# Patient Record
Sex: FEMALE | Race: WHITE | Employment: OTHER | URBAN - NONMETROPOLITAN AREA
[De-identification: names, ages, dates, MRNs, and addresses within clinical notes are randomized per-mention and may not be internally consistent; named-entity substitution may affect disease eponyms.]

---

## 2017-01-05 ENCOUNTER — OFFICE VISIT (OUTPATIENT)
Dept: OTHER | Age: 67
End: 2017-01-05

## 2017-01-05 ENCOUNTER — OFFICE VISIT (OUTPATIENT)
Dept: FAMILY MEDICINE CLINIC | Age: 67
End: 2017-01-05

## 2017-01-05 VITALS — BODY MASS INDEX: 35.37 KG/M2 | HEIGHT: 62 IN | WEIGHT: 192.2 LBS

## 2017-01-05 VITALS
HEART RATE: 68 BPM | DIASTOLIC BLOOD PRESSURE: 72 MMHG | WEIGHT: 193.6 LBS | SYSTOLIC BLOOD PRESSURE: 118 MMHG | HEIGHT: 62 IN | BODY MASS INDEX: 35.63 KG/M2 | RESPIRATION RATE: 20 BRPM | TEMPERATURE: 97.8 F

## 2017-01-05 DIAGNOSIS — F32.A DEPRESSION, UNSPECIFIED DEPRESSION TYPE: ICD-10-CM

## 2017-01-05 DIAGNOSIS — E11.65 TYPE 2 DIABETES MELLITUS WITH HYPERGLYCEMIA, UNSPECIFIED LONG TERM INSULIN USE STATUS: Primary | ICD-10-CM

## 2017-01-05 DIAGNOSIS — E66.09 NON MORBID OBESITY DUE TO EXCESS CALORIES: ICD-10-CM

## 2017-01-05 DIAGNOSIS — E78.5 HYPERLIPIDEMIA, UNSPECIFIED HYPERLIPIDEMIA TYPE: ICD-10-CM

## 2017-01-05 DIAGNOSIS — M54.40 ACUTE MIDLINE LOW BACK PAIN WITH SCIATICA, SCIATICA LATERALITY UNSPECIFIED: ICD-10-CM

## 2017-01-05 DIAGNOSIS — K21.9 GASTROESOPHAGEAL REFLUX DISEASE, ESOPHAGITIS PRESENCE NOT SPECIFIED: ICD-10-CM

## 2017-01-05 DIAGNOSIS — E03.9 HYPOTHYROIDISM, UNSPECIFIED TYPE: ICD-10-CM

## 2017-01-05 DIAGNOSIS — I10 ESSENTIAL HYPERTENSION: ICD-10-CM

## 2017-01-05 DIAGNOSIS — E11.8 TYPE 2 DIABETES MELLITUS WITH COMPLICATION, WITHOUT LONG-TERM CURRENT USE OF INSULIN (HCC): Primary | ICD-10-CM

## 2017-01-05 PROCEDURE — 99214 OFFICE O/P EST MOD 30 MIN: CPT | Performed by: NURSE PRACTITIONER

## 2017-01-05 PROCEDURE — 97803 MED NUTRITION INDIV SUBSEQ: CPT | Performed by: DIETITIAN, REGISTERED

## 2017-01-05 RX ORDER — GLIMEPIRIDE 2 MG/1
TABLET ORAL
Qty: 180 TABLET | Refills: 3 | Status: SHIPPED | OUTPATIENT
Start: 2017-01-05 | End: 2018-02-14 | Stop reason: SDUPTHER

## 2017-01-05 RX ORDER — ESCITALOPRAM OXALATE 20 MG/1
TABLET ORAL
Qty: 90 TABLET | Refills: 3 | Status: SHIPPED | OUTPATIENT
Start: 2017-01-05 | End: 2018-02-14 | Stop reason: SDUPTHER

## 2017-01-05 RX ORDER — ATENOLOL 50 MG/1
TABLET ORAL
Qty: 45 TABLET | Refills: 3 | Status: SHIPPED | OUTPATIENT
Start: 2017-01-05 | End: 2018-02-14 | Stop reason: SDUPTHER

## 2017-01-05 RX ORDER — PANTOPRAZOLE SODIUM 40 MG/1
TABLET, DELAYED RELEASE ORAL
Qty: 180 TABLET | Refills: 3 | Status: SHIPPED | OUTPATIENT
Start: 2017-01-05 | End: 2018-02-14 | Stop reason: SDUPTHER

## 2017-01-05 RX ORDER — ATORVASTATIN CALCIUM 40 MG/1
TABLET, FILM COATED ORAL
Qty: 90 TABLET | Refills: 3 | Status: SHIPPED | OUTPATIENT
Start: 2017-01-05 | End: 2018-02-14 | Stop reason: SDUPTHER

## 2017-01-05 RX ORDER — LEVOTHYROXINE SODIUM 0.05 MG/1
TABLET ORAL
Qty: 90 TABLET | Refills: 3 | Status: SHIPPED | OUTPATIENT
Start: 2017-01-05 | End: 2018-02-14 | Stop reason: SDUPTHER

## 2017-01-05 ASSESSMENT — ENCOUNTER SYMPTOMS
COUGH: 0
CONSTIPATION: 1
NAUSEA: 0
WHEEZING: 0
BACK PAIN: 1
DIARRHEA: 0
ABDOMINAL PAIN: 0
TROUBLE SWALLOWING: 0
BLOOD IN STOOL: 0
SHORTNESS OF BREATH: 0
ABDOMINAL DISTENTION: 0
ANAL BLEEDING: 0
PHOTOPHOBIA: 0
APNEA: 0
VOMITING: 0
VOICE CHANGE: 0

## 2017-02-09 ENCOUNTER — TELEPHONE (OUTPATIENT)
Dept: FAMILY MEDICINE CLINIC | Age: 67
End: 2017-02-09

## 2017-02-16 ENCOUNTER — TELEPHONE (OUTPATIENT)
Dept: OTHER | Age: 67
End: 2017-02-16

## 2017-02-23 ENCOUNTER — OFFICE VISIT (OUTPATIENT)
Dept: CARDIOLOGY | Age: 67
End: 2017-02-23

## 2017-02-23 VITALS
BODY MASS INDEX: 36.06 KG/M2 | DIASTOLIC BLOOD PRESSURE: 78 MMHG | HEART RATE: 61 BPM | WEIGHT: 191 LBS | SYSTOLIC BLOOD PRESSURE: 148 MMHG | HEIGHT: 61 IN

## 2017-02-23 DIAGNOSIS — I25.10 CORONARY ARTERY DISEASE INVOLVING NATIVE CORONARY ARTERY OF NATIVE HEART WITHOUT ANGINA PECTORIS: ICD-10-CM

## 2017-02-23 DIAGNOSIS — E78.01 FAMILIAL HYPERCHOLESTEROLEMIA: ICD-10-CM

## 2017-02-23 DIAGNOSIS — I10 ESSENTIAL HYPERTENSION: Primary | ICD-10-CM

## 2017-02-23 PROCEDURE — G8598 ASA/ANTIPLAT THER USED: HCPCS | Performed by: NUCLEAR MEDICINE

## 2017-02-23 PROCEDURE — 93000 ELECTROCARDIOGRAM COMPLETE: CPT | Performed by: NUCLEAR MEDICINE

## 2017-02-23 PROCEDURE — 1090F PRES/ABSN URINE INCON ASSESS: CPT | Performed by: NUCLEAR MEDICINE

## 2017-02-23 PROCEDURE — 99213 OFFICE O/P EST LOW 20 MIN: CPT | Performed by: NUCLEAR MEDICINE

## 2017-02-23 PROCEDURE — 3017F COLORECTAL CA SCREEN DOC REV: CPT | Performed by: NUCLEAR MEDICINE

## 2017-02-23 PROCEDURE — 4040F PNEUMOC VAC/ADMIN/RCVD: CPT | Performed by: NUCLEAR MEDICINE

## 2017-02-23 PROCEDURE — 1123F ACP DISCUSS/DSCN MKR DOCD: CPT | Performed by: NUCLEAR MEDICINE

## 2017-02-23 PROCEDURE — G8400 PT W/DXA NO RESULTS DOC: HCPCS | Performed by: NUCLEAR MEDICINE

## 2017-02-23 PROCEDURE — G8427 DOCREV CUR MEDS BY ELIG CLIN: HCPCS | Performed by: NUCLEAR MEDICINE

## 2017-02-23 PROCEDURE — 3014F SCREEN MAMMO DOC REV: CPT | Performed by: NUCLEAR MEDICINE

## 2017-02-23 PROCEDURE — G8417 CALC BMI ABV UP PARAM F/U: HCPCS | Performed by: NUCLEAR MEDICINE

## 2017-02-23 PROCEDURE — 1036F TOBACCO NON-USER: CPT | Performed by: NUCLEAR MEDICINE

## 2017-02-23 PROCEDURE — G8484 FLU IMMUNIZE NO ADMIN: HCPCS | Performed by: NUCLEAR MEDICINE

## 2017-03-23 ENCOUNTER — OFFICE VISIT (OUTPATIENT)
Dept: OTHER | Age: 67
End: 2017-03-23

## 2017-03-23 VITALS — HEIGHT: 61 IN | WEIGHT: 190.6 LBS | BODY MASS INDEX: 35.98 KG/M2

## 2017-03-23 DIAGNOSIS — E11.8 TYPE 2 DIABETES MELLITUS WITH COMPLICATION, WITHOUT LONG-TERM CURRENT USE OF INSULIN (HCC): Primary | ICD-10-CM

## 2017-04-05 ENCOUNTER — OFFICE VISIT (OUTPATIENT)
Dept: FAMILY MEDICINE CLINIC | Age: 67
End: 2017-04-05

## 2017-04-05 VITALS
TEMPERATURE: 97.9 F | RESPIRATION RATE: 16 BRPM | WEIGHT: 189.6 LBS | BODY MASS INDEX: 35.82 KG/M2 | HEART RATE: 64 BPM | SYSTOLIC BLOOD PRESSURE: 124 MMHG | DIASTOLIC BLOOD PRESSURE: 80 MMHG

## 2017-04-05 DIAGNOSIS — I10 ESSENTIAL HYPERTENSION: ICD-10-CM

## 2017-04-05 DIAGNOSIS — J20.9 ACUTE BRONCHITIS WITH BRONCHOSPASM: ICD-10-CM

## 2017-04-05 DIAGNOSIS — E66.09 NON MORBID OBESITY DUE TO EXCESS CALORIES: ICD-10-CM

## 2017-04-05 DIAGNOSIS — E78.5 HYPERLIPIDEMIA, UNSPECIFIED HYPERLIPIDEMIA TYPE: Primary | ICD-10-CM

## 2017-04-05 DIAGNOSIS — E11.8 TYPE 2 DIABETES MELLITUS WITH COMPLICATION, WITHOUT LONG-TERM CURRENT USE OF INSULIN (HCC): ICD-10-CM

## 2017-04-05 DIAGNOSIS — R05.9 COUGH: ICD-10-CM

## 2017-04-05 PROCEDURE — G8598 ASA/ANTIPLAT THER USED: HCPCS | Performed by: NURSE PRACTITIONER

## 2017-04-05 PROCEDURE — 3014F SCREEN MAMMO DOC REV: CPT | Performed by: NURSE PRACTITIONER

## 2017-04-05 PROCEDURE — 1090F PRES/ABSN URINE INCON ASSESS: CPT | Performed by: NURSE PRACTITIONER

## 2017-04-05 PROCEDURE — 3017F COLORECTAL CA SCREEN DOC REV: CPT | Performed by: NURSE PRACTITIONER

## 2017-04-05 PROCEDURE — G8417 CALC BMI ABV UP PARAM F/U: HCPCS | Performed by: NURSE PRACTITIONER

## 2017-04-05 PROCEDURE — G8427 DOCREV CUR MEDS BY ELIG CLIN: HCPCS | Performed by: NURSE PRACTITIONER

## 2017-04-05 PROCEDURE — 4040F PNEUMOC VAC/ADMIN/RCVD: CPT | Performed by: NURSE PRACTITIONER

## 2017-04-05 PROCEDURE — 1036F TOBACCO NON-USER: CPT | Performed by: NURSE PRACTITIONER

## 2017-04-05 PROCEDURE — 3045F PR MOST RECENT HEMOGLOBIN A1C LEVEL 7.0-9.0%: CPT | Performed by: NURSE PRACTITIONER

## 2017-04-05 PROCEDURE — G8400 PT W/DXA NO RESULTS DOC: HCPCS | Performed by: NURSE PRACTITIONER

## 2017-04-05 PROCEDURE — 1123F ACP DISCUSS/DSCN MKR DOCD: CPT | Performed by: NURSE PRACTITIONER

## 2017-04-05 PROCEDURE — 99214 OFFICE O/P EST MOD 30 MIN: CPT | Performed by: NURSE PRACTITIONER

## 2017-04-05 RX ORDER — CEFDINIR 300 MG/1
300 CAPSULE ORAL EVERY 12 HOURS
Qty: 20 CAPSULE | Refills: 0 | Status: SHIPPED | OUTPATIENT
Start: 2017-04-05 | End: 2017-04-15

## 2017-04-05 RX ORDER — BENZONATATE 100 MG/1
100 CAPSULE ORAL 3 TIMES DAILY PRN
Qty: 21 CAPSULE | Refills: 0 | Status: SHIPPED | OUTPATIENT
Start: 2017-04-05 | End: 2017-04-12

## 2017-04-05 ASSESSMENT — ENCOUNTER SYMPTOMS
DIARRHEA: 0
PHOTOPHOBIA: 0
VOMITING: 0
ABDOMINAL DISTENTION: 0
RHINORRHEA: 0
SINUS PRESSURE: 1
BLOOD IN STOOL: 0
WHEEZING: 1
VOICE CHANGE: 1
BACK PAIN: 1
COUGH: 1
TROUBLE SWALLOWING: 0
APNEA: 0
SHORTNESS OF BREATH: 0
ANAL BLEEDING: 0
ABDOMINAL PAIN: 0
NAUSEA: 0
CONSTIPATION: 0

## 2017-04-05 ASSESSMENT — PATIENT HEALTH QUESTIONNAIRE - PHQ9
1. LITTLE INTEREST OR PLEASURE IN DOING THINGS: 0
SUM OF ALL RESPONSES TO PHQ9 QUESTIONS 1 & 2: 0
SUM OF ALL RESPONSES TO PHQ QUESTIONS 1-9: 0
2. FEELING DOWN, DEPRESSED OR HOPELESS: 0

## 2017-04-18 ENCOUNTER — TELEPHONE (OUTPATIENT)
Dept: FAMILY MEDICINE CLINIC | Age: 67
End: 2017-04-18

## 2017-05-09 ENCOUNTER — TELEPHONE (OUTPATIENT)
Dept: FAMILY MEDICINE CLINIC | Age: 67
End: 2017-05-09

## 2017-05-12 ENCOUNTER — TELEPHONE (OUTPATIENT)
Dept: ENDOCRINOLOGY | Age: 67
End: 2017-05-12

## 2017-05-15 ENCOUNTER — OFFICE VISIT (OUTPATIENT)
Dept: ENDOCRINOLOGY | Age: 67
End: 2017-05-15

## 2017-05-15 VITALS
BODY MASS INDEX: 35.78 KG/M2 | WEIGHT: 189.5 LBS | DIASTOLIC BLOOD PRESSURE: 72 MMHG | HEIGHT: 61 IN | RESPIRATION RATE: 20 BRPM | SYSTOLIC BLOOD PRESSURE: 148 MMHG | HEART RATE: 65 BPM

## 2017-05-15 DIAGNOSIS — E11.8 TYPE 2 DIABETES MELLITUS WITH COMPLICATION, WITHOUT LONG-TERM CURRENT USE OF INSULIN (HCC): Primary | ICD-10-CM

## 2017-05-15 DIAGNOSIS — E03.9 ACQUIRED HYPOTHYROIDISM: ICD-10-CM

## 2017-05-15 PROCEDURE — 1036F TOBACCO NON-USER: CPT | Performed by: CLINICAL NURSE SPECIALIST

## 2017-05-15 PROCEDURE — G8400 PT W/DXA NO RESULTS DOC: HCPCS | Performed by: CLINICAL NURSE SPECIALIST

## 2017-05-15 PROCEDURE — G8427 DOCREV CUR MEDS BY ELIG CLIN: HCPCS | Performed by: CLINICAL NURSE SPECIALIST

## 2017-05-15 PROCEDURE — 1090F PRES/ABSN URINE INCON ASSESS: CPT | Performed by: CLINICAL NURSE SPECIALIST

## 2017-05-15 PROCEDURE — 1123F ACP DISCUSS/DSCN MKR DOCD: CPT | Performed by: CLINICAL NURSE SPECIALIST

## 2017-05-15 PROCEDURE — 3017F COLORECTAL CA SCREEN DOC REV: CPT | Performed by: CLINICAL NURSE SPECIALIST

## 2017-05-15 PROCEDURE — G8417 CALC BMI ABV UP PARAM F/U: HCPCS | Performed by: CLINICAL NURSE SPECIALIST

## 2017-05-15 PROCEDURE — 4040F PNEUMOC VAC/ADMIN/RCVD: CPT | Performed by: CLINICAL NURSE SPECIALIST

## 2017-05-15 PROCEDURE — G8598 ASA/ANTIPLAT THER USED: HCPCS | Performed by: CLINICAL NURSE SPECIALIST

## 2017-05-15 PROCEDURE — 3045F PR MOST RECENT HEMOGLOBIN A1C LEVEL 7.0-9.0%: CPT | Performed by: CLINICAL NURSE SPECIALIST

## 2017-05-15 PROCEDURE — 99214 OFFICE O/P EST MOD 30 MIN: CPT | Performed by: CLINICAL NURSE SPECIALIST

## 2017-05-15 PROCEDURE — 3014F SCREEN MAMMO DOC REV: CPT | Performed by: CLINICAL NURSE SPECIALIST

## 2017-05-15 RX ORDER — METFORMIN HYDROCHLORIDE 500 MG/1
500 TABLET, EXTENDED RELEASE ORAL
Qty: 30 TABLET | Refills: 3 | Status: SHIPPED | OUTPATIENT
Start: 2017-05-15 | End: 2017-06-05 | Stop reason: SDUPTHER

## 2017-05-15 ASSESSMENT — ENCOUNTER SYMPTOMS
CHEST TIGHTNESS: 0
COUGH: 0
ABDOMINAL PAIN: 0
EYE REDNESS: 0
NAUSEA: 0
WHEEZING: 0
CONSTIPATION: 0
DIARRHEA: 0
VOICE CHANGE: 0
SHORTNESS OF BREATH: 0

## 2017-06-05 ENCOUNTER — TELEPHONE (OUTPATIENT)
Dept: ENDOCRINOLOGY | Age: 67
End: 2017-06-05

## 2017-06-05 RX ORDER — METFORMIN HYDROCHLORIDE 500 MG/1
TABLET, EXTENDED RELEASE ORAL
Qty: 30 TABLET | Refills: 3
Start: 2017-06-05 | End: 2017-06-06 | Stop reason: SDUPTHER

## 2017-06-06 RX ORDER — METFORMIN HYDROCHLORIDE 500 MG/1
TABLET, EXTENDED RELEASE ORAL
Qty: 60 TABLET | Refills: 3 | Status: SHIPPED | OUTPATIENT
Start: 2017-06-06 | End: 2017-07-26 | Stop reason: SDUPTHER

## 2017-06-19 ENCOUNTER — TELEPHONE (OUTPATIENT)
Dept: ENDOCRINOLOGY | Age: 67
End: 2017-06-19

## 2017-06-20 ENCOUNTER — TELEPHONE (OUTPATIENT)
Dept: ENDOCRINOLOGY | Age: 67
End: 2017-06-20

## 2017-06-22 ENCOUNTER — OFFICE VISIT (OUTPATIENT)
Dept: OTHER | Age: 67
End: 2017-06-22

## 2017-06-22 VITALS — BODY MASS INDEX: 34.7 KG/M2 | HEIGHT: 61 IN | WEIGHT: 183.8 LBS

## 2017-06-22 DIAGNOSIS — E11.8 TYPE 2 DIABETES MELLITUS WITH COMPLICATION, WITHOUT LONG-TERM CURRENT USE OF INSULIN (HCC): Primary | ICD-10-CM

## 2017-06-26 ENCOUNTER — TELEPHONE (OUTPATIENT)
Dept: FAMILY MEDICINE CLINIC | Age: 67
End: 2017-06-26

## 2017-06-26 ENCOUNTER — TELEPHONE (OUTPATIENT)
Dept: CARDIOLOGY | Age: 67
End: 2017-06-26

## 2017-06-26 DIAGNOSIS — E11.8 TYPE 2 DIABETES MELLITUS WITH COMPLICATION, WITHOUT LONG-TERM CURRENT USE OF INSULIN (HCC): Primary | ICD-10-CM

## 2017-06-27 ENCOUNTER — TELEPHONE (OUTPATIENT)
Dept: ENDOCRINOLOGY | Age: 67
End: 2017-06-27

## 2017-06-27 DIAGNOSIS — E03.9 ACQUIRED HYPOTHYROIDISM: ICD-10-CM

## 2017-06-27 DIAGNOSIS — E11.8 TYPE 2 DIABETES MELLITUS WITH COMPLICATION, WITHOUT LONG-TERM CURRENT USE OF INSULIN (HCC): Primary | ICD-10-CM

## 2017-07-10 ENCOUNTER — OFFICE VISIT (OUTPATIENT)
Dept: FAMILY MEDICINE CLINIC | Age: 67
End: 2017-07-10

## 2017-07-10 VITALS
SYSTOLIC BLOOD PRESSURE: 132 MMHG | HEART RATE: 64 BPM | WEIGHT: 185.6 LBS | HEIGHT: 61 IN | DIASTOLIC BLOOD PRESSURE: 86 MMHG | TEMPERATURE: 98.5 F | RESPIRATION RATE: 14 BRPM | BODY MASS INDEX: 35.04 KG/M2

## 2017-07-10 DIAGNOSIS — M25.561 RIGHT KNEE PAIN, UNSPECIFIED CHRONICITY: ICD-10-CM

## 2017-07-10 DIAGNOSIS — Z87.898 HISTORY OF POSTOPERATIVE NAUSEA AND VOMITING: ICD-10-CM

## 2017-07-10 DIAGNOSIS — Z01.818 PREOPERATIVE CLEARANCE: Primary | ICD-10-CM

## 2017-07-10 PROCEDURE — 1123F ACP DISCUSS/DSCN MKR DOCD: CPT | Performed by: NURSE PRACTITIONER

## 2017-07-10 PROCEDURE — G8598 ASA/ANTIPLAT THER USED: HCPCS | Performed by: NURSE PRACTITIONER

## 2017-07-10 PROCEDURE — 99214 OFFICE O/P EST MOD 30 MIN: CPT | Performed by: NURSE PRACTITIONER

## 2017-07-10 PROCEDURE — 4040F PNEUMOC VAC/ADMIN/RCVD: CPT | Performed by: NURSE PRACTITIONER

## 2017-07-10 PROCEDURE — 1090F PRES/ABSN URINE INCON ASSESS: CPT | Performed by: NURSE PRACTITIONER

## 2017-07-10 PROCEDURE — 1036F TOBACCO NON-USER: CPT | Performed by: NURSE PRACTITIONER

## 2017-07-10 PROCEDURE — G8400 PT W/DXA NO RESULTS DOC: HCPCS | Performed by: NURSE PRACTITIONER

## 2017-07-10 PROCEDURE — 3014F SCREEN MAMMO DOC REV: CPT | Performed by: NURSE PRACTITIONER

## 2017-07-10 PROCEDURE — 3017F COLORECTAL CA SCREEN DOC REV: CPT | Performed by: NURSE PRACTITIONER

## 2017-07-10 PROCEDURE — G8427 DOCREV CUR MEDS BY ELIG CLIN: HCPCS | Performed by: NURSE PRACTITIONER

## 2017-07-10 PROCEDURE — G8417 CALC BMI ABV UP PARAM F/U: HCPCS | Performed by: NURSE PRACTITIONER

## 2017-07-10 ASSESSMENT — ENCOUNTER SYMPTOMS
DIARRHEA: 0
ABDOMINAL PAIN: 0
VOMITING: 0
SHORTNESS OF BREATH: 0
BLOOD IN STOOL: 0
ANAL BLEEDING: 0
PHOTOPHOBIA: 0
ABDOMINAL DISTENTION: 0
APNEA: 0
BACK PAIN: 0
CONSTIPATION: 0
VOICE CHANGE: 0
COUGH: 0
WHEEZING: 0
NAUSEA: 0
TROUBLE SWALLOWING: 0

## 2017-07-18 ENCOUNTER — TELEPHONE (OUTPATIENT)
Dept: FAMILY MEDICINE CLINIC | Age: 67
End: 2017-07-18

## 2017-07-26 ENCOUNTER — OFFICE VISIT (OUTPATIENT)
Dept: FAMILY MEDICINE CLINIC | Age: 67
End: 2017-07-26
Payer: MEDICARE

## 2017-07-26 VITALS
TEMPERATURE: 98.2 F | RESPIRATION RATE: 16 BRPM | DIASTOLIC BLOOD PRESSURE: 60 MMHG | HEART RATE: 64 BPM | SYSTOLIC BLOOD PRESSURE: 110 MMHG | HEIGHT: 62 IN | BODY MASS INDEX: 34.04 KG/M2 | WEIGHT: 185 LBS

## 2017-07-26 DIAGNOSIS — I10 ESSENTIAL HYPERTENSION: ICD-10-CM

## 2017-07-26 DIAGNOSIS — E78.5 HYPERLIPIDEMIA, UNSPECIFIED HYPERLIPIDEMIA TYPE: ICD-10-CM

## 2017-07-26 DIAGNOSIS — K21.9 GASTROESOPHAGEAL REFLUX DISEASE, ESOPHAGITIS PRESENCE NOT SPECIFIED: ICD-10-CM

## 2017-07-26 DIAGNOSIS — D64.9 ANEMIA, UNSPECIFIED TYPE: ICD-10-CM

## 2017-07-26 DIAGNOSIS — E11.8 TYPE 2 DIABETES MELLITUS WITH COMPLICATION, WITHOUT LONG-TERM CURRENT USE OF INSULIN (HCC): Primary | ICD-10-CM

## 2017-07-26 DIAGNOSIS — E66.09 NON MORBID OBESITY DUE TO EXCESS CALORIES: ICD-10-CM

## 2017-07-26 DIAGNOSIS — Z23 NEED FOR STREPTOCOCCUS PNEUMONIAE VACCINATION: ICD-10-CM

## 2017-07-26 PROCEDURE — 1123F ACP DISCUSS/DSCN MKR DOCD: CPT | Performed by: NURSE PRACTITIONER

## 2017-07-26 PROCEDURE — G0009 ADMIN PNEUMOCOCCAL VACCINE: HCPCS | Performed by: NURSE PRACTITIONER

## 2017-07-26 PROCEDURE — 1036F TOBACCO NON-USER: CPT | Performed by: NURSE PRACTITIONER

## 2017-07-26 PROCEDURE — 90732 PPSV23 VACC 2 YRS+ SUBQ/IM: CPT | Performed by: NURSE PRACTITIONER

## 2017-07-26 PROCEDURE — 3014F SCREEN MAMMO DOC REV: CPT | Performed by: NURSE PRACTITIONER

## 2017-07-26 PROCEDURE — G8417 CALC BMI ABV UP PARAM F/U: HCPCS | Performed by: NURSE PRACTITIONER

## 2017-07-26 PROCEDURE — 3046F HEMOGLOBIN A1C LEVEL >9.0%: CPT | Performed by: NURSE PRACTITIONER

## 2017-07-26 PROCEDURE — G8400 PT W/DXA NO RESULTS DOC: HCPCS | Performed by: NURSE PRACTITIONER

## 2017-07-26 PROCEDURE — 4040F PNEUMOC VAC/ADMIN/RCVD: CPT | Performed by: NURSE PRACTITIONER

## 2017-07-26 PROCEDURE — 3017F COLORECTAL CA SCREEN DOC REV: CPT | Performed by: NURSE PRACTITIONER

## 2017-07-26 PROCEDURE — G8427 DOCREV CUR MEDS BY ELIG CLIN: HCPCS | Performed by: NURSE PRACTITIONER

## 2017-07-26 PROCEDURE — 1090F PRES/ABSN URINE INCON ASSESS: CPT | Performed by: NURSE PRACTITIONER

## 2017-07-26 PROCEDURE — 99214 OFFICE O/P EST MOD 30 MIN: CPT | Performed by: NURSE PRACTITIONER

## 2017-07-26 PROCEDURE — G8598 ASA/ANTIPLAT THER USED: HCPCS | Performed by: NURSE PRACTITIONER

## 2017-07-26 RX ORDER — METFORMIN HYDROCHLORIDE 500 MG/1
1000 TABLET, EXTENDED RELEASE ORAL
Qty: 180 TABLET | Refills: 3 | Status: SHIPPED | OUTPATIENT
Start: 2017-07-26 | End: 2017-07-26 | Stop reason: SDUPTHER

## 2017-07-26 RX ORDER — METFORMIN HYDROCHLORIDE 500 MG/1
1000 TABLET, EXTENDED RELEASE ORAL
Qty: 180 TABLET | Refills: 3 | Status: SHIPPED | OUTPATIENT
Start: 2017-07-26 | End: 2017-07-27 | Stop reason: SDUPTHER

## 2017-07-26 ASSESSMENT — ENCOUNTER SYMPTOMS
WHEEZING: 0
CONSTIPATION: 0
DIARRHEA: 0
CHEST TIGHTNESS: 0
PHOTOPHOBIA: 0
BACK PAIN: 1
NAUSEA: 0
BLOOD IN STOOL: 0
ABDOMINAL PAIN: 0
VOICE CHANGE: 0
VOMITING: 0
ABDOMINAL DISTENTION: 0
APNEA: 0
TROUBLE SWALLOWING: 0
ANAL BLEEDING: 0
SHORTNESS OF BREATH: 0
COUGH: 0

## 2017-07-27 DIAGNOSIS — E11.8 TYPE 2 DIABETES MELLITUS WITH COMPLICATION, WITHOUT LONG-TERM CURRENT USE OF INSULIN (HCC): ICD-10-CM

## 2017-07-27 RX ORDER — METFORMIN HYDROCHLORIDE 500 MG/1
1000 TABLET, EXTENDED RELEASE ORAL
Qty: 180 TABLET | Refills: 3 | Status: SHIPPED | OUTPATIENT
Start: 2017-07-27 | End: 2018-04-18 | Stop reason: SDUPTHER

## 2017-09-05 ENCOUNTER — TELEPHONE (OUTPATIENT)
Dept: FAMILY MEDICINE CLINIC | Age: 67
End: 2017-09-05

## 2017-09-05 RX ORDER — CEFDINIR 300 MG/1
300 CAPSULE ORAL 2 TIMES DAILY
Qty: 20 CAPSULE | Refills: 0 | Status: SHIPPED | OUTPATIENT
Start: 2017-09-05 | End: 2017-09-15

## 2017-09-07 ENCOUNTER — TELEPHONE (OUTPATIENT)
Dept: FAMILY MEDICINE CLINIC | Age: 67
End: 2017-09-07

## 2017-09-20 ENCOUNTER — TELEPHONE (OUTPATIENT)
Dept: FAMILY MEDICINE CLINIC | Age: 67
End: 2017-09-20

## 2017-11-06 ENCOUNTER — OFFICE VISIT (OUTPATIENT)
Dept: FAMILY MEDICINE CLINIC | Age: 67
End: 2017-11-06
Payer: MEDICARE

## 2017-11-06 VITALS
BODY MASS INDEX: 33.75 KG/M2 | RESPIRATION RATE: 18 BRPM | SYSTOLIC BLOOD PRESSURE: 130 MMHG | HEART RATE: 64 BPM | DIASTOLIC BLOOD PRESSURE: 76 MMHG | TEMPERATURE: 97.8 F | WEIGHT: 183.4 LBS | HEIGHT: 62 IN

## 2017-11-06 DIAGNOSIS — K64.4 EXTERNAL HEMORRHOIDS: ICD-10-CM

## 2017-11-06 DIAGNOSIS — Z78.0 POSTMENOPAUSAL: ICD-10-CM

## 2017-11-06 DIAGNOSIS — E11.8 TYPE 2 DIABETES MELLITUS WITH COMPLICATION, WITHOUT LONG-TERM CURRENT USE OF INSULIN (HCC): ICD-10-CM

## 2017-11-06 DIAGNOSIS — Z01.419 ENCOUNTER FOR GYNECOLOGICAL EXAMINATION: Primary | ICD-10-CM

## 2017-11-06 DIAGNOSIS — D64.9 ANEMIA, UNSPECIFIED TYPE: ICD-10-CM

## 2017-11-06 DIAGNOSIS — Z23 NEED FOR INFLUENZA VACCINATION: ICD-10-CM

## 2017-11-06 PROCEDURE — G8598 ASA/ANTIPLAT THER USED: HCPCS | Performed by: NURSE PRACTITIONER

## 2017-11-06 PROCEDURE — 90662 IIV NO PRSV INCREASED AG IM: CPT | Performed by: NURSE PRACTITIONER

## 2017-11-06 PROCEDURE — G8484 FLU IMMUNIZE NO ADMIN: HCPCS | Performed by: NURSE PRACTITIONER

## 2017-11-06 PROCEDURE — 3045F PR MOST RECENT HEMOGLOBIN A1C LEVEL 7.0-9.0%: CPT | Performed by: NURSE PRACTITIONER

## 2017-11-06 PROCEDURE — G8400 PT W/DXA NO RESULTS DOC: HCPCS | Performed by: NURSE PRACTITIONER

## 2017-11-06 PROCEDURE — G8427 DOCREV CUR MEDS BY ELIG CLIN: HCPCS | Performed by: NURSE PRACTITIONER

## 2017-11-06 PROCEDURE — 4040F PNEUMOC VAC/ADMIN/RCVD: CPT | Performed by: NURSE PRACTITIONER

## 2017-11-06 PROCEDURE — 3017F COLORECTAL CA SCREEN DOC REV: CPT | Performed by: NURSE PRACTITIONER

## 2017-11-06 PROCEDURE — G8417 CALC BMI ABV UP PARAM F/U: HCPCS | Performed by: NURSE PRACTITIONER

## 2017-11-06 PROCEDURE — 1090F PRES/ABSN URINE INCON ASSESS: CPT | Performed by: NURSE PRACTITIONER

## 2017-11-06 PROCEDURE — 1123F ACP DISCUSS/DSCN MKR DOCD: CPT | Performed by: NURSE PRACTITIONER

## 2017-11-06 PROCEDURE — 1036F TOBACCO NON-USER: CPT | Performed by: NURSE PRACTITIONER

## 2017-11-06 PROCEDURE — 99214 OFFICE O/P EST MOD 30 MIN: CPT | Performed by: NURSE PRACTITIONER

## 2017-11-06 PROCEDURE — G0008 ADMIN INFLUENZA VIRUS VAC: HCPCS | Performed by: NURSE PRACTITIONER

## 2017-11-06 PROCEDURE — 3014F SCREEN MAMMO DOC REV: CPT | Performed by: NURSE PRACTITIONER

## 2017-11-06 ASSESSMENT — ENCOUNTER SYMPTOMS
NAUSEA: 0
WHEEZING: 0
SHORTNESS OF BREATH: 0
VOMITING: 0
BLOOD IN STOOL: 0
ABDOMINAL PAIN: 0
APNEA: 0
ANAL BLEEDING: 0
VOICE CHANGE: 0
DIARRHEA: 0
ABDOMINAL DISTENTION: 0
COUGH: 0
PHOTOPHOBIA: 0
BACK PAIN: 0
TROUBLE SWALLOWING: 0
CONSTIPATION: 1

## 2017-11-06 NOTE — PROGRESS NOTES
 Pneumococcal 13-valent Conjugate ([id number]) 02/24/2016    Pneumococcal Polysaccharide ([id number]) 03/01/2004, 07/26/2017    Tdap (Boostrix, Adacel) 09/19/2014    Zoster 04/26/2012        Health Maintenance   Topic Date Due    DEXA (modify frequency per FRAX score)  07/26/2057 (Originally 1/29/2016)    Breast cancer screen  07/26/2057 (Originally 6/26/2016)    Colon cancer screen colonoscopy  07/26/2057 (Originally 11/21/2015)    Diabetic foot exam  11/30/2017    Diabetic retinal exam  06/08/2018    Diabetic hemoglobin A1C test  06/23/2018    Diabetic microalbuminuria test  06/23/2018    Lipid screen  06/23/2018    DTaP/Tdap/Td vaccine (2 - Td) 09/19/2024    Zostavax vaccine  Completed    Flu vaccine  Completed    Pneumococcal low/med risk  Completed    Hepatitis C screen  Addressed        Lab Results   Component Value Date    WBC 5.9 07/03/2017    HGB 10.8 (L) 07/03/2017    HCT 34.3 (L) 07/03/2017     07/03/2017    CHOL 105 06/23/2017    TRIG 87 06/23/2017    HDL 36 06/23/2017    LDLDIRECT 79.69 05/14/2015    ALT 33 12/06/2016    AST 35 12/06/2016     07/03/2017    K 4.0 07/03/2017     07/03/2017    CREATININE 0.6 07/03/2017    BUN 10 07/03/2017    CO2 22 (L) 07/03/2017    TSH 3.710 06/23/2017    LABA1C 7.7 (H) 06/23/2017    LABMICR 3.32 06/23/2017       No results found for this visit on 11/06/17. Subjective:      Review of Systems   Constitutional: Negative for activity change, appetite change, chills, diaphoresis, fatigue, fever and unexpected weight change. HENT: Negative for dental problem, hearing loss, nosebleeds, tinnitus, trouble swallowing and voice change. Eyes: Negative for photophobia and visual disturbance. Respiratory: Negative for apnea, cough, shortness of breath and wheezing. Cardiovascular: Positive for leg swelling. Negative for chest pain and palpitations. Gastrointestinal: Positive for constipation.  Negative for abdominal distention, abdominal pain, anal bleeding, blood in stool, diarrhea, nausea and vomiting. Genitourinary: Negative for difficulty urinating, dysuria, flank pain, frequency, hematuria, pelvic pain, urgency, vaginal bleeding and vaginal discharge. Musculoskeletal: Positive for arthralgias. Negative for back pain, joint swelling, myalgias and neck stiffness. Skin: Positive for wound (Healing incisions right knee). Negative for rash. Allergic/Immunologic: Negative for immunocompromised state. Neurological: Negative for dizziness, tremors, seizures, syncope, weakness and headaches. Hematological: Negative for adenopathy. Does not bruise/bleed easily. Psychiatric/Behavioral: Negative for decreased concentration, dysphoric mood, sleep disturbance and suicidal ideas. The patient is not nervous/anxious. Objective:     /76 (Site: Left Arm, Position: Sitting)   Pulse 64   Temp 97.8 °F (36.6 °C) (Oral)   Resp 18   Ht 5' 2\" (1.575 m)   Wt 183 lb 6.4 oz (83.2 kg)   BMI 33.54 kg/m²     Body mass index is 33.54 kg/m². Physical Exam   Constitutional: She is oriented to person, place, and time. She appears well-developed and well-nourished. HENT:   Head: Normocephalic and atraumatic. Right Ear: External ear normal.   Left Ear: External ear normal.   Nose: Nose normal.   Mouth/Throat: Oropharynx is clear and moist. No oropharyngeal exudate. Eyes: Conjunctivae and EOM are normal. Pupils are equal, round, and reactive to light. No scleral icterus. Neck: Normal range of motion. Neck supple. No JVD present. No thyromegaly present. Cardiovascular: Normal rate, regular rhythm and normal heart sounds. Exam reveals no gallop and no friction rub. No murmur heard. Pulmonary/Chest: Effort normal and breath sounds normal. No respiratory distress. Abdominal: Soft. Bowel sounds are normal. She exhibits no distension and no mass. There is no tenderness.  Hernia confirmed negative in the right inguinal area and Provider Thom Lock   2/23/2018 12:30 PM Ren Subramanian MD [facility] [place]     Assessment:     1. Encounter for gynecological examination     2. Postmenopausal     3. Type 2 diabetes mellitus with complication, without long-term current use of insulin (HCC)  Hemoglobin A1C   4. Anemia, unspecified type     5. External hemorrhoids     6. Need for influenza vaccination  INFLUENZA, HIGH DOSE, 65 YRS +, IM, PF, PREFILL SYR, 0.5ML (FLUZONE HD)       Plan:         Orders Placed:  Orders Placed This Encounter   Procedures    INFLUENZA, HIGH DOSE, 65 YRS +, IM, PF, PREFILL SYR, 0.5ML (FLUZONE HD)    Hemoglobin A1C     Standing Status:   Future     Standing Expiration Date:   11/6/2018     Medications Prescribed:  No orders of the defined types were placed in this encounter. Mile Rodriguez received counseling on the following healthy behaviors: nutrition, exercise, safety issues and immunizations. She declines any further mammograms. The patient has been reminded to perform monthly breast self examinations. She declines any further DEXA scans. Take a calcium supplement with Vitamin D daily. The next pelvic exam is due in one year. She declines a colonoscopy at this time. Obtain lab tests as advised. Obtain at least 45 minutes of moderate exercise at least 3 times per week. Follow a low fat, low carbohydrate and low sodium diet. Take medications as prescribed. Wear eye protection (sunglasses) as needed to avoid unnecessary exposure to sunlight. Obtain regular dental care. Obtain annual eye exams as advised. Weight management is encouraged. Maintain safe habits, including wearing a seat belt, avoiding excessive speed and avoiding the use of a cell phone while driving. Avoid tobacco smoke, including second-hand smoke. Follow a well-balanced diet. \"Strive for [employer]" servings of fruits and vegetables daily.   Obtain at least 2 servings of

## 2017-11-06 NOTE — PATIENT INSTRUCTIONS
Orders Placed:  Orders Placed This Encounter   Procedures    INFLUENZA, HIGH DOSE, 65 YRS +, IM, PF, PREFILL SYR, 0.5ML (FLUZONE HD)    Hemoglobin A1C     Standing Status:   Future     Standing Expiration Date:   11/6/2018     Medications Prescribed:  No orders of the defined types were placed in this encounter. Leeann Garcia received counseling on the following healthy behaviors: nutrition, exercise, safety issues and immunizations. She declines any further mammograms. The patient has been reminded to perform monthly breast self examinations. She declines any further DEXA scans. Take a calcium supplement with Vitamin D daily. The next pelvic exam is due in one year. She declines a colonoscopy at this time. Obtain lab tests as advised. Obtain at least 45 minutes of moderate exercise at least 3 times per week. Follow a low fat, low carbohydrate and low sodium diet. Take medications as prescribed. Wear eye protection (sunglasses) as needed to avoid unnecessary exposure to sunlight. Obtain regular dental care. Obtain annual eye exams as advised. Weight management is encouraged. Maintain safe habits, including wearing a seat belt, avoiding excessive speed and avoiding the use of a cell phone while driving. Avoid tobacco smoke, including second-hand smoke. Follow a well-balanced diet. \"Strive for [employer]" servings of fruits and vegetables daily. Obtain at least 2 servings of calcium-rich food daily. Stay well hydrated with at least 64 ounces of water daily. Use sunscreen when outdoors. Vaccines provided in the office today include: High-dose influenza vaccine. Call with any concerns. Return in about 1 year (around 11/6/2018). Patient Education        Well Visit, Over 72: Care Instructions  Your Care Instructions  Physical exams can help you stay healthy.  Your doctor has checked your overall health and may have suggested ways to need a test if you ever smoked or if your parent, brother, sister, or child has had an aneurysm. When should you call for help? Watch closely for changes in your health, and be sure to contact your doctor if you have any problems or symptoms that concern you. Where can you learn more? Go to [email or web address] and sign in to your [employer] account. Enter T577 in the [employer] box to learn more about \"Well Visit, Over 65: Care Instructions. \"     If you do not have an account, please click on the \"Sign Up Now\" link. Current as of: July 19, 2016  Content Version: 11.3  © 4975-3581 [employer]. Care instructions adapted under license by [facility] ([facility]). If you have questions about a medical condition or this instruction, always ask your healthcare professional. [address] any warranty or liability for your use of this information.

## 2017-11-06 NOTE — PROGRESS NOTES
After obtaining consent, and per orders of Yemi Jaffe CNP, injection of Fluzone High Dose 0.5mL IM given in Right deltoid by Rosalia Hendrickson. Patient instructed to report any adverse reaction to me immediately. Patient tolerated well.

## 2018-01-09 ENCOUNTER — OFFICE VISIT (OUTPATIENT)
Dept: FAMILY MEDICINE CLINIC | Age: 68
End: 2018-01-09
Payer: MEDICARE

## 2018-01-09 ENCOUNTER — HOSPITAL ENCOUNTER (OUTPATIENT)
Dept: PHARMACY | Age: 68
Setting detail: THERAPIES SERIES
Discharge: HOME | End: 2018-01-09
Payer: MEDICARE

## 2018-01-09 ENCOUNTER — HOSPITAL ENCOUNTER (OUTPATIENT)
Age: 68
Discharge: HOME | End: 2018-01-09
Payer: MEDICARE

## 2018-01-09 VITALS
TEMPERATURE: 98.1 F | HEART RATE: 64 BPM | DIASTOLIC BLOOD PRESSURE: 68 MMHG | BODY MASS INDEX: 34.2 KG/M2 | RESPIRATION RATE: 16 BRPM | WEIGHT: 187 LBS | SYSTOLIC BLOOD PRESSURE: 130 MMHG

## 2018-01-09 DIAGNOSIS — I25.10 CORONARY ARTERY DISEASE INVOLVING NATIVE CORONARY ARTERY OF NATIVE HEART WITHOUT ANGINA PECTORIS: ICD-10-CM

## 2018-01-09 DIAGNOSIS — E11.8 TYPE 2 DIABETES MELLITUS WITH COMPLICATION, WITHOUT LONG-TERM CURRENT USE OF INSULIN (HCC): ICD-10-CM

## 2018-01-09 DIAGNOSIS — E78.5 HYPERLIPIDEMIA, UNSPECIFIED HYPERLIPIDEMIA TYPE: ICD-10-CM

## 2018-01-09 DIAGNOSIS — R05.9 COUGH: ICD-10-CM

## 2018-01-09 DIAGNOSIS — E66.09 CLASS 1 OBESITY DUE TO EXCESS CALORIES WITH SERIOUS COMORBIDITY AND BODY MASS INDEX (BMI) OF 34.0 TO 34.9 IN ADULT: ICD-10-CM

## 2018-01-09 DIAGNOSIS — R09.82 POST-NASAL DRIP: ICD-10-CM

## 2018-01-09 DIAGNOSIS — I10 ESSENTIAL HYPERTENSION: ICD-10-CM

## 2018-01-09 DIAGNOSIS — D64.9 ANEMIA, UNSPECIFIED TYPE: ICD-10-CM

## 2018-01-09 DIAGNOSIS — K76.0 NAFLD (NONALCOHOLIC FATTY LIVER DISEASE): ICD-10-CM

## 2018-01-09 DIAGNOSIS — E03.9 HYPOTHYROIDISM, UNSPECIFIED TYPE: ICD-10-CM

## 2018-01-09 DIAGNOSIS — J01.10 ACUTE NON-RECURRENT FRONTAL SINUSITIS: Primary | ICD-10-CM

## 2018-01-09 LAB
ALBUMIN SERPL-MCNC: 3.8 G/DL (ref 3.5–5.1)
ALP BLD-CCNC: 75 U/L (ref 38–126)
ALT SERPL-CCNC: 36 U/L (ref 11–66)
ANISOCYTOSIS: ABNORMAL
AST SERPL-CCNC: 70 U/L (ref 5–40)
AVERAGE GLUCOSE: 189 MG/DL (ref 70–126)
BASOPHILIA: SLIGHT
BASOPHILS # BLD: 0.5 %
BASOPHILS ABSOLUTE: 0 THOU/MM3 (ref 0–0.1)
BILIRUB SERPL-MCNC: 0.4 MG/DL (ref 0.3–1.2)
BILIRUBIN DIRECT: < 0.2 MG/DL (ref 0–0.3)
CRENATED RBC'S: ABNORMAL
ELLIPTOCYTES: ABNORMAL
EOSINOPHIL # BLD: 2.2 %
EOSINOPHILS ABSOLUTE: 0.1 THOU/MM3 (ref 0–0.4)
HBA1C MFR BLD: 8.3 % (ref 4.4–6.4)
HCT VFR BLD CALC: 33 % (ref 37–47)
HEMOGLOBIN: 10.2 GM/DL (ref 12–16)
HYPOCHROMIA: ABNORMAL
LYMPHOCYTES # BLD: 33.1 %
LYMPHOCYTES ABSOLUTE: 2 THOU/MM3 (ref 1–4.8)
MCH RBC QN AUTO: 20.8 PG (ref 27–31)
MCHC RBC AUTO-ENTMCNC: 30.9 GM/DL (ref 33–37)
MCV RBC AUTO: 67.4 FL (ref 81–99)
MICROCYTES: ABNORMAL
MONOCYTES # BLD: 9.2 %
MONOCYTES ABSOLUTE: 0.6 THOU/MM3 (ref 0.4–1.3)
NUCLEATED RED BLOOD CELLS: 0 /100 WBC
PDW BLD-RTO: 22.6 % (ref 11.5–14.5)
PLATELET # BLD: 136 THOU/MM3 (ref 130–400)
PLATELET ESTIMATE: ADEQUATE
PMV BLD AUTO: 8.4 MCM (ref 7.4–10.4)
POIKILOCYTES: ABNORMAL
RBC # BLD: 4.89 MILL/MM3 (ref 4.2–5.4)
SCAN OF BLOOD SMEAR: NORMAL
SEG NEUTROPHILS: 55 %
SEGMENTED NEUTROPHILS ABSOLUTE COUNT: 3.4 THOU/MM3 (ref 1.8–7.7)
TARGET CELLS: ABNORMAL
TOTAL PROTEIN: 6.7 G/DL (ref 6.1–8)
WBC # BLD: 6.1 THOU/MM3 (ref 4.8–10.8)

## 2018-01-09 PROCEDURE — 36415 COLL VENOUS BLD VENIPUNCTURE: CPT

## 2018-01-09 PROCEDURE — 83036 HEMOGLOBIN GLYCOSYLATED A1C: CPT

## 2018-01-09 PROCEDURE — 80076 HEPATIC FUNCTION PANEL: CPT

## 2018-01-09 PROCEDURE — G8427 DOCREV CUR MEDS BY ELIG CLIN: HCPCS | Performed by: FAMILY MEDICINE

## 2018-01-09 PROCEDURE — G8400 PT W/DXA NO RESULTS DOC: HCPCS | Performed by: FAMILY MEDICINE

## 2018-01-09 PROCEDURE — 4040F PNEUMOC VAC/ADMIN/RCVD: CPT | Performed by: FAMILY MEDICINE

## 2018-01-09 PROCEDURE — 3014F SCREEN MAMMO DOC REV: CPT | Performed by: FAMILY MEDICINE

## 2018-01-09 PROCEDURE — 3046F HEMOGLOBIN A1C LEVEL >9.0%: CPT | Performed by: FAMILY MEDICINE

## 2018-01-09 PROCEDURE — 1036F TOBACCO NON-USER: CPT | Performed by: FAMILY MEDICINE

## 2018-01-09 PROCEDURE — G8484 FLU IMMUNIZE NO ADMIN: HCPCS | Performed by: FAMILY MEDICINE

## 2018-01-09 PROCEDURE — 99213 OFFICE O/P EST LOW 20 MIN: CPT | Performed by: FAMILY MEDICINE

## 2018-01-09 PROCEDURE — 1090F PRES/ABSN URINE INCON ASSESS: CPT | Performed by: FAMILY MEDICINE

## 2018-01-09 PROCEDURE — 3017F COLORECTAL CA SCREEN DOC REV: CPT | Performed by: FAMILY MEDICINE

## 2018-01-09 PROCEDURE — 85025 COMPLETE CBC W/AUTO DIFF WBC: CPT

## 2018-01-09 PROCEDURE — 1123F ACP DISCUSS/DSCN MKR DOCD: CPT | Performed by: FAMILY MEDICINE

## 2018-01-09 PROCEDURE — G8598 ASA/ANTIPLAT THER USED: HCPCS | Performed by: FAMILY MEDICINE

## 2018-01-09 PROCEDURE — G8417 CALC BMI ABV UP PARAM F/U: HCPCS | Performed by: FAMILY MEDICINE

## 2018-01-09 RX ORDER — AMOXICILLIN AND CLAVULANATE POTASSIUM 875; 125 MG/1; MG/1
1 TABLET, FILM COATED ORAL 2 TIMES DAILY
Qty: 20 TABLET | Refills: 0 | Status: SHIPPED | OUTPATIENT
Start: 2018-01-09 | End: 2018-01-19

## 2018-01-09 ASSESSMENT — ENCOUNTER SYMPTOMS
SHORTNESS OF BREATH: 0
SINUS PRESSURE: 1
COUGH: 1
EYE PAIN: 0
ABDOMINAL PAIN: 0
ANAL BLEEDING: 0
CONSTIPATION: 0
EYE ITCHING: 0
RHINORRHEA: 1
BLOOD IN STOOL: 0
VOMITING: 0
SORE THROAT: 0
EYE DISCHARGE: 0
EYE REDNESS: 0
SINUS PAIN: 1
DIARRHEA: 0
CHEST TIGHTNESS: 0
NAUSEA: 0

## 2018-01-09 NOTE — PROGRESS NOTES
[facility]  [facility]  Dept: [phone]  Dept Fax: [phone]  JACOB Lindsey is a 78 y. o.female    Pt complains of cough, chest congestion since 12/26/22017- pt using Coricidin HBP with minimal benefit. Glucometer readings at home are running \"higher\" as pt does not watch diet on a regular basis. (Avg 163 for past 7 days). Checking 1-2x/day per pt report. Patient Active Problem List   Diagnosis    Hyperlipidemia    GERD (gastroesophageal reflux disease)    Essential hypertension    Insomnia    Type 2 diabetes mellitus ([facility] [address]    CAD (coronary artery disease)    Class 1 obesity due to excess calories with serious comorbidity and body mass index (BMI) of 34.0 to 34.9 in adult    Elevated liver enzymes    NAFLD (nonalcoholic fatty liver disease)--Dr. Sheba Ayala    Hypothyroidism    Microalbuminuria     Review of Systems   Constitutional: Negative for chills, diaphoresis, fatigue, fever and unexpected weight change. HENT: Positive for postnasal drip, rhinorrhea, sinus pain (frontal) and sinus pressure. Negative for ear discharge, ear pain and sore throat. Eyes: Negative for pain, discharge, redness, itching and visual disturbance. Respiratory: Positive for cough. Negative for chest tightness and shortness of breath. Cardiovascular: Positive for leg swelling (occasional, resolves at HS.  ). Negative for chest pain and palpitations. Gastrointestinal: Negative for abdominal pain, anal bleeding, blood in stool, constipation, diarrhea, nausea and vomiting. Genitourinary: Negative for dysuria and hematuria. Musculoskeletal: Negative for neck pain. Neurological: Positive for headaches (frontal). Negative for dizziness and light-headedness.      OBJECTIVE     /68 (Site: Left Arm)   Pulse 64   Temp 98.1 °F (36.7 °C) (Oral)   Resp 16   Wt 187 lb (84.8 kg)   BMI 34.20 kg/m²     Physical Exam   Constitutional: She is oriented Virus Vaccine 12/12/2012, 12/11/2013, 09/19/2014, 09/30/2015    Influenza, High Dose 11/06/2017    Influenza, Quadv, 3 Years and older, IM 11/21/2016    Pneumococcal 13-valent Conjugate ([id number]) 02/24/2016    Pneumococcal Polysaccharide ([id number]) 03/01/2004, 07/26/2017    Tdap (Boostrix, Adacel) 09/19/2014    Zoster 04/26/2012     Health Maintenance   Topic Date Due    Potassium monitoring  1950    Creatinine monitoring  1950    Diabetic foot exam  11/30/2017    DEXA (modify frequency per FRAX score)  07/26/2057 (Originally 1/29/2016)    Breast cancer screen  07/26/2057 (Originally 6/26/2016)    Colon cancer screen colonoscopy  07/26/2057 (Originally 11/21/2015)    Diabetic retinal exam  06/08/2018    A1C test (Diabetic or Prediabetic)  06/23/2018    Diabetic microalbuminuria test  06/23/2018    Lipid screen  06/23/2018    TSH testing  06/23/2018    DTaP/Tdap/Td vaccine (2 - Td) 09/19/2024    Zostavax vaccine  Completed    Flu vaccine  Completed    Pneumococcal low/med risk  Completed    Hepatitis C screen  Addressed         Future Appointments  Date Time Provider Thom Lock   1/9/2018 3:00 PM Brandon Harris RD, LD [facility]   2/23/2018 12:30 PM Joselin Frost MD [facility]      1. Acute non-recurrent frontal sinusitis  amoxicillin-clavulanate (AUGMENTIN) 875-125 MG per tablet   2. Cough     3. Post-nasal drip     4. Type 2 diabetes mellitus with complication, without long-term current use of insulin (HCC)  Hemoglobin A1C   5. Essential hypertension     6. Hyperlipidemia, unspecified hyperlipidemia type  Hepatic Function Panel   7. Coronary artery disease involving native coronary artery of native heart without angina pectoris     8. Class 1 obesity due to excess calories with serious comorbidity and body mass index (BMI) of 34.0 to 34.9 in adult     9. Hypothyroidism, unspecified type     10.  NAFLD (nonalcoholic

## 2018-01-09 NOTE — PROGRESS NOTES
Patient arrived for dietitian appt and was expecting to be with Tiesha Miller RD. Explained to pt that Faiza Garrido now works at [employer] in the [facility] program and is no longer with the [facility] Nutrition clinic. Pt refused to be seen by a different dietitian. Apology given, but pt turned around and walked out the department door.

## 2018-01-10 ENCOUNTER — TELEPHONE (OUTPATIENT)
Dept: FAMILY MEDICINE CLINIC | Age: 68
End: 2018-01-10

## 2018-01-10 DIAGNOSIS — R79.89 ELEVATED LFTS: ICD-10-CM

## 2018-01-10 DIAGNOSIS — E11.8 TYPE 2 DIABETES MELLITUS WITH COMPLICATION, WITHOUT LONG-TERM CURRENT USE OF INSULIN (HCC): ICD-10-CM

## 2018-01-10 DIAGNOSIS — I10 ESSENTIAL HYPERTENSION: ICD-10-CM

## 2018-01-10 DIAGNOSIS — D64.9 ANEMIA, UNSPECIFIED TYPE: Primary | ICD-10-CM

## 2018-01-10 NOTE — TELEPHONE ENCOUNTER
Would like to have the ordered tests, as we are trying to figure out the CAUSE of her anemia. If she will not let me, I don't have any choice- This is certainly not in her best interests medically. Increase Glucophage as instructed. Let me know.   ES

## 2018-02-08 ENCOUNTER — HOSPITAL ENCOUNTER (OUTPATIENT)
Age: 68
Discharge: HOME | End: 2018-02-08
Payer: MEDICARE

## 2018-02-08 DIAGNOSIS — E11.8 TYPE 2 DIABETES MELLITUS WITH COMPLICATION, WITHOUT LONG-TERM CURRENT USE OF INSULIN (HCC): ICD-10-CM

## 2018-02-08 DIAGNOSIS — I10 ESSENTIAL HYPERTENSION: ICD-10-CM

## 2018-02-08 DIAGNOSIS — R79.89 ELEVATED LFTS: ICD-10-CM

## 2018-02-08 LAB
ALT SERPL-CCNC: 20 U/L (ref 11–66)
AST SERPL-CCNC: 30 U/L (ref 5–40)
BUN BLDV-MCNC: 8 MG/DL (ref 7–22)
CREAT SERPL-MCNC: 0.5 MG/DL (ref 0.4–1.2)
GFR SERPL CREATININE-BSD FRML MDRD: > 90 ML/MIN/1.73M2

## 2018-02-08 PROCEDURE — 84450 TRANSFERASE (AST) (SGOT): CPT

## 2018-02-08 PROCEDURE — 36415 COLL VENOUS BLD VENIPUNCTURE: CPT

## 2018-02-08 PROCEDURE — 84520 ASSAY OF UREA NITROGEN: CPT

## 2018-02-08 PROCEDURE — 82565 ASSAY OF CREATININE: CPT

## 2018-02-08 PROCEDURE — 84460 ALANINE AMINO (ALT) (SGPT): CPT

## 2018-02-14 ENCOUNTER — OFFICE VISIT (OUTPATIENT)
Dept: FAMILY MEDICINE CLINIC | Age: 68
End: 2018-02-14
Payer: MEDICARE

## 2018-02-14 VITALS
HEART RATE: 80 BPM | SYSTOLIC BLOOD PRESSURE: 122 MMHG | WEIGHT: 184 LBS | DIASTOLIC BLOOD PRESSURE: 60 MMHG | TEMPERATURE: 98 F | BODY MASS INDEX: 33.65 KG/M2 | RESPIRATION RATE: 24 BRPM

## 2018-02-14 DIAGNOSIS — E03.9 HYPOTHYROIDISM, UNSPECIFIED TYPE: ICD-10-CM

## 2018-02-14 DIAGNOSIS — E78.5 HYPERLIPIDEMIA, UNSPECIFIED HYPERLIPIDEMIA TYPE: ICD-10-CM

## 2018-02-14 DIAGNOSIS — R82.998 LEUKOCYTES IN URINE: ICD-10-CM

## 2018-02-14 DIAGNOSIS — K21.9 GASTROESOPHAGEAL REFLUX DISEASE, ESOPHAGITIS PRESENCE NOT SPECIFIED: ICD-10-CM

## 2018-02-14 DIAGNOSIS — Z12.31 VISIT FOR SCREENING MAMMOGRAM: ICD-10-CM

## 2018-02-14 DIAGNOSIS — K64.9 HEMORRHOIDS, UNSPECIFIED HEMORRHOID TYPE: ICD-10-CM

## 2018-02-14 DIAGNOSIS — R30.0 DYSURIA: ICD-10-CM

## 2018-02-14 DIAGNOSIS — F32.A DEPRESSION, UNSPECIFIED DEPRESSION TYPE: ICD-10-CM

## 2018-02-14 DIAGNOSIS — R31.9 HEMATURIA, UNSPECIFIED TYPE: ICD-10-CM

## 2018-02-14 DIAGNOSIS — D64.9 ANEMIA, UNSPECIFIED TYPE: ICD-10-CM

## 2018-02-14 DIAGNOSIS — N39.0 URINARY TRACT INFECTION WITHOUT HEMATURIA, SITE UNSPECIFIED: ICD-10-CM

## 2018-02-14 DIAGNOSIS — I25.10 CORONARY ARTERY DISEASE INVOLVING NATIVE CORONARY ARTERY OF NATIVE HEART WITHOUT ANGINA PECTORIS: ICD-10-CM

## 2018-02-14 DIAGNOSIS — I10 ESSENTIAL HYPERTENSION: ICD-10-CM

## 2018-02-14 DIAGNOSIS — E11.65 TYPE 2 DIABETES MELLITUS WITH HYPERGLYCEMIA, UNSPECIFIED LONG TERM INSULIN USE STATUS: Primary | ICD-10-CM

## 2018-02-14 DIAGNOSIS — E66.09 CLASS 1 OBESITY DUE TO EXCESS CALORIES WITH SERIOUS COMORBIDITY AND BODY MASS INDEX (BMI) OF 34.0 TO 34.9 IN ADULT: ICD-10-CM

## 2018-02-14 LAB
BILIRUBIN URINE: NEGATIVE
BLOOD URINE, POC: ABNORMAL
CHARACTER, URINE: ABNORMAL
COLOR, URINE: ABNORMAL
GLUCOSE URINE: NEGATIVE MG/DL
KETONES, URINE: 15
LEUKOCYTE CLUMPS, URINE: ABNORMAL
NITRITE, URINE: POSITIVE
PH, URINE: 5.5
PROTEIN, URINE: >= 300 MG/DL
SPECIFIC GRAVITY, URINE: 1.02 (ref 1–1.03)
UROBILINOGEN, URINE: 1 EU/DL

## 2018-02-14 PROCEDURE — G8427 DOCREV CUR MEDS BY ELIG CLIN: HCPCS | Performed by: FAMILY MEDICINE

## 2018-02-14 PROCEDURE — 1036F TOBACCO NON-USER: CPT | Performed by: FAMILY MEDICINE

## 2018-02-14 PROCEDURE — G8417 CALC BMI ABV UP PARAM F/U: HCPCS | Performed by: FAMILY MEDICINE

## 2018-02-14 PROCEDURE — 1123F ACP DISCUSS/DSCN MKR DOCD: CPT | Performed by: FAMILY MEDICINE

## 2018-02-14 PROCEDURE — G8598 ASA/ANTIPLAT THER USED: HCPCS | Performed by: FAMILY MEDICINE

## 2018-02-14 PROCEDURE — 81003 URINALYSIS AUTO W/O SCOPE: CPT | Performed by: FAMILY MEDICINE

## 2018-02-14 PROCEDURE — G8484 FLU IMMUNIZE NO ADMIN: HCPCS | Performed by: FAMILY MEDICINE

## 2018-02-14 PROCEDURE — 99214 OFFICE O/P EST MOD 30 MIN: CPT | Performed by: FAMILY MEDICINE

## 2018-02-14 PROCEDURE — 3014F SCREEN MAMMO DOC REV: CPT | Performed by: FAMILY MEDICINE

## 2018-02-14 PROCEDURE — 3045F PR MOST RECENT HEMOGLOBIN A1C LEVEL 7.0-9.0%: CPT | Performed by: FAMILY MEDICINE

## 2018-02-14 PROCEDURE — 4040F PNEUMOC VAC/ADMIN/RCVD: CPT | Performed by: FAMILY MEDICINE

## 2018-02-14 PROCEDURE — G8400 PT W/DXA NO RESULTS DOC: HCPCS | Performed by: FAMILY MEDICINE

## 2018-02-14 PROCEDURE — 1090F PRES/ABSN URINE INCON ASSESS: CPT | Performed by: FAMILY MEDICINE

## 2018-02-14 PROCEDURE — 3017F COLORECTAL CA SCREEN DOC REV: CPT | Performed by: FAMILY MEDICINE

## 2018-02-14 RX ORDER — LEVOTHYROXINE SODIUM 0.05 MG/1
TABLET ORAL
Qty: 90 TABLET | Refills: 3 | Status: SHIPPED | OUTPATIENT
Start: 2018-02-14 | End: 2019-02-22 | Stop reason: SDUPTHER

## 2018-02-14 RX ORDER — GLIMEPIRIDE 2 MG/1
TABLET ORAL
Qty: 180 TABLET | Refills: 3 | Status: SHIPPED | OUTPATIENT
Start: 2018-02-14 | End: 2019-01-21 | Stop reason: SDUPTHER

## 2018-02-14 RX ORDER — CIPROFLOXACIN 500 MG/1
500 TABLET, FILM COATED ORAL 2 TIMES DAILY
Qty: 14 TABLET | Refills: 0 | Status: SHIPPED | OUTPATIENT
Start: 2018-02-14 | End: 2018-02-21

## 2018-02-14 RX ORDER — ATENOLOL 50 MG/1
TABLET ORAL
Qty: 45 TABLET | Refills: 3 | Status: SHIPPED | OUTPATIENT
Start: 2018-02-14 | End: 2019-02-22 | Stop reason: SDUPTHER

## 2018-02-14 RX ORDER — ESCITALOPRAM OXALATE 20 MG/1
TABLET ORAL
Qty: 90 TABLET | Refills: 3 | Status: SHIPPED | OUTPATIENT
Start: 2018-02-14 | End: 2019-05-04 | Stop reason: SDUPTHER

## 2018-02-14 RX ORDER — PANTOPRAZOLE SODIUM 40 MG/1
TABLET, DELAYED RELEASE ORAL
Qty: 180 TABLET | Refills: 3 | Status: SHIPPED | OUTPATIENT
Start: 2018-02-14 | End: 2019-05-04 | Stop reason: SDUPTHER

## 2018-02-14 RX ORDER — ATORVASTATIN CALCIUM 40 MG/1
TABLET, FILM COATED ORAL
Qty: 90 TABLET | Refills: 3 | Status: SHIPPED | OUTPATIENT
Start: 2018-02-14 | End: 2019-02-22 | Stop reason: SDUPTHER

## 2018-02-14 ASSESSMENT — ENCOUNTER SYMPTOMS
ANAL BLEEDING: 0
NAUSEA: 0
DIARRHEA: 0
VOMITING: 0
CONSTIPATION: 0
CHEST TIGHTNESS: 0
SHORTNESS OF BREATH: 0
ABDOMINAL PAIN: 0
BLOOD IN STOOL: 0

## 2018-02-14 NOTE — PROGRESS NOTES
Normal range of motion. Neurological: She is alert and oriented to person, place, and time. She has normal reflexes. Visual inspection:  Deformity/amputation: absent  Skin lesions/pre-ulcerative calluses: absent  Edema: right- negative, left- negative    Sensory exam:  Monofilament sensation: normal  (minimum of 5 random plantar locations tested, avoiding callused areas - > 1 area with absence of sensation is + for neuropathy)    Plus at least one of the following:  Pulses: normal     Skin: Skin is warm and dry. Psychiatric: She has a normal mood and affect. Her behavior is normal. Judgment and thought content normal.   Nursing note and vitals reviewed.     Results for POC orders placed in visit on 02/14/18   POCT Urinalysis No Micro (Auto)   Result Value Ref Range    Glucose, Ur Negative NEGATIVE mg/dl    Bilirubin Urine Negative     Ketones, Urine 15 (A) NEGATIVE    Specific Gravity, Urine 1.020 1.002 - 1.03    Blood, UA POC Moderate NEGATIVE    pH, Urine 5.50 5.0 - 9.0    Protein, Urine >= 300 (A) NEGATIVE mg/dl    Urobilinogen, Urine 1.00 0.0 - 1.0 eu/dl    Nitrite, Urine Positive NEGATIVE    Leukocyte Clumps, Urine Moderate NEGATIVE    Color, Urine Green YELLOW-STR    Character, Urine Cloudy [id number]     Component      Latest Ref Rng & Units 2/8/2018   AST      5 - 40 U/L 30   ALT      11 - 66 U/L 20   BUN      7 - 22 mg/dL 8   Creatinine      0.4 - 1.2 mg/dL 0.5   Est, Glom Filt Rate      ml/min/1.73m2 >90       Lab Results   Component Value Date    LABA1C 8.3 (H) 01/09/2018   +Metformin increased after this result+    Lab Results   Component Value Date    CHOL 105 06/23/2017    TRIG 87 06/23/2017    HDL 36 06/23/2017    LDLCALC 52 06/23/2017    LDLDIRECT 79.69 05/14/2015       Lab Results   Component Value Date     07/03/2017    K 4.0 07/03/2017     07/03/2017    CO2 22 (L) 07/03/2017    BUN 8 02/08/2018    CREATININE 0.5 02/08/2018    GLUCOSE 225 (H) 07/03/2017    CALCIUM 9.3 07/03/2017 Essential hypertension  atenolol (TENORMIN) 50 MG tablet   3. Hyperlipidemia, unspecified hyperlipidemia type  atorvastatin (LIPITOR) 40 MG tablet   4. Hypothyroidism, unspecified type  levothyroxine (SYNTHROID) 50 MCG tablet   5. Coronary artery disease involving native coronary artery of native heart without angina pectoris     6. Gastroesophageal reflux disease, esophagitis presence not specified  pantoprazole (PROTONIX) 40 MG tablet   7. Depression, unspecified depression type  escitalopram (LEXAPRO) 20 MG tablet   8. Hemorrhoids, unspecified hemorrhoid type  Gastro-Intestinal Ovi Pump, MD (CHAD)   9. Class 1 obesity due to excess calories with serious comorbidity and body mass index (BMI) of 34.0 to 34.9 in adult     10. Dysuria  POCT Urinalysis No Micro (Auto)   11. Visit for screening mammogram  ALEXANDREA DIGITAL SCREEN W CAD BILATERAL   12. Urinary tract infection without hematuria, site unspecified  ciprofloxacin (CIPRO) 500 MG tablet   13. Anemia, unspecified type  Hemoglobin and Hematocrit, Blood    Vitamin B12 & Folate    Ferritin    Iron and TIBC       PLAN      Check UA in office today- +- will treat with Cipro 500 mg- 1 pill 2x/day for 7 days. #14/ no refills. Encouraged NEW SHINGLES [facility]) - check with insurance re coverage and location of administration. PELVIC done 11/6/2017 per BK-  Plan PELVIC EXAM annually. No further PAP smears indicated as pt has had adequate negative prior screening  Plan PELVIC EXAM annually. MAMMO overdue- order given  COLONOSCOPY done 2005 per Dr. Starr Elena- was to have again in 2015- will refer back to Dr. Claus Damico office at this time. Pt declines DEXA at this time. DILATED DIABETIC EYE EXAM- > 12 months- to make appt with Dr. Mer Crenshaw in [place], [place]. Continue current medicines at current doses. Refills   Check HGA1C, H/H, FOLATE, B12, FERRITIN, IRON & TIBC in 2 months   Follow up in 2 months.          Electronically signed by Ledy Avitia

## 2018-02-16 LAB — URINE CULTURE, ROUTINE: ABNORMAL

## 2018-02-23 ENCOUNTER — OFFICE VISIT (OUTPATIENT)
Dept: CARDIOLOGY CLINIC | Age: 68
End: 2018-02-23
Payer: MEDICARE

## 2018-02-23 VITALS
WEIGHT: 184 LBS | HEART RATE: 64 BPM | DIASTOLIC BLOOD PRESSURE: 64 MMHG | SYSTOLIC BLOOD PRESSURE: 138 MMHG | BODY MASS INDEX: 33.86 KG/M2 | HEIGHT: 62 IN

## 2018-02-23 DIAGNOSIS — E78.01 FAMILIAL HYPERCHOLESTEROLEMIA: ICD-10-CM

## 2018-02-23 DIAGNOSIS — I10 ESSENTIAL HYPERTENSION: Primary | ICD-10-CM

## 2018-02-23 DIAGNOSIS — I25.10 CORONARY ARTERY DISEASE INVOLVING NATIVE CORONARY ARTERY OF NATIVE HEART WITHOUT ANGINA PECTORIS: ICD-10-CM

## 2018-02-23 PROCEDURE — 4040F PNEUMOC VAC/ADMIN/RCVD: CPT | Performed by: NUCLEAR MEDICINE

## 2018-02-23 PROCEDURE — 1036F TOBACCO NON-USER: CPT | Performed by: NUCLEAR MEDICINE

## 2018-02-23 PROCEDURE — 99213 OFFICE O/P EST LOW 20 MIN: CPT | Performed by: NUCLEAR MEDICINE

## 2018-02-23 PROCEDURE — 1090F PRES/ABSN URINE INCON ASSESS: CPT | Performed by: NUCLEAR MEDICINE

## 2018-02-23 PROCEDURE — 3017F COLORECTAL CA SCREEN DOC REV: CPT | Performed by: NUCLEAR MEDICINE

## 2018-02-23 PROCEDURE — G8428 CUR MEDS NOT DOCUMENT: HCPCS | Performed by: NUCLEAR MEDICINE

## 2018-02-23 PROCEDURE — 1123F ACP DISCUSS/DSCN MKR DOCD: CPT | Performed by: NUCLEAR MEDICINE

## 2018-02-23 PROCEDURE — G8417 CALC BMI ABV UP PARAM F/U: HCPCS | Performed by: NUCLEAR MEDICINE

## 2018-02-23 PROCEDURE — G8400 PT W/DXA NO RESULTS DOC: HCPCS | Performed by: NUCLEAR MEDICINE

## 2018-02-23 PROCEDURE — G8598 ASA/ANTIPLAT THER USED: HCPCS | Performed by: NUCLEAR MEDICINE

## 2018-02-23 PROCEDURE — 3014F SCREEN MAMMO DOC REV: CPT | Performed by: NUCLEAR MEDICINE

## 2018-02-23 PROCEDURE — G8484 FLU IMMUNIZE NO ADMIN: HCPCS | Performed by: NUCLEAR MEDICINE

## 2018-02-26 ENCOUNTER — HOSPITAL ENCOUNTER (INPATIENT)
Age: 68
LOS: 3 days | Discharge: HOME | DRG: 369 | End: 2018-03-02
Attending: FAMILY MEDICINE | Admitting: INTERNAL MEDICINE
Payer: MEDICARE

## 2018-02-26 ENCOUNTER — TELEPHONE (OUTPATIENT)
Dept: FAMILY MEDICINE CLINIC | Age: 68
End: 2018-02-26

## 2018-02-26 ENCOUNTER — APPOINTMENT (OUTPATIENT)
Dept: CT IMAGING | Age: 68
DRG: 369 | End: 2018-02-26
Payer: MEDICARE

## 2018-02-26 DIAGNOSIS — K64.9 HEMORRHOIDS, UNSPECIFIED HEMORRHOID TYPE: ICD-10-CM

## 2018-02-26 DIAGNOSIS — K74.69 OTHER CIRRHOSIS OF LIVER (HCC): ICD-10-CM

## 2018-02-26 DIAGNOSIS — K92.1 BLOOD IN STOOL: Primary | ICD-10-CM

## 2018-02-26 DIAGNOSIS — R42 DIZZINESS: ICD-10-CM

## 2018-02-26 DIAGNOSIS — K52.9 COLITIS: ICD-10-CM

## 2018-02-26 PROBLEM — K92.2 GI BLEEDING: Status: ACTIVE | Noted: 2018-02-26

## 2018-02-26 LAB
ABO: NORMAL
ALBUMIN SERPL-MCNC: 3.7 G/DL (ref 3.5–5.1)
ALP BLD-CCNC: 77 U/L (ref 38–126)
ALT SERPL-CCNC: 29 U/L (ref 11–66)
ANION GAP SERPL CALCULATED.3IONS-SCNC: 18 MEQ/L (ref 8–16)
ANTIBODY SCREEN: NORMAL
AST SERPL-CCNC: 43 U/L (ref 5–40)
BILIRUB SERPL-MCNC: 0.5 MG/DL (ref 0.3–1.2)
BILIRUBIN DIRECT: < 0.2 MG/DL (ref 0–0.3)
BUN BLDV-MCNC: 4 MG/DL (ref 7–22)
CALCIUM SERPL-MCNC: 8.9 MG/DL (ref 8.5–10.5)
CHLORIDE BLD-SCNC: 99 MEQ/L (ref 98–111)
CO2: 21 MEQ/L (ref 23–33)
CREAT SERPL-MCNC: 0.4 MG/DL (ref 0.4–1.2)
EKG ATRIAL RATE: 73 BPM
EKG P AXIS: 32 DEGREES
EKG P-R INTERVAL: 144 MS
EKG Q-T INTERVAL: 430 MS
EKG QRS DURATION: 74 MS
EKG QTC CALCULATION (BAZETT): 473 MS
EKG R AXIS: -9 DEGREES
EKG T AXIS: -10 DEGREES
EKG VENTRICULAR RATE: 73 BPM
GFR SERPL CREATININE-BSD FRML MDRD: > 90 ML/MIN/1.73M2
GLUCOSE BLD-MCNC: 188 MG/DL (ref 70–108)
GLUCOSE BLD-MCNC: 55 MG/DL (ref 70–108)
GLUCOSE BLD-MCNC: 87 MG/DL (ref 70–108)
HEMOCCULT STL QL: POSITIVE
INR BLD: 1.19 (ref 0.85–1.13)
OSMOLALITY CALCULATION: 277.6 MOSMOL/KG (ref 275–300)
POTASSIUM SERPL-SCNC: 3.4 MEQ/L (ref 3.5–5.2)
RH FACTOR: NORMAL
SCAN OF BLOOD SMEAR: NORMAL
SODIUM BLD-SCNC: 138 MEQ/L (ref 135–145)
TOTAL PROTEIN: 6.8 G/DL (ref 6.1–8)
TROPONIN T: < 0.01 NG/ML

## 2018-02-26 PROCEDURE — 70450 CT HEAD/BRAIN W/O DYE: CPT

## 2018-02-26 PROCEDURE — 6360000004 HC RX CONTRAST MEDICATION: Performed by: FAMILY MEDICINE

## 2018-02-26 PROCEDURE — 85610 PROTHROMBIN TIME: CPT

## 2018-02-26 PROCEDURE — 80053 COMPREHEN METABOLIC PANEL: CPT

## 2018-02-26 PROCEDURE — G0378 HOSPITAL OBSERVATION PER HR: HCPCS

## 2018-02-26 PROCEDURE — 2580000003 HC RX 258: Performed by: FAMILY MEDICINE

## 2018-02-26 PROCEDURE — 85025 COMPLETE CBC W/AUTO DIFF WBC: CPT

## 2018-02-26 PROCEDURE — 99223 1ST HOSP IP/OBS HIGH 75: CPT | Performed by: INTERNAL MEDICINE

## 2018-02-26 PROCEDURE — 96374 THER/PROPH/DIAG INJ IV PUSH: CPT

## 2018-02-26 PROCEDURE — 36415 COLL VENOUS BLD VENIPUNCTURE: CPT

## 2018-02-26 PROCEDURE — 82248 BILIRUBIN DIRECT: CPT

## 2018-02-26 PROCEDURE — 93005 ELECTROCARDIOGRAM TRACING: CPT | Performed by: FAMILY MEDICINE

## 2018-02-26 PROCEDURE — 82272 OCCULT BLD FECES 1-3 TESTS: CPT

## 2018-02-26 PROCEDURE — 86850 RBC ANTIBODY SCREEN: CPT

## 2018-02-26 PROCEDURE — 82948 REAGENT STRIP/BLOOD GLUCOSE: CPT

## 2018-02-26 PROCEDURE — 74177 CT ABD & PELVIS W/CONTRAST: CPT

## 2018-02-26 PROCEDURE — 99285 EMERGENCY DEPT VISIT HI MDM: CPT

## 2018-02-26 PROCEDURE — 6360000002 HC RX W HCPCS: Performed by: FAMILY MEDICINE

## 2018-02-26 PROCEDURE — 86900 BLOOD TYPING SEROLOGIC ABO: CPT

## 2018-02-26 PROCEDURE — 87507 IADNA-DNA/RNA PROBE TQ 12-25: CPT

## 2018-02-26 PROCEDURE — 86901 BLOOD TYPING SEROLOGIC RH(D): CPT

## 2018-02-26 PROCEDURE — 84484 ASSAY OF TROPONIN QUANT: CPT

## 2018-02-26 RX ORDER — ONDANSETRON 2 MG/ML
4 INJECTION INTRAMUSCULAR; INTRAVENOUS ONCE
Status: COMPLETED | OUTPATIENT
Start: 2018-02-26 | End: 2018-02-26

## 2018-02-26 RX ORDER — SODIUM CHLORIDE 9 MG/ML
INJECTION, SOLUTION INTRAVENOUS CONTINUOUS
Status: DISCONTINUED | OUTPATIENT
Start: 2018-02-26 | End: 2018-02-27 | Stop reason: SDUPTHER

## 2018-02-26 RX ADMIN — ONDANSETRON 4 MG: 2 INJECTION INTRAMUSCULAR; INTRAVENOUS at 20:14

## 2018-02-26 RX ADMIN — SODIUM CHLORIDE: 9 INJECTION, SOLUTION INTRAVENOUS at 18:04

## 2018-02-26 RX ADMIN — IOPAMIDOL 80 ML: 755 INJECTION, SOLUTION INTRAVENOUS at 20:55

## 2018-02-26 ASSESSMENT — ENCOUNTER SYMPTOMS
EYE DISCHARGE: 0
VOMITING: 0
ABDOMINAL PAIN: 1
SHORTNESS OF BREATH: 0
COUGH: 0
NAUSEA: 0
BLOOD IN STOOL: 1
WHEEZING: 0
SORE THROAT: 0
DIARRHEA: 0
ANAL BLEEDING: 1
EYE PAIN: 0
RECTAL PAIN: 1
BACK PAIN: 0
RHINORRHEA: 0

## 2018-02-26 ASSESSMENT — PAIN DESCRIPTION - LOCATION: LOCATION: RECTUM

## 2018-02-26 ASSESSMENT — PAIN DESCRIPTION - PAIN TYPE: TYPE: ACUTE PAIN

## 2018-02-26 ASSESSMENT — PAIN DESCRIPTION - DESCRIPTORS: DESCRIPTORS: DISCOMFORT

## 2018-02-26 NOTE — TELEPHONE ENCOUNTER
Spoke with patient. She denies SOB but is having some dizziness. States that she only notices blood with BMs and it is bright red. Happening since Friday evening. Extreme fatigue. CBC in January--10.2 hemoglobin. Please advise.

## 2018-02-27 ENCOUNTER — APPOINTMENT (OUTPATIENT)
Dept: NUCLEAR MEDICINE | Age: 68
DRG: 369 | End: 2018-02-27
Payer: MEDICARE

## 2018-02-27 PROBLEM — K92.1 BLOOD IN STOOL: Status: ACTIVE | Noted: 2018-02-26

## 2018-02-27 PROBLEM — K92.2 GI BLEEDING: Status: ACTIVE | Noted: 2018-02-27

## 2018-02-27 LAB
ADENOVIRUS F 40 41 PCR: NOT DETECTED
ANISOCYTOSIS: ABNORMAL
ANISOCYTOSIS: ABNORMAL
ASTROVIRUS PCR: NOT DETECTED
ATYPICAL LYMPHOCYTES: ABNORMAL %
BASOPHILIA: ABNORMAL
BASOPHILIA: ABNORMAL
BASOPHILS # BLD: 0.4 %
BASOPHILS # BLD: 0.5 %
BASOPHILS ABSOLUTE: 0 THOU/MM3 (ref 0–0.1)
BASOPHILS ABSOLUTE: 0 THOU/MM3 (ref 0–0.1)
CAMPYLOBACTER PCR: NOT DETECTED
CLOSTRIDIUM DIFFICILE, PCR: NOT DETECTED
CRENATED RBC'S: ABNORMAL
CRYPTOSPORIDIUM PCR: NOT DETECTED
CYCLOSPORA CAYETANENSIS PCR: NOT DETECTED
E COLI 0157 PCR: NORMAL
E COLI ENTEROAGGREGATIVE PCR: NOT DETECTED
E COLI ENTEROPATHOGENIC PCR: NOT DETECTED
E COLI ENTEROTOXIGENIC PCR: NOT DETECTED
E COLI SHIGA LIKE TOXIN PCR: NOT DETECTED
E COLI SHIGELLA/ENTEROINVASIVE PCR: NOT DETECTED
E HISTOLYTICA GI FILM ARRAY: NOT DETECTED
ELLIPTOCYTES: ABNORMAL
EOSINOPHIL # BLD: 2.2 %
EOSINOPHIL # BLD: 2.2 %
EOSINOPHILS ABSOLUTE: 0.2 THOU/MM3 (ref 0–0.4)
EOSINOPHILS ABSOLUTE: 0.2 THOU/MM3 (ref 0–0.4)
FERRITIN: 8 NG/ML (ref 10–291)
GIARDIA LAMBLIA PCR: NOT DETECTED
GLUCOSE BLD-MCNC: 118 MG/DL (ref 70–108)
GLUCOSE BLD-MCNC: 140 MG/DL (ref 70–108)
GLUCOSE BLD-MCNC: 147 MG/DL (ref 70–108)
GLUCOSE BLD-MCNC: 187 MG/DL (ref 70–108)
HCT VFR BLD CALC: 28.8 % (ref 37–47)
HCT VFR BLD CALC: 28.8 % (ref 37–47)
HCT VFR BLD CALC: 31.6 % (ref 37–47)
HEMOGLOBIN: 8.3 GM/DL (ref 12–16)
HEMOGLOBIN: 8.4 GM/DL (ref 12–16)
HEMOGLOBIN: 9 GM/DL (ref 12–16)
HEMOGLOBIN: 9.1 GM/DL (ref 12–16)
HEMOGLOBIN: 9.5 GM/DL (ref 12–16)
HEPATITIS C ANTIBODY: NEGATIVE
HYPOCHROMIA: ABNORMAL
HYPOCHROMIA: ABNORMAL
LYMPHOCYTES # BLD: 32.4 %
LYMPHOCYTES # BLD: 40.9 %
LYMPHOCYTES ABSOLUTE: 2.5 THOU/MM3 (ref 1–4.8)
LYMPHOCYTES ABSOLUTE: 3.3 THOU/MM3 (ref 1–4.8)
MCH RBC QN AUTO: 20 PG (ref 27–31)
MCH RBC QN AUTO: 20.3 PG (ref 27–31)
MCH RBC QN AUTO: 20.9 PG (ref 27–31)
MCHC RBC AUTO-ENTMCNC: 29.3 GM/DL (ref 33–37)
MCHC RBC AUTO-ENTMCNC: 30.2 GM/DL (ref 33–37)
MCHC RBC AUTO-ENTMCNC: 31.3 GM/DL (ref 33–37)
MCV RBC AUTO: 66.8 FL (ref 81–99)
MCV RBC AUTO: 67.3 FL (ref 81–99)
MCV RBC AUTO: 68.2 FL (ref 81–99)
MICROCYTES: ABNORMAL
MICROCYTES: ABNORMAL
MONOCYTES # BLD: 7.5 %
MONOCYTES # BLD: 9.3 %
MONOCYTES ABSOLUTE: 0.6 THOU/MM3 (ref 0.4–1.3)
MONOCYTES ABSOLUTE: 0.7 THOU/MM3 (ref 0.4–1.3)
NOROVIRUS GI GII PCR: NOT DETECTED
NUCLEATED RED BLOOD CELLS: 0 /100 WBC
NUCLEATED RED BLOOD CELLS: 0 /100 WBC
OVALOCYTES: ABNORMAL
OVALOCYTES: ABNORMAL
PATHOLOGIST REVIEW: ABNORMAL
PDW BLD-RTO: 21.1 % (ref 11.5–14.5)
PDW BLD-RTO: 21.8 % (ref 11.5–14.5)
PDW BLD-RTO: 22 % (ref 11.5–14.5)
PLATELET # BLD: 194 THOU/MM3 (ref 130–400)
PLATELET # BLD: 207 THOU/MM3 (ref 130–400)
PLATELET # BLD: 223 THOU/MM3 (ref 130–400)
PLATELET ESTIMATE: ADEQUATE
PLATELET ESTIMATE: ADEQUATE
PLESIOMONAS SHIGELLOIDES PCR: NOT DETECTED
PMV BLD AUTO: 7.7 FL (ref 7.4–10.4)
PMV BLD AUTO: 8.1 FL (ref 7.4–10.4)
PMV BLD AUTO: 8.7 FL (ref 7.4–10.4)
POIKILOCYTES: ABNORMAL
POIKILOCYTES: SLIGHT
RBC # BLD: 4.22 MILL/MM3 (ref 4.2–5.4)
RBC # BLD: 4.31 MILL/MM3 (ref 4.2–5.4)
RBC # BLD: 4.69 MILL/MM3 (ref 4.2–5.4)
ROTAVIRUS A PCR: NOT DETECTED
SALMONELLA PCR: NOT DETECTED
SAPOVIRUS PCR: NOT DETECTED
SCAN OF BLOOD SMEAR: NORMAL
SCHISTOCYTES: ABNORMAL
SEG NEUTROPHILS: 48.9 %
SEG NEUTROPHILS: 55.7 %
SEGMENTED NEUTROPHILS ABSOLUTE COUNT: 3.9 THOU/MM3 (ref 1.8–7.7)
SEGMENTED NEUTROPHILS ABSOLUTE COUNT: 4.3 THOU/MM3 (ref 1.8–7.7)
TARGET CELLS: ABNORMAL
TARGET CELLS: ABNORMAL
VIBRIO CHOLERAE PCR: NOT DETECTED
VIBRIO PCR: NOT DETECTED
WBC # BLD: 7.7 THOU/MM3 (ref 4.8–10.8)
WBC # BLD: 8 THOU/MM3 (ref 4.8–10.8)
WBC # BLD: 8.7 THOU/MM3 (ref 4.8–10.8)
YERSINIA ENTEROCOLITICA PCR: NOT DETECTED

## 2018-02-27 PROCEDURE — 99233 SBSQ HOSP IP/OBS HIGH 50: CPT | Performed by: INTERNAL MEDICINE

## 2018-02-27 PROCEDURE — 87340 HEPATITIS B SURFACE AG IA: CPT

## 2018-02-27 PROCEDURE — 96376 TX/PRO/DX INJ SAME DRUG ADON: CPT

## 2018-02-27 PROCEDURE — 6360000002 HC RX W HCPCS: Performed by: INTERNAL MEDICINE

## 2018-02-27 PROCEDURE — 36415 COLL VENOUS BLD VENIPUNCTURE: CPT

## 2018-02-27 PROCEDURE — A9560 TC99M LABELED RBC: HCPCS | Performed by: INTERNAL MEDICINE

## 2018-02-27 PROCEDURE — 2580000003 HC RX 258: Performed by: INTERNAL MEDICINE

## 2018-02-27 PROCEDURE — 86038 ANTINUCLEAR ANTIBODIES: CPT

## 2018-02-27 PROCEDURE — C9113 INJ PANTOPRAZOLE SODIUM, VIA: HCPCS | Performed by: INTERNAL MEDICINE

## 2018-02-27 PROCEDURE — 96375 TX/PRO/DX INJ NEW DRUG ADDON: CPT

## 2018-02-27 PROCEDURE — 99152 MOD SED SAME PHYS/QHP 5/>YRS: CPT | Performed by: INTERNAL MEDICINE

## 2018-02-27 PROCEDURE — 1200000000 HC SEMI PRIVATE

## 2018-02-27 PROCEDURE — 85025 COMPLETE CBC W/AUTO DIFF WBC: CPT

## 2018-02-27 PROCEDURE — 78278 ACUTE GI BLOOD LOSS IMAGING: CPT

## 2018-02-27 PROCEDURE — 3609012300 HC EGD BAND LIGATION ESOPHGEAL/GASTRIC VARICES: Performed by: INTERNAL MEDICINE

## 2018-02-27 PROCEDURE — 85018 HEMOGLOBIN: CPT

## 2018-02-27 PROCEDURE — 93010 ELECTROCARDIOGRAM REPORT: CPT | Performed by: NUCLEAR MEDICINE

## 2018-02-27 PROCEDURE — 6370000000 HC RX 637 (ALT 250 FOR IP): Performed by: INTERNAL MEDICINE

## 2018-02-27 PROCEDURE — 85027 COMPLETE CBC AUTOMATED: CPT

## 2018-02-27 PROCEDURE — 82728 ASSAY OF FERRITIN: CPT

## 2018-02-27 PROCEDURE — 2720000010 HC SURG SUPPLY STERILE: Performed by: INTERNAL MEDICINE

## 2018-02-27 PROCEDURE — 3430000000 HC RX DIAGNOSTIC RADIOPHARMACEUTICAL: Performed by: INTERNAL MEDICINE

## 2018-02-27 PROCEDURE — 82948 REAGENT STRIP/BLOOD GLUCOSE: CPT

## 2018-02-27 PROCEDURE — 82105 ALPHA-FETOPROTEIN SERUM: CPT

## 2018-02-27 PROCEDURE — 86803 HEPATITIS C AB TEST: CPT

## 2018-02-27 PROCEDURE — 86256 FLUORESCENT ANTIBODY TITER: CPT

## 2018-02-27 PROCEDURE — 83516 IMMUNOASSAY NONANTIBODY: CPT

## 2018-02-27 PROCEDURE — 06L38CZ OCCLUSION OF ESOPHAGEAL VEIN WITH EXTRALUMINAL DEVICE, VIA NATURAL OR ARTIFICIAL OPENING ENDOSCOPIC: ICD-10-PCS | Performed by: INTERNAL MEDICINE

## 2018-02-27 RX ORDER — ACETAMINOPHEN 325 MG/1
650 TABLET ORAL EVERY 4 HOURS PRN
Status: DISCONTINUED | OUTPATIENT
Start: 2018-02-27 | End: 2018-03-02 | Stop reason: HOSPADM

## 2018-02-27 RX ORDER — ONDANSETRON 2 MG/ML
4 INJECTION INTRAMUSCULAR; INTRAVENOUS EVERY 6 HOURS PRN
Status: DISCONTINUED | OUTPATIENT
Start: 2018-02-27 | End: 2018-03-02 | Stop reason: HOSPADM

## 2018-02-27 RX ORDER — SENNA PLUS 8.6 MG/1
15 TABLET ORAL ONCE
Status: DISCONTINUED | OUTPATIENT
Start: 2018-02-27 | End: 2018-02-27

## 2018-02-27 RX ORDER — FENTANYL CITRATE 50 UG/ML
INJECTION, SOLUTION INTRAMUSCULAR; INTRAVENOUS PRN
Status: DISCONTINUED | OUTPATIENT
Start: 2018-02-27 | End: 2018-02-27 | Stop reason: HOSPADM

## 2018-02-27 RX ORDER — HYDROCODONE BITARTRATE AND ACETAMINOPHEN 5; 325 MG/1; MG/1
1 TABLET ORAL ONCE
Status: DISCONTINUED | OUTPATIENT
Start: 2018-02-27 | End: 2018-03-02 | Stop reason: HOSPADM

## 2018-02-27 RX ORDER — SENNA PLUS 8.6 MG/1
15 TABLET ORAL ONCE
Status: COMPLETED | OUTPATIENT
Start: 2018-02-28 | End: 2018-02-28

## 2018-02-27 RX ORDER — HYDROCODONE BITARTRATE AND ACETAMINOPHEN 5; 325 MG/1; MG/1
1 TABLET ORAL EVERY 6 HOURS PRN
Status: DISCONTINUED | OUTPATIENT
Start: 2018-02-27 | End: 2018-03-02 | Stop reason: HOSPADM

## 2018-02-27 RX ORDER — LEVOTHYROXINE SODIUM 0.05 MG/1
50 TABLET ORAL DAILY
Status: DISCONTINUED | OUTPATIENT
Start: 2018-02-27 | End: 2018-03-02 | Stop reason: HOSPADM

## 2018-02-27 RX ORDER — MIDAZOLAM HYDROCHLORIDE 1 MG/ML
INJECTION INTRAMUSCULAR; INTRAVENOUS PRN
Status: DISCONTINUED | OUTPATIENT
Start: 2018-02-27 | End: 2018-02-27 | Stop reason: HOSPADM

## 2018-02-27 RX ORDER — ATENOLOL 25 MG/1
25 TABLET ORAL DAILY
Status: DISCONTINUED | OUTPATIENT
Start: 2018-02-27 | End: 2018-03-02 | Stop reason: HOSPADM

## 2018-02-27 RX ORDER — POLYETHYLENE GLYCOL 3350 17 G/17G
238 POWDER, FOR SOLUTION ORAL DAILY
Status: DISCONTINUED | OUTPATIENT
Start: 2018-02-28 | End: 2018-02-27

## 2018-02-27 RX ORDER — METFORMIN HYDROCHLORIDE 500 MG/1
1000 TABLET, EXTENDED RELEASE ORAL
Status: DISCONTINUED | OUTPATIENT
Start: 2018-02-27 | End: 2018-02-27

## 2018-02-27 RX ORDER — SODIUM CHLORIDE 0.9 % (FLUSH) 0.9 %
10 SYRINGE (ML) INJECTION PRN
Status: DISCONTINUED | OUTPATIENT
Start: 2018-02-27 | End: 2018-03-02 | Stop reason: HOSPADM

## 2018-02-27 RX ORDER — SODIUM CHLORIDE 9 MG/ML
INJECTION, SOLUTION INTRAVENOUS CONTINUOUS
Status: DISCONTINUED | OUTPATIENT
Start: 2018-02-27 | End: 2018-03-01

## 2018-02-27 RX ORDER — ATORVASTATIN CALCIUM 40 MG/1
40 TABLET, FILM COATED ORAL NIGHTLY
Status: DISCONTINUED | OUTPATIENT
Start: 2018-02-27 | End: 2018-03-02 | Stop reason: HOSPADM

## 2018-02-27 RX ORDER — PANTOPRAZOLE SODIUM 40 MG/10ML
40 INJECTION, POWDER, LYOPHILIZED, FOR SOLUTION INTRAVENOUS DAILY
Status: DISCONTINUED | OUTPATIENT
Start: 2018-02-27 | End: 2018-03-02 | Stop reason: HOSPADM

## 2018-02-27 RX ORDER — ESCITALOPRAM OXALATE 20 MG/1
20 TABLET ORAL DAILY
Status: DISCONTINUED | OUTPATIENT
Start: 2018-02-27 | End: 2018-03-02 | Stop reason: HOSPADM

## 2018-02-27 RX ORDER — POLYETHYLENE GLYCOL 3350 17 G/17G
238 POWDER, FOR SOLUTION ORAL ONCE
Status: COMPLETED | OUTPATIENT
Start: 2018-02-28 | End: 2018-02-28

## 2018-02-27 RX ORDER — DOCUSATE SODIUM 100 MG/1
100 CAPSULE, LIQUID FILLED ORAL 2 TIMES DAILY
Status: DISCONTINUED | OUTPATIENT
Start: 2018-02-27 | End: 2018-03-02 | Stop reason: HOSPADM

## 2018-02-27 RX ORDER — PANTOPRAZOLE SODIUM 40 MG/1
40 TABLET, DELAYED RELEASE ORAL
Status: DISCONTINUED | OUTPATIENT
Start: 2018-02-27 | End: 2018-02-27

## 2018-02-27 RX ORDER — DOCUSATE SODIUM 100 MG/1
100 CAPSULE, LIQUID FILLED ORAL NIGHTLY
Status: DISCONTINUED | OUTPATIENT
Start: 2018-02-27 | End: 2018-02-27 | Stop reason: SDUPTHER

## 2018-02-27 RX ORDER — POLYETHYLENE GLYCOL 3350 17 G/17G
238 POWDER, FOR SOLUTION ORAL ONCE
Status: DISCONTINUED | OUTPATIENT
Start: 2018-02-27 | End: 2018-02-27

## 2018-02-27 RX ORDER — ATENOLOL 50 MG/1
50 TABLET ORAL DAILY
Status: DISCONTINUED | OUTPATIENT
Start: 2018-02-27 | End: 2018-02-27

## 2018-02-27 RX ORDER — SODIUM CHLORIDE 0.9 % (FLUSH) 0.9 %
10 SYRINGE (ML) INJECTION EVERY 12 HOURS SCHEDULED
Status: DISCONTINUED | OUTPATIENT
Start: 2018-02-27 | End: 2018-03-02 | Stop reason: HOSPADM

## 2018-02-27 RX ADMIN — SODIUM CHLORIDE: 9 INJECTION, SOLUTION INTRAVENOUS at 19:15

## 2018-02-27 RX ADMIN — ONDANSETRON 4 MG: 2 INJECTION INTRAMUSCULAR; INTRAVENOUS at 19:10

## 2018-02-27 RX ADMIN — ATORVASTATIN CALCIUM 40 MG: 40 TABLET, FILM COATED ORAL at 22:18

## 2018-02-27 RX ADMIN — Medication 10 ML: at 11:54

## 2018-02-27 RX ADMIN — SODIUM CHLORIDE: 9 INJECTION, SOLUTION INTRAVENOUS at 00:57

## 2018-02-27 RX ADMIN — Medication 15 ML: at 20:56

## 2018-02-27 RX ADMIN — ONDANSETRON 4 MG: 2 INJECTION INTRAMUSCULAR; INTRAVENOUS at 11:54

## 2018-02-27 RX ADMIN — Medication 28.8 MILLICURIE: at 07:35

## 2018-02-27 RX ADMIN — SODIUM CHLORIDE: 9 INJECTION, SOLUTION INTRAVENOUS at 22:21

## 2018-02-27 RX ADMIN — ATENOLOL 25 MG: 25 TABLET ORAL at 22:17

## 2018-02-27 RX ADMIN — ATORVASTATIN CALCIUM 40 MG: 40 TABLET, FILM COATED ORAL at 02:10

## 2018-02-27 RX ADMIN — PANTOPRAZOLE SODIUM 40 MG: 40 INJECTION, POWDER, FOR SOLUTION INTRAVENOUS at 11:54

## 2018-02-27 ASSESSMENT — PAIN SCALES - GENERAL
PAINLEVEL_OUTOF10: 3
PAINLEVEL_OUTOF10: 0
PAINLEVEL_OUTOF10: 4
PAINLEVEL_OUTOF10: 0

## 2018-02-27 ASSESSMENT — PAIN DESCRIPTION - PAIN TYPE
TYPE: ACUTE PAIN
TYPE: ACUTE PAIN

## 2018-02-27 ASSESSMENT — PAIN DESCRIPTION - LOCATION
LOCATION: CHEST
LOCATION: CHEST

## 2018-02-27 ASSESSMENT — PAIN DESCRIPTION - ORIENTATION: ORIENTATION: MID

## 2018-02-28 ENCOUNTER — APPOINTMENT (OUTPATIENT)
Dept: ULTRASOUND IMAGING | Age: 68
DRG: 369 | End: 2018-02-28
Payer: MEDICARE

## 2018-02-28 LAB
AFP-TUMOR MARKER: 2.7 UG/L
ANION GAP SERPL CALCULATED.3IONS-SCNC: 15 MEQ/L (ref 8–16)
BUN BLDV-MCNC: 3 MG/DL (ref 7–22)
CALCIUM SERPL-MCNC: 8.5 MG/DL (ref 8.5–10.5)
CHLORIDE BLD-SCNC: 107 MEQ/L (ref 98–111)
CO2: 23 MEQ/L (ref 23–33)
CREAT SERPL-MCNC: 0.4 MG/DL (ref 0.4–1.2)
GFR SERPL CREATININE-BSD FRML MDRD: > 90 ML/MIN/1.73M2
GLUCOSE BLD-MCNC: 136 MG/DL (ref 70–108)
GLUCOSE BLD-MCNC: 174 MG/DL (ref 70–108)
HEMOGLOBIN: 8.6 GM/DL (ref 12–16)
HEMOGLOBIN: 9.5 GM/DL (ref 12–16)
HEPATITIS B SURFACE ANTIGEN: NEGATIVE
POTASSIUM SERPL-SCNC: 3.8 MEQ/L (ref 3.5–5.2)
SODIUM BLD-SCNC: 145 MEQ/L (ref 135–145)

## 2018-02-28 PROCEDURE — C9113 INJ PANTOPRAZOLE SODIUM, VIA: HCPCS | Performed by: INTERNAL MEDICINE

## 2018-02-28 PROCEDURE — 6370000000 HC RX 637 (ALT 250 FOR IP): Performed by: INTERNAL MEDICINE

## 2018-02-28 PROCEDURE — 36415 COLL VENOUS BLD VENIPUNCTURE: CPT

## 2018-02-28 PROCEDURE — 93975 VASCULAR STUDY: CPT

## 2018-02-28 PROCEDURE — 85018 HEMOGLOBIN: CPT

## 2018-02-28 PROCEDURE — 1200000000 HC SEMI PRIVATE

## 2018-02-28 PROCEDURE — 6360000002 HC RX W HCPCS: Performed by: INTERNAL MEDICINE

## 2018-02-28 PROCEDURE — 99232 SBSQ HOSP IP/OBS MODERATE 35: CPT | Performed by: INTERNAL MEDICINE

## 2018-02-28 PROCEDURE — 80048 BASIC METABOLIC PNL TOTAL CA: CPT

## 2018-02-28 PROCEDURE — 76705 ECHO EXAM OF ABDOMEN: CPT

## 2018-02-28 PROCEDURE — 82948 REAGENT STRIP/BLOOD GLUCOSE: CPT

## 2018-02-28 PROCEDURE — 2580000003 HC RX 258: Performed by: INTERNAL MEDICINE

## 2018-02-28 RX ADMIN — LEVOTHYROXINE SODIUM 50 MCG: 0.05 TABLET ORAL at 10:30

## 2018-02-28 RX ADMIN — ESCITALOPRAM OXALATE 20 MG: 20 TABLET ORAL at 21:44

## 2018-02-28 RX ADMIN — SODIUM CHLORIDE: 9 INJECTION, SOLUTION INTRAVENOUS at 18:41

## 2018-02-28 RX ADMIN — SODIUM CHLORIDE: 9 INJECTION, SOLUTION INTRAVENOUS at 08:30

## 2018-02-28 RX ADMIN — SENNOSIDES 129 MG: 8.6 TABLET, FILM COATED ORAL at 21:37

## 2018-02-28 RX ADMIN — DOCUSATE SODIUM 100 MG: 100 CAPSULE ORAL at 21:44

## 2018-02-28 RX ADMIN — ATORVASTATIN CALCIUM 40 MG: 40 TABLET, FILM COATED ORAL at 21:43

## 2018-02-28 RX ADMIN — ATENOLOL 25 MG: 25 TABLET ORAL at 21:40

## 2018-02-28 RX ADMIN — PANTOPRAZOLE SODIUM 40 MG: 40 INJECTION, POWDER, FOR SOLUTION INTRAVENOUS at 08:15

## 2018-02-28 RX ADMIN — POLYETHYLENE GLYCOL 3350 238 G: 17 POWDER, FOR SOLUTION ORAL at 21:44

## 2018-02-28 RX ADMIN — ACETAMINOPHEN 650 MG: 325 TABLET ORAL at 21:35

## 2018-02-28 ASSESSMENT — PAIN DESCRIPTION - PAIN TYPE
TYPE: ACUTE PAIN
TYPE: ACUTE PAIN

## 2018-02-28 ASSESSMENT — PAIN DESCRIPTION - PROGRESSION: CLINICAL_PROGRESSION: NOT CHANGED

## 2018-02-28 ASSESSMENT — PAIN DESCRIPTION - LOCATION
LOCATION: HEAD
LOCATION: CHEST

## 2018-02-28 ASSESSMENT — PAIN SCALES - GENERAL
PAINLEVEL_OUTOF10: 3
PAINLEVEL_OUTOF10: 0
PAINLEVEL_OUTOF10: 1
PAINLEVEL_OUTOF10: 0
PAINLEVEL_OUTOF10: 0
PAINLEVEL_OUTOF10: 3
PAINLEVEL_OUTOF10: 0

## 2018-02-28 ASSESSMENT — PAIN DESCRIPTION - FREQUENCY: FREQUENCY: CONTINUOUS

## 2018-02-28 ASSESSMENT — PAIN DESCRIPTION - ONSET: ONSET: SUDDEN

## 2018-02-28 ASSESSMENT — PAIN DESCRIPTION - DESCRIPTORS: DESCRIPTORS: HEADACHE

## 2018-02-28 ASSESSMENT — PAIN DESCRIPTION - ORIENTATION: ORIENTATION: MID

## 2018-03-01 ENCOUNTER — ANESTHESIA EVENT (OUTPATIENT)
Dept: ENDOSCOPY | Age: 68
DRG: 369 | End: 2018-03-01
Payer: MEDICARE

## 2018-03-01 ENCOUNTER — ANESTHESIA (OUTPATIENT)
Dept: ENDOSCOPY | Age: 68
DRG: 369 | End: 2018-03-01
Payer: MEDICARE

## 2018-03-01 LAB
ANION GAP SERPL CALCULATED.3IONS-SCNC: 11 MEQ/L (ref 8–16)
BUN BLDV-MCNC: < 2 MG/DL (ref 7–22)
CALCIUM SERPL-MCNC: 8.1 MG/DL (ref 8.5–10.5)
CHLORIDE BLD-SCNC: 107 MEQ/L (ref 98–111)
CO2: 22 MEQ/L (ref 23–33)
CREAT SERPL-MCNC: 0.3 MG/DL (ref 0.4–1.2)
GFR SERPL CREATININE-BSD FRML MDRD: > 90 ML/MIN/1.73M2
GLUCOSE BLD-MCNC: 136 MG/DL (ref 70–108)
HCT VFR BLD CALC: 27 % (ref 37–47)
HEMOGLOBIN: 8.1 GM/DL (ref 12–16)
MCH RBC QN AUTO: 20.4 PG (ref 27–31)
MCHC RBC AUTO-ENTMCNC: 30.2 GM/DL (ref 33–37)
MCV RBC AUTO: 67.7 FL (ref 81–99)
PDW BLD-RTO: 21.2 % (ref 11.5–14.5)
PLATELET # BLD: 157 THOU/MM3 (ref 130–400)
PMV BLD AUTO: 8.5 FL (ref 7.4–10.4)
POTASSIUM SERPL-SCNC: 3 MEQ/L (ref 3.5–5.2)
RBC # BLD: 3.98 MILL/MM3 (ref 4.2–5.4)
SODIUM BLD-SCNC: 140 MEQ/L (ref 135–145)
WBC # BLD: 6.7 THOU/MM3 (ref 4.8–10.8)

## 2018-03-01 PROCEDURE — C9113 INJ PANTOPRAZOLE SODIUM, VIA: HCPCS | Performed by: INTERNAL MEDICINE

## 2018-03-01 PROCEDURE — 6360000002 HC RX W HCPCS: Performed by: INTERNAL MEDICINE

## 2018-03-01 PROCEDURE — 80048 BASIC METABOLIC PNL TOTAL CA: CPT

## 2018-03-01 PROCEDURE — 6370000000 HC RX 637 (ALT 250 FOR IP): Performed by: INTERNAL MEDICINE

## 2018-03-01 PROCEDURE — 36415 COLL VENOUS BLD VENIPUNCTURE: CPT

## 2018-03-01 PROCEDURE — 7100000000 HC PACU RECOVERY - FIRST 15 MIN: Performed by: INTERNAL MEDICINE

## 2018-03-01 PROCEDURE — 1200000000 HC SEMI PRIVATE

## 2018-03-01 PROCEDURE — 0DJD8ZZ INSPECTION OF LOWER INTESTINAL TRACT, VIA NATURAL OR ARTIFICIAL OPENING ENDOSCOPIC: ICD-10-PCS | Performed by: INTERNAL MEDICINE

## 2018-03-01 PROCEDURE — 2580000003 HC RX 258: Performed by: INTERNAL MEDICINE

## 2018-03-01 PROCEDURE — 3609027000 HC COLONOSCOPY: Performed by: INTERNAL MEDICINE

## 2018-03-01 PROCEDURE — 99232 SBSQ HOSP IP/OBS MODERATE 35: CPT | Performed by: INTERNAL MEDICINE

## 2018-03-01 PROCEDURE — 85027 COMPLETE CBC AUTOMATED: CPT

## 2018-03-01 RX ORDER — METFORMIN HYDROCHLORIDE 500 MG/1
1000 TABLET, EXTENDED RELEASE ORAL
Status: DISCONTINUED | OUTPATIENT
Start: 2018-03-01 | End: 2018-03-02 | Stop reason: HOSPADM

## 2018-03-01 RX ORDER — POTASSIUM CHLORIDE 20 MEQ/1
20 TABLET, EXTENDED RELEASE ORAL 2 TIMES DAILY WITH MEALS
Status: COMPLETED | OUTPATIENT
Start: 2018-03-01 | End: 2018-03-02

## 2018-03-01 RX ORDER — FENTANYL CITRATE 50 UG/ML
INJECTION, SOLUTION INTRAMUSCULAR; INTRAVENOUS PRN
Status: DISCONTINUED | OUTPATIENT
Start: 2018-03-01 | End: 2018-03-01 | Stop reason: HOSPADM

## 2018-03-01 RX ORDER — MIDAZOLAM HYDROCHLORIDE 1 MG/ML
INJECTION INTRAMUSCULAR; INTRAVENOUS PRN
Status: DISCONTINUED | OUTPATIENT
Start: 2018-03-01 | End: 2018-03-01 | Stop reason: HOSPADM

## 2018-03-01 RX ADMIN — SODIUM CHLORIDE: 9 INJECTION, SOLUTION INTRAVENOUS at 14:36

## 2018-03-01 RX ADMIN — POTASSIUM CHLORIDE 20 MEQ: 1500 TABLET, EXTENDED RELEASE ORAL at 18:20

## 2018-03-01 RX ADMIN — ACETAMINOPHEN 650 MG: 325 TABLET ORAL at 10:56

## 2018-03-01 RX ADMIN — ESCITALOPRAM OXALATE 20 MG: 20 TABLET ORAL at 21:03

## 2018-03-01 RX ADMIN — DOCUSATE SODIUM 100 MG: 100 CAPSULE ORAL at 21:02

## 2018-03-01 RX ADMIN — ATORVASTATIN CALCIUM 40 MG: 40 TABLET, FILM COATED ORAL at 21:02

## 2018-03-01 RX ADMIN — PANTOPRAZOLE SODIUM 40 MG: 40 INJECTION, POWDER, FOR SOLUTION INTRAVENOUS at 10:16

## 2018-03-01 RX ADMIN — Medication 10 ML: at 21:06

## 2018-03-01 RX ADMIN — LEVOTHYROXINE SODIUM 50 MCG: 0.05 TABLET ORAL at 06:33

## 2018-03-01 RX ADMIN — ATENOLOL 25 MG: 25 TABLET ORAL at 21:01

## 2018-03-01 ASSESSMENT — PAIN SCALES - GENERAL
PAINLEVEL_OUTOF10: 0
PAINLEVEL_OUTOF10: 3
PAINLEVEL_OUTOF10: 0

## 2018-03-01 ASSESSMENT — PAIN - FUNCTIONAL ASSESSMENT: PAIN_FUNCTIONAL_ASSESSMENT: 0-10

## 2018-03-02 VITALS
WEIGHT: 182.4 LBS | HEART RATE: 60 BPM | RESPIRATION RATE: 18 BRPM | HEIGHT: 62 IN | DIASTOLIC BLOOD PRESSURE: 67 MMHG | BODY MASS INDEX: 33.57 KG/M2 | TEMPERATURE: 97.4 F | OXYGEN SATURATION: 97 % | SYSTOLIC BLOOD PRESSURE: 138 MMHG

## 2018-03-02 LAB
ANA SCREEN: NORMAL
ANION GAP SERPL CALCULATED.3IONS-SCNC: 10 MEQ/L (ref 8–16)
BUN BLDV-MCNC: < 2 MG/DL (ref 7–22)
CALCIUM SERPL-MCNC: 8.2 MG/DL (ref 8.5–10.5)
CHLORIDE BLD-SCNC: 105 MEQ/L (ref 98–111)
CO2: 27 MEQ/L (ref 23–33)
CREAT SERPL-MCNC: 0.3 MG/DL (ref 0.4–1.2)
GFR SERPL CREATININE-BSD FRML MDRD: > 90 ML/MIN/1.73M2
GLUCOSE BLD-MCNC: 142 MG/DL (ref 70–108)
HCT VFR BLD CALC: 26.4 % (ref 37–47)
HEMOGLOBIN: 8.1 GM/DL (ref 12–16)
MAGNESIUM: 1.8 MG/DL (ref 1.6–2.4)
MCH RBC QN AUTO: 20.8 PG (ref 27–31)
MCHC RBC AUTO-ENTMCNC: 30.8 GM/DL (ref 33–37)
MCV RBC AUTO: 67.6 FL (ref 81–99)
MITOCHONDRIAL ANTIBODY: 3.1 UNITS (ref 0–20)
PDW BLD-RTO: 22.7 % (ref 11.5–14.5)
PLATELET # BLD: 210 THOU/MM3 (ref 130–400)
PMV BLD AUTO: 8.2 FL (ref 7.4–10.4)
POTASSIUM SERPL-SCNC: 3.7 MEQ/L (ref 3.5–5.2)
RBC # BLD: 3.9 MILL/MM3 (ref 4.2–5.4)
SMOOTH MUSCLE AB IGG TITER: < 1
SODIUM BLD-SCNC: 142 MEQ/L (ref 135–145)
WBC # BLD: 6.8 THOU/MM3 (ref 4.8–10.8)

## 2018-03-02 PROCEDURE — 6370000000 HC RX 637 (ALT 250 FOR IP): Performed by: INTERNAL MEDICINE

## 2018-03-02 PROCEDURE — C9113 INJ PANTOPRAZOLE SODIUM, VIA: HCPCS | Performed by: INTERNAL MEDICINE

## 2018-03-02 PROCEDURE — 80048 BASIC METABOLIC PNL TOTAL CA: CPT

## 2018-03-02 PROCEDURE — 85027 COMPLETE CBC AUTOMATED: CPT

## 2018-03-02 PROCEDURE — 83735 ASSAY OF MAGNESIUM: CPT

## 2018-03-02 PROCEDURE — 36415 COLL VENOUS BLD VENIPUNCTURE: CPT

## 2018-03-02 PROCEDURE — 99238 HOSP IP/OBS DSCHRG MGMT 30/<: CPT | Performed by: INTERNAL MEDICINE

## 2018-03-02 PROCEDURE — 6360000002 HC RX W HCPCS: Performed by: INTERNAL MEDICINE

## 2018-03-02 RX ORDER — FERROUS SULFATE 325(65) MG
325 TABLET ORAL
Qty: 30 TABLET | Refills: 0 | Status: SHIPPED | OUTPATIENT
Start: 2018-03-03 | End: 2018-04-26 | Stop reason: SINTOL

## 2018-03-02 RX ORDER — POLYETHYLENE GLYCOL 3350 17 G/17G
17 POWDER, FOR SOLUTION ORAL DAILY PRN
Qty: 510 G | Refills: 0 | Status: SHIPPED | OUTPATIENT
Start: 2018-03-02 | End: 2018-04-01

## 2018-03-02 RX ORDER — FERROUS SULFATE 325(65) MG
325 TABLET ORAL
Status: DISCONTINUED | OUTPATIENT
Start: 2018-03-03 | End: 2018-03-02 | Stop reason: HOSPADM

## 2018-03-02 RX ADMIN — METFORMIN HYDROCHLORIDE 1000 MG: 500 TABLET, EXTENDED RELEASE ORAL at 08:41

## 2018-03-02 RX ADMIN — ACETAMINOPHEN 650 MG: 325 TABLET ORAL at 03:01

## 2018-03-02 RX ADMIN — POTASSIUM CHLORIDE 20 MEQ: 1500 TABLET, EXTENDED RELEASE ORAL at 08:39

## 2018-03-02 RX ADMIN — PANTOPRAZOLE SODIUM 40 MG: 40 INJECTION, POWDER, FOR SOLUTION INTRAVENOUS at 08:39

## 2018-03-02 RX ADMIN — LEVOTHYROXINE SODIUM 50 MCG: 0.05 TABLET ORAL at 06:09

## 2018-03-02 ASSESSMENT — PAIN DESCRIPTION - LOCATION: LOCATION: HEAD

## 2018-03-02 ASSESSMENT — PAIN SCALES - GENERAL
PAINLEVEL_OUTOF10: 0
PAINLEVEL_OUTOF10: 0
PAINLEVEL_OUTOF10: 3

## 2018-03-02 ASSESSMENT — PAIN DESCRIPTION - PAIN TYPE: TYPE: ACUTE PAIN

## 2018-03-02 ASSESSMENT — PAIN DESCRIPTION - ONSET: ONSET: SUDDEN

## 2018-03-02 ASSESSMENT — PAIN DESCRIPTION - FREQUENCY: FREQUENCY: CONTINUOUS

## 2018-03-02 ASSESSMENT — PAIN DESCRIPTION - DESCRIPTORS: DESCRIPTORS: HEADACHE

## 2018-04-02 PROBLEM — D62 ACUTE BLOOD LOSS ANEMIA: Status: ACTIVE | Noted: 2018-04-02

## 2018-04-12 ENCOUNTER — HOSPITAL ENCOUNTER (OUTPATIENT)
Age: 68
Discharge: HOME | End: 2018-04-12
Payer: MEDICARE

## 2018-04-12 DIAGNOSIS — E11.65 TYPE 2 DIABETES MELLITUS WITH HYPERGLYCEMIA, UNSPECIFIED LONG TERM INSULIN USE STATUS: ICD-10-CM

## 2018-04-12 DIAGNOSIS — D64.9 ANEMIA, UNSPECIFIED TYPE: ICD-10-CM

## 2018-04-12 LAB
AVERAGE GLUCOSE: 138 MG/DL (ref 70–126)
FERRITIN: 6 NG/ML (ref 10–291)
FOLATE: 9.6 NG/ML (ref 4.8–24.2)
HBA1C MFR BLD: 6.6 % (ref 4.4–6.4)
HCT VFR BLD CALC: 32.3 % (ref 37–47)
HEMOGLOBIN: 9.5 GM/DL (ref 12–16)
IRON: 26 UG/DL (ref 50–170)
TOTAL IRON BINDING CAPACITY: 451 UG/DL (ref 171–450)
VITAMIN B-12: 637 PG/ML (ref 211–911)

## 2018-04-12 PROCEDURE — 82746 ASSAY OF FOLIC ACID SERUM: CPT

## 2018-04-12 PROCEDURE — 85018 HEMOGLOBIN: CPT

## 2018-04-12 PROCEDURE — 36415 COLL VENOUS BLD VENIPUNCTURE: CPT

## 2018-04-12 PROCEDURE — 83540 ASSAY OF IRON: CPT

## 2018-04-12 PROCEDURE — 83550 IRON BINDING TEST: CPT

## 2018-04-12 PROCEDURE — 83036 HEMOGLOBIN GLYCOSYLATED A1C: CPT

## 2018-04-12 PROCEDURE — 85014 HEMATOCRIT: CPT

## 2018-04-12 PROCEDURE — 82607 VITAMIN B-12: CPT

## 2018-04-12 PROCEDURE — 82728 ASSAY OF FERRITIN: CPT

## 2018-04-13 ENCOUNTER — TELEPHONE (OUTPATIENT)
Dept: FAMILY MEDICINE CLINIC | Age: 68
End: 2018-04-13

## 2018-04-18 DIAGNOSIS — E11.8 TYPE 2 DIABETES MELLITUS WITH COMPLICATION, WITHOUT LONG-TERM CURRENT USE OF INSULIN (HCC): ICD-10-CM

## 2018-04-18 RX ORDER — METFORMIN HYDROCHLORIDE 500 MG/1
1000 TABLET, EXTENDED RELEASE ORAL 2 TIMES DAILY
Qty: 360 TABLET | Refills: 3 | Status: SHIPPED | OUTPATIENT
Start: 2018-04-18 | End: 2019-05-04 | Stop reason: SDUPTHER

## 2018-04-19 ENCOUNTER — ANESTHESIA (OUTPATIENT)
Dept: ENDOSCOPY | Age: 68
End: 2018-04-19
Payer: MEDICARE

## 2018-04-19 ENCOUNTER — ANESTHESIA EVENT (OUTPATIENT)
Dept: ENDOSCOPY | Age: 68
End: 2018-04-19
Payer: MEDICARE

## 2018-04-19 ENCOUNTER — HOSPITAL ENCOUNTER (OUTPATIENT)
Age: 68
Setting detail: OUTPATIENT SURGERY
Discharge: HOME | End: 2018-04-19
Attending: INTERNAL MEDICINE | Admitting: INTERNAL MEDICINE
Payer: MEDICARE

## 2018-04-19 VITALS
OXYGEN SATURATION: 96 % | BODY MASS INDEX: 32.42 KG/M2 | WEIGHT: 176.2 LBS | TEMPERATURE: 97.1 F | RESPIRATION RATE: 20 BRPM | HEART RATE: 62 BPM | SYSTOLIC BLOOD PRESSURE: 146 MMHG | DIASTOLIC BLOOD PRESSURE: 69 MMHG | HEIGHT: 62 IN

## 2018-04-19 VITALS
RESPIRATION RATE: 21 BRPM | DIASTOLIC BLOOD PRESSURE: 77 MMHG | OXYGEN SATURATION: 99 % | SYSTOLIC BLOOD PRESSURE: 158 MMHG

## 2018-04-19 PROCEDURE — 7100000001 HC PACU RECOVERY - ADDTL 15 MIN: Performed by: INTERNAL MEDICINE

## 2018-04-19 PROCEDURE — 2500000003 HC RX 250 WO HCPCS: Performed by: REGISTERED NURSE

## 2018-04-19 PROCEDURE — 7100000000 HC PACU RECOVERY - FIRST 15 MIN: Performed by: INTERNAL MEDICINE

## 2018-04-19 PROCEDURE — 3700000001 HC ADD 15 MINUTES (ANESTHESIA): Performed by: INTERNAL MEDICINE

## 2018-04-19 PROCEDURE — 6360000002 HC RX W HCPCS: Performed by: REGISTERED NURSE

## 2018-04-19 PROCEDURE — 3700000000 HC ANESTHESIA ATTENDED CARE: Performed by: INTERNAL MEDICINE

## 2018-04-19 PROCEDURE — 3609012400 HC EGD TRANSORAL BIOPSY SINGLE/MULTIPLE: Performed by: INTERNAL MEDICINE

## 2018-04-19 PROCEDURE — 2580000003 HC RX 258: Performed by: INTERNAL MEDICINE

## 2018-04-19 PROCEDURE — 88305 TISSUE EXAM BY PATHOLOGIST: CPT

## 2018-04-19 RX ORDER — SODIUM CHLORIDE 450 MG/100ML
INJECTION, SOLUTION INTRAVENOUS CONTINUOUS
Status: DISCONTINUED | OUTPATIENT
Start: 2018-04-19 | End: 2018-04-19 | Stop reason: HOSPADM

## 2018-04-19 RX ORDER — LIDOCAINE HYDROCHLORIDE 10 MG/ML
INJECTION, SOLUTION INFILTRATION; PERINEURAL PRN
Status: DISCONTINUED | OUTPATIENT
Start: 2018-04-19 | End: 2018-04-19 | Stop reason: SDUPTHER

## 2018-04-19 RX ADMIN — LIDOCAINE HYDROCHLORIDE 60 MG: 10 INJECTION, SOLUTION INFILTRATION; PERINEURAL at 10:16

## 2018-04-19 RX ADMIN — PROPOFOL 50 MG: 10 INJECTION, EMULSION INTRAVENOUS at 10:21

## 2018-04-19 RX ADMIN — SODIUM CHLORIDE: 4.5 INJECTION, SOLUTION INTRAVENOUS at 09:34

## 2018-04-19 RX ADMIN — PROPOFOL 50 MG: 10 INJECTION, EMULSION INTRAVENOUS at 10:16

## 2018-04-19 ASSESSMENT — PAIN - FUNCTIONAL ASSESSMENT: PAIN_FUNCTIONAL_ASSESSMENT: 0-10

## 2018-04-19 ASSESSMENT — PAIN SCALES - GENERAL
PAINLEVEL_OUTOF10: 0
PAINLEVEL_OUTOF10: 0

## 2018-04-26 ENCOUNTER — OFFICE VISIT (OUTPATIENT)
Dept: FAMILY MEDICINE CLINIC | Age: 68
End: 2018-04-26
Payer: MEDICARE

## 2018-04-26 VITALS
HEIGHT: 62 IN | TEMPERATURE: 97.8 F | WEIGHT: 173 LBS | DIASTOLIC BLOOD PRESSURE: 86 MMHG | BODY MASS INDEX: 31.83 KG/M2 | RESPIRATION RATE: 16 BRPM | SYSTOLIC BLOOD PRESSURE: 132 MMHG | HEART RATE: 76 BPM

## 2018-04-26 DIAGNOSIS — E03.9 HYPOTHYROIDISM, UNSPECIFIED TYPE: ICD-10-CM

## 2018-04-26 DIAGNOSIS — K76.0 NAFLD (NONALCOHOLIC FATTY LIVER DISEASE): ICD-10-CM

## 2018-04-26 DIAGNOSIS — E78.5 HYPERLIPIDEMIA, UNSPECIFIED HYPERLIPIDEMIA TYPE: ICD-10-CM

## 2018-04-26 DIAGNOSIS — I10 ESSENTIAL HYPERTENSION: ICD-10-CM

## 2018-04-26 DIAGNOSIS — I25.10 CORONARY ARTERY DISEASE INVOLVING NATIVE CORONARY ARTERY OF NATIVE HEART WITHOUT ANGINA PECTORIS: ICD-10-CM

## 2018-04-26 DIAGNOSIS — F32.A DEPRESSION, UNSPECIFIED DEPRESSION TYPE: ICD-10-CM

## 2018-04-26 DIAGNOSIS — D50.9 IRON DEFICIENCY ANEMIA, UNSPECIFIED IRON DEFICIENCY ANEMIA TYPE: ICD-10-CM

## 2018-04-26 DIAGNOSIS — K92.2 GASTROINTESTINAL HEMORRHAGE, UNSPECIFIED GASTROINTESTINAL HEMORRHAGE TYPE: ICD-10-CM

## 2018-04-26 DIAGNOSIS — E11.8 TYPE 2 DIABETES MELLITUS WITH COMPLICATION, WITHOUT LONG-TERM CURRENT USE OF INSULIN (HCC): Primary | ICD-10-CM

## 2018-04-26 PROBLEM — D62 ACUTE BLOOD LOSS ANEMIA: Status: RESOLVED | Noted: 2018-04-02 | Resolved: 2018-04-26

## 2018-04-26 PROCEDURE — G8598 ASA/ANTIPLAT THER USED: HCPCS | Performed by: FAMILY MEDICINE

## 2018-04-26 PROCEDURE — 1090F PRES/ABSN URINE INCON ASSESS: CPT | Performed by: FAMILY MEDICINE

## 2018-04-26 PROCEDURE — G8427 DOCREV CUR MEDS BY ELIG CLIN: HCPCS | Performed by: FAMILY MEDICINE

## 2018-04-26 PROCEDURE — 1123F ACP DISCUSS/DSCN MKR DOCD: CPT | Performed by: FAMILY MEDICINE

## 2018-04-26 PROCEDURE — 2022F DILAT RTA XM EVC RTNOPTHY: CPT | Performed by: FAMILY MEDICINE

## 2018-04-26 PROCEDURE — 1036F TOBACCO NON-USER: CPT | Performed by: FAMILY MEDICINE

## 2018-04-26 PROCEDURE — 3044F HG A1C LEVEL LT 7.0%: CPT | Performed by: FAMILY MEDICINE

## 2018-04-26 PROCEDURE — G8417 CALC BMI ABV UP PARAM F/U: HCPCS | Performed by: FAMILY MEDICINE

## 2018-04-26 PROCEDURE — 4040F PNEUMOC VAC/ADMIN/RCVD: CPT | Performed by: FAMILY MEDICINE

## 2018-04-26 PROCEDURE — 3017F COLORECTAL CA SCREEN DOC REV: CPT | Performed by: FAMILY MEDICINE

## 2018-04-26 PROCEDURE — G8400 PT W/DXA NO RESULTS DOC: HCPCS | Performed by: FAMILY MEDICINE

## 2018-04-26 PROCEDURE — 99214 OFFICE O/P EST MOD 30 MIN: CPT | Performed by: FAMILY MEDICINE

## 2018-04-26 ASSESSMENT — ENCOUNTER SYMPTOMS
ABDOMINAL PAIN: 0
ANAL BLEEDING: 0
CONSTIPATION: 0
NAUSEA: 0
CHEST TIGHTNESS: 0
VOMITING: 0
BLOOD IN STOOL: 0
SHORTNESS OF BREATH: 0
DIARRHEA: 0

## 2018-04-26 ASSESSMENT — PATIENT HEALTH QUESTIONNAIRE - PHQ9
SUM OF ALL RESPONSES TO PHQ9 QUESTIONS 1 & 2: 1
2. FEELING DOWN, DEPRESSED OR HOPELESS: 1
SUM OF ALL RESPONSES TO PHQ QUESTIONS 1-9: 1
1. LITTLE INTEREST OR PLEASURE IN DOING THINGS: 0

## 2018-05-09 ENCOUNTER — HOSPITAL ENCOUNTER (OUTPATIENT)
Dept: WOMENS IMAGING | Age: 68
Discharge: HOME | End: 2018-05-09
Payer: MEDICARE

## 2018-05-09 DIAGNOSIS — Z12.31 VISIT FOR SCREENING MAMMOGRAM: ICD-10-CM

## 2018-05-09 PROCEDURE — 77063 BREAST TOMOSYNTHESIS BI: CPT

## 2018-05-09 PROCEDURE — 77067 SCR MAMMO BI INCL CAD: CPT

## 2018-05-14 ENCOUNTER — HOSPITAL ENCOUNTER (OUTPATIENT)
Dept: WOMENS IMAGING | Age: 68
Discharge: HOME | End: 2018-05-14
Payer: MEDICARE

## 2018-05-14 ENCOUNTER — APPOINTMENT (OUTPATIENT)
Dept: WOMENS IMAGING | Age: 68
End: 2018-05-14
Payer: MEDICARE

## 2018-05-14 DIAGNOSIS — R92.2 BREAST DENSITY: ICD-10-CM

## 2018-05-14 PROCEDURE — 77065 DX MAMMO INCL CAD UNI: CPT

## 2018-05-15 ENCOUNTER — TELEPHONE (OUTPATIENT)
Dept: FAMILY MEDICINE CLINIC | Age: 68
End: 2018-05-15

## 2018-05-15 DIAGNOSIS — Z78.0 MENOPAUSE: Primary | ICD-10-CM

## 2018-05-16 ENCOUNTER — OFFICE VISIT (OUTPATIENT)
Dept: ONCOLOGY | Age: 68
End: 2018-05-16
Payer: MEDICARE

## 2018-05-16 ENCOUNTER — HOSPITAL ENCOUNTER (OUTPATIENT)
Dept: INFUSION THERAPY | Age: 68
Discharge: HOME | End: 2018-05-16
Payer: MEDICARE

## 2018-05-16 VITALS
HEIGHT: 62 IN | SYSTOLIC BLOOD PRESSURE: 139 MMHG | WEIGHT: 172.4 LBS | OXYGEN SATURATION: 97 % | TEMPERATURE: 97.9 F | RESPIRATION RATE: 16 BRPM | BODY MASS INDEX: 31.73 KG/M2 | DIASTOLIC BLOOD PRESSURE: 72 MMHG | HEART RATE: 68 BPM

## 2018-05-16 DIAGNOSIS — K92.1 BLOOD IN STOOL: ICD-10-CM

## 2018-05-16 DIAGNOSIS — K76.0 NAFLD (NONALCOHOLIC FATTY LIVER DISEASE): ICD-10-CM

## 2018-05-16 DIAGNOSIS — D50.0 IRON DEFICIENCY ANEMIA DUE TO CHRONIC BLOOD LOSS: Primary | ICD-10-CM

## 2018-05-16 PROCEDURE — 1123F ACP DISCUSS/DSCN MKR DOCD: CPT | Performed by: INTERNAL MEDICINE

## 2018-05-16 PROCEDURE — 1090F PRES/ABSN URINE INCON ASSESS: CPT | Performed by: INTERNAL MEDICINE

## 2018-05-16 PROCEDURE — 4040F PNEUMOC VAC/ADMIN/RCVD: CPT | Performed by: INTERNAL MEDICINE

## 2018-05-16 PROCEDURE — G8417 CALC BMI ABV UP PARAM F/U: HCPCS | Performed by: INTERNAL MEDICINE

## 2018-05-16 PROCEDURE — 99211 OFF/OP EST MAY X REQ PHY/QHP: CPT

## 2018-05-16 PROCEDURE — G8427 DOCREV CUR MEDS BY ELIG CLIN: HCPCS | Performed by: INTERNAL MEDICINE

## 2018-05-16 PROCEDURE — 1036F TOBACCO NON-USER: CPT | Performed by: INTERNAL MEDICINE

## 2018-05-16 PROCEDURE — G8399 PT W/DXA RESULTS DOCUMENT: HCPCS | Performed by: INTERNAL MEDICINE

## 2018-05-16 PROCEDURE — 3017F COLORECTAL CA SCREEN DOC REV: CPT | Performed by: INTERNAL MEDICINE

## 2018-05-16 PROCEDURE — G8598 ASA/ANTIPLAT THER USED: HCPCS | Performed by: INTERNAL MEDICINE

## 2018-05-16 PROCEDURE — 99203 OFFICE O/P NEW LOW 30 MIN: CPT | Performed by: INTERNAL MEDICINE

## 2018-05-18 PROBLEM — D50.0 IRON DEFICIENCY ANEMIA DUE TO CHRONIC BLOOD LOSS: Status: ACTIVE | Noted: 2018-05-18

## 2018-05-18 RX ORDER — SODIUM CHLORIDE 0.9 % (FLUSH) 0.9 %
20 SYRINGE (ML) INJECTION PRN
Status: CANCELLED | OUTPATIENT
Start: 2018-05-25

## 2018-05-18 RX ORDER — SODIUM CHLORIDE 0.9 % (FLUSH) 0.9 %
10 SYRINGE (ML) INJECTION PRN
Status: CANCELLED | OUTPATIENT
Start: 2018-05-25

## 2018-05-18 RX ORDER — HEPARIN SODIUM (PORCINE) LOCK FLUSH IV SOLN 100 UNIT/ML 100 UNIT/ML
500 SOLUTION INTRAVENOUS PRN
Status: CANCELLED | OUTPATIENT
Start: 2018-05-25

## 2018-05-21 ENCOUNTER — HOSPITAL ENCOUNTER (OUTPATIENT)
Dept: WOMENS IMAGING | Age: 68
Discharge: HOME | End: 2018-05-21
Payer: MEDICARE

## 2018-05-21 DIAGNOSIS — Z78.0 MENOPAUSE: ICD-10-CM

## 2018-05-21 PROCEDURE — 77080 DXA BONE DENSITY AXIAL: CPT

## 2018-05-22 ENCOUNTER — TELEPHONE (OUTPATIENT)
Dept: FAMILY MEDICINE CLINIC | Age: 68
End: 2018-05-22

## 2018-05-25 ENCOUNTER — HOSPITAL ENCOUNTER (OUTPATIENT)
Dept: INFUSION THERAPY | Age: 68
Discharge: HOME | End: 2018-05-25
Payer: MEDICARE

## 2018-05-25 VITALS
RESPIRATION RATE: 16 BRPM | WEIGHT: 171.8 LBS | BODY MASS INDEX: 31.62 KG/M2 | HEIGHT: 62 IN | DIASTOLIC BLOOD PRESSURE: 70 MMHG | OXYGEN SATURATION: 97 % | HEART RATE: 63 BPM | TEMPERATURE: 98.3 F | SYSTOLIC BLOOD PRESSURE: 130 MMHG

## 2018-05-25 DIAGNOSIS — K92.1 BLOOD IN STOOL: ICD-10-CM

## 2018-05-25 DIAGNOSIS — R80.9 MICROALBUMINURIA: ICD-10-CM

## 2018-05-25 DIAGNOSIS — K92.2 GASTROINTESTINAL HEMORRHAGE, UNSPECIFIED GASTROINTESTINAL HEMORRHAGE TYPE: ICD-10-CM

## 2018-05-25 DIAGNOSIS — D50.0 IRON DEFICIENCY ANEMIA DUE TO CHRONIC BLOOD LOSS: ICD-10-CM

## 2018-05-25 DIAGNOSIS — R74.8 ELEVATED LIVER ENZYMES: ICD-10-CM

## 2018-05-25 DIAGNOSIS — E11.8 TYPE 2 DIABETES MELLITUS WITH COMPLICATION, WITHOUT LONG-TERM CURRENT USE OF INSULIN (HCC): ICD-10-CM

## 2018-05-25 DIAGNOSIS — K76.0 NAFLD (NONALCOHOLIC FATTY LIVER DISEASE): ICD-10-CM

## 2018-05-25 PROCEDURE — 6360000002 HC RX W HCPCS: Performed by: INTERNAL MEDICINE

## 2018-05-25 PROCEDURE — 96365 THER/PROPH/DIAG IV INF INIT: CPT

## 2018-05-25 PROCEDURE — 2580000003 HC RX 258: Performed by: INTERNAL MEDICINE

## 2018-05-25 RX ORDER — DIPHENHYDRAMINE HYDROCHLORIDE 50 MG/ML
50 INJECTION INTRAMUSCULAR; INTRAVENOUS ONCE
Status: CANCELLED | OUTPATIENT
Start: 2018-05-25 | End: 2018-05-25

## 2018-05-25 RX ORDER — SODIUM CHLORIDE 0.9 % (FLUSH) 0.9 %
5 SYRINGE (ML) INJECTION PRN
Status: CANCELLED | OUTPATIENT
Start: 2018-05-25

## 2018-05-25 RX ORDER — EPINEPHRINE 1 MG/ML
0.3 INJECTION, SOLUTION, CONCENTRATE INTRAVENOUS PRN
Status: CANCELLED | OUTPATIENT
Start: 2018-05-25

## 2018-05-25 RX ORDER — 0.9 % SODIUM CHLORIDE 0.9 %
10 VIAL (ML) INJECTION ONCE
Status: CANCELLED | OUTPATIENT
Start: 2018-05-25 | End: 2018-05-25

## 2018-05-25 RX ORDER — HEPARIN SODIUM (PORCINE) LOCK FLUSH IV SOLN 100 UNIT/ML 100 UNIT/ML
500 SOLUTION INTRAVENOUS PRN
Status: CANCELLED | OUTPATIENT
Start: 2018-05-25

## 2018-05-25 RX ORDER — METHYLPREDNISOLONE SODIUM SUCCINATE 125 MG/2ML
125 INJECTION, POWDER, LYOPHILIZED, FOR SOLUTION INTRAMUSCULAR; INTRAVENOUS ONCE
Status: CANCELLED | OUTPATIENT
Start: 2018-05-25 | End: 2018-05-25

## 2018-05-25 RX ORDER — SODIUM CHLORIDE 9 MG/ML
INJECTION, SOLUTION INTRAVENOUS ONCE
Status: CANCELLED | OUTPATIENT
Start: 2018-05-25 | End: 2018-05-25

## 2018-05-25 RX ORDER — SODIUM CHLORIDE 9 MG/ML
100 INJECTION, SOLUTION INTRAVENOUS CONTINUOUS
Status: CANCELLED | OUTPATIENT
Start: 2018-05-25

## 2018-05-25 RX ORDER — SODIUM CHLORIDE 0.9 % (FLUSH) 0.9 %
10 SYRINGE (ML) INJECTION PRN
Status: CANCELLED | OUTPATIENT
Start: 2018-05-25

## 2018-05-25 RX ORDER — SODIUM CHLORIDE 9 MG/ML
INJECTION, SOLUTION INTRAVENOUS ONCE
Status: COMPLETED | OUTPATIENT
Start: 2018-05-25 | End: 2018-05-25

## 2018-05-25 RX ADMIN — SODIUM CHLORIDE: 9 INJECTION, SOLUTION INTRAVENOUS at 13:28

## 2018-05-25 RX ADMIN — FERUMOXYTOL 510 MG: 510 INJECTION INTRAVENOUS at 13:42

## 2018-05-25 NOTE — PROGRESS NOTES
Patient assessed for the following post feraheme:    Dizziness   No  Lightheadedness  No      Acute nausea/vomiting No  Headache   No  Chest pain/pressure  No  Rash/itching   No  Shortness of breath  No    Patient kept for 20 minutes observation post infusion feraheme. Patient tolerated feraheme without any complications. Last vital signs:   /70   Pulse 63   Temp 98.3 °F (36.8 °C) (Oral)   Resp 16   Ht 5' 2\" (1.575 m)   Wt 171 lb 12.8 oz (77.9 kg)   SpO2 97%   BMI 31.42 kg/m²         Patient instructed if experience any of the above symptoms following today's infusion,he/she is to notify MD immediately or go to the emergency department. Discharge instructions given to patient. Verbalizes understanding. Ambulated off unit per self with belongings accompanied by spouse.

## 2018-05-25 NOTE — PLAN OF CARE
Problem: Musculor/Skeletal Functional Status  Intervention: Fall precautions  Verbalized understanding of fall prevention to ask for assistance with ambulation. Call light within reach. Goal: Absence of falls  Outcome: Met This Shift  No falls occurred during this visit    Problem: Intellectual/Education/Knowledge Deficit  Intervention: Verbal/written education provided  feraheme reviewed, patient verbalizes understanding of medication being administered and potential side effects. Goal: Teaching initiated upon admission  Outcome: Met This Shift  Patient verbalizes understanding to verbal and written information given on feraheme,action and possible side effects. Aware to call MD if develop complications. Problem: Discharge Planning  Intervention: Interaction with patient/family and care team  Discuss understanding of discharge instructions,follow-up appointments, and when to call the physician. Goal: Knowledge of discharge instructions  Knowledge of discharge instructions    Outcome: Met This Shift  Verbalized understanding of discharge instructions, follow-up appointments, and when to call the physician. Comments: Care plan reviewed with patient and spouse. Patient and spouse verbalize understanding of the plan of care and contribute to goal setting.

## 2018-05-30 ENCOUNTER — HOSPITAL ENCOUNTER (OUTPATIENT)
Dept: INFUSION THERAPY | Age: 68
Discharge: HOME | End: 2018-05-30
Payer: MEDICARE

## 2018-05-30 VITALS
SYSTOLIC BLOOD PRESSURE: 112 MMHG | TEMPERATURE: 98 F | HEART RATE: 57 BPM | OXYGEN SATURATION: 97 % | DIASTOLIC BLOOD PRESSURE: 58 MMHG | RESPIRATION RATE: 16 BRPM

## 2018-05-30 DIAGNOSIS — E11.8 TYPE 2 DIABETES MELLITUS WITH COMPLICATION, WITHOUT LONG-TERM CURRENT USE OF INSULIN (HCC): ICD-10-CM

## 2018-05-30 DIAGNOSIS — R80.9 MICROALBUMINURIA: ICD-10-CM

## 2018-05-30 DIAGNOSIS — R74.8 ELEVATED LIVER ENZYMES: ICD-10-CM

## 2018-05-30 DIAGNOSIS — D50.0 IRON DEFICIENCY ANEMIA DUE TO CHRONIC BLOOD LOSS: ICD-10-CM

## 2018-05-30 DIAGNOSIS — K92.2 GASTROINTESTINAL HEMORRHAGE, UNSPECIFIED GASTROINTESTINAL HEMORRHAGE TYPE: ICD-10-CM

## 2018-05-30 DIAGNOSIS — K76.0 NAFLD (NONALCOHOLIC FATTY LIVER DISEASE): ICD-10-CM

## 2018-05-30 DIAGNOSIS — K92.1 BLOOD IN STOOL: ICD-10-CM

## 2018-05-30 PROCEDURE — 2580000003 HC RX 258: Performed by: INTERNAL MEDICINE

## 2018-05-30 PROCEDURE — 6360000002 HC RX W HCPCS: Performed by: INTERNAL MEDICINE

## 2018-05-30 PROCEDURE — 96365 THER/PROPH/DIAG IV INF INIT: CPT

## 2018-05-30 RX ORDER — HEPARIN SODIUM (PORCINE) LOCK FLUSH IV SOLN 100 UNIT/ML 100 UNIT/ML
500 SOLUTION INTRAVENOUS PRN
Status: CANCELLED | OUTPATIENT
Start: 2018-05-30

## 2018-05-30 RX ORDER — EPINEPHRINE 1 MG/ML
0.3 INJECTION, SOLUTION, CONCENTRATE INTRAVENOUS PRN
Status: CANCELLED | OUTPATIENT
Start: 2018-05-30

## 2018-05-30 RX ORDER — DIPHENHYDRAMINE HYDROCHLORIDE 50 MG/ML
50 INJECTION INTRAMUSCULAR; INTRAVENOUS ONCE
Status: CANCELLED | OUTPATIENT
Start: 2018-05-30 | End: 2018-05-30

## 2018-05-30 RX ORDER — SODIUM CHLORIDE 0.9 % (FLUSH) 0.9 %
5 SYRINGE (ML) INJECTION PRN
Status: CANCELLED | OUTPATIENT
Start: 2018-05-30

## 2018-05-30 RX ORDER — METHYLPREDNISOLONE SODIUM SUCCINATE 125 MG/2ML
125 INJECTION, POWDER, LYOPHILIZED, FOR SOLUTION INTRAMUSCULAR; INTRAVENOUS ONCE
Status: CANCELLED | OUTPATIENT
Start: 2018-05-30 | End: 2018-05-30

## 2018-05-30 RX ORDER — SODIUM CHLORIDE 0.9 % (FLUSH) 0.9 %
10 SYRINGE (ML) INJECTION PRN
Status: CANCELLED | OUTPATIENT
Start: 2018-05-30

## 2018-05-30 RX ORDER — 0.9 % SODIUM CHLORIDE 0.9 %
10 VIAL (ML) INJECTION ONCE
Status: CANCELLED | OUTPATIENT
Start: 2018-05-30 | End: 2018-05-30

## 2018-05-30 RX ORDER — SODIUM CHLORIDE 9 MG/ML
100 INJECTION, SOLUTION INTRAVENOUS CONTINUOUS
Status: CANCELLED | OUTPATIENT
Start: 2018-05-30

## 2018-05-30 RX ORDER — SODIUM CHLORIDE 9 MG/ML
INJECTION, SOLUTION INTRAVENOUS ONCE
Status: COMPLETED | OUTPATIENT
Start: 2018-05-30 | End: 2018-05-30

## 2018-05-30 RX ORDER — SODIUM CHLORIDE 9 MG/ML
INJECTION, SOLUTION INTRAVENOUS ONCE
Status: CANCELLED | OUTPATIENT
Start: 2018-05-30 | End: 2018-05-30

## 2018-05-30 RX ADMIN — SODIUM CHLORIDE: 9 INJECTION, SOLUTION INTRAVENOUS at 13:30

## 2018-05-30 RX ADMIN — FERUMOXYTOL 510 MG: 510 INJECTION INTRAVENOUS at 13:31

## 2018-05-30 NOTE — PLAN OF CARE
Problem: Discharge Planning  Intervention: Interaction with patient/family and care team  Discuss understanding of discharge instructions,follow-up appointments, and when to call the physician. Goal: Knowledge of discharge instructions  Knowledge of discharge instructions     Outcome: Met This Shift  Verbalized understanding of discharge instructions, follow-up appointments, and when to call the physician. Problem: Intellectual/Education/Knowledge Deficit  Intervention: Verbal/written education provided  feraheme reviewed, patient verbalizes understanding of medication being administered and potential side effects. Goal: Teaching initiated upon admission  Outcome: Met This Shift  Patient verbalizes understanding to verbal information given on Feraheme,action and possible side effects. Aware to call MD if develop complications. Problem: Musculor/Skeletal Functional Status  Intervention: Fall precautions  Verbalized understanding of fall prevention to ask for assistance with ambulation. Call light within reach. Goal: Absence of falls  Outcome: Met This Shift  No falls occurred during this visit    Comments: Care plan reviewed with patient . Patient  verbalize understanding of the plan of care and contribute to goal setting.

## 2018-05-30 NOTE — PROGRESS NOTES
Patient assessed for the following post feraheme:    Dizziness   No  Lightheadedness  No      Acute nausea/vomiting No  Headache   No  Chest pain/pressure  No  Rash/itching   No  Shortness of breath  No    Patient kept for 20 minutes observation post infusion feraheme. Patient tolerated feraheme infusion without any complications. Last vital signs:   BP (!) 112/58   Pulse 57   Temp 98 °F (36.7 °C) (Oral)   Resp 16   SpO2 97%         Patient instructed if experience any of the above symptoms following today's infusion,he/she is to notify MD immediately or go to the emergency department. Discharge instructions given to patient. Verbalizes understanding. Ambulated off unit per self with belongings.

## 2018-05-30 NOTE — PROGRESS NOTES
Patient stated she was having some heart fluttering. Feraheme had finished and normal saline infusing. After patient sat up to take her vitals- she stated it was subsiding. Heart was regular rhythm. After 5 minutes states heart fluttering completely gone. Stated had some episodes over weekend. Patient will call heart MD.

## 2018-06-07 ENCOUNTER — OFFICE VISIT (OUTPATIENT)
Dept: FAMILY MEDICINE CLINIC | Age: 68
End: 2018-06-07
Payer: MEDICARE

## 2018-06-07 VITALS
HEART RATE: 60 BPM | WEIGHT: 169.8 LBS | BODY MASS INDEX: 31.06 KG/M2 | DIASTOLIC BLOOD PRESSURE: 74 MMHG | TEMPERATURE: 98 F | RESPIRATION RATE: 16 BRPM | SYSTOLIC BLOOD PRESSURE: 132 MMHG

## 2018-06-07 DIAGNOSIS — R53.83 OTHER FATIGUE: ICD-10-CM

## 2018-06-07 DIAGNOSIS — F32.A DEPRESSION, UNSPECIFIED DEPRESSION TYPE: Primary | ICD-10-CM

## 2018-06-07 DIAGNOSIS — D50.0 IRON DEFICIENCY ANEMIA DUE TO CHRONIC BLOOD LOSS: ICD-10-CM

## 2018-06-07 PROCEDURE — 1090F PRES/ABSN URINE INCON ASSESS: CPT | Performed by: FAMILY MEDICINE

## 2018-06-07 PROCEDURE — 4040F PNEUMOC VAC/ADMIN/RCVD: CPT | Performed by: FAMILY MEDICINE

## 2018-06-07 PROCEDURE — G8417 CALC BMI ABV UP PARAM F/U: HCPCS | Performed by: FAMILY MEDICINE

## 2018-06-07 PROCEDURE — G8399 PT W/DXA RESULTS DOCUMENT: HCPCS | Performed by: FAMILY MEDICINE

## 2018-06-07 PROCEDURE — 3017F COLORECTAL CA SCREEN DOC REV: CPT | Performed by: FAMILY MEDICINE

## 2018-06-07 PROCEDURE — G8427 DOCREV CUR MEDS BY ELIG CLIN: HCPCS | Performed by: FAMILY MEDICINE

## 2018-06-07 PROCEDURE — 99214 OFFICE O/P EST MOD 30 MIN: CPT | Performed by: FAMILY MEDICINE

## 2018-06-07 PROCEDURE — 1123F ACP DISCUSS/DSCN MKR DOCD: CPT | Performed by: FAMILY MEDICINE

## 2018-06-07 PROCEDURE — 1036F TOBACCO NON-USER: CPT | Performed by: FAMILY MEDICINE

## 2018-06-07 PROCEDURE — G8598 ASA/ANTIPLAT THER USED: HCPCS | Performed by: FAMILY MEDICINE

## 2018-06-07 RX ORDER — BUPROPION HYDROCHLORIDE 150 MG/1
150 TABLET ORAL EVERY MORNING
Qty: 30 TABLET | Refills: 1 | Status: SHIPPED | OUTPATIENT
Start: 2018-06-07 | End: 2018-11-08

## 2018-06-07 ASSESSMENT — ENCOUNTER SYMPTOMS
CHEST TIGHTNESS: 0
ANAL BLEEDING: 0
EYE REDNESS: 0
SINUS PRESSURE: 0
VOMITING: 1
EYE PAIN: 0
SORE THROAT: 0
RHINORRHEA: 0
SHORTNESS OF BREATH: 0
NAUSEA: 0
CONSTIPATION: 0
BLOOD IN STOOL: 0
COUGH: 0
DIARRHEA: 0
SINUS PAIN: 0
ABDOMINAL PAIN: 0
EYE ITCHING: 0
EYE DISCHARGE: 0

## 2018-06-11 ENCOUNTER — HOSPITAL ENCOUNTER (OUTPATIENT)
Age: 68
Discharge: HOME | End: 2018-06-11
Payer: MEDICARE

## 2018-06-11 DIAGNOSIS — I10 ESSENTIAL HYPERTENSION: ICD-10-CM

## 2018-06-11 DIAGNOSIS — D50.0 IRON DEFICIENCY ANEMIA DUE TO CHRONIC BLOOD LOSS: ICD-10-CM

## 2018-06-11 DIAGNOSIS — E11.8 TYPE 2 DIABETES MELLITUS WITH COMPLICATION, WITHOUT LONG-TERM CURRENT USE OF INSULIN (HCC): ICD-10-CM

## 2018-06-11 DIAGNOSIS — E78.5 HYPERLIPIDEMIA, UNSPECIFIED HYPERLIPIDEMIA TYPE: ICD-10-CM

## 2018-06-11 LAB
ALBUMIN SERPL-MCNC: 3.6 G/DL (ref 3.5–5.1)
ALP BLD-CCNC: 77 U/L (ref 38–126)
ALT SERPL-CCNC: 33 U/L (ref 11–66)
ANION GAP SERPL CALCULATED.3IONS-SCNC: 15 MEQ/L (ref 8–16)
ANISOCYTOSIS: ABNORMAL
AST SERPL-CCNC: 56 U/L (ref 5–40)
BASOPHILS # BLD: 0.6 %
BASOPHILS ABSOLUTE: 0 THOU/MM3 (ref 0–0.1)
BILIRUB SERPL-MCNC: 0.5 MG/DL (ref 0.3–1.2)
BUN BLDV-MCNC: 7 MG/DL (ref 7–22)
CALCIUM SERPL-MCNC: 9.2 MG/DL (ref 8.5–10.5)
CHLORIDE BLD-SCNC: 102 MEQ/L (ref 98–111)
CO2: 24 MEQ/L (ref 23–33)
CREAT SERPL-MCNC: 0.4 MG/DL (ref 0.4–1.2)
EOSINOPHIL # BLD: 2.6 %
EOSINOPHILS ABSOLUTE: 0.1 THOU/MM3 (ref 0–0.4)
GFR SERPL CREATININE-BSD FRML MDRD: > 90 ML/MIN/1.73M2
GLUCOSE BLD-MCNC: 114 MG/DL (ref 70–108)
HCT VFR BLD CALC: 35.3 % (ref 37–47)
HEMOGLOBIN: 10.8 GM/DL (ref 12–16)
HYPOCHROMIA: ABNORMAL
LYMPHOCYTES # BLD: 36.4 %
LYMPHOCYTES ABSOLUTE: 1.8 THOU/MM3 (ref 1–4.8)
MCH RBC QN AUTO: 22.8 PG (ref 27–31)
MCHC RBC AUTO-ENTMCNC: 30.6 GM/DL (ref 33–37)
MCV RBC AUTO: 74.3 FL (ref 81–99)
MICROCYTES: ABNORMAL
MONOCYTES # BLD: 7.8 %
MONOCYTES ABSOLUTE: 0.4 THOU/MM3 (ref 0.4–1.3)
NUCLEATED RED BLOOD CELLS: 0 /100 WBC
OVALOCYTES: ABNORMAL
PDW BLD-RTO: 35.8 % (ref 11.5–14.5)
PLATELET # BLD: 160 THOU/MM3 (ref 130–400)
PLATELET ESTIMATE: ADEQUATE
PMV BLD AUTO: 9.3 FL (ref 7.4–10.4)
POIKILOCYTES: ABNORMAL
POTASSIUM SERPL-SCNC: 3.8 MEQ/L (ref 3.5–5.2)
RBC # BLD: 4.75 MILL/MM3 (ref 4.2–5.4)
SEG NEUTROPHILS: 52.6 %
SEGMENTED NEUTROPHILS ABSOLUTE COUNT: 2.6 THOU/MM3 (ref 1.8–7.7)
SODIUM BLD-SCNC: 141 MEQ/L (ref 135–145)
T4 FREE: 1.32 NG/DL (ref 0.93–1.76)
TOTAL PROTEIN: 6.9 G/DL (ref 6.1–8)
TSH SERPL DL<=0.05 MIU/L-ACNC: 2.51 UIU/ML (ref 0.4–4.2)
WBC # BLD: 4.9 THOU/MM3 (ref 4.8–10.8)

## 2018-06-11 PROCEDURE — 84443 ASSAY THYROID STIM HORMONE: CPT

## 2018-06-11 PROCEDURE — 80053 COMPREHEN METABOLIC PANEL: CPT

## 2018-06-11 PROCEDURE — 84439 ASSAY OF FREE THYROXINE: CPT

## 2018-06-11 PROCEDURE — 85025 COMPLETE CBC W/AUTO DIFF WBC: CPT

## 2018-06-11 PROCEDURE — 36415 COLL VENOUS BLD VENIPUNCTURE: CPT

## 2018-06-12 ENCOUNTER — TELEPHONE (OUTPATIENT)
Dept: FAMILY MEDICINE CLINIC | Age: 68
End: 2018-06-12

## 2018-06-12 DIAGNOSIS — R74.01 ELEVATED AST (SGOT): Primary | ICD-10-CM

## 2018-06-20 ASSESSMENT — ENCOUNTER SYMPTOMS
CHEST TIGHTNESS: 0
COUGH: 0
RECTAL PAIN: 0
EYE DISCHARGE: 0
COLOR CHANGE: 0
CONSTIPATION: 0
TROUBLE SWALLOWING: 0
VOMITING: 0
ABDOMINAL PAIN: 0
FACIAL SWELLING: 0
SORE THROAT: 0
DIARRHEA: 0
NAUSEA: 0
WHEEZING: 0
BACK PAIN: 0
BLOOD IN STOOL: 1
SHORTNESS OF BREATH: 0
ABDOMINAL DISTENTION: 0

## 2018-07-23 ENCOUNTER — HOSPITAL ENCOUNTER (OUTPATIENT)
Age: 68
Discharge: HOME | End: 2018-07-23
Payer: MEDICARE

## 2018-07-23 DIAGNOSIS — E78.5 HYPERLIPIDEMIA, UNSPECIFIED HYPERLIPIDEMIA TYPE: ICD-10-CM

## 2018-07-23 DIAGNOSIS — E11.8 TYPE 2 DIABETES MELLITUS WITH COMPLICATION, WITHOUT LONG-TERM CURRENT USE OF INSULIN (HCC): ICD-10-CM

## 2018-07-23 LAB
ALBUMIN SERPL-MCNC: 3.6 G/DL (ref 3.5–5.1)
ALP BLD-CCNC: 75 U/L (ref 38–126)
ALT SERPL-CCNC: 27 U/L (ref 11–66)
ANION GAP SERPL CALCULATED.3IONS-SCNC: 17 MEQ/L (ref 8–16)
AST SERPL-CCNC: 32 U/L (ref 5–40)
AVERAGE GLUCOSE: 117 MG/DL (ref 70–126)
BILIRUB SERPL-MCNC: 0.3 MG/DL (ref 0.3–1.2)
BUN BLDV-MCNC: 18 MG/DL (ref 7–22)
CALCIUM SERPL-MCNC: 10.2 MG/DL (ref 8.5–10.5)
CHLORIDE BLD-SCNC: 99 MEQ/L (ref 98–111)
CHOLESTEROL, TOTAL: 99 MG/DL (ref 100–199)
CO2: 23 MEQ/L (ref 23–33)
CREAT SERPL-MCNC: 0.7 MG/DL (ref 0.4–1.2)
CREATININE, URINE: 114.8 MG/DL
GFR SERPL CREATININE-BSD FRML MDRD: 83 ML/MIN/1.73M2
GLUCOSE BLD-MCNC: 100 MG/DL (ref 70–108)
HBA1C MFR BLD: 5.9 % (ref 4.4–6.4)
HDLC SERPL-MCNC: 33 MG/DL
LDL CHOLESTEROL CALCULATED: 36 MG/DL
MICROALBUMIN UR-MCNC: 125.74 MG/DL
MICROALBUMIN/CREAT UR-RTO: 1095 MG/G (ref 0–30)
POTASSIUM SERPL-SCNC: 4.2 MEQ/L (ref 3.5–5.2)
SODIUM BLD-SCNC: 139 MEQ/L (ref 135–145)
T4 FREE: 1.39 NG/DL (ref 0.93–1.76)
TOTAL PROTEIN: 7.7 G/DL (ref 6.1–8)
TRIGL SERPL-MCNC: 151 MG/DL (ref 0–199)
TSH SERPL DL<=0.05 MIU/L-ACNC: 3.01 UIU/ML (ref 0.4–4.2)

## 2018-07-23 PROCEDURE — 84439 ASSAY OF FREE THYROXINE: CPT

## 2018-07-23 PROCEDURE — 80053 COMPREHEN METABOLIC PANEL: CPT

## 2018-07-23 PROCEDURE — 83036 HEMOGLOBIN GLYCOSYLATED A1C: CPT

## 2018-07-23 PROCEDURE — 36415 COLL VENOUS BLD VENIPUNCTURE: CPT

## 2018-07-23 PROCEDURE — 82043 UR ALBUMIN QUANTITATIVE: CPT

## 2018-07-23 PROCEDURE — 80061 LIPID PANEL: CPT

## 2018-07-23 PROCEDURE — 84443 ASSAY THYROID STIM HORMONE: CPT

## 2018-07-25 ENCOUNTER — HOSPITAL ENCOUNTER (OUTPATIENT)
Dept: INFUSION THERAPY | Age: 68
Discharge: HOME | End: 2018-07-25
Payer: MEDICARE

## 2018-07-25 ENCOUNTER — OFFICE VISIT (OUTPATIENT)
Dept: ONCOLOGY | Age: 68
End: 2018-07-25
Payer: MEDICARE

## 2018-07-25 VITALS
DIASTOLIC BLOOD PRESSURE: 66 MMHG | BODY MASS INDEX: 30.62 KG/M2 | WEIGHT: 166.4 LBS | OXYGEN SATURATION: 97 % | HEART RATE: 61 BPM | RESPIRATION RATE: 16 BRPM | SYSTOLIC BLOOD PRESSURE: 138 MMHG | HEIGHT: 62 IN | TEMPERATURE: 98 F

## 2018-07-25 DIAGNOSIS — D50.0 IRON DEFICIENCY ANEMIA DUE TO CHRONIC BLOOD LOSS: Primary | ICD-10-CM

## 2018-07-25 LAB
ANISOCYTOSIS: PRESENT
BASOPHILS # BLD: 0.6 %
BASOPHILS ABSOLUTE: 0 THOU/MM3 (ref 0–0.1)
ELLIPTOCYTES: ABNORMAL
EOSINOPHIL # BLD: 2 %
EOSINOPHILS ABSOLUTE: 0.1 THOU/MM3 (ref 0–0.4)
ERYTHROCYTE [DISTWIDTH] IN BLOOD BY AUTOMATED COUNT: 24.1 % (ref 11.5–14.5)
ERYTHROCYTE [DISTWIDTH] IN BLOOD BY AUTOMATED COUNT: 67.7 FL (ref 35–45)
HCT VFR BLD CALC: 43.7 % (ref 37–47)
HEMOGLOBIN: 13.8 GM/DL (ref 12–16)
IMMATURE GRANS (ABS): 0.02 THOU/MM3 (ref 0–0.07)
IMMATURE GRANULOCYTES: 0.3 %
LYMPHOCYTES # BLD: 31 %
LYMPHOCYTES ABSOLUTE: 2.2 THOU/MM3 (ref 1–4.8)
MCH RBC QN AUTO: 25.9 PG (ref 26–33)
MCHC RBC AUTO-ENTMCNC: 31.6 GM/DL (ref 32.2–35.5)
MCV RBC AUTO: 82.1 FL (ref 81–99)
MONOCYTES # BLD: 11 %
MONOCYTES ABSOLUTE: 0.8 THOU/MM3 (ref 0.4–1.3)
NUCLEATED RED BLOOD CELLS: 0 /100 WBC
PLATELET # BLD: 157 THOU/MM3 (ref 130–400)
PMV BLD AUTO: 9.9 FL (ref 9.4–12.4)
POIKILOCYTES: ABNORMAL
RBC # BLD: 5.32 MILL/MM3 (ref 4.2–5.4)
SCAN OF BLOOD SMEAR: NORMAL
SEG NEUTROPHILS: 55.1 %
SEGMENTED NEUTROPHILS ABSOLUTE COUNT: 3.9 THOU/MM3 (ref 1.8–7.7)
WBC # BLD: 7 THOU/MM3 (ref 4.8–10.8)

## 2018-07-25 PROCEDURE — G8427 DOCREV CUR MEDS BY ELIG CLIN: HCPCS | Performed by: INTERNAL MEDICINE

## 2018-07-25 PROCEDURE — 3017F COLORECTAL CA SCREEN DOC REV: CPT | Performed by: INTERNAL MEDICINE

## 2018-07-25 PROCEDURE — G8399 PT W/DXA RESULTS DOCUMENT: HCPCS | Performed by: INTERNAL MEDICINE

## 2018-07-25 PROCEDURE — 1101F PT FALLS ASSESS-DOCD LE1/YR: CPT | Performed by: INTERNAL MEDICINE

## 2018-07-25 PROCEDURE — 99212 OFFICE O/P EST SF 10 MIN: CPT | Performed by: INTERNAL MEDICINE

## 2018-07-25 PROCEDURE — 1090F PRES/ABSN URINE INCON ASSESS: CPT | Performed by: INTERNAL MEDICINE

## 2018-07-25 PROCEDURE — 85025 COMPLETE CBC W/AUTO DIFF WBC: CPT

## 2018-07-25 PROCEDURE — 1123F ACP DISCUSS/DSCN MKR DOCD: CPT | Performed by: INTERNAL MEDICINE

## 2018-07-25 PROCEDURE — G8598 ASA/ANTIPLAT THER USED: HCPCS | Performed by: INTERNAL MEDICINE

## 2018-07-25 PROCEDURE — 99211 OFF/OP EST MAY X REQ PHY/QHP: CPT

## 2018-07-25 PROCEDURE — 4040F PNEUMOC VAC/ADMIN/RCVD: CPT | Performed by: INTERNAL MEDICINE

## 2018-07-25 PROCEDURE — 36415 COLL VENOUS BLD VENIPUNCTURE: CPT

## 2018-07-25 PROCEDURE — G8417 CALC BMI ABV UP PARAM F/U: HCPCS | Performed by: INTERNAL MEDICINE

## 2018-07-25 PROCEDURE — 1036F TOBACCO NON-USER: CPT | Performed by: INTERNAL MEDICINE

## 2018-07-25 ASSESSMENT — ENCOUNTER SYMPTOMS
WHEEZING: 0
SORE THROAT: 0
ABDOMINAL DISTENTION: 0
COLOR CHANGE: 0
ABDOMINAL PAIN: 0
TROUBLE SWALLOWING: 0
VOMITING: 0
CHEST TIGHTNESS: 0
CONSTIPATION: 0
RECTAL PAIN: 0
BACK PAIN: 0
SHORTNESS OF BREATH: 0
COUGH: 0
NAUSEA: 0
EYE DISCHARGE: 0
BLOOD IN STOOL: 1
FACIAL SWELLING: 0
DIARRHEA: 0

## 2018-07-25 NOTE — PROGRESS NOTES
 pantoprazole (PROTONIX) 40 MG tablet TAKE 1 TABLET TWICE A DAY (Patient taking differently: daily TAKE 1 TABLET TWICE A DAY) 180 tablet 3    levothyroxine (SYNTHROID) 50 MCG tablet TAKE 1 TABLET ONE TIME DAILY 90 tablet 3    glucose blood VI test strips (ONE TOUCH ULTRA TEST) strip Tests TID. Dx:  250.00 300 each 3    ONETOUCH DELICA LANCETS 63O MISC USE TWICE A DAY 2 each 11    FISH OIL Take by mouth nightly Arthur Red      Docusate Calcium (STOOL SOFTENER PO) Take by mouth nightly       lisinopril (PRINIVIL;ZESTRIL) 5 MG tablet Take 1 tablet by mouth daily 30 tablet 1    zoledronic acid (RECLAST) 5 MG/100ML SOLN Infuse 100 mLs intravenously once for 1 dose 100 mL 0     No current facility-administered medications for this visit. No Known Allergies   Health Maintenance   Topic Date Due    Shingles Vaccine (1 of 2 - 2 Dose Series) 01/29/2001    Flu vaccine (1) 09/01/2018    Diabetic foot exam  02/14/2019    Diabetic retinal exam  07/19/2019    A1C test (Diabetic or Prediabetic)  07/23/2019    Diabetic microalbuminuria test  07/23/2019    Lipid screen  07/23/2019    TSH testing  07/23/2019    Potassium monitoring  07/23/2019    Creatinine monitoring  07/23/2019    Breast cancer screen  05/14/2020    DTaP/Tdap/Td vaccine (2 - Td) 09/19/2024    Colon cancer screen colonoscopy  03/01/2028    DEXA (modify frequency per FRAX score)  Completed    Pneumococcal low/med risk  Completed    Hepatitis C screen  Addressed        Subjective:   Review of Systems   Constitutional: Positive for fatigue and unexpected weight change. Negative for activity change, appetite change and fever. HENT: Positive for nosebleeds. Negative for congestion, dental problem, facial swelling, hearing loss, mouth sores, sore throat, tinnitus and trouble swallowing. Eyes: Negative for discharge and visual disturbance. Respiratory: Negative for cough, chest tightness, shortness of breath and wheezing. Cardiovascular: Negative for chest pain, palpitations and leg swelling. Gastrointestinal: Positive for blood in stool. Negative for abdominal distention, abdominal pain, constipation, diarrhea, nausea, rectal pain and vomiting. Endocrine: Positive for cold intolerance. Negative for polydipsia and polyuria. Genitourinary: Negative for decreased urine volume, difficulty urinating, dysuria, flank pain, hematuria and urgency. Musculoskeletal: Positive for arthralgias. Negative for back pain, gait problem, joint swelling, myalgias and neck stiffness. Skin: Negative for color change, rash and wound. Neurological: Positive for dizziness. Negative for tremors, seizures, speech difficulty, weakness, light-headedness, numbness and headaches. Hematological: Negative for adenopathy. Does not bruise/bleed easily. Psychiatric/Behavioral: Negative for confusion and sleep disturbance. The patient is not nervous/anxious. Objective:   Physical Exam   Constitutional: She is oriented to person, place, and time. She appears well-developed and well-nourished. No distress. HENT:   Head: Normocephalic. Mouth/Throat: Oropharynx is clear and moist. No oropharyngeal exudate. Eyes: EOM are normal. Pupils are equal, round, and reactive to light. Right eye exhibits no discharge. Left eye exhibits no discharge. No scleral icterus. Neck: Normal range of motion. Neck supple. No JVD present. No tracheal deviation present. No thyromegaly present. Cardiovascular: Normal rate and normal heart sounds. Exam reveals no gallop and no friction rub. No murmur heard. Pulmonary/Chest: Effort normal and breath sounds normal. No stridor. No respiratory distress. She has no wheezes. She has no rales. She exhibits no tenderness. Abdominal: Soft. Bowel sounds are normal. She exhibits no distension and no mass. There is no tenderness. There is no rebound. Musculoskeletal: Normal range of motion. She exhibits no edema. Good range of motion in all four extremities. Lymphadenopathy:     She has no cervical adenopathy. Neurological: She is alert and oriented to person, place, and time. She has normal reflexes. No cranial nerve deficit. She exhibits normal muscle tone. Skin: Skin is warm. No rash noted. No erythema. Psychiatric: She has a normal mood and affect. Her behavior is normal. Judgment and thought content normal.   Vitals reviewed. /66 (Site: Left Arm, Position: Sitting, Cuff Size: Large Adult)   Pulse 61   Temp 98 °F (36.7 °C) (Oral)   Resp 16   Ht 5' 2.01\" (1.575 m)   Wt 166 lb 6.4 oz (75.5 kg)   SpO2 97%   BMI 30.43 kg/m²      Imaging studies and labs: Dev Digital Diagnostic W Or Wo Cad Right  Result Date: 5/14/2018  IMPRESSION: Mammogram BI-RADS: 2: Benign 1. There is no mammographic evidence of malignancy. 2. The previously seen asymmetric density is not seen on the current examination suggesting asymmetric fibroglandular tissue. 3. A 1 year screening mammogram is recommended. 4. The patient has been entered into our database and they will receive a notification when they are due for their next exam.  5. The patient was notified of the results. #[id number] - [facility] DIGITAL DIAGNOSTIC W OR WO CAD RIGHT UNILATERAL RIGHT DIGITAL DIAGNOSTIC MAMMOGRAM WITH CAD: 5/14/2018 CLINICAL: Right breast density. Tomosynthesis. Comparison is made to exams dated:  5/9/2018 mammogram - [id number] [address] and 4/14/2011 mammogram - [facility]. There are scattered fibroglandular elements in the right breast that could obscure a lesion on mammography. Current study was also evaluated with a Computer Aided Detection (CAD) system. The previously seen asymmetric density is not seen on the current  examination suggesting asymmetric fibroglandular tissue.   There are benign vascular calcifications right breast.  There also are benign scattered calcifications right breast.  No significant

## 2018-07-26 ENCOUNTER — OFFICE VISIT (OUTPATIENT)
Dept: FAMILY MEDICINE CLINIC | Age: 68
End: 2018-07-26
Payer: MEDICARE

## 2018-07-26 VITALS
RESPIRATION RATE: 16 BRPM | TEMPERATURE: 98 F | SYSTOLIC BLOOD PRESSURE: 112 MMHG | HEART RATE: 68 BPM | BODY MASS INDEX: 30.9 KG/M2 | WEIGHT: 169 LBS | DIASTOLIC BLOOD PRESSURE: 68 MMHG

## 2018-07-26 DIAGNOSIS — I25.10 CORONARY ARTERY DISEASE INVOLVING NATIVE CORONARY ARTERY OF NATIVE HEART WITHOUT ANGINA PECTORIS: ICD-10-CM

## 2018-07-26 DIAGNOSIS — E03.9 HYPOTHYROIDISM, UNSPECIFIED TYPE: ICD-10-CM

## 2018-07-26 DIAGNOSIS — D50.0 IRON DEFICIENCY ANEMIA DUE TO CHRONIC BLOOD LOSS: ICD-10-CM

## 2018-07-26 DIAGNOSIS — E11.21 DIABETIC NEPHROPATHY ASSOCIATED WITH TYPE 2 DIABETES MELLITUS (HCC): ICD-10-CM

## 2018-07-26 DIAGNOSIS — E78.5 HYPERLIPIDEMIA, UNSPECIFIED HYPERLIPIDEMIA TYPE: ICD-10-CM

## 2018-07-26 DIAGNOSIS — M81.0 OSTEOPOROSIS, UNSPECIFIED OSTEOPOROSIS TYPE, UNSPECIFIED PATHOLOGICAL FRACTURE PRESENCE: ICD-10-CM

## 2018-07-26 DIAGNOSIS — I10 ESSENTIAL HYPERTENSION: ICD-10-CM

## 2018-07-26 DIAGNOSIS — E11.8 TYPE 2 DIABETES MELLITUS WITH COMPLICATION, WITHOUT LONG-TERM CURRENT USE OF INSULIN (HCC): Primary | ICD-10-CM

## 2018-07-26 DIAGNOSIS — K21.9 GASTROESOPHAGEAL REFLUX DISEASE, ESOPHAGITIS PRESENCE NOT SPECIFIED: ICD-10-CM

## 2018-07-26 PROCEDURE — 4040F PNEUMOC VAC/ADMIN/RCVD: CPT | Performed by: FAMILY MEDICINE

## 2018-07-26 PROCEDURE — 2022F DILAT RTA XM EVC RTNOPTHY: CPT | Performed by: FAMILY MEDICINE

## 2018-07-26 PROCEDURE — G8417 CALC BMI ABV UP PARAM F/U: HCPCS | Performed by: FAMILY MEDICINE

## 2018-07-26 PROCEDURE — 1090F PRES/ABSN URINE INCON ASSESS: CPT | Performed by: FAMILY MEDICINE

## 2018-07-26 PROCEDURE — 1036F TOBACCO NON-USER: CPT | Performed by: FAMILY MEDICINE

## 2018-07-26 PROCEDURE — G8427 DOCREV CUR MEDS BY ELIG CLIN: HCPCS | Performed by: FAMILY MEDICINE

## 2018-07-26 PROCEDURE — 3017F COLORECTAL CA SCREEN DOC REV: CPT | Performed by: FAMILY MEDICINE

## 2018-07-26 PROCEDURE — 99214 OFFICE O/P EST MOD 30 MIN: CPT | Performed by: FAMILY MEDICINE

## 2018-07-26 PROCEDURE — 3044F HG A1C LEVEL LT 7.0%: CPT | Performed by: FAMILY MEDICINE

## 2018-07-26 PROCEDURE — G8598 ASA/ANTIPLAT THER USED: HCPCS | Performed by: FAMILY MEDICINE

## 2018-07-26 PROCEDURE — 1123F ACP DISCUSS/DSCN MKR DOCD: CPT | Performed by: FAMILY MEDICINE

## 2018-07-26 PROCEDURE — G8399 PT W/DXA RESULTS DOCUMENT: HCPCS | Performed by: FAMILY MEDICINE

## 2018-07-26 PROCEDURE — 1101F PT FALLS ASSESS-DOCD LE1/YR: CPT | Performed by: FAMILY MEDICINE

## 2018-07-26 RX ORDER — ZOLEDRONIC ACID 5 MG/100ML
5 INJECTION, SOLUTION INTRAVENOUS ONCE
Qty: 100 ML | Refills: 0 | Status: SHIPPED | OUTPATIENT
Start: 2018-07-26 | End: 2018-11-08 | Stop reason: ALTCHOICE

## 2018-07-26 RX ORDER — LISINOPRIL 5 MG/1
5 TABLET ORAL DAILY
Qty: 30 TABLET | Refills: 1 | Status: SHIPPED | OUTPATIENT
Start: 2018-07-26 | End: 2018-09-24 | Stop reason: SDUPTHER

## 2018-07-26 ASSESSMENT — ENCOUNTER SYMPTOMS
VOMITING: 0
SHORTNESS OF BREATH: 0
ANAL BLEEDING: 0
DIARRHEA: 0
CHEST TIGHTNESS: 0
NAUSEA: 0
CONSTIPATION: 1
BLOOD IN STOOL: 0
ABDOMINAL PAIN: 0

## 2018-07-26 NOTE — PROGRESS NOTES
[facility]  [facility]  Dept: [phone]  Dept Fax: [phone]    SUBJECTIVE     Tatiana Delgadillo is a 80 y. o.female    Pt presents for follow up of T2DM. Pt took Wellbutrin XL since last visit- pt would like to stop at this time as pt having vivid dreams and insomnia. Pt saw Dr. Thuy Ramirez yesterday after Iron infusion x 2- to follow up in 3 months (10/25/2018) for re-evaluation. Glucometer readings at home are not checked regularly. The home BP readings have been in the <130 / 60's range.     Patient Active Problem List   Diagnosis    Hyperlipidemia    GERD (gastroesophageal reflux disease)    Essential hypertension    Insomnia    Type 2 diabetes mellitus with complication, without long-term current use of insulin (HCC)    CAD (coronary artery disease)    Class 1 obesity due to excess calories with serious comorbidity and body mass index (BMI) of 34.0 to 34.9 in adult    Elevated liver enzymes    NAFLD (nonalcoholic fatty liver disease)--Dr. Moreland Stands    Hypothyroidism    Microalbuminuria    Blood in stool    GI bleeding    Depression    Iron deficiency anemia due to chronic blood loss       Current Outpatient Prescriptions   Medication Sig Dispense Refill    lisinopril (PRINIVIL;ZESTRIL) 5 MG tablet Take 1 tablet by mouth daily 30 tablet 1    buPROPion (WELLBUTRIN XL) 150 MG extended release tablet Take 1 tablet by mouth every morning 30 tablet 1    metFORMIN (GLUCOPHAGE XR) 500 MG extended release tablet Take 2 tablets by mouth 2 times daily 360 tablet 3    escitalopram (LEXAPRO) 20 MG tablet TAKE 1 TABLET DAILY 90 tablet 3    atenolol (TENORMIN) 50 MG tablet TAKE 1/2 TABLET EVERY DAY 45 tablet 3    glimepiride (AMARYL) 2 MG tablet TAKE 1 TABLET TWICE DAILY 180 tablet 3    atorvastatin (LIPITOR) 40 MG tablet TAKE 1 TABLET EVERY DAY 90 tablet 3    pantoprazole (PROTONIX) 40 MG tablet TAKE 1 TABLET TWICE A DAY (Patient taking differently: daily TAKE 1 TABLET TWICE A DAY) 180 tablet 3    levothyroxine (SYNTHROID) 50 MCG tablet TAKE 1 TABLET ONE TIME DAILY 90 tablet 3    glucose blood VI test strips (ONE TOUCH ULTRA TEST) strip Tests TID. Dx:  250.00 300 each 3    ONETOUCH DELICA LANCETS 95X MISC USE TWICE A DAY 2 each 11    FISH OIL Take by mouth nightly Arthur Red      Docusate Calcium (STOOL SOFTENER PO) Take by mouth nightly        No current facility-administered medications for this visit. Review of Systems   Constitutional: Negative for chills, diaphoresis, fatigue, fever and unexpected weight change. Eyes: Negative for visual disturbance. Respiratory: Negative for chest tightness and shortness of breath. Cardiovascular: Positive for leg swelling (chronic- stable). Negative for chest pain and palpitations. Gastrointestinal: Positive for constipation (chronic-stable). Negative for abdominal pain, anal bleeding, blood in stool, diarrhea, nausea and vomiting. Genitourinary: Negative for dysuria and hematuria. Musculoskeletal: Negative for neck pain. Neurological: Negative for dizziness, light-headedness and headaches. OBJECTIVE     /68   Pulse 68   Temp 98 °F (36.7 °C) (Oral)   Resp 16   Wt 169 lb (76.7 kg)   BMI 30.90 kg/m²     Wt Readings from Last 3 Encounters:   07/26/18 169 lb (76.7 kg)   07/25/18 166 lb 6.4 oz (75.5 kg)   06/07/18 169 lb 12.8 oz (77 kg)     Body mass index is 30.9 kg/m². Physical Exam   Constitutional: She is oriented to person, place, and time. She appears well-developed and well-nourished. HENT:   Head: Normocephalic and atraumatic. Right Ear: External ear normal.   Left Ear: External ear normal.   Nose: Nose normal.   Mouth/Throat: Oropharynx is clear and moist.   Eyes: Conjunctivae and EOM are normal. Pupils are equal, round, and reactive to light. Neck: Normal range of motion. Neck supple.    Cardiovascular: Normal rate, regular rhythm, normal heart sounds and intact distal with your local pharmacy. PELVIC done 11/6/2017 per BK- will schedule with WS after 11/6/2018  No further PAP smears indicated as pt has had adequate negative prior screening (updated 7/26/2018)   MAMMO to be done after 5/9/2019  COLONOSCOPY done 3/1/2018 per Dr. Yanet Salcido (DIVERTICULOSIS, HEMORRHOIDS, MELANOSIS COLI)- to do in 10 years- to follow up with Dr. Bony Narvaez only.    EGD done 4/19/2018 per Dr. Lovett Backgeni- to have again in 12 months. Pt to have recheck Liver Ultrasound in 9/2018 per Dr. Lovett Backgeni- please call their office ASAP for information regarding this test  DEXA done 5/21/2018- Osteoporosis- will order Reclast at this time. DILATED DIABETIC EYE EXAM- 7/19/2018 with Dr. Liliam Venegas in [place], [place]- to follow up annually. OK to stop Wellbutrin XL at this time. Start Lisinopril 5 mg- 1 pill daily. #30/1 refills  Call update on several weeks on BP- if ok, will refill x 90 days per [facility]. Check TG, HDL, and SPOT MA in 3 months. Check HGA1C in 6 months- will give order at next visit. Continue current medicines at current doses. Follow up with Dr. Ella Brito as scheduled. No refills needed   Follow up in 3 months.       Electronically signed by Chilango Hathaway MD on 7/26/2018 at 3:51 PM

## 2018-07-26 NOTE — PATIENT INSTRUCTIONS
Encouraged annual FLU VACCINE after October 1st.    Encouraged NEW SHINGLES SHOT [facility]) - check with your local pharmacy. PELVIC done 11/6/2017 per BK- will schedule with WS after 11/6/2018  No further PAP smears indicated as pt has had adequate negative prior screening (updated 7/26/2018)   MAMMO to be done after 5/9/2019  COLONOSCOPY done 3/1/2018 per Dr. Justino Niño (DIVERTICULOSIS, HEMORRHOIDS, MELANOSIS COLI)- to do in 10 years- to follow up with Dr. Gustabo Glover only.    EGD done 4/19/2018 per Dr. Gustabo Glover- to have again in 12 months. Pt to have recheck Liver Ultrasound in 9/2018 per Dr. Gustabo Glover- please call their office ASAP for information regarding this test  DEXA done 5/21/2018- Osteoporosis- will order Reclast at this time. DILATED DIABETIC EYE EXAM- 7/19/2018 with Dr. Jessica William in [facility]- to follow up annually. OK to stop Wellbutrin XL at this time. Start Lisinopril 5 mg- 1 pill daily. #30/1 refills  Call update on several weeks on BP- if ok, will refill x 90 days per [facility]. Check TG, HDL, and SPOT MA in 3 months. Check HGA1C in 3 months. Continue current medicines at current doses. Follow up with Dr. Tanisha Sheth as scheduled. No refills needed   Follow up in 3 months.

## 2018-08-03 ENCOUNTER — HOSPITAL ENCOUNTER (OUTPATIENT)
Dept: NURSING | Age: 68
Discharge: HOME | End: 2018-08-03
Payer: MEDICARE

## 2018-08-06 ENCOUNTER — HOSPITAL ENCOUNTER (OUTPATIENT)
Dept: NURSING | Age: 68
Discharge: HOME | End: 2018-08-06
Payer: MEDICARE

## 2018-08-06 VITALS
WEIGHT: 168 LBS | RESPIRATION RATE: 16 BRPM | HEART RATE: 62 BPM | OXYGEN SATURATION: 96 % | TEMPERATURE: 96.9 F | BODY MASS INDEX: 30.91 KG/M2 | DIASTOLIC BLOOD PRESSURE: 63 MMHG | SYSTOLIC BLOOD PRESSURE: 137 MMHG | HEIGHT: 62 IN

## 2018-08-06 DIAGNOSIS — E11.8 TYPE 2 DIABETES MELLITUS WITH COMPLICATION, WITHOUT LONG-TERM CURRENT USE OF INSULIN (HCC): ICD-10-CM

## 2018-08-06 DIAGNOSIS — K76.0 NAFLD (NONALCOHOLIC FATTY LIVER DISEASE): ICD-10-CM

## 2018-08-06 DIAGNOSIS — D50.0 IRON DEFICIENCY ANEMIA DUE TO CHRONIC BLOOD LOSS: ICD-10-CM

## 2018-08-06 DIAGNOSIS — K92.2 GASTROINTESTINAL HEMORRHAGE, UNSPECIFIED GASTROINTESTINAL HEMORRHAGE TYPE: ICD-10-CM

## 2018-08-06 DIAGNOSIS — M81.0 SENILE OSTEOPOROSIS: ICD-10-CM

## 2018-08-06 DIAGNOSIS — R74.8 ELEVATED LIVER ENZYMES: ICD-10-CM

## 2018-08-06 DIAGNOSIS — K92.1 BLOOD IN STOOL: ICD-10-CM

## 2018-08-06 DIAGNOSIS — R80.9 MICROALBUMINURIA: ICD-10-CM

## 2018-08-06 DIAGNOSIS — Z78.0 POSTMENOPAUSAL: ICD-10-CM

## 2018-08-06 PROCEDURE — 2709999900 HC NON-CHARGEABLE SUPPLY

## 2018-08-06 PROCEDURE — 96365 THER/PROPH/DIAG IV INF INIT: CPT

## 2018-08-06 PROCEDURE — 2580000003 HC RX 258: Performed by: FAMILY MEDICINE

## 2018-08-06 PROCEDURE — 6360000002 HC RX W HCPCS: Performed by: FAMILY MEDICINE

## 2018-08-06 RX ORDER — SODIUM CHLORIDE 0.9 % (FLUSH) 0.9 %
10 SYRINGE (ML) INJECTION PRN
Status: DISCONTINUED | OUTPATIENT
Start: 2018-08-06 | End: 2018-08-07 | Stop reason: HOSPADM

## 2018-08-06 RX ORDER — ZOLEDRONIC ACID 5 MG/100ML
5 INJECTION, SOLUTION INTRAVENOUS ONCE
Status: CANCELLED | OUTPATIENT
Start: 2018-08-06 | End: 2018-08-06

## 2018-08-06 RX ORDER — SODIUM CHLORIDE 0.9 % (FLUSH) 0.9 %
10 SYRINGE (ML) INJECTION PRN
Status: CANCELLED | OUTPATIENT
Start: 2018-08-06

## 2018-08-06 RX ORDER — ZOLEDRONIC ACID 5 MG/100ML
5 INJECTION, SOLUTION INTRAVENOUS ONCE
Status: COMPLETED | OUTPATIENT
Start: 2018-08-06 | End: 2018-08-06

## 2018-08-06 RX ADMIN — Medication 10 ML: at 10:26

## 2018-08-06 RX ADMIN — Medication 10 ML: at 10:00

## 2018-08-06 RX ADMIN — ZOLEDRONIC ACID 5 MG: 5 INJECTION INTRAVENOUS at 10:00

## 2018-08-06 ASSESSMENT — PAIN - FUNCTIONAL ASSESSMENT: PAIN_FUNCTIONAL_ASSESSMENT: 0-10

## 2018-08-06 ASSESSMENT — PAIN SCALES - GENERAL: PAINLEVEL_OUTOF10: 0

## 2018-08-06 NOTE — PROGRESS NOTES
__m__ Safety:       (Environmental)   Tucker to environment   Ensure ID band is correct and in place/ allergy band as needed   Assess for fall risk   Initiate fall precautions as applicable (fall band, side rails, etc.)   Call light within reach   Bed in low position/ wheels locked    ____ Pain:        Assess pain level and characteristics   Administer analgesics as ordered   Assess effectiveness of pain management and report to MD as needed    ____ Knowledge Deficit:   Assess baseline knowledge   Provide teaching at level of understanding   Provide teaching via preferred learning method   Evaluate teaching effectiveness    ____ Hemodynamic/Respiratory Status:       (Pre and Post Procedure Monitoring)   Assess/Monitor vital signs and LOC   Assess Baseline SpO2 prior to any sedation   Obtain weight/height   Assess vital signs/ LOC until patient meets discharge criteria   Monitor procedure site and notify MD of any issues    ____ Infection-Risk of Central Venous Catheter:   Monitor for infection signs and symptoms (catheter site redness, temperature elevation, etc)   Assess for infection risks   Educate regarding infection prevention   Manage central venous catheter (flushes/ dressing changes per protocol)

## 2018-08-06 NOTE — PROGRESS NOTES
Patient admitted to room 10 a infusion, vitals are stable. Patient offered rights and responsibilities.

## 2018-08-08 ENCOUNTER — TELEPHONE (OUTPATIENT)
Dept: FAMILY MEDICINE CLINIC | Age: 68
End: 2018-08-08

## 2018-08-08 RX ORDER — ONDANSETRON 4 MG/1
4 TABLET, FILM COATED ORAL EVERY 8 HOURS PRN
Qty: 10 TABLET | Refills: 0 | Status: SHIPPED | OUTPATIENT
Start: 2018-08-08 | End: 2018-11-08 | Stop reason: ALTCHOICE

## 2018-08-08 NOTE — TELEPHONE ENCOUNTER
ES patient. States that she recently had Reclast injection and has had nausea since then. Requesting medication to help with this. Pharmacy info entered. CBP.

## 2018-09-04 ENCOUNTER — TELEPHONE (OUTPATIENT)
Dept: FAMILY MEDICINE CLINIC | Age: 68
End: 2018-09-04

## 2018-09-04 DIAGNOSIS — R82.998 DARK BROWN-COLORED URINE: Primary | ICD-10-CM

## 2018-09-04 NOTE — TELEPHONE ENCOUNTER
C/O dark colored urine, looks brownish, since yesterday. No frequency, burning or urgency. Pt states he urine doesn't have a foul smell. No back pain or abdominal pain. Pt wants to know if she can get her urine checked at [facility] to make sure she doesn't have an infection. Pt admits to not drinking much water but this is not new for her. Please advise.

## 2018-09-04 NOTE — TELEPHONE ENCOUNTER
I called the pt and notified her of ES response and she is agreeable. Orders faxed to [facility] and pt will get done tomorrow. Pt notified to fast 12 hours.

## 2018-09-05 ENCOUNTER — TELEPHONE (OUTPATIENT)
Dept: FAMILY MEDICINE CLINIC | Age: 68
End: 2018-09-05

## 2018-09-05 ENCOUNTER — HOSPITAL ENCOUNTER (OUTPATIENT)
Age: 68
Discharge: HOME | End: 2018-09-05
Payer: MEDICARE

## 2018-09-05 DIAGNOSIS — R31.9 URINARY TRACT INFECTION WITH HEMATURIA, SITE UNSPECIFIED: Primary | ICD-10-CM

## 2018-09-05 DIAGNOSIS — R82.998 DARK BROWN-COLORED URINE: ICD-10-CM

## 2018-09-05 DIAGNOSIS — N39.0 URINARY TRACT INFECTION WITH HEMATURIA, SITE UNSPECIFIED: Primary | ICD-10-CM

## 2018-09-05 LAB
ALBUMIN SERPL-MCNC: 3.6 G/DL (ref 3.5–5.1)
ALP BLD-CCNC: 76 U/L (ref 38–126)
ALT SERPL-CCNC: 28 U/L (ref 11–66)
ANION GAP SERPL CALCULATED.3IONS-SCNC: 18 MEQ/L (ref 8–16)
AST SERPL-CCNC: 35 U/L (ref 5–40)
BACTERIA: ABNORMAL /HPF
BILIRUB SERPL-MCNC: 0.4 MG/DL (ref 0.3–1.2)
BILIRUBIN URINE: NEGATIVE
BLOOD, URINE: ABNORMAL
BUN BLDV-MCNC: 13 MG/DL (ref 7–22)
CALCIUM SERPL-MCNC: 9.9 MG/DL (ref 8.5–10.5)
CASTS 2: ABNORMAL /LPF
CASTS UA: ABNORMAL /LPF
CHARACTER, URINE: ABNORMAL
CHLORIDE BLD-SCNC: 102 MEQ/L (ref 98–111)
CO2: 23 MEQ/L (ref 23–33)
COLOR: ABNORMAL
CREAT SERPL-MCNC: 0.7 MG/DL (ref 0.4–1.2)
CRYSTALS, UA: ABNORMAL
EPITHELIAL CELLS, UA: ABNORMAL /HPF
GFR SERPL CREATININE-BSD FRML MDRD: 83 ML/MIN/1.73M2
GLUCOSE BLD-MCNC: 90 MG/DL (ref 70–108)
GLUCOSE URINE: NEGATIVE MG/DL
KETONES, URINE: NEGATIVE
LEUKOCYTE ESTERASE, URINE: ABNORMAL
MISCELLANEOUS 2: ABNORMAL
NITRITE, URINE: NEGATIVE
PH UA: 5.5
POTASSIUM SERPL-SCNC: 3.8 MEQ/L (ref 3.5–5.2)
PROTEIN UA: 100
RBC URINE: > 200 /HPF
RENAL EPITHELIAL, UA: ABNORMAL
SODIUM BLD-SCNC: 143 MEQ/L (ref 135–145)
SPECIFIC GRAVITY, URINE: 1.01 (ref 1–1.03)
TOTAL PROTEIN: 6.8 G/DL (ref 6.1–8)
UROBILINOGEN, URINE: 0.2 EU/DL
WBC UA: > 100 /HPF
YEAST: ABNORMAL

## 2018-09-05 PROCEDURE — 87077 CULTURE AEROBIC IDENTIFY: CPT

## 2018-09-05 PROCEDURE — 80053 COMPREHEN METABOLIC PANEL: CPT

## 2018-09-05 PROCEDURE — 87186 SC STD MICRODIL/AGAR DIL: CPT

## 2018-09-05 PROCEDURE — 81001 URINALYSIS AUTO W/SCOPE: CPT

## 2018-09-05 PROCEDURE — 87184 SC STD DISK METHOD PER PLATE: CPT

## 2018-09-05 PROCEDURE — 87086 URINE CULTURE/COLONY COUNT: CPT

## 2018-09-05 PROCEDURE — 36415 COLL VENOUS BLD VENIPUNCTURE: CPT

## 2018-09-05 RX ORDER — CIPROFLOXACIN 250 MG/1
250 TABLET, FILM COATED ORAL 2 TIMES DAILY
Qty: 14 TABLET | Refills: 0 | Status: SHIPPED | OUTPATIENT
Start: 2018-09-05 | End: 2018-10-11 | Stop reason: SDUPTHER

## 2018-09-07 ENCOUNTER — TELEPHONE (OUTPATIENT)
Dept: FAMILY MEDICINE CLINIC | Age: 68
End: 2018-09-07

## 2018-09-07 LAB
ORGANISM: ABNORMAL
URINE CULTURE REFLEX: ABNORMAL

## 2018-09-13 ENCOUNTER — HOSPITAL ENCOUNTER (OUTPATIENT)
Age: 68
Discharge: HOME | End: 2018-09-13
Payer: MEDICARE

## 2018-09-13 LAB
ERYTHROCYTE [DISTWIDTH] IN BLOOD BY AUTOMATED COUNT: 15.6 % (ref 11.5–14.5)
ERYTHROCYTE [DISTWIDTH] IN BLOOD BY AUTOMATED COUNT: 45.8 FL (ref 35–45)
HCT VFR BLD CALC: 42 % (ref 37–47)
HEMOGLOBIN: 13.4 GM/DL (ref 12–16)
MCH RBC QN AUTO: 27.3 PG (ref 26–33)
MCHC RBC AUTO-ENTMCNC: 31.9 GM/DL (ref 32.2–35.5)
MCV RBC AUTO: 85.5 FL (ref 81–99)
PLATELET # BLD: 139 THOU/MM3 (ref 130–400)
PMV BLD AUTO: 10.1 FL (ref 9.4–12.4)
RBC # BLD: 4.91 MILL/MM3 (ref 4.2–5.4)
WBC # BLD: 6.3 THOU/MM3 (ref 4.8–10.8)

## 2018-09-13 PROCEDURE — 85027 COMPLETE CBC AUTOMATED: CPT

## 2018-09-13 PROCEDURE — 36415 COLL VENOUS BLD VENIPUNCTURE: CPT

## 2018-09-24 DIAGNOSIS — E11.21 DIABETIC NEPHROPATHY ASSOCIATED WITH TYPE 2 DIABETES MELLITUS (HCC): ICD-10-CM

## 2018-09-24 RX ORDER — LISINOPRIL 5 MG/1
5 TABLET ORAL DAILY
Qty: 30 TABLET | Refills: 0 | Status: SHIPPED | OUTPATIENT
Start: 2018-09-24 | End: 2018-10-24 | Stop reason: SDUPTHER

## 2018-10-10 ENCOUNTER — HOSPITAL ENCOUNTER (OUTPATIENT)
Age: 68
Discharge: HOME | End: 2018-10-10
Payer: MEDICARE

## 2018-10-10 ENCOUNTER — TELEPHONE (OUTPATIENT)
Dept: FAMILY MEDICINE CLINIC | Age: 68
End: 2018-10-10

## 2018-10-10 DIAGNOSIS — R31.9 URINARY TRACT INFECTION WITH HEMATURIA, SITE UNSPECIFIED: Primary | ICD-10-CM

## 2018-10-10 DIAGNOSIS — N39.0 URINARY TRACT INFECTION WITH HEMATURIA, SITE UNSPECIFIED: ICD-10-CM

## 2018-10-10 DIAGNOSIS — R31.9 URINARY TRACT INFECTION WITH HEMATURIA, SITE UNSPECIFIED: ICD-10-CM

## 2018-10-10 DIAGNOSIS — N39.0 URINARY TRACT INFECTION WITH HEMATURIA, SITE UNSPECIFIED: Primary | ICD-10-CM

## 2018-10-10 LAB
BACTERIA: ABNORMAL /HPF
BILIRUBIN URINE: NEGATIVE
BLOOD, URINE: ABNORMAL
CASTS 2: ABNORMAL /LPF
CASTS UA: ABNORMAL /LPF
CHARACTER, URINE: ABNORMAL
COLOR: YELLOW
CRYSTALS, UA: ABNORMAL
EPITHELIAL CELLS, UA: ABNORMAL /HPF
GLUCOSE URINE: NEGATIVE MG/DL
KETONES, URINE: NEGATIVE
LEUKOCYTE ESTERASE, URINE: NEGATIVE
MISCELLANEOUS 2: ABNORMAL
NITRITE, URINE: NEGATIVE
PH UA: 5
PROTEIN UA: 300
RBC URINE: ABNORMAL /HPF
RENAL EPITHELIAL, UA: ABNORMAL
SPECIFIC GRAVITY, URINE: 1.02 (ref 1–1.03)
UROBILINOGEN, URINE: 0.2 EU/DL
WBC UA: ABNORMAL /HPF
YEAST: ABNORMAL

## 2018-10-10 PROCEDURE — 81001 URINALYSIS AUTO W/SCOPE: CPT

## 2018-10-11 RX ORDER — CIPROFLOXACIN 250 MG/1
250 TABLET, FILM COATED ORAL 2 TIMES DAILY
Qty: 14 TABLET | Refills: 0 | Status: SHIPPED | OUTPATIENT
Start: 2018-10-11 | End: 2018-10-18

## 2018-10-11 NOTE — TELEPHONE ENCOUNTER
Notes recorded by Johnson Piedra MD on 10/11/2018 at 7:03 AM EDT  Notify pt-   Results reviewed. UA consistent with UTI given return of symptoms  Start Cipro 250 mg- 1 pill BID x 7 days (#14/ no refills) - will await final culture.   Please urge pt TO TAKE MEDICATION AS PRESCRIBED AND FINISH MEDICINE  Recheck UA with reflex C&S in 10-14 days.  ES

## 2018-10-24 ENCOUNTER — HOSPITAL ENCOUNTER (OUTPATIENT)
Age: 68
Discharge: HOME | End: 2018-10-24
Payer: MEDICARE

## 2018-10-24 DIAGNOSIS — E78.5 HYPERLIPIDEMIA, UNSPECIFIED HYPERLIPIDEMIA TYPE: ICD-10-CM

## 2018-10-24 DIAGNOSIS — N39.0 URINARY TRACT INFECTION WITH HEMATURIA, SITE UNSPECIFIED: ICD-10-CM

## 2018-10-24 DIAGNOSIS — R31.9 URINARY TRACT INFECTION WITH HEMATURIA, SITE UNSPECIFIED: ICD-10-CM

## 2018-10-24 DIAGNOSIS — E11.8 TYPE 2 DIABETES MELLITUS WITH COMPLICATION, WITHOUT LONG-TERM CURRENT USE OF INSULIN (HCC): ICD-10-CM

## 2018-10-24 DIAGNOSIS — E11.21 DIABETIC NEPHROPATHY ASSOCIATED WITH TYPE 2 DIABETES MELLITUS (HCC): ICD-10-CM

## 2018-10-24 LAB
AMORPHOUS: ABNORMAL
BACTERIA: ABNORMAL /HPF
BILIRUBIN URINE: NEGATIVE
BLOOD, URINE: ABNORMAL
CASTS 2: ABNORMAL /LPF
CASTS UA: ABNORMAL /LPF
CHARACTER, URINE: CLEAR
COLOR: YELLOW
CREATININE, URINE: 42.6 MG/DL
CRYSTALS, UA: ABNORMAL
EPITHELIAL CELLS, UA: ABNORMAL /HPF
GLUCOSE URINE: NEGATIVE MG/DL
HDLC SERPL-MCNC: 47 MG/DL
KETONES, URINE: NEGATIVE
LEUKOCYTE ESTERASE, URINE: NEGATIVE
MICROALBUMIN UR-MCNC: 30.6 MG/DL
MICROALBUMIN/CREAT UR-RTO: 718 MG/G (ref 0–30)
MISCELLANEOUS 2: ABNORMAL
NITRITE, URINE: NEGATIVE
PH UA: 5
PROTEIN UA: 30
RBC URINE: ABNORMAL /HPF
RENAL EPITHELIAL, UA: ABNORMAL
SPECIFIC GRAVITY, URINE: 1.01 (ref 1–1.03)
TRIGL SERPL-MCNC: 80 MG/DL (ref 0–199)
UROBILINOGEN, URINE: 0.2 EU/DL
WBC UA: ABNORMAL /HPF
YEAST: ABNORMAL

## 2018-10-24 PROCEDURE — 84478 ASSAY OF TRIGLYCERIDES: CPT

## 2018-10-24 PROCEDURE — 83718 ASSAY OF LIPOPROTEIN: CPT

## 2018-10-24 PROCEDURE — 36415 COLL VENOUS BLD VENIPUNCTURE: CPT

## 2018-10-24 PROCEDURE — 81001 URINALYSIS AUTO W/SCOPE: CPT

## 2018-10-24 PROCEDURE — 82043 UR ALBUMIN QUANTITATIVE: CPT

## 2018-10-24 RX ORDER — LISINOPRIL 5 MG/1
5 TABLET ORAL DAILY
Qty: 30 TABLET | Refills: 0 | Status: SHIPPED | OUTPATIENT
Start: 2018-10-24 | End: 2018-11-08 | Stop reason: SDUPTHER

## 2018-10-25 ENCOUNTER — TELEPHONE (OUTPATIENT)
Dept: FAMILY MEDICINE CLINIC | Age: 68
End: 2018-10-25

## 2018-10-25 DIAGNOSIS — R31.9 HEMATURIA, UNSPECIFIED TYPE: Primary | ICD-10-CM

## 2018-10-25 NOTE — TELEPHONE ENCOUNTER
Spoke to belem garcia for CT at [facility]. Do not schedule at [facility]. Pt is unavailable on 10/31, 11/1. Ok for referral to Dr Kimberley Sawyer.  Order signed, [facility] will call pt to schedule

## 2018-10-30 ENCOUNTER — HOSPITAL ENCOUNTER (OUTPATIENT)
Dept: CT IMAGING | Age: 68
Discharge: HOME | End: 2018-10-30
Payer: MEDICARE

## 2018-10-30 DIAGNOSIS — R31.9 HEMATURIA, UNSPECIFIED TYPE: ICD-10-CM

## 2018-10-30 PROCEDURE — 74176 CT ABD & PELVIS W/O CONTRAST: CPT

## 2018-10-31 ENCOUNTER — TELEPHONE (OUTPATIENT)
Dept: FAMILY MEDICINE CLINIC | Age: 68
End: 2018-10-31

## 2018-10-31 DIAGNOSIS — R91.8 LUNG NODULES: Primary | ICD-10-CM

## 2018-11-01 NOTE — TELEPHONE ENCOUNTER
I spoke to the pt and and notified her of the CT scan result and ES recommendations and she verbalized understanding. Pt is scheduled for a CT scan of the chest without contrast at [facility] on 11/6/18 at 7:00 AM. She is to arrive at outpatient [facility] at 6:30 AM, no prep. Order in [facility] [address] PT HAS NOT YET BEEN NOTIFIED OF THIS. The pt states that she never received an order for a repeat liver ultrasound from Dr. Sinai Ovalles. I called Dr. Rebecca Holland office and spoke to Edinson Bowden and she stated that the order was originally faxed to [facility] after the pts last appt with Dr. Sinai Ovalles. I asked her to re-fax it to [facility] so I can schedule it for the pt and she stated she would do it right away. I checked with [facility] and they did not receive an order yet. I will check on this tomorrow. Also, the pt doesn't have an appt scheduled with Dr. Sinai Ovalles at this time, on recall to be seen again in 6/19. A referral is in the pts chart for Dr. Jerald Weller but the appt was never scheduled. I called Dr. Marlo Ley office and scheduled the pt an appt with Paul Finnegan CNP on 11/21/18 at 10:15 AM. PT HAS NOT YET BEEN NOTIFIED OF THIS. I will contact the pt with the above information tomorrow after checking with [facility] about the ultrasound order.

## 2018-11-02 NOTE — TELEPHONE ENCOUNTER
GIRISH Simmons faxed the order for the liver US to [facility] so I scheduled it for 11/6/18 at 6:30 AM, prior to the CT scan of the chest. She is to arrive at main radiology at 6:00 AM, NPO 8 hours prior. I notified the pt of the ultrasound scheduling information and she verbalized understanding. She states that she looked on My Chart and got the information regarding the CT scan and the appt with Dion Thomas and is agreeable. Pt will await the recall letter from Dr. Lori Schultz office to schedule in 6/19.

## 2018-11-06 ENCOUNTER — HOSPITAL ENCOUNTER (OUTPATIENT)
Dept: CT IMAGING | Age: 68
Discharge: HOME | End: 2018-11-06
Payer: MEDICARE

## 2018-11-06 DIAGNOSIS — R91.8 LUNG NODULES: ICD-10-CM

## 2018-11-06 PROCEDURE — 71250 CT THORAX DX C-: CPT

## 2018-11-08 ENCOUNTER — OFFICE VISIT (OUTPATIENT)
Dept: FAMILY MEDICINE CLINIC | Age: 68
End: 2018-11-08
Payer: MEDICARE

## 2018-11-08 ENCOUNTER — HOSPITAL ENCOUNTER (OUTPATIENT)
Dept: INFUSION THERAPY | Age: 68
Discharge: HOME | End: 2018-11-08
Payer: MEDICARE

## 2018-11-08 ENCOUNTER — OFFICE VISIT (OUTPATIENT)
Dept: ONCOLOGY | Age: 68
End: 2018-11-08
Payer: MEDICARE

## 2018-11-08 VITALS
WEIGHT: 166.6 LBS | HEART RATE: 65 BPM | DIASTOLIC BLOOD PRESSURE: 60 MMHG | OXYGEN SATURATION: 98 % | BODY MASS INDEX: 30.66 KG/M2 | HEIGHT: 62 IN | TEMPERATURE: 98.1 F | RESPIRATION RATE: 16 BRPM | SYSTOLIC BLOOD PRESSURE: 125 MMHG

## 2018-11-08 VITALS
BODY MASS INDEX: 31.09 KG/M2 | HEART RATE: 68 BPM | DIASTOLIC BLOOD PRESSURE: 72 MMHG | TEMPERATURE: 97.7 F | SYSTOLIC BLOOD PRESSURE: 124 MMHG | RESPIRATION RATE: 16 BRPM | WEIGHT: 170 LBS

## 2018-11-08 DIAGNOSIS — I51.7 CARDIOMEGALY: ICD-10-CM

## 2018-11-08 DIAGNOSIS — I25.10 CORONARY ARTERY DISEASE INVOLVING NATIVE CORONARY ARTERY OF NATIVE HEART WITHOUT ANGINA PECTORIS: ICD-10-CM

## 2018-11-08 DIAGNOSIS — D50.0 IRON DEFICIENCY ANEMIA DUE TO CHRONIC BLOOD LOSS: ICD-10-CM

## 2018-11-08 DIAGNOSIS — E78.5 HYPERLIPIDEMIA, UNSPECIFIED HYPERLIPIDEMIA TYPE: ICD-10-CM

## 2018-11-08 DIAGNOSIS — E03.9 HYPOTHYROIDISM, UNSPECIFIED TYPE: ICD-10-CM

## 2018-11-08 DIAGNOSIS — Z86.2 HISTORY OF IRON DEFICIENCY ANEMIA: Primary | ICD-10-CM

## 2018-11-08 DIAGNOSIS — K76.0 NAFLD (NONALCOHOLIC FATTY LIVER DISEASE): ICD-10-CM

## 2018-11-08 DIAGNOSIS — E66.09 CLASS 1 OBESITY DUE TO EXCESS CALORIES WITH SERIOUS COMORBIDITY AND BODY MASS INDEX (BMI) OF 34.0 TO 34.9 IN ADULT: ICD-10-CM

## 2018-11-08 DIAGNOSIS — E11.8 TYPE 2 DIABETES MELLITUS WITH COMPLICATION, WITHOUT LONG-TERM CURRENT USE OF INSULIN (HCC): Primary | ICD-10-CM

## 2018-11-08 DIAGNOSIS — Z23 NEED FOR INFLUENZA VACCINATION: ICD-10-CM

## 2018-11-08 DIAGNOSIS — E11.21 DIABETIC NEPHROPATHY ASSOCIATED WITH TYPE 2 DIABETES MELLITUS (HCC): ICD-10-CM

## 2018-11-08 DIAGNOSIS — K21.9 GASTROESOPHAGEAL REFLUX DISEASE, ESOPHAGITIS PRESENCE NOT SPECIFIED: ICD-10-CM

## 2018-11-08 DIAGNOSIS — I10 ESSENTIAL HYPERTENSION: ICD-10-CM

## 2018-11-08 DIAGNOSIS — R91.8 LUNG NODULES: ICD-10-CM

## 2018-11-08 LAB
BASINOPHIL, AUTOMATED: 0 % (ref 0–3)
EOSINOPHILS RELATIVE PERCENT: 3 % (ref 0–4)
HCT VFR BLD CALC: 39.7 % (ref 37–47)
HEMOGLOBIN: 13.3 GM/DL (ref 12–16)
LYMPHOCYTES # BLD: 32 % (ref 15–47)
MCH RBC QN AUTO: 27.7 PG (ref 27–31)
MCHC RBC AUTO-ENTMCNC: 33.5 GM/DL (ref 33–37)
MCV RBC AUTO: 83 FL (ref 81–99)
MONOCYTES: 11 % (ref 0–12)
PDW BLD-RTO: 13.6 % (ref 11.5–14.5)
PLATELET # BLD: 129 THOU/MM3 (ref 130–400)
PMV BLD AUTO: 7.3 FL (ref 7.4–10.4)
RBC # BLD: 4.8 MILL/MM3 (ref 4.2–5.4)
SEG NEUTROPHILS: 54 % (ref 43–75)
WBC # BLD: 6.2 THOU/MM3 (ref 4.8–10.8)

## 2018-11-08 PROCEDURE — G8399 PT W/DXA RESULTS DOCUMENT: HCPCS | Performed by: FAMILY MEDICINE

## 2018-11-08 PROCEDURE — G8427 DOCREV CUR MEDS BY ELIG CLIN: HCPCS | Performed by: FAMILY MEDICINE

## 2018-11-08 PROCEDURE — 1123F ACP DISCUSS/DSCN MKR DOCD: CPT | Performed by: PHYSICIAN ASSISTANT

## 2018-11-08 PROCEDURE — 3017F COLORECTAL CA SCREEN DOC REV: CPT | Performed by: PHYSICIAN ASSISTANT

## 2018-11-08 PROCEDURE — G8598 ASA/ANTIPLAT THER USED: HCPCS | Performed by: PHYSICIAN ASSISTANT

## 2018-11-08 PROCEDURE — G8427 DOCREV CUR MEDS BY ELIG CLIN: HCPCS | Performed by: PHYSICIAN ASSISTANT

## 2018-11-08 PROCEDURE — 99214 OFFICE O/P EST MOD 30 MIN: CPT | Performed by: FAMILY MEDICINE

## 2018-11-08 PROCEDURE — G8482 FLU IMMUNIZE ORDER/ADMIN: HCPCS | Performed by: FAMILY MEDICINE

## 2018-11-08 PROCEDURE — G8417 CALC BMI ABV UP PARAM F/U: HCPCS | Performed by: FAMILY MEDICINE

## 2018-11-08 PROCEDURE — 99212 OFFICE O/P EST SF 10 MIN: CPT | Performed by: PHYSICIAN ASSISTANT

## 2018-11-08 PROCEDURE — G8482 FLU IMMUNIZE ORDER/ADMIN: HCPCS | Performed by: PHYSICIAN ASSISTANT

## 2018-11-08 PROCEDURE — 99211 OFF/OP EST MAY X REQ PHY/QHP: CPT

## 2018-11-08 PROCEDURE — 4040F PNEUMOC VAC/ADMIN/RCVD: CPT | Performed by: PHYSICIAN ASSISTANT

## 2018-11-08 PROCEDURE — G8399 PT W/DXA RESULTS DOCUMENT: HCPCS | Performed by: PHYSICIAN ASSISTANT

## 2018-11-08 PROCEDURE — 1101F PT FALLS ASSESS-DOCD LE1/YR: CPT | Performed by: FAMILY MEDICINE

## 2018-11-08 PROCEDURE — 1101F PT FALLS ASSESS-DOCD LE1/YR: CPT | Performed by: PHYSICIAN ASSISTANT

## 2018-11-08 PROCEDURE — 4040F PNEUMOC VAC/ADMIN/RCVD: CPT | Performed by: FAMILY MEDICINE

## 2018-11-08 PROCEDURE — 1123F ACP DISCUSS/DSCN MKR DOCD: CPT | Performed by: FAMILY MEDICINE

## 2018-11-08 PROCEDURE — 2022F DILAT RTA XM EVC RTNOPTHY: CPT | Performed by: FAMILY MEDICINE

## 2018-11-08 PROCEDURE — 1036F TOBACCO NON-USER: CPT | Performed by: PHYSICIAN ASSISTANT

## 2018-11-08 PROCEDURE — 1036F TOBACCO NON-USER: CPT | Performed by: FAMILY MEDICINE

## 2018-11-08 PROCEDURE — 36415 COLL VENOUS BLD VENIPUNCTURE: CPT

## 2018-11-08 PROCEDURE — 1090F PRES/ABSN URINE INCON ASSESS: CPT | Performed by: PHYSICIAN ASSISTANT

## 2018-11-08 PROCEDURE — G8417 CALC BMI ABV UP PARAM F/U: HCPCS | Performed by: PHYSICIAN ASSISTANT

## 2018-11-08 PROCEDURE — 3044F HG A1C LEVEL LT 7.0%: CPT | Performed by: FAMILY MEDICINE

## 2018-11-08 PROCEDURE — 3017F COLORECTAL CA SCREEN DOC REV: CPT | Performed by: FAMILY MEDICINE

## 2018-11-08 PROCEDURE — 90662 IIV NO PRSV INCREASED AG IM: CPT | Performed by: FAMILY MEDICINE

## 2018-11-08 PROCEDURE — 85025 COMPLETE CBC W/AUTO DIFF WBC: CPT

## 2018-11-08 PROCEDURE — G8598 ASA/ANTIPLAT THER USED: HCPCS | Performed by: FAMILY MEDICINE

## 2018-11-08 PROCEDURE — G0008 ADMIN INFLUENZA VIRUS VAC: HCPCS | Performed by: FAMILY MEDICINE

## 2018-11-08 PROCEDURE — 1090F PRES/ABSN URINE INCON ASSESS: CPT | Performed by: FAMILY MEDICINE

## 2018-11-08 RX ORDER — LISINOPRIL 5 MG/1
5 TABLET ORAL DAILY
Qty: 90 TABLET | Refills: 3 | Status: SHIPPED | OUTPATIENT
Start: 2018-11-08 | End: 2019-10-10 | Stop reason: DRUGHIGH

## 2018-11-08 ASSESSMENT — ENCOUNTER SYMPTOMS
DIARRHEA: 0
NAUSEA: 0
VOMITING: 0
BLOOD IN STOOL: 0
CHEST TIGHTNESS: 0
ANAL BLEEDING: 0
SHORTNESS OF BREATH: 0
ABDOMINAL PAIN: 0
CONSTIPATION: 1

## 2018-11-08 NOTE — PATIENT INSTRUCTIONS
1. Follow up as needed. 2. Continue to follow with PCP, Dr. Maxwell Reina, to have CBC monitored every 3 months. 3. Please call for questions or concerns.

## 2018-11-12 ENCOUNTER — HOSPITAL ENCOUNTER (OUTPATIENT)
Dept: NON INVASIVE DIAGNOSTICS | Age: 68
Discharge: HOME | End: 2018-11-12
Payer: MEDICARE

## 2018-11-12 DIAGNOSIS — I51.7 CARDIOMEGALY: ICD-10-CM

## 2018-11-12 LAB
LV EF: 45 %
LVEF MODALITY: NORMAL

## 2018-11-12 PROCEDURE — 93306 TTE W/DOPPLER COMPLETE: CPT

## 2018-11-13 ENCOUNTER — TELEPHONE (OUTPATIENT)
Dept: CARDIOLOGY CLINIC | Age: 68
End: 2018-11-13

## 2018-11-13 DIAGNOSIS — E78.5 HYPERLIPIDEMIA, UNSPECIFIED HYPERLIPIDEMIA TYPE: ICD-10-CM

## 2018-11-13 DIAGNOSIS — I25.10 CORONARY ARTERY DISEASE INVOLVING NATIVE CORONARY ARTERY OF NATIVE HEART WITHOUT ANGINA PECTORIS: Primary | ICD-10-CM

## 2018-11-14 ENCOUNTER — HOSPITAL ENCOUNTER (OUTPATIENT)
Dept: ULTRASOUND IMAGING | Age: 68
Discharge: HOME | End: 2018-11-14
Payer: MEDICARE

## 2018-11-14 DIAGNOSIS — K74.60 CIRRHOSIS OF LIVER WITHOUT ASCITES, UNSPECIFIED HEPATIC CIRRHOSIS TYPE (HCC): ICD-10-CM

## 2018-11-14 PROCEDURE — 76705 ECHO EXAM OF ABDOMEN: CPT

## 2018-11-21 ENCOUNTER — OFFICE VISIT (OUTPATIENT)
Dept: FAMILY MEDICINE CLINIC | Age: 68
End: 2018-11-21
Payer: MEDICARE

## 2018-11-21 ENCOUNTER — OFFICE VISIT (OUTPATIENT)
Dept: UROLOGY | Age: 68
End: 2018-11-21
Payer: MEDICARE

## 2018-11-21 VITALS
WEIGHT: 169 LBS | BODY MASS INDEX: 33.01 KG/M2 | RESPIRATION RATE: 16 BRPM | HEART RATE: 68 BPM | TEMPERATURE: 97.6 F | DIASTOLIC BLOOD PRESSURE: 70 MMHG | SYSTOLIC BLOOD PRESSURE: 124 MMHG

## 2018-11-21 VITALS
DIASTOLIC BLOOD PRESSURE: 78 MMHG | HEIGHT: 60 IN | WEIGHT: 170 LBS | BODY MASS INDEX: 33.38 KG/M2 | SYSTOLIC BLOOD PRESSURE: 122 MMHG

## 2018-11-21 DIAGNOSIS — R31.9 HEMATURIA, UNSPECIFIED TYPE: Primary | ICD-10-CM

## 2018-11-21 DIAGNOSIS — Z01.419 ENCOUNTER FOR GYNECOLOGICAL EXAMINATION: Primary | ICD-10-CM

## 2018-11-21 LAB
BILIRUBIN URINE: NEGATIVE
BLOOD URINE, POC: ABNORMAL
CHARACTER, URINE: CLEAR
COLOR, URINE: YELLOW
GLUCOSE URINE: NEGATIVE MG/DL
KETONES, URINE: NEGATIVE
LEUKOCYTE CLUMPS, URINE: NEGATIVE
NITRITE, URINE: NEGATIVE
PH, URINE: 6.5
PROTEIN, URINE: >= 300 MG/DL
SPECIFIC GRAVITY, URINE: 1.02 (ref 1–1.03)
UROBILINOGEN, URINE: 0.2 EU/DL

## 2018-11-21 PROCEDURE — G8399 PT W/DXA RESULTS DOCUMENT: HCPCS | Performed by: NURSE PRACTITIONER

## 2018-11-21 PROCEDURE — 99213 OFFICE O/P EST LOW 20 MIN: CPT | Performed by: NURSE PRACTITIONER

## 2018-11-21 PROCEDURE — 1090F PRES/ABSN URINE INCON ASSESS: CPT | Performed by: NURSE PRACTITIONER

## 2018-11-21 PROCEDURE — G8482 FLU IMMUNIZE ORDER/ADMIN: HCPCS | Performed by: NURSE PRACTITIONER

## 2018-11-21 PROCEDURE — 1123F ACP DISCUSS/DSCN MKR DOCD: CPT | Performed by: NURSE PRACTITIONER

## 2018-11-21 PROCEDURE — 4040F PNEUMOC VAC/ADMIN/RCVD: CPT | Performed by: NURSE PRACTITIONER

## 2018-11-21 PROCEDURE — 1101F PT FALLS ASSESS-DOCD LE1/YR: CPT | Performed by: NURSE PRACTITIONER

## 2018-11-21 PROCEDURE — G8427 DOCREV CUR MEDS BY ELIG CLIN: HCPCS | Performed by: NURSE PRACTITIONER

## 2018-11-21 PROCEDURE — G8417 CALC BMI ABV UP PARAM F/U: HCPCS | Performed by: NURSE PRACTITIONER

## 2018-11-21 PROCEDURE — 1036F TOBACCO NON-USER: CPT | Performed by: NURSE PRACTITIONER

## 2018-11-21 PROCEDURE — 3017F COLORECTAL CA SCREEN DOC REV: CPT | Performed by: NURSE PRACTITIONER

## 2018-11-21 PROCEDURE — G8598 ASA/ANTIPLAT THER USED: HCPCS | Performed by: NURSE PRACTITIONER

## 2018-11-21 PROCEDURE — 81003 URINALYSIS AUTO W/O SCOPE: CPT | Performed by: NURSE PRACTITIONER

## 2018-11-21 ASSESSMENT — ENCOUNTER SYMPTOMS
BLOOD IN STOOL: 0
ABDOMINAL PAIN: 0
COLOR CHANGE: 0
BACK PAIN: 0

## 2018-11-21 NOTE — PROGRESS NOTES
edema. Respiratory: Denies cough or SOB. GI:The patient denies abdominal or flank pain, anorexia, nausea or vomiting. : See HPI  Musculoskeletal: Patient denies low back pain or painful or reduced ROM of the back or extremities. Neurological: The patient denies any symptoms of neurological impairment or TIA's; no history of stroke. Lymphatic: Denies swollen glands in neck, axillary or inguinal areas. Psychiatric: Denies anxiety or depression. Skin: Denies rash or lesions. The remainder of the complete ROS is negative    PHYSICAL EXAM:  VITALS:  There were no vitals taken for this visit. .    Constitutional:    Alert and oriented times 3, no acute distress and cooperative to examination with appropriate mood and affect. HEENT:   Head:         Normocephalic and atraumatic. Mouth/Throat:          Mucous membranes are normal.   Eyes:         EOM are normal. No scleral icterus. Nose:    The external appearance of the nose is normal  Ears: The ears appear normal to external inspection   Neck:         Supple, symmetrical, trachea midline, no adenopathy, thyroid symmetric, not enlarged and no tenderness. Cardiovascular:        Normal rate, regular rhythm, S1 S2 heart sounds. Pulmonary/Chest:       Chest symmetric with normal A/P diameter, no wheezes, rales, or rhonchi noted. Normal respiratory rate and rhthym. No use of accessory muscles. Abdominal:          Soft. No tenderness. Active bowel sounds. Musculoskeletal:    Normal range of motion. She exhibits no edema or tenderness of lower extremities. Extremities:    No cyanosis, clubbing, or edema present. Neurological:    Alert and oriented. No cranial nerve deficit. There are no focalizing motor or sensory deficits.     DATA:  CBC:   Lab Results   Component Value Date    WBC 6.2 11/08/2018    RBC 4.80 11/08/2018    HGB 13.3 11/08/2018    HCT 39.7 11/08/2018    MCV 83 11/08/2018    MCH 27.7 11/08/2018    MCHC 33.5 11/08/2018    RDW 13.6 11/08/2018     11/08/2018    MPV 7.3 11/08/2018     BMP:    Lab Results   Component Value Date     09/05/2018    K 3.8 09/05/2018     09/05/2018    CO2 23 09/05/2018    BUN 13 09/05/2018    CREATININE 0.7 09/05/2018    CALCIUM 9.9 09/05/2018    LABGLOM 83 09/05/2018    GLUCOSE 90 09/05/2018    GLUCOSE 132 10/15/2011     BUN/Creatinine:    Lab Results   Component Value Date    BUN 13 09/05/2018    CREATININE 0.7 09/05/2018     Magnesium:    Lab Results   Component Value Date    MG 1.8 03/02/2018     Phosphorus:  No results found for: PHOS  PT/INR:    Lab Results   Component Value Date    INR 1.19 02/26/2018     U/A:    Lab Results   Component Value Date    NITRITE neg 11/11/2011    COLORU YELLOW 10/24/2018    PHUR 5.0 10/24/2018    WBCUA 2-4 10/24/2018    WBCUA 0-2 11/29/2011    RBCUA 0-2 10/24/2018    YEAST NONE SEEN 10/24/2018    BACTERIA NONE 10/24/2018    CLARITYU cloudy 11/11/2011    SPECGRAV 1.020 11/11/2011    LEUKOCYTESUR NEGATIVE 10/24/2018    UROBILINOGEN 0.2 10/24/2018    BILIRUBINUR NEGATIVE 10/24/2018    BILIRUBINUR NEGATIVE 11/29/2011    BLOODU LARGE 10/24/2018    GLUCOSEU NEGATIVE 10/24/2018    AMORPHOUS URATES 10/24/2018       Imaging: The patient has had a CT Stone Protocol which I have reviewed along with its accompanying report. The study demonstrates no urological abnormality.     Results for POC orders placed in visit on 11/21/18   POCT Urinalysis No Micro (Auto)   Result Value Ref Range    Glucose, Ur Negative NEGATIVE mg/dl    Bilirubin Urine Negative     Ketones, Urine Negative NEGATIVE    Specific Gravity, Urine 1.020 1.002 - 1.03    Blood, UA POC Large (A) NEGATIVE    pH, Urine 6.50 5.0 - 9.0    Protein, Urine >= 300 (A) NEGATIVE mg/dl    Urobilinogen, Urine 0.20 0.0 - 1.0 eu/dl    Nitrite, Urine Negative NEGATIVE    Leukocyte Clumps, Urine Negative NEGATIVE    Color, Urine Yellow YELLOW-STR    Character, Urine Clear [id number]         Assessment & Plan:      Microscopic

## 2018-11-30 ENCOUNTER — HOSPITAL ENCOUNTER (OUTPATIENT)
Dept: NON INVASIVE DIAGNOSTICS | Age: 68
Discharge: HOME | End: 2018-11-30
Payer: MEDICARE

## 2018-11-30 DIAGNOSIS — I25.10 CORONARY ARTERY DISEASE INVOLVING NATIVE CORONARY ARTERY OF NATIVE HEART WITHOUT ANGINA PECTORIS: ICD-10-CM

## 2018-11-30 DIAGNOSIS — E78.5 HYPERLIPIDEMIA, UNSPECIFIED HYPERLIPIDEMIA TYPE: ICD-10-CM

## 2018-11-30 PROCEDURE — 6360000002 HC RX W HCPCS

## 2018-11-30 PROCEDURE — A9500 TC99M SESTAMIBI: HCPCS | Performed by: NUCLEAR MEDICINE

## 2018-11-30 PROCEDURE — 3430000000 HC RX DIAGNOSTIC RADIOPHARMACEUTICAL: Performed by: NUCLEAR MEDICINE

## 2018-11-30 PROCEDURE — 2709999900 HC NON-CHARGEABLE SUPPLY

## 2018-11-30 PROCEDURE — 93017 CV STRESS TEST TRACING ONLY: CPT | Performed by: NUCLEAR MEDICINE

## 2018-11-30 PROCEDURE — 78452 HT MUSCLE IMAGE SPECT MULT: CPT | Performed by: NUCLEAR MEDICINE

## 2018-11-30 RX ADMIN — Medication 34.9 MILLICURIE: at 09:35

## 2018-11-30 RX ADMIN — Medication 9.8 MILLICURIE: at 09:35

## 2018-12-03 ENCOUNTER — TELEPHONE (OUTPATIENT)
Dept: CARDIOLOGY CLINIC | Age: 68
End: 2018-12-03

## 2018-12-04 ENCOUNTER — OFFICE VISIT (OUTPATIENT)
Dept: CARDIOLOGY CLINIC | Age: 68
End: 2018-12-04
Payer: MEDICARE

## 2018-12-04 VITALS
HEART RATE: 68 BPM | BODY MASS INDEX: 32.98 KG/M2 | DIASTOLIC BLOOD PRESSURE: 66 MMHG | HEIGHT: 60 IN | WEIGHT: 168 LBS | SYSTOLIC BLOOD PRESSURE: 124 MMHG

## 2018-12-04 DIAGNOSIS — E78.01 FAMILIAL HYPERCHOLESTEROLEMIA: ICD-10-CM

## 2018-12-04 DIAGNOSIS — I10 ESSENTIAL HYPERTENSION: ICD-10-CM

## 2018-12-04 DIAGNOSIS — I25.10 CORONARY ARTERY DISEASE INVOLVING NATIVE CORONARY ARTERY OF NATIVE HEART WITHOUT ANGINA PECTORIS: Primary | ICD-10-CM

## 2018-12-04 PROCEDURE — G8417 CALC BMI ABV UP PARAM F/U: HCPCS | Performed by: NUCLEAR MEDICINE

## 2018-12-04 PROCEDURE — 1123F ACP DISCUSS/DSCN MKR DOCD: CPT | Performed by: NUCLEAR MEDICINE

## 2018-12-04 PROCEDURE — 4040F PNEUMOC VAC/ADMIN/RCVD: CPT | Performed by: NUCLEAR MEDICINE

## 2018-12-04 PROCEDURE — 99214 OFFICE O/P EST MOD 30 MIN: CPT | Performed by: NUCLEAR MEDICINE

## 2018-12-04 PROCEDURE — G8427 DOCREV CUR MEDS BY ELIG CLIN: HCPCS | Performed by: NUCLEAR MEDICINE

## 2018-12-04 PROCEDURE — G8598 ASA/ANTIPLAT THER USED: HCPCS | Performed by: NUCLEAR MEDICINE

## 2018-12-04 PROCEDURE — G8399 PT W/DXA RESULTS DOCUMENT: HCPCS | Performed by: NUCLEAR MEDICINE

## 2018-12-04 PROCEDURE — G8482 FLU IMMUNIZE ORDER/ADMIN: HCPCS | Performed by: NUCLEAR MEDICINE

## 2018-12-04 PROCEDURE — 1036F TOBACCO NON-USER: CPT | Performed by: NUCLEAR MEDICINE

## 2018-12-04 PROCEDURE — 1090F PRES/ABSN URINE INCON ASSESS: CPT | Performed by: NUCLEAR MEDICINE

## 2018-12-04 PROCEDURE — 3017F COLORECTAL CA SCREEN DOC REV: CPT | Performed by: NUCLEAR MEDICINE

## 2018-12-04 PROCEDURE — 1101F PT FALLS ASSESS-DOCD LE1/YR: CPT | Performed by: NUCLEAR MEDICINE

## 2018-12-04 NOTE — PROGRESS NOTES
Pt here for fu abn stress test   States she does have sharp pains in her chest and pain to her left arm
07/19/2019    A1C test (Diabetic or Prediabetic)  07/23/2019    Lipid screen  07/23/2019    TSH testing  07/23/2019    Potassium monitoring  09/05/2019    Creatinine monitoring  09/05/2019    Diabetic microalbuminuria test  10/24/2019    Breast cancer screen  05/14/2020    DTaP/Tdap/Td vaccine (2 - Td) 09/19/2024    Colon cancer screen colonoscopy  03/01/2028    DEXA (modify frequency per FRAX score)  Completed    Flu vaccine  Completed    Pneumococcal low/med risk  Completed    Hepatitis C screen  Addressed       Subjective:  Review of Systems  General:   No fever, no chills, No fatigue or weight loss  Pulmonary:   some dyspnea, no wheezing  Cardiac:    Denies recent chest pain,   GI:     No nausea or vomiting, no abdominal pain  Neuro:     No dizziness or light headedness,   Musculoskeletal:  No recent active issues  Extremities:   No edema, good peripheral pulses        Objective:  Physical Exam  /66   Pulse 68   Ht 5' (1.524 m)   Wt 168 lb (76.2 kg)   BMI 32.81 kg/m²   General:   Well developed, well nourished  Lungs:    Clear to auscultation  Heart:    Normal S1 S2, Slight murmur. no rubs, no gallops  Abdomen:   Soft, non tender, no organomegalies, positive bowel sounds  Extremities:   No edema, no cyanosis, good peripheral pulses  Neurological:   Awake, alert, oriented. No obvious focal deficits  Musculoskelatal:  No obvious deformities    Assessment:      Diagnosis Orders   1. Coronary artery disease involving native coronary artery of native heart without angina pectoris     2. Essential hypertension     3. Familial hypercholesterolemia     cardiac as above  Denies new angina yet a very high risk patient   She is concerned     Plan:  No Follow-up on file. I have spent 25 minutes face to face with the patient. More than 50% of this time was spent counseling and coordinating care.     As above  Discussed options  Cardiac cathterizaion, risks and benefits discussed with the patient and

## 2018-12-06 ENCOUNTER — PREP FOR PROCEDURE (OUTPATIENT)
Dept: CARDIOLOGY | Age: 68
End: 2018-12-06

## 2018-12-06 RX ORDER — SODIUM CHLORIDE 9 MG/ML
INJECTION, SOLUTION INTRAVENOUS CONTINUOUS
Status: CANCELLED | OUTPATIENT
Start: 2018-12-06

## 2018-12-06 RX ORDER — NITROGLYCERIN 0.4 MG/1
0.4 TABLET SUBLINGUAL EVERY 5 MIN PRN
Status: CANCELLED | OUTPATIENT
Start: 2018-12-06

## 2018-12-06 RX ORDER — ASPIRIN 325 MG
325 TABLET ORAL ONCE
Status: CANCELLED | OUTPATIENT
Start: 2018-12-06 | End: 2018-12-06

## 2018-12-06 RX ORDER — SODIUM CHLORIDE 0.9 % (FLUSH) 0.9 %
10 SYRINGE (ML) INJECTION PRN
Status: CANCELLED | OUTPATIENT
Start: 2018-12-06

## 2018-12-06 RX ORDER — SODIUM CHLORIDE 0.9 % (FLUSH) 0.9 %
10 SYRINGE (ML) INJECTION EVERY 12 HOURS SCHEDULED
Status: CANCELLED | OUTPATIENT
Start: 2018-12-06

## 2018-12-06 RX ORDER — ISOSORBIDE MONONITRATE 30 MG/1
30 TABLET, EXTENDED RELEASE ORAL DAILY
Qty: 30 TABLET | Refills: 3 | OUTPATIENT
Start: 2018-12-06 | End: 2019-01-21

## 2019-01-17 ENCOUNTER — HOSPITAL ENCOUNTER (OUTPATIENT)
Age: 69
Discharge: HOME | End: 2019-01-17
Payer: MEDICARE

## 2019-01-17 DIAGNOSIS — D50.0 IRON DEFICIENCY ANEMIA DUE TO CHRONIC BLOOD LOSS: ICD-10-CM

## 2019-01-17 DIAGNOSIS — E11.8 TYPE 2 DIABETES MELLITUS WITH COMPLICATION, WITHOUT LONG-TERM CURRENT USE OF INSULIN (HCC): ICD-10-CM

## 2019-01-17 LAB
AVERAGE GLUCOSE: 132 MG/DL (ref 70–126)
BASOPHILS # BLD: 0.5 %
BASOPHILS ABSOLUTE: 0 THOU/MM3 (ref 0–0.1)
EOSINOPHIL # BLD: 3.2 %
EOSINOPHILS ABSOLUTE: 0.2 THOU/MM3 (ref 0–0.4)
ERYTHROCYTE [DISTWIDTH] IN BLOOD BY AUTOMATED COUNT: 14.2 % (ref 11.5–14.5)
ERYTHROCYTE [DISTWIDTH] IN BLOOD BY AUTOMATED COUNT: 43.7 FL (ref 35–45)
HBA1C MFR BLD: 6.4 % (ref 4.4–6.4)
HCT VFR BLD CALC: 39 % (ref 37–47)
HEMOGLOBIN: 12.4 GM/DL (ref 12–16)
IMMATURE GRANS (ABS): 0.01 THOU/MM3 (ref 0–0.07)
IMMATURE GRANULOCYTES: 0.2 %
LYMPHOCYTES # BLD: 41.4 %
LYMPHOCYTES ABSOLUTE: 2.4 THOU/MM3 (ref 1–4.8)
MCH RBC QN AUTO: 27.3 PG (ref 26–33)
MCHC RBC AUTO-ENTMCNC: 31.8 GM/DL (ref 32.2–35.5)
MCV RBC AUTO: 85.7 FL (ref 81–99)
MONOCYTES # BLD: 9 %
MONOCYTES ABSOLUTE: 0.5 THOU/MM3 (ref 0.4–1.3)
NUCLEATED RED BLOOD CELLS: 0 /100 WBC
PLATELET # BLD: 121 THOU/MM3 (ref 130–400)
PMV BLD AUTO: 10.5 FL (ref 9.4–12.4)
RBC # BLD: 4.55 MILL/MM3 (ref 4.2–5.4)
SEG NEUTROPHILS: 45.7 %
SEGMENTED NEUTROPHILS ABSOLUTE COUNT: 2.7 THOU/MM3 (ref 1.8–7.7)
WBC # BLD: 5.9 THOU/MM3 (ref 4.8–10.8)

## 2019-01-17 PROCEDURE — 36415 COLL VENOUS BLD VENIPUNCTURE: CPT

## 2019-01-17 PROCEDURE — 83036 HEMOGLOBIN GLYCOSYLATED A1C: CPT

## 2019-01-17 PROCEDURE — 85025 COMPLETE CBC W/AUTO DIFF WBC: CPT

## 2019-01-21 ENCOUNTER — OFFICE VISIT (OUTPATIENT)
Dept: FAMILY MEDICINE CLINIC | Age: 69
End: 2019-01-21
Payer: MEDICARE

## 2019-01-21 VITALS
HEART RATE: 60 BPM | TEMPERATURE: 98.7 F | BODY MASS INDEX: 32.42 KG/M2 | SYSTOLIC BLOOD PRESSURE: 122 MMHG | WEIGHT: 166 LBS | RESPIRATION RATE: 18 BRPM | DIASTOLIC BLOOD PRESSURE: 72 MMHG

## 2019-01-21 DIAGNOSIS — K76.0 NAFLD (NONALCOHOLIC FATTY LIVER DISEASE): ICD-10-CM

## 2019-01-21 DIAGNOSIS — E78.5 HYPERLIPIDEMIA, UNSPECIFIED HYPERLIPIDEMIA TYPE: ICD-10-CM

## 2019-01-21 DIAGNOSIS — E03.9 HYPOTHYROIDISM, UNSPECIFIED TYPE: ICD-10-CM

## 2019-01-21 DIAGNOSIS — I25.10 CORONARY ARTERY DISEASE INVOLVING NATIVE CORONARY ARTERY OF NATIVE HEART WITHOUT ANGINA PECTORIS: ICD-10-CM

## 2019-01-21 DIAGNOSIS — R74.8 ELEVATED LIVER ENZYMES: ICD-10-CM

## 2019-01-21 DIAGNOSIS — M81.0 SENILE OSTEOPOROSIS: ICD-10-CM

## 2019-01-21 DIAGNOSIS — D69.6 THROMBOCYTOPENIA (HCC): ICD-10-CM

## 2019-01-21 DIAGNOSIS — E66.09 CLASS 1 OBESITY DUE TO EXCESS CALORIES WITH SERIOUS COMORBIDITY AND BODY MASS INDEX (BMI) OF 34.0 TO 34.9 IN ADULT: ICD-10-CM

## 2019-01-21 DIAGNOSIS — F32.A DEPRESSION, UNSPECIFIED DEPRESSION TYPE: ICD-10-CM

## 2019-01-21 DIAGNOSIS — I10 ESSENTIAL HYPERTENSION: ICD-10-CM

## 2019-01-21 DIAGNOSIS — D50.0 IRON DEFICIENCY ANEMIA DUE TO CHRONIC BLOOD LOSS: ICD-10-CM

## 2019-01-21 DIAGNOSIS — K21.9 GASTROESOPHAGEAL REFLUX DISEASE, ESOPHAGITIS PRESENCE NOT SPECIFIED: ICD-10-CM

## 2019-01-21 DIAGNOSIS — E11.8 TYPE 2 DIABETES MELLITUS WITH COMPLICATION, WITHOUT LONG-TERM CURRENT USE OF INSULIN (HCC): Primary | ICD-10-CM

## 2019-01-21 PROCEDURE — G8482 FLU IMMUNIZE ORDER/ADMIN: HCPCS | Performed by: FAMILY MEDICINE

## 2019-01-21 PROCEDURE — 1101F PT FALLS ASSESS-DOCD LE1/YR: CPT | Performed by: FAMILY MEDICINE

## 2019-01-21 PROCEDURE — 1123F ACP DISCUSS/DSCN MKR DOCD: CPT | Performed by: FAMILY MEDICINE

## 2019-01-21 PROCEDURE — 4040F PNEUMOC VAC/ADMIN/RCVD: CPT | Performed by: FAMILY MEDICINE

## 2019-01-21 PROCEDURE — 3017F COLORECTAL CA SCREEN DOC REV: CPT | Performed by: FAMILY MEDICINE

## 2019-01-21 PROCEDURE — G8399 PT W/DXA RESULTS DOCUMENT: HCPCS | Performed by: FAMILY MEDICINE

## 2019-01-21 PROCEDURE — 3044F HG A1C LEVEL LT 7.0%: CPT | Performed by: FAMILY MEDICINE

## 2019-01-21 PROCEDURE — G8427 DOCREV CUR MEDS BY ELIG CLIN: HCPCS | Performed by: FAMILY MEDICINE

## 2019-01-21 PROCEDURE — G8598 ASA/ANTIPLAT THER USED: HCPCS | Performed by: FAMILY MEDICINE

## 2019-01-21 PROCEDURE — 1090F PRES/ABSN URINE INCON ASSESS: CPT | Performed by: FAMILY MEDICINE

## 2019-01-21 PROCEDURE — G8417 CALC BMI ABV UP PARAM F/U: HCPCS | Performed by: FAMILY MEDICINE

## 2019-01-21 PROCEDURE — 1036F TOBACCO NON-USER: CPT | Performed by: FAMILY MEDICINE

## 2019-01-21 PROCEDURE — 2022F DILAT RTA XM EVC RTNOPTHY: CPT | Performed by: FAMILY MEDICINE

## 2019-01-21 PROCEDURE — 99214 OFFICE O/P EST MOD 30 MIN: CPT | Performed by: FAMILY MEDICINE

## 2019-01-21 RX ORDER — GLIMEPIRIDE 1 MG/1
TABLET ORAL
Qty: 180 TABLET | Refills: 3 | Status: SHIPPED | OUTPATIENT
Start: 2019-01-21 | End: 2019-12-09 | Stop reason: DRUGHIGH

## 2019-01-21 ASSESSMENT — ENCOUNTER SYMPTOMS
VOMITING: 0
CONSTIPATION: 0
DIARRHEA: 0
NAUSEA: 0
BLOOD IN STOOL: 0
ANAL BLEEDING: 0
ABDOMINAL PAIN: 0
SHORTNESS OF BREATH: 0
CHEST TIGHTNESS: 0

## 2019-02-07 ENCOUNTER — PROCEDURE VISIT (OUTPATIENT)
Dept: UROLOGY | Age: 69
End: 2019-02-07
Payer: MEDICARE

## 2019-02-07 VITALS
BODY MASS INDEX: 30.99 KG/M2 | HEIGHT: 62 IN | DIASTOLIC BLOOD PRESSURE: 82 MMHG | SYSTOLIC BLOOD PRESSURE: 124 MMHG | WEIGHT: 168.4 LBS

## 2019-02-07 DIAGNOSIS — R31.9 HEMATURIA, UNSPECIFIED TYPE: Primary | ICD-10-CM

## 2019-02-07 LAB
BILIRUBIN URINE: NEGATIVE
BLOOD URINE, POC: ABNORMAL
CHARACTER, URINE: CLEAR
COLOR, URINE: YELLOW
GLUCOSE URINE: NEGATIVE MG/DL
KETONES, URINE: NEGATIVE
LEUKOCYTE CLUMPS, URINE: ABNORMAL
NITRITE, URINE: NEGATIVE
PH, URINE: 5.5
PROTEIN, URINE: 100 MG/DL
SPECIFIC GRAVITY, URINE: 1.02 (ref 1–1.03)
UROBILINOGEN, URINE: 0.2 EU/DL

## 2019-02-07 PROCEDURE — 52000 CYSTOURETHROSCOPY: CPT | Performed by: UROLOGY

## 2019-02-07 PROCEDURE — 81003 URINALYSIS AUTO W/O SCOPE: CPT | Performed by: UROLOGY

## 2019-02-07 PROCEDURE — 99999 PR OFFICE/OUTPT VISIT,PROCEDURE ONLY: CPT | Performed by: UROLOGY

## 2019-02-07 RX ORDER — SULFAMETHOXAZOLE AND TRIMETHOPRIM 400; 80 MG/1; MG/1
1 TABLET ORAL 2 TIMES DAILY
Qty: 28 TABLET | Refills: 0 | Status: SHIPPED | OUTPATIENT
Start: 2019-02-07 | End: 2019-02-21

## 2019-02-22 ENCOUNTER — OFFICE VISIT (OUTPATIENT)
Dept: CARDIOLOGY CLINIC | Age: 69
End: 2019-02-22
Payer: MEDICARE

## 2019-02-22 VITALS
HEART RATE: 60 BPM | WEIGHT: 166 LBS | BODY MASS INDEX: 32.59 KG/M2 | DIASTOLIC BLOOD PRESSURE: 64 MMHG | SYSTOLIC BLOOD PRESSURE: 118 MMHG | HEIGHT: 60 IN

## 2019-02-22 DIAGNOSIS — I10 ESSENTIAL HYPERTENSION: ICD-10-CM

## 2019-02-22 DIAGNOSIS — E78.5 HYPERLIPIDEMIA, UNSPECIFIED HYPERLIPIDEMIA TYPE: ICD-10-CM

## 2019-02-22 DIAGNOSIS — E78.01 FAMILIAL HYPERCHOLESTEROLEMIA: ICD-10-CM

## 2019-02-22 DIAGNOSIS — E03.9 HYPOTHYROIDISM, UNSPECIFIED TYPE: ICD-10-CM

## 2019-02-22 DIAGNOSIS — I25.10 CORONARY ARTERY DISEASE INVOLVING NATIVE CORONARY ARTERY OF NATIVE HEART WITHOUT ANGINA PECTORIS: Primary | ICD-10-CM

## 2019-02-22 PROCEDURE — G8482 FLU IMMUNIZE ORDER/ADMIN: HCPCS | Performed by: NUCLEAR MEDICINE

## 2019-02-22 PROCEDURE — 1036F TOBACCO NON-USER: CPT | Performed by: NUCLEAR MEDICINE

## 2019-02-22 PROCEDURE — 99213 OFFICE O/P EST LOW 20 MIN: CPT | Performed by: NUCLEAR MEDICINE

## 2019-02-22 PROCEDURE — G8417 CALC BMI ABV UP PARAM F/U: HCPCS | Performed by: NUCLEAR MEDICINE

## 2019-02-22 PROCEDURE — G8428 CUR MEDS NOT DOCUMENT: HCPCS | Performed by: NUCLEAR MEDICINE

## 2019-02-22 PROCEDURE — 1090F PRES/ABSN URINE INCON ASSESS: CPT | Performed by: NUCLEAR MEDICINE

## 2019-02-22 PROCEDURE — 3017F COLORECTAL CA SCREEN DOC REV: CPT | Performed by: NUCLEAR MEDICINE

## 2019-02-22 PROCEDURE — G8598 ASA/ANTIPLAT THER USED: HCPCS | Performed by: NUCLEAR MEDICINE

## 2019-02-22 PROCEDURE — 4040F PNEUMOC VAC/ADMIN/RCVD: CPT | Performed by: NUCLEAR MEDICINE

## 2019-02-22 PROCEDURE — 1123F ACP DISCUSS/DSCN MKR DOCD: CPT | Performed by: NUCLEAR MEDICINE

## 2019-02-22 PROCEDURE — G8399 PT W/DXA RESULTS DOCUMENT: HCPCS | Performed by: NUCLEAR MEDICINE

## 2019-02-22 PROCEDURE — 1101F PT FALLS ASSESS-DOCD LE1/YR: CPT | Performed by: NUCLEAR MEDICINE

## 2019-02-22 RX ORDER — BUPROPION HYDROCHLORIDE 150 MG/1
150 TABLET, EXTENDED RELEASE ORAL 2 TIMES DAILY
Status: ON HOLD | COMMUNITY
End: 2022-09-03 | Stop reason: HOSPADM

## 2019-02-25 RX ORDER — ATENOLOL 50 MG/1
TABLET ORAL
Qty: 45 TABLET | Refills: 1 | Status: SHIPPED | OUTPATIENT
Start: 2019-02-25 | End: 2019-10-06 | Stop reason: SDUPTHER

## 2019-02-25 RX ORDER — ATORVASTATIN CALCIUM 40 MG/1
TABLET, FILM COATED ORAL
Qty: 90 TABLET | Refills: 1 | Status: SHIPPED | OUTPATIENT
Start: 2019-02-25 | End: 2019-10-06 | Stop reason: SDUPTHER

## 2019-02-25 RX ORDER — LEVOTHYROXINE SODIUM 0.05 MG/1
TABLET ORAL
Qty: 90 TABLET | Refills: 1 | Status: SHIPPED | OUTPATIENT
Start: 2019-02-25 | End: 2019-10-06 | Stop reason: SDUPTHER

## 2019-04-22 ENCOUNTER — HOSPITAL ENCOUNTER (OUTPATIENT)
Age: 69
Discharge: HOME | End: 2019-04-22
Payer: MEDICARE

## 2019-04-22 DIAGNOSIS — D69.6 THROMBOCYTOPENIA (HCC): ICD-10-CM

## 2019-04-22 DIAGNOSIS — E11.8 TYPE 2 DIABETES MELLITUS WITH COMPLICATION, WITHOUT LONG-TERM CURRENT USE OF INSULIN (HCC): ICD-10-CM

## 2019-04-22 LAB
BASOPHILS # BLD: 0.4 %
BASOPHILS ABSOLUTE: 0 THOU/MM3 (ref 0–0.1)
EOSINOPHIL # BLD: 3.1 %
EOSINOPHILS ABSOLUTE: 0.2 THOU/MM3 (ref 0–0.4)
ERYTHROCYTE [DISTWIDTH] IN BLOOD BY AUTOMATED COUNT: 15.1 % (ref 11.5–14.5)
ERYTHROCYTE [DISTWIDTH] IN BLOOD BY AUTOMATED COUNT: 47.3 FL (ref 35–45)
HCT VFR BLD CALC: 36.9 % (ref 37–47)
HEMOGLOBIN: 11 GM/DL (ref 12–16)
IMMATURE GRANS (ABS): 0.01 THOU/MM3 (ref 0–0.07)
IMMATURE GRANULOCYTES: 0.2 %
LYMPHOCYTES # BLD: 40.8 %
LYMPHOCYTES ABSOLUTE: 2.2 THOU/MM3 (ref 1–4.8)
MCH RBC QN AUTO: 25.5 PG (ref 26–33)
MCHC RBC AUTO-ENTMCNC: 29.8 GM/DL (ref 32.2–35.5)
MCV RBC AUTO: 85.6 FL (ref 81–99)
MONOCYTES # BLD: 11.2 %
MONOCYTES ABSOLUTE: 0.6 THOU/MM3 (ref 0.4–1.3)
NUCLEATED RED BLOOD CELLS: 0 /100 WBC
PLATELET # BLD: 137 THOU/MM3 (ref 130–400)
PMV BLD AUTO: 10.3 FL (ref 9.4–12.4)
RBC # BLD: 4.31 MILL/MM3 (ref 4.2–5.4)
SEG NEUTROPHILS: 44.3 %
SEGMENTED NEUTROPHILS ABSOLUTE COUNT: 2.4 THOU/MM3 (ref 1.8–7.7)
WBC # BLD: 5.5 THOU/MM3 (ref 4.8–10.8)

## 2019-04-22 PROCEDURE — 83036 HEMOGLOBIN GLYCOSYLATED A1C: CPT

## 2019-04-22 PROCEDURE — 36415 COLL VENOUS BLD VENIPUNCTURE: CPT

## 2019-04-22 PROCEDURE — 85025 COMPLETE CBC W/AUTO DIFF WBC: CPT

## 2019-04-23 LAB
AVERAGE GLUCOSE: 159 MG/DL (ref 70–126)
HBA1C MFR BLD: 7.3 % (ref 4.4–6.4)

## 2019-05-04 DIAGNOSIS — E11.8 TYPE 2 DIABETES MELLITUS WITH COMPLICATION, WITHOUT LONG-TERM CURRENT USE OF INSULIN (HCC): ICD-10-CM

## 2019-05-04 DIAGNOSIS — F32.A DEPRESSION, UNSPECIFIED DEPRESSION TYPE: ICD-10-CM

## 2019-05-04 DIAGNOSIS — K21.9 GASTROESOPHAGEAL REFLUX DISEASE, ESOPHAGITIS PRESENCE NOT SPECIFIED: ICD-10-CM

## 2019-05-06 RX ORDER — ESCITALOPRAM OXALATE 20 MG/1
TABLET ORAL
Qty: 90 TABLET | Refills: 3 | Status: SHIPPED | OUTPATIENT
Start: 2019-05-06 | End: 2020-05-04

## 2019-05-06 RX ORDER — PANTOPRAZOLE SODIUM 40 MG/1
TABLET, DELAYED RELEASE ORAL
Qty: 180 TABLET | Refills: 3 | Status: SHIPPED | OUTPATIENT
Start: 2019-05-06 | End: 2020-05-04

## 2019-05-06 RX ORDER — METFORMIN HYDROCHLORIDE 500 MG/1
TABLET, EXTENDED RELEASE ORAL
Qty: 360 TABLET | Refills: 3 | Status: SHIPPED | OUTPATIENT
Start: 2019-05-06 | End: 2020-05-04

## 2019-05-06 NOTE — TELEPHONE ENCOUNTER
Last written: 2-14-18 and 4-18-18 #90days/3    Last seen: 1-21-19  Next visit: 7-25-19  Last labs: 4-22-19    Order pended for #90days/3

## 2019-07-25 ENCOUNTER — OFFICE VISIT (OUTPATIENT)
Dept: FAMILY MEDICINE CLINIC | Age: 69
End: 2019-07-25
Payer: MEDICARE

## 2019-07-25 VITALS
HEART RATE: 64 BPM | BODY MASS INDEX: 34.88 KG/M2 | SYSTOLIC BLOOD PRESSURE: 124 MMHG | TEMPERATURE: 97.5 F | DIASTOLIC BLOOD PRESSURE: 70 MMHG | RESPIRATION RATE: 16 BRPM | WEIGHT: 178.6 LBS

## 2019-07-25 DIAGNOSIS — D50.0 IRON DEFICIENCY ANEMIA DUE TO CHRONIC BLOOD LOSS: ICD-10-CM

## 2019-07-25 DIAGNOSIS — E11.8 TYPE 2 DIABETES MELLITUS WITH COMPLICATION, WITHOUT LONG-TERM CURRENT USE OF INSULIN (HCC): Primary | ICD-10-CM

## 2019-07-25 DIAGNOSIS — Z51.81 VISIT FOR MONITORING RECLAST THERAPY: ICD-10-CM

## 2019-07-25 DIAGNOSIS — E78.5 HYPERLIPIDEMIA, UNSPECIFIED HYPERLIPIDEMIA TYPE: ICD-10-CM

## 2019-07-25 DIAGNOSIS — I10 ESSENTIAL HYPERTENSION: ICD-10-CM

## 2019-07-25 DIAGNOSIS — Z79.83 VISIT FOR MONITORING RECLAST THERAPY: ICD-10-CM

## 2019-07-25 DIAGNOSIS — K21.9 GASTROESOPHAGEAL REFLUX DISEASE, ESOPHAGITIS PRESENCE NOT SPECIFIED: ICD-10-CM

## 2019-07-25 DIAGNOSIS — E03.9 HYPOTHYROIDISM, UNSPECIFIED TYPE: ICD-10-CM

## 2019-07-25 DIAGNOSIS — R80.9 MICROALBUMINURIA: ICD-10-CM

## 2019-07-25 DIAGNOSIS — I25.10 CORONARY ARTERY DISEASE INVOLVING NATIVE CORONARY ARTERY OF NATIVE HEART WITHOUT ANGINA PECTORIS: ICD-10-CM

## 2019-07-25 DIAGNOSIS — E66.09 CLASS 1 OBESITY DUE TO EXCESS CALORIES WITH SERIOUS COMORBIDITY AND BODY MASS INDEX (BMI) OF 34.0 TO 34.9 IN ADULT: ICD-10-CM

## 2019-07-25 PROCEDURE — 2022F DILAT RTA XM EVC RTNOPTHY: CPT | Performed by: FAMILY MEDICINE

## 2019-07-25 PROCEDURE — G8598 ASA/ANTIPLAT THER USED: HCPCS | Performed by: FAMILY MEDICINE

## 2019-07-25 PROCEDURE — 99214 OFFICE O/P EST MOD 30 MIN: CPT | Performed by: FAMILY MEDICINE

## 2019-07-25 PROCEDURE — 4040F PNEUMOC VAC/ADMIN/RCVD: CPT | Performed by: FAMILY MEDICINE

## 2019-07-25 PROCEDURE — G8417 CALC BMI ABV UP PARAM F/U: HCPCS | Performed by: FAMILY MEDICINE

## 2019-07-25 PROCEDURE — G8399 PT W/DXA RESULTS DOCUMENT: HCPCS | Performed by: FAMILY MEDICINE

## 2019-07-25 PROCEDURE — 1123F ACP DISCUSS/DSCN MKR DOCD: CPT | Performed by: FAMILY MEDICINE

## 2019-07-25 PROCEDURE — 3017F COLORECTAL CA SCREEN DOC REV: CPT | Performed by: FAMILY MEDICINE

## 2019-07-25 PROCEDURE — 1036F TOBACCO NON-USER: CPT | Performed by: FAMILY MEDICINE

## 2019-07-25 PROCEDURE — 3045F PR MOST RECENT HEMOGLOBIN A1C LEVEL 7.0-9.0%: CPT | Performed by: FAMILY MEDICINE

## 2019-07-25 PROCEDURE — G8427 DOCREV CUR MEDS BY ELIG CLIN: HCPCS | Performed by: FAMILY MEDICINE

## 2019-07-25 PROCEDURE — 1090F PRES/ABSN URINE INCON ASSESS: CPT | Performed by: FAMILY MEDICINE

## 2019-07-25 ASSESSMENT — ENCOUNTER SYMPTOMS
BLOOD IN STOOL: 0
ABDOMINAL PAIN: 0
VOMITING: 0
ANAL BLEEDING: 0
SHORTNESS OF BREATH: 0
CONSTIPATION: 0
CHEST TIGHTNESS: 0
DIARRHEA: 0
NAUSEA: 0

## 2019-07-25 NOTE — PROGRESS NOTES
Eyes: Pupils are equal, round, and reactive to light. Conjunctivae and EOM are normal.   Neck: Normal range of motion. Neck supple. Cardiovascular: Normal rate, regular rhythm, normal heart sounds and intact distal pulses. Pulmonary/Chest: Effort normal and breath sounds normal.   Abdominal: Soft. Bowel sounds are normal.   Musculoskeletal: Normal range of motion. Visual inspection:  Deformity/amputation: absent  Skin lesions/pre-ulcerative calluses: absent  Edema: right- negative, left- negative    Sensory exam:  Monofilament sensation: normal  (minimum of 5 random plantar locations tested, avoiding callused areas - > 1 area with absence of sensation is + for neuropathy)    Plus at least one of the following:  Pulses: normal     Neurological: She is alert and oriented to person, place, and time. She has normal reflexes. Skin: Skin is warm and dry. Psychiatric: She has a normal mood and affect. Her behavior is normal. Judgment and thought content normal.   Nursing note and vitals reviewed. Lab Results   Component Value Date    LABA1C 7.3 (H) 04/22/2019       Lab Results   Component Value Date    CHOL 99 (L) 07/23/2018    TRIG 80 10/24/2018    HDL 47 10/24/2018    LDLCALC 36 07/23/2018    LDLDIRECT 79.69 05/14/2015       The ASCVD Risk score (Paulois Lipoma., et al., 2013) failed to calculate for the following reasons:     The valid total cholesterol range is 130 to 320 mg/dL    Lab Results   Component Value Date     12/07/2018    K 4.1 12/07/2018     12/07/2018    CO2 24 12/07/2018    BUN 13 12/07/2018    CREATININE 0.5 12/07/2018    GLUCOSE 138 (H) 12/07/2018    CALCIUM 9.1 12/07/2018    PROT 6.8 09/05/2018    LABALBU 3.6 09/05/2018    BILITOT 0.4 09/05/2018    ALKPHOS 76 09/05/2018    AST 35 09/05/2018    ALT 28 09/05/2018    LABGLOM >90 12/07/2018       Lab Results   Component Value Date    LABMICR 30.60 10/24/2018       Lab Results   Component Value Date    TSH 3.010 07/23/2018    T4FREE 1.39 07/23/2018       Lab Results   Component Value Date    WBC 5.5 04/22/2019    HGB 11.0 (L) 04/22/2019    HCT 36.9 (L) 04/22/2019    MCV 85.6 04/22/2019     04/22/2019       Immunization History   Administered Date(s) Administered    Influenza 12/12/2012, 12/11/2013    Influenza Virus Vaccine 09/19/2014, 09/30/2015    Influenza, High Dose (Fluzone 65 yrs and older) 11/06/2017, 11/08/2018    Influenza, Donnis Hence, 3 Years and older, IM (Fluzone 3 yrs and older or Afluria 5 yrs and older) 11/21/2016    Pneumococcal Conjugate 13-valent ([id number]) 02/24/2016    Pneumococcal Polysaccharide ([id number]) 03/01/2004, 07/26/2017    Tdap (Boostrix, Adacel) 09/19/2014    Zoster Live (Zostavax) 04/26/2012       Health Maintenance   Topic Date Due    Hepatitis A vaccine (1 of 2 - Risk 2-dose series) 01/29/1951    Hepatitis B Vaccine (1 of 3 - Risk 3-dose series) 01/29/1970    Shingles Vaccine (2 of 3) 06/21/2012    Annual Wellness Visit (AWV)  01/29/2014    TSH testing  07/23/2019    Diabetic retinal exam  07/19/2019    Lipid screen  07/23/2019    Flu vaccine (1) 09/01/2019    Diabetic microalbuminuria test  10/24/2019    Potassium monitoring  12/07/2019    Creatinine monitoring  12/07/2019    A1C test (Diabetic or Prediabetic)  04/22/2020    Breast cancer screen  05/14/2020    Diabetic foot exam  07/25/2020    DTaP/Tdap/Td vaccine (2 - Td) 09/19/2024    Colon cancer screen colonoscopy  03/01/2028    DEXA (modify frequency per FRAX score)  Completed    Pneumococcal 65+ years Vaccine  Completed    Hepatitis C screen  Addressed       AAA ultrasound (Male, 65-75, smoked ever) indicated at this time? NO  CT Lung Screen (55-80, 30 pk-yrs, smoking or quit <15 years) indicated at this time? NO tobacco use for past 19 years.      Future Appointments   Date Time Provider Thom Lock   2/28/2020  1:00 PM [address], MD [address] [facility]     ASSESSMENT       Diagnosis Orders   1.  Type 2

## 2019-07-26 DIAGNOSIS — E78.5 HYPERLIPIDEMIA, UNSPECIFIED HYPERLIPIDEMIA TYPE: ICD-10-CM

## 2019-07-26 DIAGNOSIS — I10 ESSENTIAL HYPERTENSION: ICD-10-CM

## 2019-07-26 DIAGNOSIS — E03.9 HYPOTHYROIDISM, UNSPECIFIED TYPE: ICD-10-CM

## 2019-07-26 RX ORDER — LEVOTHYROXINE SODIUM 0.05 MG/1
TABLET ORAL
Qty: 90 TABLET | Refills: 1 | OUTPATIENT
Start: 2019-07-26

## 2019-07-26 RX ORDER — ATENOLOL 50 MG/1
TABLET ORAL
Qty: 45 TABLET | Refills: 1 | OUTPATIENT
Start: 2019-07-26

## 2019-07-26 RX ORDER — ATORVASTATIN CALCIUM 40 MG/1
TABLET, FILM COATED ORAL
Qty: 90 TABLET | Refills: 1 | OUTPATIENT
Start: 2019-07-26

## 2019-07-26 RX ORDER — ZOLEDRONIC ACID 5 MG/100ML
5 INJECTION, SOLUTION INTRAVENOUS ONCE
Qty: 100 ML | Refills: 0 | Status: SHIPPED | OUTPATIENT
Start: 2019-07-26 | End: 2019-12-02 | Stop reason: ALTCHOICE

## 2019-07-26 NOTE — TELEPHONE ENCOUNTER
This medication refill is regarding an electronic request from [facility] for the following medications:    Requested Prescriptions     Pending Prescriptions Disp Refills    atenolol (TENORMIN) 50 MG tablet [Pharmacy Med Name: ATENOLOL 50 MG Tablet] 45 tablet 1     Sig: TAKE 1/2 TABLET EVERY DAY    levothyroxine (SYNTHROID) 50 MCG tablet [Pharmacy Med Name: LEVOTHYROXINE SODIUM 50 MCG Tablet] 90 tablet 1     Sig: TAKE 1 TABLET EVERY DAY    atorvastatin (LIPITOR) 40 MG tablet [Pharmacy Med Name: ATORVASTATIN CALCIUM 40 MG Tablet] 90 tablet 1     Sig: TAKE 1 TABLET EVERY DAY       Date of last visit: 7/25/2019  Date of next visit: 10/28/19  Date of last refill: 2/25/19 for a 3 month supply with 1 refill    Pt due for labs, orders given at appt on 7/25/19. Will await lab results. Rx's refused.

## 2019-07-29 ENCOUNTER — HOSPITAL ENCOUNTER (OUTPATIENT)
Age: 69
Discharge: HOME | End: 2019-07-29
Payer: MEDICARE

## 2019-07-29 DIAGNOSIS — E78.5 HYPERLIPIDEMIA, UNSPECIFIED HYPERLIPIDEMIA TYPE: ICD-10-CM

## 2019-07-29 DIAGNOSIS — Z51.81 VISIT FOR MONITORING RECLAST THERAPY: ICD-10-CM

## 2019-07-29 DIAGNOSIS — Z79.83 VISIT FOR MONITORING RECLAST THERAPY: ICD-10-CM

## 2019-07-29 DIAGNOSIS — D50.0 IRON DEFICIENCY ANEMIA DUE TO CHRONIC BLOOD LOSS: ICD-10-CM

## 2019-07-29 DIAGNOSIS — E11.8 TYPE 2 DIABETES MELLITUS WITH COMPLICATION, WITHOUT LONG-TERM CURRENT USE OF INSULIN (HCC): ICD-10-CM

## 2019-07-29 DIAGNOSIS — I10 ESSENTIAL HYPERTENSION: ICD-10-CM

## 2019-07-29 LAB
ALBUMIN SERPL-MCNC: 3.7 G/DL (ref 3.5–5.1)
ALP BLD-CCNC: 67 U/L (ref 38–126)
ALT SERPL-CCNC: 24 U/L (ref 11–66)
ANION GAP SERPL CALCULATED.3IONS-SCNC: 15 MEQ/L (ref 8–16)
AST SERPL-CCNC: 37 U/L (ref 5–40)
AVERAGE GLUCOSE: 198 MG/DL (ref 70–126)
BILIRUB SERPL-MCNC: 0.4 MG/DL (ref 0.3–1.2)
BILIRUBIN DIRECT: < 0.2 MG/DL (ref 0–0.3)
BUN BLDV-MCNC: 15 MG/DL (ref 7–22)
CALCIUM SERPL-MCNC: 9.6 MG/DL (ref 8.5–10.5)
CHLORIDE BLD-SCNC: 97 MEQ/L (ref 98–111)
CHOLESTEROL, TOTAL: 124 MG/DL (ref 100–199)
CO2: 24 MEQ/L (ref 23–33)
CREAT SERPL-MCNC: 0.8 MG/DL (ref 0.4–1.2)
GFR SERPL CREATININE-BSD FRML MDRD: 71 ML/MIN/1.73M2
GLUCOSE BLD-MCNC: 182 MG/DL (ref 70–108)
HBA1C MFR BLD: 8.6 % (ref 4.4–6.4)
HDLC SERPL-MCNC: 35 MG/DL
LDL CHOLESTEROL CALCULATED: 56 MG/DL
POTASSIUM SERPL-SCNC: 5.3 MEQ/L (ref 3.5–5.2)
SODIUM BLD-SCNC: 136 MEQ/L (ref 135–145)
T4 FREE: 1.21 NG/DL (ref 0.93–1.76)
TOTAL PROTEIN: 7.2 G/DL (ref 6.1–8)
TRIGL SERPL-MCNC: 163 MG/DL (ref 0–199)
TSH SERPL DL<=0.05 MIU/L-ACNC: 2.92 UIU/ML (ref 0.4–4.2)

## 2019-07-29 PROCEDURE — 36415 COLL VENOUS BLD VENIPUNCTURE: CPT

## 2019-07-29 PROCEDURE — 83036 HEMOGLOBIN GLYCOSYLATED A1C: CPT

## 2019-07-29 PROCEDURE — 82248 BILIRUBIN DIRECT: CPT

## 2019-07-29 PROCEDURE — 84439 ASSAY OF FREE THYROXINE: CPT

## 2019-07-29 PROCEDURE — 80053 COMPREHEN METABOLIC PANEL: CPT

## 2019-07-29 PROCEDURE — 84443 ASSAY THYROID STIM HORMONE: CPT

## 2019-07-29 PROCEDURE — 80061 LIPID PANEL: CPT

## 2019-07-30 ENCOUNTER — TELEPHONE (OUTPATIENT)
Dept: FAMILY MEDICINE CLINIC | Age: 69
End: 2019-07-30

## 2019-07-30 DIAGNOSIS — E87.8 SERUM CHLORIDE DECREASED: ICD-10-CM

## 2019-07-30 DIAGNOSIS — E87.5 HYPERKALEMIA: Primary | ICD-10-CM

## 2019-08-01 LAB
REASON FOR REJECTION: NORMAL
REJECTED TEST: NORMAL

## 2019-08-02 ENCOUNTER — HOSPITAL ENCOUNTER (OUTPATIENT)
Age: 69
Discharge: HOME | End: 2019-08-02
Payer: MEDICARE

## 2019-08-02 LAB
BASOPHILS # BLD: 0.3 %
BASOPHILS ABSOLUTE: 0 THOU/MM3 (ref 0–0.1)
EOSINOPHIL # BLD: 2.6 %
EOSINOPHILS ABSOLUTE: 0.2 THOU/MM3 (ref 0–0.4)
ERYTHROCYTE [DISTWIDTH] IN BLOOD BY AUTOMATED COUNT: 18.8 % (ref 11.5–14.5)
ERYTHROCYTE [DISTWIDTH] IN BLOOD BY AUTOMATED COUNT: 57.3 FL (ref 35–45)
HCT VFR BLD CALC: 36.6 % (ref 37–47)
HEMOGLOBIN: 10.9 GM/DL (ref 12–16)
IMMATURE GRANS (ABS): 0.03 THOU/MM3 (ref 0–0.07)
IMMATURE GRANULOCYTES: 0.5 %
LYMPHOCYTES # BLD: 32.8 %
LYMPHOCYTES ABSOLUTE: 2 THOU/MM3 (ref 1–4.8)
MCH RBC QN AUTO: 24.8 PG (ref 26–33)
MCHC RBC AUTO-ENTMCNC: 29.8 GM/DL (ref 32.2–35.5)
MCV RBC AUTO: 83.4 FL (ref 81–99)
MONOCYTES # BLD: 13.1 %
MONOCYTES ABSOLUTE: 0.8 THOU/MM3 (ref 0.4–1.3)
NUCLEATED RED BLOOD CELLS: 0 /100 WBC
PLATELET # BLD: 133 THOU/MM3 (ref 130–400)
PMV BLD AUTO: 10.1 FL (ref 9.4–12.4)
RBC # BLD: 4.39 MILL/MM3 (ref 4.2–5.4)
SEG NEUTROPHILS: 50.7 %
SEGMENTED NEUTROPHILS ABSOLUTE COUNT: 3.1 THOU/MM3 (ref 1.8–7.7)
WBC # BLD: 6.1 THOU/MM3 (ref 4.8–10.8)

## 2019-08-02 PROCEDURE — 36415 COLL VENOUS BLD VENIPUNCTURE: CPT

## 2019-08-02 PROCEDURE — 85025 COMPLETE CBC W/AUTO DIFF WBC: CPT

## 2019-08-05 ENCOUNTER — TELEPHONE (OUTPATIENT)
Dept: FAMILY MEDICINE CLINIC | Age: 69
End: 2019-08-05

## 2019-08-05 DIAGNOSIS — R71.0 DECREASED HEMOGLOBIN: Primary | ICD-10-CM

## 2019-08-05 DIAGNOSIS — R79.89 ABNORMAL CBC: ICD-10-CM

## 2019-08-05 RX ORDER — SODIUM CHLORIDE 0.9 % (FLUSH) 0.9 %
10 SYRINGE (ML) INJECTION PRN
Status: CANCELLED | OUTPATIENT
Start: 2019-08-07

## 2019-08-05 RX ORDER — ZOLEDRONIC ACID 5 MG/100ML
5 INJECTION, SOLUTION INTRAVENOUS ONCE
Status: CANCELLED | OUTPATIENT
Start: 2019-08-07

## 2019-08-05 RX ORDER — SODIUM CHLORIDE 0.9 % (FLUSH) 0.9 %
5 SYRINGE (ML) INJECTION PRN
Status: CANCELLED | OUTPATIENT
Start: 2019-08-07

## 2019-08-06 NOTE — TELEPHONE ENCOUNTER
Start Ferrous Sulfate 325 mg- 1pill daily for 2 weeks, then 1 pill BID (always with small glass of OJ to aid absorption).   Recheck H/H , Iron & TIBC, Ferritin, and Retic in 8 weeks (prior to next appt)  Noted on GI referral.  ES

## 2019-08-12 ENCOUNTER — HOSPITAL ENCOUNTER (OUTPATIENT)
Dept: NURSING | Age: 69
Discharge: HOME | End: 2019-08-12
Payer: MEDICARE

## 2019-08-12 VITALS
DIASTOLIC BLOOD PRESSURE: 82 MMHG | BODY MASS INDEX: 34.57 KG/M2 | RESPIRATION RATE: 16 BRPM | WEIGHT: 177 LBS | HEART RATE: 70 BPM | SYSTOLIC BLOOD PRESSURE: 137 MMHG | TEMPERATURE: 96.9 F

## 2019-08-12 DIAGNOSIS — M81.0 SENILE OSTEOPOROSIS: Primary | ICD-10-CM

## 2019-08-12 PROCEDURE — 2709999900 HC NON-CHARGEABLE SUPPLY

## 2019-08-12 PROCEDURE — 6360000002 HC RX W HCPCS: Performed by: FAMILY MEDICINE

## 2019-08-12 PROCEDURE — 2580000003 HC RX 258: Performed by: FAMILY MEDICINE

## 2019-08-12 PROCEDURE — 96365 THER/PROPH/DIAG IV INF INIT: CPT

## 2019-08-12 RX ORDER — SODIUM CHLORIDE 0.9 % (FLUSH) 0.9 %
10 SYRINGE (ML) INJECTION PRN
Status: CANCELLED | OUTPATIENT
Start: 2019-08-12

## 2019-08-12 RX ORDER — ZOLEDRONIC ACID 5 MG/100ML
5 INJECTION, SOLUTION INTRAVENOUS ONCE
Status: COMPLETED | OUTPATIENT
Start: 2019-08-12 | End: 2019-08-12

## 2019-08-12 RX ORDER — ZOLEDRONIC ACID 5 MG/100ML
5 INJECTION, SOLUTION INTRAVENOUS ONCE
Status: CANCELLED | OUTPATIENT
Start: 2019-08-12

## 2019-08-12 RX ORDER — SODIUM CHLORIDE 0.9 % (FLUSH) 0.9 %
5 SYRINGE (ML) INJECTION PRN
Status: CANCELLED | OUTPATIENT
Start: 2019-08-12

## 2019-08-12 RX ORDER — SODIUM CHLORIDE 0.9 % (FLUSH) 0.9 %
10 SYRINGE (ML) INJECTION PRN
Status: DISCONTINUED | OUTPATIENT
Start: 2019-08-12 | End: 2019-08-13 | Stop reason: HOSPADM

## 2019-08-12 RX ADMIN — Medication 10 ML: at 14:43

## 2019-08-12 RX ADMIN — ZOLEDRONIC ACID 5 MG: 5 INJECTION, SOLUTION INTRAVENOUS at 14:43

## 2019-08-12 ASSESSMENT — PAIN SCALES - GENERAL: PAINLEVEL_OUTOF10: 0

## 2019-08-12 NOTE — PROGRESS NOTES
1435 pt arrives to OPN for Reclast infusion. All questions and concerns addressed. Creatine clearance passed prior to infusion  1437 PATIENT RIGHTS AND RESPONSIBILITIES SHEET OFFERED TO PT TO READ. 1450 call light in reach, denies refreshments.  Family at bedside, warm blankets given  1500 ___m_ Safety:       (Environmental)   Bailey to environment   Ensure ID band is correct and in place/ allergy band as needed   Assess for fall risk   Initiate fall precautions as applicable (fall band, side rails, etc.)   Call light within reach   Bed in low position/ wheels locked    _m__ Pain:        Assess pain level and characteristics   Administer analgesics as ordered   Assess effectiveness of pain management and report to MD as needed    _m__ Knowledge Deficit:   Assess baseline knowledge   Provide teaching at level of understanding   Provide teaching via preferred learning method   Evaluate teaching effectiveness    _m___ Hemodynamic/Respiratory Status:       (Pre and Post Procedure Monitoring)   Assess/Monitor vital signs and LOC   Assess Baseline SpO2 prior to any sedation   Obtain weight/height   Assess vital signs/ LOC until patient meets discharge criteria   Monitor procedure site and notify MD of any issues       1505 WRITTEN DISCHARGE INSTRUCTIONS GIVEN TO PT-VERBALIZES UNDERSTANDING  1510 PT DISCHARGED AMBULATORY IN SATISFACTORY CONDITION

## 2019-09-12 ENCOUNTER — HOSPITAL ENCOUNTER (OUTPATIENT)
Age: 69
Discharge: HOME | End: 2019-09-12
Payer: MEDICARE

## 2019-09-12 DIAGNOSIS — R79.89 ABNORMAL CBC: ICD-10-CM

## 2019-09-12 DIAGNOSIS — E87.5 HYPERKALEMIA: ICD-10-CM

## 2019-09-12 DIAGNOSIS — E11.8 TYPE 2 DIABETES MELLITUS WITH COMPLICATION, WITHOUT LONG-TERM CURRENT USE OF INSULIN (HCC): ICD-10-CM

## 2019-09-12 DIAGNOSIS — E87.8 SERUM CHLORIDE DECREASED: ICD-10-CM

## 2019-09-12 DIAGNOSIS — R71.0 DECREASED HEMOGLOBIN: ICD-10-CM

## 2019-09-12 LAB
ABSOLUTE RETIC #: 112 THOU/MM3 (ref 20–115)
ANION GAP SERPL CALCULATED.3IONS-SCNC: 12 MEQ/L (ref 8–16)
AVERAGE GLUCOSE: 171 MG/DL (ref 70–126)
BUN BLDV-MCNC: 12 MG/DL (ref 7–22)
CALCIUM SERPL-MCNC: 9.8 MG/DL (ref 8.5–10.5)
CHLORIDE BLD-SCNC: 102 MEQ/L (ref 98–111)
CO2: 26 MEQ/L (ref 23–33)
CREAT SERPL-MCNC: 0.9 MG/DL (ref 0.4–1.2)
CREATININE, URINE: 79.4 MG/DL
FERRITIN: 24 NG/ML (ref 10–291)
GFR SERPL CREATININE-BSD FRML MDRD: 62 ML/MIN/1.73M2
GLUCOSE BLD-MCNC: 193 MG/DL (ref 70–108)
HBA1C MFR BLD: 7.7 % (ref 4.4–6.4)
HCT VFR BLD CALC: 39.8 % (ref 37–47)
HEMOGLOBIN: 12 GM/DL (ref 12–16)
IMMATURE RETIC FRACT: 25 % (ref 3–15.9)
IRON: 65 UG/DL (ref 50–170)
MICROALBUMIN UR-MCNC: 176.53 MG/DL
MICROALBUMIN/CREAT UR-RTO: 2223 MG/G (ref 0–30)
POTASSIUM SERPL-SCNC: 4.5 MEQ/L (ref 3.5–5.2)
RETIC HEMOGLOBIN: 27.2 PG (ref 28.2–35.7)
RETICULOCYTE ABSOLUTE COUNT: 2.5 % (ref 0.5–2)
SODIUM BLD-SCNC: 140 MEQ/L (ref 135–145)
TOTAL IRON BINDING CAPACITY: 386 UG/DL (ref 171–450)

## 2019-09-12 PROCEDURE — 85046 RETICYTE/HGB CONCENTRATE: CPT

## 2019-09-12 PROCEDURE — 82043 UR ALBUMIN QUANTITATIVE: CPT

## 2019-09-12 PROCEDURE — 82728 ASSAY OF FERRITIN: CPT

## 2019-09-12 PROCEDURE — 85014 HEMATOCRIT: CPT

## 2019-09-12 PROCEDURE — 83036 HEMOGLOBIN GLYCOSYLATED A1C: CPT

## 2019-09-12 PROCEDURE — 83540 ASSAY OF IRON: CPT

## 2019-09-12 PROCEDURE — 80048 BASIC METABOLIC PNL TOTAL CA: CPT

## 2019-09-12 PROCEDURE — 36415 COLL VENOUS BLD VENIPUNCTURE: CPT

## 2019-09-12 PROCEDURE — 83550 IRON BINDING TEST: CPT

## 2019-09-12 PROCEDURE — 85018 HEMOGLOBIN: CPT

## 2019-09-13 ENCOUNTER — OFFICE VISIT (OUTPATIENT)
Dept: FAMILY MEDICINE CLINIC | Age: 69
End: 2019-09-13
Payer: MEDICARE

## 2019-09-13 ENCOUNTER — TELEPHONE (OUTPATIENT)
Dept: FAMILY MEDICINE CLINIC | Age: 69
End: 2019-09-13

## 2019-09-13 VITALS
WEIGHT: 176.8 LBS | BODY MASS INDEX: 34.71 KG/M2 | HEART RATE: 76 BPM | TEMPERATURE: 98.6 F | SYSTOLIC BLOOD PRESSURE: 132 MMHG | RESPIRATION RATE: 18 BRPM | DIASTOLIC BLOOD PRESSURE: 90 MMHG | HEIGHT: 60 IN

## 2019-09-13 DIAGNOSIS — R80.9 MICROALBUMINURIA: ICD-10-CM

## 2019-09-13 DIAGNOSIS — E11.8 TYPE 2 DIABETES MELLITUS WITH COMPLICATION, WITHOUT LONG-TERM CURRENT USE OF INSULIN (HCC): ICD-10-CM

## 2019-09-13 DIAGNOSIS — I10 ESSENTIAL HYPERTENSION: Primary | ICD-10-CM

## 2019-09-13 PROCEDURE — 1090F PRES/ABSN URINE INCON ASSESS: CPT | Performed by: NURSE PRACTITIONER

## 2019-09-13 PROCEDURE — G8427 DOCREV CUR MEDS BY ELIG CLIN: HCPCS | Performed by: NURSE PRACTITIONER

## 2019-09-13 PROCEDURE — 4040F PNEUMOC VAC/ADMIN/RCVD: CPT | Performed by: NURSE PRACTITIONER

## 2019-09-13 PROCEDURE — 1123F ACP DISCUSS/DSCN MKR DOCD: CPT | Performed by: NURSE PRACTITIONER

## 2019-09-13 PROCEDURE — G8417 CALC BMI ABV UP PARAM F/U: HCPCS | Performed by: NURSE PRACTITIONER

## 2019-09-13 PROCEDURE — 99214 OFFICE O/P EST MOD 30 MIN: CPT | Performed by: NURSE PRACTITIONER

## 2019-09-13 PROCEDURE — G8598 ASA/ANTIPLAT THER USED: HCPCS | Performed by: NURSE PRACTITIONER

## 2019-09-13 PROCEDURE — 1036F TOBACCO NON-USER: CPT | Performed by: NURSE PRACTITIONER

## 2019-09-13 PROCEDURE — 2022F DILAT RTA XM EVC RTNOPTHY: CPT | Performed by: NURSE PRACTITIONER

## 2019-09-13 PROCEDURE — 3017F COLORECTAL CA SCREEN DOC REV: CPT | Performed by: NURSE PRACTITIONER

## 2019-09-13 PROCEDURE — 3045F PR MOST RECENT HEMOGLOBIN A1C LEVEL 7.0-9.0%: CPT | Performed by: NURSE PRACTITIONER

## 2019-09-13 PROCEDURE — G8399 PT W/DXA RESULTS DOCUMENT: HCPCS | Performed by: NURSE PRACTITIONER

## 2019-09-13 RX ORDER — FERROUS SULFATE 325(65) MG
325 TABLET ORAL
COMMUNITY
End: 2019-12-02

## 2019-09-13 ASSESSMENT — ENCOUNTER SYMPTOMS
BLOOD IN STOOL: 0
CONSTIPATION: 0
VOMITING: 1
DIARRHEA: 0
NAUSEA: 0
SHORTNESS OF BREATH: 0

## 2019-09-13 NOTE — PROGRESS NOTES
Chief Complaint   Patient presents with    Hypertension     Patient states that she has been experiencing high blood pressure readings at home. Patient states today her machine read 184/74.  Discuss Labs     that were done yesterday (9/12/2019)    Headache         SUBJECTIVE     Nasima Gomes is a 77 y. o.female      Pt complains of elevated BP since Tuesday. Reports she feels okay except she has been having headaches and did vomit through the night. Pain is at temples, dull and constant. Is does go away with sleep. Has tried acetaminophen with some relief. BP on her home machine today was 184/74    Has been getting palpitations when her BP is elevated. State they only last a min. Denies chest pain or sob. Is only taking 1 iron tablet daily due to constipation. Labs were completed yesterday. Review of Systems   Constitutional: Negative for chills, diaphoresis and fever. Respiratory: Negative for shortness of breath. Cardiovascular: Positive for palpitations. Negative for chest pain and leg swelling. Gastrointestinal: Positive for vomiting (x1 last night with headache). Negative for blood in stool, constipation, diarrhea and nausea. Genitourinary: Negative for dysuria and hematuria. Musculoskeletal: Negative for myalgias. Neurological: Positive for headaches. Negative for dizziness. All other systems reviewed and are negative. OBJECTIVE     BP (!) 132/90 (Site: Left Upper Arm, Position: Sitting, Cuff Size: Medium Adult)   Pulse 76   Temp 98.6 °F (37 °C) (Oral)   Resp 18   Ht 5' (1.524 m)   Wt 176 lb 12.8 oz (80.2 kg)   BMI 34.53 kg/m²     Physical Exam   Constitutional: She is oriented to person, place, and time. She appears well-developed and well-nourished. HENT:   Head: Normocephalic and atraumatic.    Right Ear: External ear normal.   Left Ear: External ear normal.   Nose: Nose normal.   Mouth/Throat: Oropharynx is clear and moist.   Eyes: Pupils are equal, round, and reactive to light. Conjunctivae and EOM are normal.   Neck: Normal range of motion. Neck supple. Cardiovascular: Normal rate, regular rhythm, normal heart sounds and intact distal pulses. Pulmonary/Chest: Effort normal and breath sounds normal.   Abdominal: Soft. Bowel sounds are normal.   Musculoskeletal: Normal range of motion. Neurological: She is alert and oriented to person, place, and time. She has normal reflexes. Skin: Skin is warm and dry. Psychiatric: She has a normal mood and affect. Her behavior is normal. Judgment and thought content normal.   Nursing note and vitals reviewed. Component      Latest Ref Rng & Units 9/12/2019   Sodium      135 - 145 meq/L 140   Potassium      3.5 - 5.2 meq/L 4.5   Chloride      98 - 111 meq/L 102   CO2      23 - 33 meq/L 26   Glucose      70 - 108 mg/dL 193 (H)   BUN      7 - 22 mg/dL 12   Creatinine      0.4 - 1.2 mg/dL 0.9   Calcium      8.5 - 10.5 mg/dL 9.8   Retic Ct Abs      0.5 - 2.0 % 2.5 (H)   Absolute Retic #      20.0 - 115.0 thou/mm3 112.0   Immature Retic Fract      3.0 - 15.9 % 25.0 (H)   Retic Hemoglobin      28.2 - 35.7 pg 27.2 (L)   Microalbumin, Random Urine      mg/dL 176.53   Creatinine, Urine      mg/dL 79.4   Microalb/Creat Ratio      0 - 30 mg/g 2223 (H)   Hemoglobin A1C      4.4 - 6.4 % 7.7 (H)   AVERAGE GLUCOSE      70 - 126 mg/dL 171 (H)   Ferritin      10 - 291 ng/mL 24   TIBC      171 - 450 ug/dL 386   Iron      50 - 170 ug/dL 65   Hemoglobin Quant      12.0 - 16.0 gm/dl 12.0   Hematocrit      37.0 - 47.0 % 39.8   Anion Gap      8.0 - 16.0 meq/L 12.0   Est, Glom Filt Rate      ml/min/1.73m2 62 (A)     No results found for this visit on 09/13/19. ASSESSMENT      1. Essential hypertension    2. Type 2 diabetes mellitus with complication, without long-term current use of insulin ([facility] [address]    3.  Microalbuminuria        PLAN       Orders Placed This Encounter   Procedures   Raul Yuen MD, Nephrology, [facility]     Referral

## 2019-10-06 DIAGNOSIS — I10 ESSENTIAL HYPERTENSION: ICD-10-CM

## 2019-10-06 DIAGNOSIS — E78.5 HYPERLIPIDEMIA, UNSPECIFIED HYPERLIPIDEMIA TYPE: ICD-10-CM

## 2019-10-06 DIAGNOSIS — E03.9 HYPOTHYROIDISM, UNSPECIFIED TYPE: ICD-10-CM

## 2019-10-07 ENCOUNTER — TELEPHONE (OUTPATIENT)
Dept: FAMILY MEDICINE CLINIC | Age: 69
End: 2019-10-07

## 2019-10-07 DIAGNOSIS — I10 ESSENTIAL HYPERTENSION: ICD-10-CM

## 2019-10-07 DIAGNOSIS — E11.21 DIABETIC NEPHROPATHY ASSOCIATED WITH TYPE 2 DIABETES MELLITUS (HCC): ICD-10-CM

## 2019-10-07 RX ORDER — LEVOTHYROXINE SODIUM 0.05 MG/1
TABLET ORAL
Qty: 90 TABLET | Refills: 1 | Status: SHIPPED | OUTPATIENT
Start: 2019-10-07 | End: 2020-02-07

## 2019-10-07 RX ORDER — ATENOLOL 50 MG/1
TABLET ORAL
Qty: 45 TABLET | Refills: 1 | Status: SHIPPED | OUTPATIENT
Start: 2019-10-07 | End: 2020-02-07

## 2019-10-07 RX ORDER — ATORVASTATIN CALCIUM 40 MG/1
TABLET, FILM COATED ORAL
Qty: 90 TABLET | Refills: 1 | Status: SHIPPED | OUTPATIENT
Start: 2019-10-07 | End: 2020-02-07

## 2019-10-10 RX ORDER — LISINOPRIL 10 MG/1
10 TABLET ORAL DAILY
Qty: 90 TABLET | Refills: 3 | Status: SHIPPED | OUTPATIENT
Start: 2019-10-10 | End: 2019-12-04 | Stop reason: SDUPTHER

## 2019-10-28 ENCOUNTER — OFFICE VISIT (OUTPATIENT)
Dept: FAMILY MEDICINE CLINIC | Age: 69
End: 2019-10-28
Payer: MEDICARE

## 2019-10-28 VITALS
TEMPERATURE: 98.8 F | HEART RATE: 72 BPM | SYSTOLIC BLOOD PRESSURE: 126 MMHG | RESPIRATION RATE: 16 BRPM | BODY MASS INDEX: 34.96 KG/M2 | WEIGHT: 179 LBS | DIASTOLIC BLOOD PRESSURE: 76 MMHG

## 2019-10-28 DIAGNOSIS — E78.5 HYPERLIPIDEMIA, UNSPECIFIED HYPERLIPIDEMIA TYPE: ICD-10-CM

## 2019-10-28 DIAGNOSIS — E11.8 TYPE 2 DIABETES MELLITUS WITH COMPLICATION, WITHOUT LONG-TERM CURRENT USE OF INSULIN (HCC): Primary | ICD-10-CM

## 2019-10-28 DIAGNOSIS — I25.10 CORONARY ARTERY DISEASE INVOLVING NATIVE CORONARY ARTERY OF NATIVE HEART WITHOUT ANGINA PECTORIS: ICD-10-CM

## 2019-10-28 DIAGNOSIS — D50.0 IRON DEFICIENCY ANEMIA DUE TO CHRONIC BLOOD LOSS: ICD-10-CM

## 2019-10-28 DIAGNOSIS — E03.9 HYPOTHYROIDISM, UNSPECIFIED TYPE: ICD-10-CM

## 2019-10-28 DIAGNOSIS — Z23 NEED FOR INFLUENZA VACCINATION: ICD-10-CM

## 2019-10-28 DIAGNOSIS — I10 ESSENTIAL HYPERTENSION: ICD-10-CM

## 2019-10-28 DIAGNOSIS — E11.21 DIABETIC NEPHROPATHY ASSOCIATED WITH TYPE 2 DIABETES MELLITUS (HCC): ICD-10-CM

## 2019-10-28 PROCEDURE — 2022F DILAT RTA XM EVC RTNOPTHY: CPT | Performed by: FAMILY MEDICINE

## 2019-10-28 PROCEDURE — 4040F PNEUMOC VAC/ADMIN/RCVD: CPT | Performed by: FAMILY MEDICINE

## 2019-10-28 PROCEDURE — G8417 CALC BMI ABV UP PARAM F/U: HCPCS | Performed by: FAMILY MEDICINE

## 2019-10-28 PROCEDURE — G8399 PT W/DXA RESULTS DOCUMENT: HCPCS | Performed by: FAMILY MEDICINE

## 2019-10-28 PROCEDURE — G8482 FLU IMMUNIZE ORDER/ADMIN: HCPCS | Performed by: FAMILY MEDICINE

## 2019-10-28 PROCEDURE — 99214 OFFICE O/P EST MOD 30 MIN: CPT | Performed by: FAMILY MEDICINE

## 2019-10-28 PROCEDURE — G8598 ASA/ANTIPLAT THER USED: HCPCS | Performed by: FAMILY MEDICINE

## 2019-10-28 PROCEDURE — G8427 DOCREV CUR MEDS BY ELIG CLIN: HCPCS | Performed by: FAMILY MEDICINE

## 2019-10-28 PROCEDURE — 90653 IIV ADJUVANT VACCINE IM: CPT | Performed by: FAMILY MEDICINE

## 2019-10-28 PROCEDURE — 3017F COLORECTAL CA SCREEN DOC REV: CPT | Performed by: FAMILY MEDICINE

## 2019-10-28 PROCEDURE — G0008 ADMIN INFLUENZA VIRUS VAC: HCPCS | Performed by: FAMILY MEDICINE

## 2019-10-28 PROCEDURE — 1090F PRES/ABSN URINE INCON ASSESS: CPT | Performed by: FAMILY MEDICINE

## 2019-10-28 PROCEDURE — 1123F ACP DISCUSS/DSCN MKR DOCD: CPT | Performed by: FAMILY MEDICINE

## 2019-10-28 PROCEDURE — 1036F TOBACCO NON-USER: CPT | Performed by: FAMILY MEDICINE

## 2019-10-28 ASSESSMENT — ENCOUNTER SYMPTOMS
CHEST TIGHTNESS: 0
NAUSEA: 0
CONSTIPATION: 0
ANAL BLEEDING: 0
SHORTNESS OF BREATH: 0
DIARRHEA: 0
ABDOMINAL PAIN: 0
BLOOD IN STOOL: 0
VOMITING: 0

## 2019-10-31 ENCOUNTER — OFFICE VISIT (OUTPATIENT)
Dept: NEPHROLOGY | Age: 69
End: 2019-10-31
Payer: MEDICARE

## 2019-10-31 VITALS
WEIGHT: 172 LBS | OXYGEN SATURATION: 98 % | BODY MASS INDEX: 33.59 KG/M2 | HEART RATE: 56 BPM | SYSTOLIC BLOOD PRESSURE: 141 MMHG | DIASTOLIC BLOOD PRESSURE: 78 MMHG

## 2019-10-31 DIAGNOSIS — I10 ESSENTIAL HYPERTENSION: ICD-10-CM

## 2019-10-31 DIAGNOSIS — R80.9 MICROALBUMINURIA: Primary | ICD-10-CM

## 2019-10-31 DIAGNOSIS — E78.5 HYPERLIPIDEMIA, UNSPECIFIED HYPERLIPIDEMIA TYPE: ICD-10-CM

## 2019-10-31 DIAGNOSIS — E11.21 DIABETIC NEPHROPATHY ASSOCIATED WITH TYPE 2 DIABETES MELLITUS (HCC): ICD-10-CM

## 2019-10-31 PROCEDURE — G8598 ASA/ANTIPLAT THER USED: HCPCS | Performed by: INTERNAL MEDICINE

## 2019-10-31 PROCEDURE — 3051F HG A1C>EQUAL 7.0%<8.0%: CPT | Performed by: INTERNAL MEDICINE

## 2019-10-31 PROCEDURE — G8427 DOCREV CUR MEDS BY ELIG CLIN: HCPCS | Performed by: INTERNAL MEDICINE

## 2019-10-31 PROCEDURE — 2022F DILAT RTA XM EVC RTNOPTHY: CPT | Performed by: INTERNAL MEDICINE

## 2019-10-31 PROCEDURE — 1036F TOBACCO NON-USER: CPT | Performed by: INTERNAL MEDICINE

## 2019-10-31 PROCEDURE — G8399 PT W/DXA RESULTS DOCUMENT: HCPCS | Performed by: INTERNAL MEDICINE

## 2019-10-31 PROCEDURE — G8482 FLU IMMUNIZE ORDER/ADMIN: HCPCS | Performed by: INTERNAL MEDICINE

## 2019-10-31 PROCEDURE — 3017F COLORECTAL CA SCREEN DOC REV: CPT | Performed by: INTERNAL MEDICINE

## 2019-10-31 PROCEDURE — 1090F PRES/ABSN URINE INCON ASSESS: CPT | Performed by: INTERNAL MEDICINE

## 2019-10-31 PROCEDURE — G8417 CALC BMI ABV UP PARAM F/U: HCPCS | Performed by: INTERNAL MEDICINE

## 2019-10-31 PROCEDURE — 4040F PNEUMOC VAC/ADMIN/RCVD: CPT | Performed by: INTERNAL MEDICINE

## 2019-10-31 PROCEDURE — 99203 OFFICE O/P NEW LOW 30 MIN: CPT | Performed by: INTERNAL MEDICINE

## 2019-10-31 PROCEDURE — 1123F ACP DISCUSS/DSCN MKR DOCD: CPT | Performed by: INTERNAL MEDICINE

## 2019-10-31 RX ORDER — LISINOPRIL 5 MG/1
5 TABLET ORAL DAILY
Qty: 90 TABLET | Refills: 3 | Status: SHIPPED | OUTPATIENT
Start: 2019-10-31 | End: 2019-12-04 | Stop reason: DRUGHIGH

## 2019-12-02 ENCOUNTER — OFFICE VISIT (OUTPATIENT)
Dept: FAMILY MEDICINE CLINIC | Age: 69
End: 2019-12-02
Payer: MEDICARE

## 2019-12-02 VITALS
BODY MASS INDEX: 34.96 KG/M2 | HEART RATE: 64 BPM | WEIGHT: 179 LBS | RESPIRATION RATE: 16 BRPM | DIASTOLIC BLOOD PRESSURE: 78 MMHG | TEMPERATURE: 98.1 F | SYSTOLIC BLOOD PRESSURE: 130 MMHG

## 2019-12-02 DIAGNOSIS — Z01.419 GYNECOLOGIC EXAM NORMAL: Primary | ICD-10-CM

## 2019-12-02 PROCEDURE — G0101 CA SCREEN;PELVIC/BREAST EXAM: HCPCS | Performed by: NURSE PRACTITIONER

## 2019-12-02 ASSESSMENT — ENCOUNTER SYMPTOMS
ABDOMINAL PAIN: 0
COLOR CHANGE: 0
NAUSEA: 0
BLOOD IN STOOL: 0
VOMITING: 0
DIARRHEA: 0
RECTAL PAIN: 0

## 2019-12-03 ENCOUNTER — TELEPHONE (OUTPATIENT)
Dept: FAMILY MEDICINE CLINIC | Age: 69
End: 2019-12-03

## 2019-12-04 RX ORDER — LISINOPRIL 10 MG/1
10 TABLET ORAL 2 TIMES DAILY
Qty: 180 TABLET | Refills: 3 | Status: SHIPPED | OUTPATIENT
Start: 2019-12-04 | End: 2020-01-08 | Stop reason: DRUGHIGH

## 2019-12-09 DIAGNOSIS — E11.65 TYPE 2 DIABETES MELLITUS WITH HYPERGLYCEMIA (HCC): ICD-10-CM

## 2019-12-09 DIAGNOSIS — E11.8 TYPE 2 DIABETES MELLITUS WITH COMPLICATION, WITHOUT LONG-TERM CURRENT USE OF INSULIN (HCC): ICD-10-CM

## 2019-12-09 RX ORDER — GLIMEPIRIDE 2 MG/1
TABLET ORAL
Qty: 180 TABLET | Refills: 3 | Status: SHIPPED | OUTPATIENT
Start: 2019-12-09 | End: 2020-09-11 | Stop reason: SDUPTHER

## 2019-12-09 RX ORDER — GLIMEPIRIDE 1 MG/1
TABLET ORAL
Qty: 180 TABLET | Refills: 0 | OUTPATIENT
Start: 2019-12-09

## 2020-01-07 ENCOUNTER — TELEPHONE (OUTPATIENT)
Dept: FAMILY MEDICINE CLINIC | Age: 70
End: 2020-01-07

## 2020-01-08 RX ORDER — LISINOPRIL 20 MG/1
20 TABLET ORAL 2 TIMES DAILY
Qty: 180 TABLET | Refills: 3 | Status: SHIPPED | OUTPATIENT
Start: 2020-01-08 | End: 2020-01-31 | Stop reason: SDUPTHER

## 2020-01-08 NOTE — TELEPHONE ENCOUNTER
Spoke to pt. Read ES instructions. Lab order mailed. Pt would [facility], she has plenty of med to use until rx comes in the mail, will take 2-10mg tabs BID.   Order pending if ok

## 2020-01-20 ENCOUNTER — HOSPITAL ENCOUNTER (OUTPATIENT)
Age: 70
Discharge: HOME | End: 2020-01-20
Payer: MEDICARE

## 2020-01-20 DIAGNOSIS — I10 ESSENTIAL HYPERTENSION: ICD-10-CM

## 2020-01-20 DIAGNOSIS — R80.9 MICROALBUMINURIA: ICD-10-CM

## 2020-01-20 DIAGNOSIS — E11.8 TYPE 2 DIABETES MELLITUS WITH COMPLICATION, WITHOUT LONG-TERM CURRENT USE OF INSULIN (HCC): ICD-10-CM

## 2020-01-20 DIAGNOSIS — E78.5 HYPERLIPIDEMIA, UNSPECIFIED HYPERLIPIDEMIA TYPE: ICD-10-CM

## 2020-01-20 LAB
ANION GAP SERPL CALCULATED.3IONS-SCNC: 12 MEQ/L (ref 8–16)
AVERAGE GLUCOSE: 144 MG/DL (ref 70–126)
BUN BLDV-MCNC: 9 MG/DL (ref 7–22)
CALCIUM SERPL-MCNC: 9.4 MG/DL (ref 8.5–10.5)
CHLORIDE BLD-SCNC: 95 MEQ/L (ref 98–111)
CO2: 27 MEQ/L (ref 23–33)
CREAT SERPL-MCNC: 0.6 MG/DL (ref 0.4–1.2)
CREATININE, URINE: 55.9 MG/DL
GFR SERPL CREATININE-BSD FRML MDRD: > 90 ML/MIN/1.73M2
GLUCOSE BLD-MCNC: 167 MG/DL (ref 70–108)
HBA1C MFR BLD: 6.8 % (ref 4.4–6.4)
HDLC SERPL-MCNC: 37 MG/DL
MICROALBUMIN UR-MCNC: 35.88 MG/DL
MICROALBUMIN/CREAT UR-RTO: 642 MG/G (ref 0–30)
POTASSIUM SERPL-SCNC: 3.6 MEQ/L (ref 3.5–5.2)
SODIUM BLD-SCNC: 134 MEQ/L (ref 135–145)
TRIGL SERPL-MCNC: 153 MG/DL (ref 0–199)

## 2020-01-20 PROCEDURE — 83036 HEMOGLOBIN GLYCOSYLATED A1C: CPT

## 2020-01-20 PROCEDURE — 36415 COLL VENOUS BLD VENIPUNCTURE: CPT

## 2020-01-20 PROCEDURE — 84478 ASSAY OF TRIGLYCERIDES: CPT

## 2020-01-20 PROCEDURE — 80048 BASIC METABOLIC PNL TOTAL CA: CPT

## 2020-01-20 PROCEDURE — 83718 ASSAY OF LIPOPROTEIN: CPT

## 2020-01-20 PROCEDURE — 82043 UR ALBUMIN QUANTITATIVE: CPT

## 2020-01-31 ENCOUNTER — OFFICE VISIT (OUTPATIENT)
Dept: FAMILY MEDICINE CLINIC | Age: 70
End: 2020-01-31
Payer: MEDICARE

## 2020-01-31 VITALS
BODY MASS INDEX: 33.59 KG/M2 | TEMPERATURE: 97.2 F | RESPIRATION RATE: 16 BRPM | HEART RATE: 68 BPM | WEIGHT: 172 LBS | SYSTOLIC BLOOD PRESSURE: 138 MMHG | DIASTOLIC BLOOD PRESSURE: 80 MMHG

## 2020-01-31 PROCEDURE — G8417 CALC BMI ABV UP PARAM F/U: HCPCS | Performed by: FAMILY MEDICINE

## 2020-01-31 PROCEDURE — 4040F PNEUMOC VAC/ADMIN/RCVD: CPT | Performed by: FAMILY MEDICINE

## 2020-01-31 PROCEDURE — 2022F DILAT RTA XM EVC RTNOPTHY: CPT | Performed by: FAMILY MEDICINE

## 2020-01-31 PROCEDURE — 99214 OFFICE O/P EST MOD 30 MIN: CPT | Performed by: FAMILY MEDICINE

## 2020-01-31 PROCEDURE — G8399 PT W/DXA RESULTS DOCUMENT: HCPCS | Performed by: FAMILY MEDICINE

## 2020-01-31 PROCEDURE — G8427 DOCREV CUR MEDS BY ELIG CLIN: HCPCS | Performed by: FAMILY MEDICINE

## 2020-01-31 PROCEDURE — 3044F HG A1C LEVEL LT 7.0%: CPT | Performed by: FAMILY MEDICINE

## 2020-01-31 PROCEDURE — 1123F ACP DISCUSS/DSCN MKR DOCD: CPT | Performed by: FAMILY MEDICINE

## 2020-01-31 PROCEDURE — 1036F TOBACCO NON-USER: CPT | Performed by: FAMILY MEDICINE

## 2020-01-31 PROCEDURE — 3017F COLORECTAL CA SCREEN DOC REV: CPT | Performed by: FAMILY MEDICINE

## 2020-01-31 PROCEDURE — 1090F PRES/ABSN URINE INCON ASSESS: CPT | Performed by: FAMILY MEDICINE

## 2020-01-31 PROCEDURE — G8482 FLU IMMUNIZE ORDER/ADMIN: HCPCS | Performed by: FAMILY MEDICINE

## 2020-01-31 RX ORDER — LANCETS 30 GAUGE
EACH MISCELLANEOUS
Qty: 100 EACH | Refills: 3 | Status: SHIPPED | OUTPATIENT
Start: 2020-01-31

## 2020-01-31 RX ORDER — LISINOPRIL 20 MG/1
20 TABLET ORAL 2 TIMES DAILY
Qty: 180 TABLET | Refills: 3 | Status: SHIPPED | OUTPATIENT
Start: 2020-01-31 | End: 2021-01-05

## 2020-01-31 RX ORDER — GLUCOSAMINE HCL/CHONDROITIN SU 500-400 MG
CAPSULE ORAL
Qty: 100 STRIP | Refills: 3 | Status: SHIPPED | OUTPATIENT
Start: 2020-01-31 | End: 2021-03-23

## 2020-01-31 ASSESSMENT — ENCOUNTER SYMPTOMS
CONSTIPATION: 0
SHORTNESS OF BREATH: 0
BLOOD IN STOOL: 0
ANAL BLEEDING: 0
NAUSEA: 0
CHEST TIGHTNESS: 0
ABDOMINAL PAIN: 0
DIARRHEA: 0
VOMITING: 0

## 2020-01-31 NOTE — PATIENT INSTRUCTIONS
Encouraged NEW SHINGLES SHOT [facility])- pt declines (updated 1/31/2020)  PELVIC done 12/2/2019 per WS- will schedule after 12/2/2020  No further PAP smears indicated as pt has had adequate negative prior screening (updated 1/31/2020)  MAMMO overdue at this time- pt declines- aware of risk of cancer, death, etc. (updated 1/31/2020)  COLONOSCOPY done 3/1/2018 per Dr. Jerson Reynoso (DIVERTICULOSIS, HEMORRHOIDS, MELANOSIS COLI)- to do in 10 years- to follow up with Dr. Darshan Love only.  (updated 1/31/2020)  EGD done 4/19/2018 per Dr. Darshan Love- was to have again in 4/2019- pt declines- aware of risk of death, cancer, cirrhosis, etc.  (updated 1/31/2020)  DEXA done 5/21/2018- Osteoporosis- Reclast done 8/12/2019  To do again in 5/2020.  Will schedule Reclast for after 8/12/2020. Will schedule both at next visit. DILATED DIABETIC EYE EXAM- 9/30/2019 with Dr. Jani Abrams in [place], [place]- to follow up annually. Pt had recheck Liver Ultrasound 11/14/2018 per Dr. Darshan Love- to have every 6 months per Dr. Queenie Trammell recommendations- pt declines at this time- pt aware of risk of death, cancer, cirrhosis, etc. (updated 1/31/2020)  Pt declines any further evaluation per Urology for reddened, raised area in bladder. (updated 1/31/2020)  After discussion with pt, increase Lisinopril to 20 mg- 1 pill 2x/day at this time # 180/3 refills   Home BP's- call if > 140/90 on a regular basis  Keep appt with with  Dr. Dilcia Gu as planned 2/6/2020   Check HGA1C and BMP in 3 months.   Continue current medicines at current doses. No refills needed today.    Follow up in 3 months.

## 2020-01-31 NOTE — PROGRESS NOTES
[facility]  [facility]  Dept: [phone]  Dept Fax: [phone]    JACOB Babcock is a 72 y. o.female    Pt presents for follow up of multiple medical issues.     Pt feeling ok since last visit- no new complaints, issues, or problems, except as noted below:     Pt instructed to increase Lisinopril to 20 mg BID on 1/7/2020- pt never increased from 10 mg BID.  BP's remain elevated as below:          Glucometer readings at home are running . Checking daily. Pt declines any new problems at this time. Wt Readings from Last 3 Encounters:   01/31/20 172 lb (78 kg)   12/02/19 179 lb (81.2 kg)   10/31/19 172 lb (78 kg)        Patient Active Problem List   Diagnosis    Hyperlipidemia    GERD (gastroesophageal reflux disease)    Essential hypertension    Insomnia    Type 2 diabetes mellitus with complication, without long-term current use of insulin (HCC)    CAD (coronary artery disease)    Class 1 obesity due to excess calories with serious comorbidity and body mass index (BMI) of 34.0 to 34.9 in adult    Elevated liver enzymes    NAFLD (nonalcoholic fatty liver disease)--Dr. Dwayne Sousa    Hypothyroidism    Microalbuminuria    Blood in stool    GI bleeding    Depression    Iron deficiency anemia due to chronic blood loss    Postmenopausal    Senile osteoporosis    Abnormal ECG       Current Outpatient Medications   Medication Sig Dispense Refill    lisinopril (PRINIVIL;ZESTRIL) 20 MG tablet Take 1 tablet by mouth 2 times daily 180 tablet 3    blood glucose monitor kit and supplies Glucose monitor and supplies, brand per pt preference. Test sugar daily. Dx: E11.9 1 kit 0    blood glucose monitor strips Glucose test strips, brand per pt preference. Test sugar daily. Dx: E11.9 100 strip 3    Lancets MISC Test sugar daily. Brand per pt preference.  Dx: E11.9 100 each 3    glimepiride (AMARYL) 2 MG tablet TAKE 1 TABLET TWICE DAILY 180 tablet mg/dL    Lab Results   Component Value Date     (L) 01/20/2020    K 3.6 01/20/2020    CL 95 (L) 01/20/2020    CO2 27 01/20/2020    BUN 9 01/20/2020    CREATININE 0.6 01/20/2020    GLUCOSE 167 (H) 01/20/2020    CALCIUM 9.4 01/20/2020    PROT 7.2 07/29/2019    LABALBU 3.7 07/29/2019    BILITOT 0.4 07/29/2019    ALKPHOS 67 07/29/2019    AST 37 07/29/2019    ALT 24 07/29/2019    LABGLOM >90 01/20/2020     Lab Results   Component Value Date    LABMICR 35.88 01/20/2020     Lab Results   Component Value Date    TSH 2.920 07/29/2019    T4FREE 1.21 07/29/2019     Lab Results   Component Value Date    WBC 6.1 08/02/2019    HGB 12.0 09/12/2019    HCT 39.8 09/12/2019    MCV 83.4 08/02/2019     08/02/2019     Immunization History   Administered Date(s) Administered    Influenza 12/12/2012, 12/11/2013    Influenza Virus Vaccine 09/19/2014, 09/30/2015    Influenza, High Dose (Fluzone 65 yrs and older) 11/06/2017, 11/08/2018    Influenza, Quadv, IM, (6 mo and older Fluzone, Flulaval, Fluarix and 3 yrs and older Afluria) 11/21/2016    Influenza, Triv, inactivated, subunit, adjuvanted, IM (Fluad 65 yrs and older) 10/28/2019    Pneumococcal Conjugate 13-valent ([id number]) 02/24/2016    Pneumococcal Polysaccharide ([id number]) 03/01/2004, 07/26/2017    Tdap (Boostrix, Adacel) 09/19/2014    Zoster Live (Zostavax) 04/26/2012       Health Maintenance   Topic Date Due    Annual Wellness Visit (AWV)  05/29/2019    Shingles Vaccine (2 of 3) 01/31/2021 (Originally 6/21/2012)    Breast cancer screen  05/14/2020    Diabetic foot exam  07/25/2020    Lipid screen  07/29/2020    TSH testing  07/29/2020    Diabetic retinal exam  09/30/2020    A1C test (Diabetic or Prediabetic)  01/20/2021    Diabetic microalbuminuria test  01/20/2021    Potassium monitoring  01/20/2021    Creatinine monitoring  01/20/2021    DTaP/Tdap/Td vaccine (2 - Td) 09/19/2024    Colon cancer screen colonoscopy  03/01/2028    DEXA (modify in 4/2019- pt declines- aware of risk of death, cancer, cirrhosis, etc.  (updated 1/31/2020)  DEXA done 5/21/2018- Osteoporosis- Reclast done 8/12/2019  To do again in 5/2020.  Will schedule Reclast for after 8/12/2020. Will schedule both at next visit. DILATED DIABETIC EYE EXAM- 9/30/2019 with Dr. Candy Elise in [place], [place]- to follow up annually. Pt had recheck Liver Ultrasound 11/14/2018 per Dr. Micah Lara- to have every 6 months per Dr. Niesha Boone recommendations- pt declines at this time- pt aware of risk of death, cancer, cirrhosis, etc. (updated 1/31/2020)  Pt declines any further evaluation per Urology for reddened, raised area in bladder. (updated 1/31/2020)  After discussion with pt, increase Lisinopril to 20 mg- 1 pill 2x/day at this time # 180/3 refills   Home BP's- call if > 140/90 on a regular basis  Keep appt with with  Dr. Newell Holstein as planned 2/6/2020   Check HGA1C and BMP in 3 months.   Continue current medicines at current doses. No refills needed today.    Follow up in 3 months.        Electronically signed by Darren Armenta MD on 1/31/2020 at 2:41 PM

## 2020-02-06 ENCOUNTER — OFFICE VISIT (OUTPATIENT)
Dept: NEPHROLOGY | Age: 70
End: 2020-02-06
Payer: MEDICARE

## 2020-02-06 VITALS — HEART RATE: 69 BPM | WEIGHT: 170.8 LBS | OXYGEN SATURATION: 95 % | BODY MASS INDEX: 33.36 KG/M2

## 2020-02-06 PROCEDURE — 99213 OFFICE O/P EST LOW 20 MIN: CPT | Performed by: INTERNAL MEDICINE

## 2020-02-06 PROCEDURE — 1090F PRES/ABSN URINE INCON ASSESS: CPT | Performed by: INTERNAL MEDICINE

## 2020-02-06 PROCEDURE — 1036F TOBACCO NON-USER: CPT | Performed by: INTERNAL MEDICINE

## 2020-02-06 PROCEDURE — 2022F DILAT RTA XM EVC RTNOPTHY: CPT | Performed by: INTERNAL MEDICINE

## 2020-02-06 PROCEDURE — 3017F COLORECTAL CA SCREEN DOC REV: CPT | Performed by: INTERNAL MEDICINE

## 2020-02-06 PROCEDURE — G8427 DOCREV CUR MEDS BY ELIG CLIN: HCPCS | Performed by: INTERNAL MEDICINE

## 2020-02-06 PROCEDURE — G8482 FLU IMMUNIZE ORDER/ADMIN: HCPCS | Performed by: INTERNAL MEDICINE

## 2020-02-06 PROCEDURE — 3044F HG A1C LEVEL LT 7.0%: CPT | Performed by: INTERNAL MEDICINE

## 2020-02-06 PROCEDURE — G8399 PT W/DXA RESULTS DOCUMENT: HCPCS | Performed by: INTERNAL MEDICINE

## 2020-02-06 PROCEDURE — G8417 CALC BMI ABV UP PARAM F/U: HCPCS | Performed by: INTERNAL MEDICINE

## 2020-02-06 PROCEDURE — 4040F PNEUMOC VAC/ADMIN/RCVD: CPT | Performed by: INTERNAL MEDICINE

## 2020-02-06 PROCEDURE — 1123F ACP DISCUSS/DSCN MKR DOCD: CPT | Performed by: INTERNAL MEDICINE

## 2020-02-06 RX ORDER — BLOOD-GLUCOSE METER
EACH MISCELLANEOUS
COMMUNITY
Start: 2020-01-31

## 2020-02-06 NOTE — PROGRESS NOTES
12.0 09/12/2019    HCT 39.8 09/12/2019    MCV 83.4 08/02/2019     08/02/2019     BMP:    Lab Results   Component Value Date     (L) 01/20/2020     09/12/2019     07/29/2019    K 3.6 01/20/2020    K 4.5 09/12/2019    K 5.3 (H) 07/29/2019    CL 95 (L) 01/20/2020     09/12/2019    CL 97 (L) 07/29/2019    CO2 27 01/20/2020    CO2 26 09/12/2019    CO2 24 07/29/2019    BUN 9 01/20/2020    BUN 12 09/12/2019    BUN 15 07/29/2019    CREATININE 0.6 01/20/2020    CREATININE 0.9 09/12/2019    CREATININE 0.8 07/29/2019    GLUCOSE 167 (H) 01/20/2020    GLUCOSE 193 (H) 09/12/2019    GLUCOSE 182 (H) 07/29/2019      Hepatic:   Lab Results   Component Value Date    AST 37 07/29/2019    AST 35 09/05/2018    AST 32 07/23/2018    ALT 24 07/29/2019    ALT 28 09/05/2018    ALT 27 07/23/2018    BILITOT 0.4 07/29/2019    BILITOT 0.4 09/05/2018    BILITOT 0.3 07/23/2018    ALKPHOS 67 07/29/2019    ALKPHOS 76 09/05/2018    ALKPHOS 75 07/23/2018     BNP: No results found for: BNP  Lipids:   Lab Results   Component Value Date    CHOL 124 07/29/2019    HDL 37 01/20/2020     INR:   Lab Results   Component Value Date    INR 1.12 12/07/2018    INR 1.19 (H) 02/26/2018     URINE:   Lab Results   Component Value Date    PROTUR 100 02/07/2019     Lab Results   Component Value Date    NITRU Negative 02/07/2019    COLORU Yellow 02/07/2019    COLORU YELLOW 10/24/2018    PHUR 5.0 10/24/2018    WBCUA 2-4 10/24/2018    WBCUA 0-2 11/29/2011    RBCUA 0-2 10/24/2018    YEAST NONE SEEN 10/24/2018    BACTERIA NONE 10/24/2018    CLARITYU cloudy 11/11/2011    SPECGRAV 1.020 11/11/2011    LEUKOCYTESUR NEGATIVE 10/24/2018    UROBILINOGEN 0.20 02/07/2019    BILIRUBINUR Negative 02/07/2019    BILIRUBINUR NEGATIVE 11/29/2011    BLOODU Large 02/07/2019    BLOODU LARGE 10/24/2018    GLUCOSEU Negative 02/07/2019    KETUA Negative 02/07/2019    AMORPHOUS URATES 10/24/2018      Microalbumen/Creatinine ratio:  No components found for:

## 2020-02-07 RX ORDER — ATENOLOL 50 MG/1
TABLET ORAL
Qty: 45 TABLET | Refills: 3 | Status: SHIPPED | OUTPATIENT
Start: 2020-02-07 | End: 2021-01-05

## 2020-02-07 RX ORDER — LEVOTHYROXINE SODIUM 0.05 MG/1
TABLET ORAL
Qty: 90 TABLET | Refills: 3 | Status: SHIPPED | OUTPATIENT
Start: 2020-02-07 | End: 2021-01-05

## 2020-02-07 RX ORDER — ATORVASTATIN CALCIUM 40 MG/1
TABLET, FILM COATED ORAL
Qty: 90 TABLET | Refills: 3 | Status: SHIPPED | OUTPATIENT
Start: 2020-02-07 | End: 2021-01-05

## 2020-02-28 ENCOUNTER — OFFICE VISIT (OUTPATIENT)
Dept: CARDIOLOGY CLINIC | Age: 70
End: 2020-02-28
Payer: MEDICARE

## 2020-02-28 VITALS
SYSTOLIC BLOOD PRESSURE: 142 MMHG | BODY MASS INDEX: 33.92 KG/M2 | HEIGHT: 60 IN | HEART RATE: 69 BPM | WEIGHT: 172.8 LBS | DIASTOLIC BLOOD PRESSURE: 64 MMHG

## 2020-02-28 PROCEDURE — G8427 DOCREV CUR MEDS BY ELIG CLIN: HCPCS | Performed by: NUCLEAR MEDICINE

## 2020-02-28 PROCEDURE — G8399 PT W/DXA RESULTS DOCUMENT: HCPCS | Performed by: NUCLEAR MEDICINE

## 2020-02-28 PROCEDURE — 3017F COLORECTAL CA SCREEN DOC REV: CPT | Performed by: NUCLEAR MEDICINE

## 2020-02-28 PROCEDURE — G8417 CALC BMI ABV UP PARAM F/U: HCPCS | Performed by: NUCLEAR MEDICINE

## 2020-02-28 PROCEDURE — 99213 OFFICE O/P EST LOW 20 MIN: CPT | Performed by: NUCLEAR MEDICINE

## 2020-02-28 PROCEDURE — 4040F PNEUMOC VAC/ADMIN/RCVD: CPT | Performed by: NUCLEAR MEDICINE

## 2020-02-28 PROCEDURE — 1036F TOBACCO NON-USER: CPT | Performed by: NUCLEAR MEDICINE

## 2020-02-28 PROCEDURE — 1090F PRES/ABSN URINE INCON ASSESS: CPT | Performed by: NUCLEAR MEDICINE

## 2020-02-28 PROCEDURE — G8482 FLU IMMUNIZE ORDER/ADMIN: HCPCS | Performed by: NUCLEAR MEDICINE

## 2020-02-28 PROCEDURE — 1123F ACP DISCUSS/DSCN MKR DOCD: CPT | Performed by: NUCLEAR MEDICINE

## 2020-02-28 PROCEDURE — 93000 ELECTROCARDIOGRAM COMPLETE: CPT | Performed by: NUCLEAR MEDICINE

## 2020-02-28 NOTE — PROGRESS NOTES
Father     Heart Attack Father     Other Father         LUNG DISEASE-MACULAR DENERATION    Cancer Mother     High Blood Pressure Mother     Heart Disease Mother     Other Mother         GLAUCOMA    High Blood Pressure Sister     Osteoporosis Sister      Social History     Tobacco Use    Smoking status: Former Smoker     Years: 30.00     Types: Cigarettes     Last attempt to quit: 1999     Years since quittin.2    Smokeless tobacco: Former User   Substance Use Topics    Alcohol use: No     Comment: rarely      Current Outpatient Medications   Medication Sig Dispense Refill    levothyroxine (SYNTHROID) 50 MCG tablet TAKE 1 TABLET EVERY DAY 90 tablet 3    atenolol (TENORMIN) 50 MG tablet TAKE 1/2 TABLET EVERY DAY 45 tablet 3    atorvastatin (LIPITOR) 40 MG tablet TAKE 1 TABLET EVERY DAY 90 tablet 3    Blood Glucose Monitoring Suppl (ACCU-CHEK ISABELA PLUS) w/Device KIT USE 1 TO CHECK GLUCOSE ONCE DAILY      lisinopril (PRINIVIL;ZESTRIL) 20 MG tablet Take 1 tablet by mouth 2 times daily 180 tablet 3    blood glucose monitor kit and supplies Glucose monitor and supplies, brand per pt preference. Test sugar daily. Dx: E11.9 1 kit 0    blood glucose monitor strips Glucose test strips, brand per pt preference. Test sugar daily. Dx: E11.9 100 strip 3    Lancets MISC Test sugar daily. Brand per pt preference.  Dx: E11.9 100 each 3    glimepiride (AMARYL) 2 MG tablet TAKE 1 TABLET TWICE DAILY 180 tablet 3    escitalopram (LEXAPRO) 20 MG tablet TAKE 1 TABLET EVERY DAY 90 tablet 3    pantoprazole (PROTONIX) 40 MG tablet TAKE 1 TABLET TWICE DAILY 180 tablet 3    metFORMIN (GLUCOPHAGE-XR) 500 MG extended release tablet TAKE 2 TABLETS TWICE DAILY 360 tablet 3    buPROPion (WELLBUTRIN SR) 150 MG extended release tablet Take 150 mg by mouth 2 times daily      aspirin 81 MG tablet Take 81 mg by mouth nightly      FISH OIL Take by mouth nightly Arthur Red      Docusate Calcium (STOOL SOFTENER PO) Take

## 2020-04-22 ENCOUNTER — TELEPHONE (OUTPATIENT)
Dept: FAMILY MEDICINE CLINIC | Age: 70
End: 2020-04-22

## 2020-04-22 NOTE — TELEPHONE ENCOUNTER
Pts  notified of ES response. He will check it out and if he can't figure it out he will mail the readings to the office.

## 2020-05-04 ENCOUNTER — TELEPHONE (OUTPATIENT)
Dept: FAMILY MEDICINE CLINIC | Age: 70
End: 2020-05-04

## 2020-05-04 RX ORDER — METFORMIN HYDROCHLORIDE 500 MG/1
TABLET, EXTENDED RELEASE ORAL
Qty: 360 TABLET | Refills: 3 | Status: SHIPPED | OUTPATIENT
Start: 2020-05-04 | End: 2021-04-22

## 2020-05-04 RX ORDER — PANTOPRAZOLE SODIUM 40 MG/1
TABLET, DELAYED RELEASE ORAL
Qty: 180 TABLET | Refills: 3 | Status: SHIPPED | OUTPATIENT
Start: 2020-05-04 | End: 2021-04-22

## 2020-05-04 RX ORDER — ESCITALOPRAM OXALATE 20 MG/1
TABLET ORAL
Qty: 90 TABLET | Refills: 3 | Status: SHIPPED | OUTPATIENT
Start: 2020-05-04 | End: 2021-04-22

## 2020-05-04 NOTE — TELEPHONE ENCOUNTER
Please call pt. Recent BP's/ Pulses/ Sugars reviewed- Doing ok overall.     No changes needed at the current time.     Follow up as scheduled.  ES

## 2020-05-04 NOTE — TELEPHONE ENCOUNTER
Called the pt and left a message on her voicemail with ES recommendations. She was asked to call the office back with any questions.

## 2020-05-11 ENCOUNTER — HOSPITAL ENCOUNTER (OUTPATIENT)
Age: 70
Discharge: HOME | End: 2020-05-11
Payer: MEDICARE

## 2020-05-11 LAB
ERYTHROCYTE [DISTWIDTH] IN BLOOD BY AUTOMATED COUNT: 17.9 % (ref 11.5–14.5)
ERYTHROCYTE [DISTWIDTH] IN BLOOD BY AUTOMATED COUNT: 48.8 FL (ref 35–45)
HCT VFR BLD CALC: 31.2 % (ref 37–47)
HEMOGLOBIN: 8.8 GM/DL (ref 12–16)
MCH RBC QN AUTO: 21.6 PG (ref 26–33)
MCHC RBC AUTO-ENTMCNC: 28.2 GM/DL (ref 32.2–35.5)
MCV RBC AUTO: 76.5 FL (ref 81–99)
PLATELET # BLD: 148 THOU/MM3 (ref 130–400)
PMV BLD AUTO: 10.2 FL (ref 9.4–12.4)
RBC # BLD: 4.08 MILL/MM3 (ref 4.2–5.4)
SCAN OF BLOOD SMEAR: NORMAL
WBC # BLD: 6.4 THOU/MM3 (ref 4.8–10.8)

## 2020-05-11 PROCEDURE — 36415 COLL VENOUS BLD VENIPUNCTURE: CPT

## 2020-05-11 PROCEDURE — 85027 COMPLETE CBC AUTOMATED: CPT

## 2020-05-12 ENCOUNTER — TELEPHONE (OUTPATIENT)
Dept: FAMILY MEDICINE CLINIC | Age: 70
End: 2020-05-12

## 2020-05-13 PROBLEM — D50.9 IRON DEFICIENCY ANEMIA, UNSPECIFIED: Status: ACTIVE | Noted: 2020-05-13

## 2020-05-13 RX ORDER — SODIUM CHLORIDE 0.9 % (FLUSH) 0.9 %
10 SYRINGE (ML) INJECTION PRN
Status: CANCELLED | OUTPATIENT
Start: 2020-05-15

## 2020-05-22 ENCOUNTER — HOSPITAL ENCOUNTER (OUTPATIENT)
Dept: NURSING | Age: 70
Discharge: HOME | End: 2020-05-22
Payer: MEDICARE

## 2020-05-22 VITALS
OXYGEN SATURATION: 98 % | HEART RATE: 64 BPM | TEMPERATURE: 97 F | DIASTOLIC BLOOD PRESSURE: 61 MMHG | RESPIRATION RATE: 16 BRPM | SYSTOLIC BLOOD PRESSURE: 140 MMHG

## 2020-05-22 DIAGNOSIS — D50.9 IRON DEFICIENCY ANEMIA, UNSPECIFIED IRON DEFICIENCY ANEMIA TYPE: Primary | ICD-10-CM

## 2020-05-22 PROCEDURE — 6360000002 HC RX W HCPCS: Performed by: INTERNAL MEDICINE

## 2020-05-22 PROCEDURE — 2580000003 HC RX 258: Performed by: INTERNAL MEDICINE

## 2020-05-22 PROCEDURE — 96365 THER/PROPH/DIAG IV INF INIT: CPT

## 2020-05-22 RX ORDER — SODIUM CHLORIDE 0.9 % (FLUSH) 0.9 %
10 SYRINGE (ML) INJECTION PRN
Status: CANCELLED | OUTPATIENT
Start: 2020-05-29

## 2020-05-22 RX ORDER — SODIUM CHLORIDE 0.9 % (FLUSH) 0.9 %
10 SYRINGE (ML) INJECTION PRN
Status: DISCONTINUED | OUTPATIENT
Start: 2020-05-22 | End: 2020-05-23 | Stop reason: HOSPADM

## 2020-05-22 RX ADMIN — FERRIC CARBOXYMALTOSE INJECTION 750 MG: 50 INJECTION, SOLUTION INTRAVENOUS at 14:07

## 2020-05-22 RX ADMIN — Medication 10 ML: at 14:33

## 2020-05-22 RX ADMIN — Medication 10 ML: at 14:12

## 2020-05-22 NOTE — PROGRESS NOTES
1355 pt arrived per ambulation for a injectafer infusion PT RIGHTS AND RESPONSIBILITIES OFFERED TO PT.no c/o noted. 1426 discharge instructions given to patient. lilliana well. Stable. No other concerns  1440 iv  Removed. Pt discharge per ambulation to home. Pt remains stable with no c/o noted.  Denies itching, sob.           _m___ Safety:       (Environmental)   [place] to environment   Ensure ID band is correct and in place/ allergy band as needed   Assess for fall risk   Initiate fall precautions as applicable (fall band, side rails, etc.)   Call light within reach   Bed in low position/ wheels locked    ___m_ Pain:        Assess pain level and characteristics   Administer analgesics as ordered   Assess effectiveness of pain management and report to MD as needed    __m__ Knowledge Deficit:   Assess baseline knowledge   Provide teaching at level of understanding   Provide teaching via preferred learning method   Evaluate teaching effectiveness    _m___ Hemodynamic/Respiratory Status:       (Pre and Post Procedure Monitoring)   Assess/Monitor vital signs and LOC   Assess Baseline SpO2 prior to any sedation   Obtain weight/height   Assess vital signs/ LOC until patient meets discharge criteria   Monitor procedure site and notify MD of any issues

## 2020-06-01 ENCOUNTER — HOSPITAL ENCOUNTER (OUTPATIENT)
Dept: NURSING | Age: 70
Discharge: HOME | End: 2020-06-01
Payer: MEDICARE

## 2020-06-01 VITALS
OXYGEN SATURATION: 96 % | TEMPERATURE: 97.3 F | HEART RATE: 56 BPM | SYSTOLIC BLOOD PRESSURE: 139 MMHG | DIASTOLIC BLOOD PRESSURE: 72 MMHG | RESPIRATION RATE: 16 BRPM

## 2020-06-01 DIAGNOSIS — D50.9 IRON DEFICIENCY ANEMIA, UNSPECIFIED IRON DEFICIENCY ANEMIA TYPE: Primary | ICD-10-CM

## 2020-06-01 PROCEDURE — 96365 THER/PROPH/DIAG IV INF INIT: CPT

## 2020-06-01 PROCEDURE — 6360000002 HC RX W HCPCS: Performed by: INTERNAL MEDICINE

## 2020-06-01 PROCEDURE — 2580000003 HC RX 258: Performed by: INTERNAL MEDICINE

## 2020-06-01 RX ORDER — SODIUM CHLORIDE 0.9 % (FLUSH) 0.9 %
10 SYRINGE (ML) INJECTION PRN
Status: CANCELLED | OUTPATIENT
Start: 2020-06-01

## 2020-06-01 RX ADMIN — FERRIC CARBOXYMALTOSE INJECTION 750 MG: 50 INJECTION, SOLUTION INTRAVENOUS at 14:51

## 2020-06-01 ASSESSMENT — PAIN - FUNCTIONAL ASSESSMENT: PAIN_FUNCTIONAL_ASSESSMENT: 0-10

## 2020-06-01 NOTE — PROGRESS NOTES
1516 INFUSION COMPLETE TOLERATED WELL. HOME INSTRUCTIONS TO PT VERBALIZED UNDERSTANDING.   100 [address]

## 2020-06-01 NOTE — PROGRESS NOTES
[address] [facility] IRON INFUSION PROCEDURE REVIEWED WITH PT VERBALIZED UNDERSTANDING. Pt rights and responsibilities offered to pt to read.     __MET__ Safety:       (Environmental)   [place] to environment   Ensure ID band is correct and in place/ allergy band as needed   Assess for fall risk   Initiate fall precautions as applicable (fall band, side rails, etc.)   Call light within reach   Bed in low position/ wheels locked    __MET__ Pain:        Assess pain level and characteristics   Administer analgesics as ordered   Assess effectiveness of pain management and report to MD as needed    _MET___ Knowledge Deficit:   Assess baseline knowledge   Provide teaching at level of understanding   Provide teaching via preferred learning method   Evaluate teaching effectiveness     __MET__ Hemodynamic/Respiratory Status:   Assess vital signs/ LOC until patient meets discharge criteria   Monitor procedure site and notify MD of any issues    __

## 2020-06-02 ENCOUNTER — TELEPHONE (OUTPATIENT)
Dept: FAMILY MEDICINE CLINIC | Age: 70
End: 2020-06-02

## 2020-06-03 NOTE — TELEPHONE ENCOUNTER
Pt notified of ES response and recommendations and she verbalized understanding. Lab orders mailed to the pt.

## 2020-06-11 ENCOUNTER — HOSPITAL ENCOUNTER (OUTPATIENT)
Age: 70
Discharge: HOME | End: 2020-06-11
Payer: MEDICARE

## 2020-06-11 DIAGNOSIS — D50.0 IRON DEFICIENCY ANEMIA DUE TO CHRONIC BLOOD LOSS: ICD-10-CM

## 2020-06-11 DIAGNOSIS — R53.83 FATIGUE, UNSPECIFIED TYPE: ICD-10-CM

## 2020-06-11 LAB
ALBUMIN SERPL-MCNC: 3.8 G/DL (ref 3.5–5.1)
ALP BLD-CCNC: 59 U/L (ref 38–126)
ALT SERPL-CCNC: 33 U/L (ref 11–66)
ANION GAP SERPL CALCULATED.3IONS-SCNC: 14 MEQ/L (ref 8–16)
AST SERPL-CCNC: 34 U/L (ref 5–40)
BASOPHILIA: ABNORMAL
BASOPHILS # BLD: 0.5 %
BASOPHILS ABSOLUTE: 0 THOU/MM3 (ref 0–0.1)
BILIRUB SERPL-MCNC: 0.4 MG/DL (ref 0.3–1.2)
BUN BLDV-MCNC: 11 MG/DL (ref 7–22)
CALCIUM SERPL-MCNC: 9.6 MG/DL (ref 8.5–10.5)
CHLORIDE BLD-SCNC: 103 MEQ/L (ref 98–111)
CO2: 20 MEQ/L (ref 23–33)
CREAT SERPL-MCNC: 0.6 MG/DL (ref 0.4–1.2)
CRENATED RBC'S: ABNORMAL
EOSINOPHIL # BLD: 2.9 %
EOSINOPHILS ABSOLUTE: 0.2 THOU/MM3 (ref 0–0.4)
ERYTHROCYTE [DISTWIDTH] IN BLOOD BY AUTOMATED COUNT: ABNORMAL % (ref 11.5–14.5)
ERYTHROCYTE [DISTWIDTH] IN BLOOD BY AUTOMATED COUNT: ABNORMAL FL (ref 35–45)
FERRITIN: 399 NG/ML (ref 10–291)
GFR SERPL CREATININE-BSD FRML MDRD: > 90 ML/MIN/1.73M2
GLUCOSE BLD-MCNC: 159 MG/DL (ref 70–108)
HCT VFR BLD CALC: 38.5 % (ref 37–47)
HEMOGLOBIN: 10.7 GM/DL (ref 12–16)
HYPOCHROMIA: PRESENT
IMMATURE GRANS (ABS): 0.03 THOU/MM3 (ref 0–0.07)
IMMATURE GRANULOCYTES: 0.5 %
IRON: 106 UG/DL (ref 50–170)
LYMPHOCYTES # BLD: 36.4 %
LYMPHOCYTES ABSOLUTE: 2 THOU/MM3 (ref 1–4.8)
MCH RBC QN AUTO: 24 PG (ref 26–33)
MCHC RBC AUTO-ENTMCNC: 27.8 GM/DL (ref 32.2–35.5)
MCV RBC AUTO: 86.5 FL (ref 81–99)
MONOCYTES # BLD: 9.3 %
MONOCYTES ABSOLUTE: 0.5 THOU/MM3 (ref 0.4–1.3)
NUCLEATED RED BLOOD CELLS: 0 /100 WBC
PLATELET # BLD: 128 THOU/MM3 (ref 130–400)
PMV BLD AUTO: 10.1 FL (ref 9.4–12.4)
POIKILOCYTES: ABNORMAL
POTASSIUM SERPL-SCNC: 4.3 MEQ/L (ref 3.5–5.2)
RBC # BLD: 4.45 MILL/MM3 (ref 4.2–5.4)
SCAN OF BLOOD SMEAR: NORMAL
SEG NEUTROPHILS: 50.4 %
SEGMENTED NEUTROPHILS ABSOLUTE COUNT: 2.8 THOU/MM3 (ref 1.8–7.7)
SODIUM BLD-SCNC: 137 MEQ/L (ref 135–145)
TOTAL IRON BINDING CAPACITY: 307 UG/DL (ref 171–450)
TOTAL PROTEIN: 6.7 G/DL (ref 6.1–8)
TSH SERPL DL<=0.05 MIU/L-ACNC: 2.97 UIU/ML (ref 0.4–4.2)
WBC # BLD: 5.6 THOU/MM3 (ref 4.8–10.8)

## 2020-06-11 PROCEDURE — 80053 COMPREHEN METABOLIC PANEL: CPT

## 2020-06-11 PROCEDURE — 82728 ASSAY OF FERRITIN: CPT

## 2020-06-11 PROCEDURE — 84443 ASSAY THYROID STIM HORMONE: CPT

## 2020-06-11 PROCEDURE — 85025 COMPLETE CBC W/AUTO DIFF WBC: CPT

## 2020-06-11 PROCEDURE — 36415 COLL VENOUS BLD VENIPUNCTURE: CPT

## 2020-06-11 PROCEDURE — 83550 IRON BINDING TEST: CPT

## 2020-06-11 PROCEDURE — 83540 ASSAY OF IRON: CPT

## 2020-06-15 ENCOUNTER — TELEPHONE (OUTPATIENT)
Dept: FAMILY MEDICINE CLINIC | Age: 70
End: 2020-06-15

## 2020-06-16 NOTE — TELEPHONE ENCOUNTER
Noted.  Recheck CBC, Ferritin prior to next appt in 8/2020 if not rechecked per Dr. Sangeetha Polanco.   ES

## 2020-07-22 ENCOUNTER — HOSPITAL ENCOUNTER (OUTPATIENT)
Dept: ULTRASOUND IMAGING | Age: 70
Discharge: HOME | End: 2020-07-22
Payer: MEDICARE

## 2020-07-22 ENCOUNTER — HOSPITAL ENCOUNTER (OUTPATIENT)
Age: 70
Discharge: HOME | End: 2020-07-22
Payer: MEDICARE

## 2020-07-22 LAB
ALBUMIN SERPL-MCNC: 3.9 G/DL (ref 3.5–5.1)
ALP BLD-CCNC: 66 U/L (ref 38–126)
ALT SERPL-CCNC: 41 U/L (ref 11–66)
ANISOCYTOSIS: PRESENT
AST SERPL-CCNC: 37 U/L (ref 5–40)
BASOPHILS # BLD: 0.8 %
BASOPHILS ABSOLUTE: 0.1 THOU/MM3 (ref 0–0.1)
BILIRUB SERPL-MCNC: 0.3 MG/DL (ref 0.3–1.2)
BILIRUBIN DIRECT: < 0.2 MG/DL (ref 0–0.3)
EOSINOPHIL # BLD: 2.5 %
EOSINOPHILS ABSOLUTE: 0.2 THOU/MM3 (ref 0–0.4)
ERYTHROCYTE [DISTWIDTH] IN BLOOD BY AUTOMATED COUNT: 21 % (ref 11.5–14.5)
ERYTHROCYTE [DISTWIDTH] IN BLOOD BY AUTOMATED COUNT: 66.2 FL (ref 35–45)
HCT VFR BLD CALC: 42.5 % (ref 37–47)
HEMOGLOBIN: 13 GM/DL (ref 12–16)
IMMATURE GRANS (ABS): 0.03 THOU/MM3 (ref 0–0.07)
IMMATURE GRANULOCYTES: 0.5 %
LYMPHOCYTES # BLD: 30.5 %
LYMPHOCYTES ABSOLUTE: 2 THOU/MM3 (ref 1–4.8)
MCH RBC QN AUTO: 27 PG (ref 26–33)
MCHC RBC AUTO-ENTMCNC: 30.6 GM/DL (ref 32.2–35.5)
MCV RBC AUTO: 88.2 FL (ref 81–99)
MONOCYTES # BLD: 10.5 %
MONOCYTES ABSOLUTE: 0.7 THOU/MM3 (ref 0.4–1.3)
NUCLEATED RED BLOOD CELLS: 0 /100 WBC
PLATELET # BLD: 105 THOU/MM3 (ref 130–400)
PMV BLD AUTO: 10.4 FL (ref 9.4–12.4)
RBC # BLD: 4.82 MILL/MM3 (ref 4.2–5.4)
SEG NEUTROPHILS: 55.2 %
SEGMENTED NEUTROPHILS ABSOLUTE COUNT: 3.5 THOU/MM3 (ref 1.8–7.7)
TOTAL PROTEIN: 6.7 G/DL (ref 6.1–8)
WBC # BLD: 6.4 THOU/MM3 (ref 4.8–10.8)

## 2020-07-22 PROCEDURE — 76705 ECHO EXAM OF ABDOMEN: CPT

## 2020-07-22 PROCEDURE — 85025 COMPLETE CBC W/AUTO DIFF WBC: CPT

## 2020-07-22 PROCEDURE — 93975 VASCULAR STUDY: CPT

## 2020-07-22 PROCEDURE — 36415 COLL VENOUS BLD VENIPUNCTURE: CPT

## 2020-07-22 PROCEDURE — 80076 HEPATIC FUNCTION PANEL: CPT

## 2020-07-29 ENCOUNTER — HOSPITAL ENCOUNTER (OUTPATIENT)
Age: 70
Discharge: HOME | End: 2020-07-29
Payer: MEDICARE

## 2020-07-29 DIAGNOSIS — E11.8 TYPE 2 DIABETES MELLITUS WITH COMPLICATION, WITHOUT LONG-TERM CURRENT USE OF INSULIN (HCC): ICD-10-CM

## 2020-07-29 DIAGNOSIS — I10 ESSENTIAL HYPERTENSION: ICD-10-CM

## 2020-07-29 DIAGNOSIS — R80.9 MICROALBUMINURIA: ICD-10-CM

## 2020-07-29 DIAGNOSIS — D50.9 IRON DEFICIENCY ANEMIA, UNSPECIFIED IRON DEFICIENCY ANEMIA TYPE: ICD-10-CM

## 2020-07-29 LAB
ANION GAP SERPL CALCULATED.3IONS-SCNC: 15 MEQ/L (ref 8–16)
AVERAGE GLUCOSE: 153 MG/DL (ref 70–126)
BUN BLDV-MCNC: 16 MG/DL (ref 7–22)
CALCIUM SERPL-MCNC: 9.5 MG/DL (ref 8.5–10.5)
CHLORIDE BLD-SCNC: 103 MEQ/L (ref 98–111)
CO2: 22 MEQ/L (ref 23–33)
CREAT SERPL-MCNC: 0.7 MG/DL (ref 0.4–1.2)
CREATININE, URINE: 76.2 MG/DL
ERYTHROCYTE [DISTWIDTH] IN BLOOD BY AUTOMATED COUNT: 19.8 % (ref 11.5–14.5)
ERYTHROCYTE [DISTWIDTH] IN BLOOD BY AUTOMATED COUNT: 64 FL (ref 35–45)
FERRITIN: 71 NG/ML (ref 10–291)
GFR SERPL CREATININE-BSD FRML MDRD: 83 ML/MIN/1.73M2
GLUCOSE BLD-MCNC: 157 MG/DL (ref 70–108)
HBA1C MFR BLD: 7.1 % (ref 4.4–6.4)
HCT VFR BLD CALC: 42.9 % (ref 37–47)
HEMOGLOBIN: 13.1 GM/DL (ref 12–16)
MCH RBC QN AUTO: 27.1 PG (ref 26–33)
MCHC RBC AUTO-ENTMCNC: 30.5 GM/DL (ref 32.2–35.5)
MCV RBC AUTO: 88.8 FL (ref 81–99)
MICROALBUMIN UR-MCNC: 2.39 MG/DL
MICROALBUMIN/CREAT UR-RTO: 31 MG/G (ref 0–30)
PLATELET # BLD: 107 THOU/MM3 (ref 130–400)
PMV BLD AUTO: 10.7 FL (ref 9.4–12.4)
POTASSIUM SERPL-SCNC: 4.3 MEQ/L (ref 3.5–5.2)
RBC # BLD: 4.83 MILL/MM3 (ref 4.2–5.4)
SODIUM BLD-SCNC: 140 MEQ/L (ref 135–145)
WBC # BLD: 6.3 THOU/MM3 (ref 4.8–10.8)

## 2020-07-29 PROCEDURE — 36415 COLL VENOUS BLD VENIPUNCTURE: CPT

## 2020-07-29 PROCEDURE — 85027 COMPLETE CBC AUTOMATED: CPT

## 2020-07-29 PROCEDURE — 80048 BASIC METABOLIC PNL TOTAL CA: CPT

## 2020-07-29 PROCEDURE — 82728 ASSAY OF FERRITIN: CPT

## 2020-07-29 PROCEDURE — 82043 UR ALBUMIN QUANTITATIVE: CPT

## 2020-07-29 PROCEDURE — 83036 HEMOGLOBIN GLYCOSYLATED A1C: CPT

## 2020-07-31 ENCOUNTER — TELEPHONE (OUTPATIENT)
Dept: FAMILY MEDICINE CLINIC | Age: 70
End: 2020-07-31

## 2020-07-31 NOTE — TELEPHONE ENCOUNTER
Left message to call back. ----- Message from FABIENNE Rodriguez - CNP sent at 7/30/2020  7:20 AM EDT -----  Please let pt know her lab results. Ferritin 71- improved from 1 month ago  A1C increased to 7.1. I would like to increase her Amaryl to 4 mg Q AM and 2 mg PM.  Her CMP and GFR show she was a little dehydrated and needs to increase fluids.     Follow up with ES as planned on 8/31/2020. -WS

## 2020-08-03 ENCOUNTER — HOSPITAL ENCOUNTER (OUTPATIENT)
Age: 70
Discharge: HOME | End: 2020-08-03
Payer: MEDICARE

## 2020-08-03 DIAGNOSIS — E11.21 DIABETIC NEPHROPATHY ASSOCIATED WITH TYPE 2 DIABETES MELLITUS (HCC): ICD-10-CM

## 2020-08-03 LAB
ANION GAP SERPL CALCULATED.3IONS-SCNC: 16 MEQ/L (ref 8–16)
BUN BLDV-MCNC: 14 MG/DL (ref 7–22)
CALCIUM SERPL-MCNC: 9.6 MG/DL (ref 8.5–10.5)
CHLORIDE BLD-SCNC: 102 MEQ/L (ref 98–111)
CO2: 19 MEQ/L (ref 23–33)
CREAT SERPL-MCNC: 1 MG/DL (ref 0.4–1.2)
GFR SERPL CREATININE-BSD FRML MDRD: 55 ML/MIN/1.73M2
GLUCOSE BLD-MCNC: 201 MG/DL (ref 70–108)
POTASSIUM SERPL-SCNC: 4.6 MEQ/L (ref 3.5–5.2)
SODIUM BLD-SCNC: 137 MEQ/L (ref 135–145)

## 2020-08-03 PROCEDURE — 80048 BASIC METABOLIC PNL TOTAL CA: CPT

## 2020-08-03 PROCEDURE — 82043 UR ALBUMIN QUANTITATIVE: CPT

## 2020-08-03 PROCEDURE — 36415 COLL VENOUS BLD VENIPUNCTURE: CPT

## 2020-08-03 NOTE — TELEPHONE ENCOUNTER
Pt notified and was agreeable to change. Med list updated. She stated that she did not need her medication refilled at this time as she has a whole bottle of 2 mg tablets at home.

## 2020-08-04 LAB
CREATININE, URINE: 127.2 MG/DL
MICROALBUMIN UR-MCNC: 8.79 MG/DL
MICROALBUMIN/CREAT UR-RTO: 69 MG/G (ref 0–30)

## 2020-08-13 ENCOUNTER — OFFICE VISIT (OUTPATIENT)
Dept: NEPHROLOGY | Age: 70
End: 2020-08-13
Payer: MEDICARE

## 2020-08-13 VITALS
OXYGEN SATURATION: 96 % | WEIGHT: 175.4 LBS | BODY MASS INDEX: 34.26 KG/M2 | TEMPERATURE: 97.4 F | DIASTOLIC BLOOD PRESSURE: 64 MMHG | HEART RATE: 59 BPM | SYSTOLIC BLOOD PRESSURE: 119 MMHG

## 2020-08-13 PROCEDURE — 1123F ACP DISCUSS/DSCN MKR DOCD: CPT | Performed by: INTERNAL MEDICINE

## 2020-08-13 PROCEDURE — G8427 DOCREV CUR MEDS BY ELIG CLIN: HCPCS | Performed by: INTERNAL MEDICINE

## 2020-08-13 PROCEDURE — 99213 OFFICE O/P EST LOW 20 MIN: CPT | Performed by: INTERNAL MEDICINE

## 2020-08-13 PROCEDURE — G8399 PT W/DXA RESULTS DOCUMENT: HCPCS | Performed by: INTERNAL MEDICINE

## 2020-08-13 PROCEDURE — 2022F DILAT RTA XM EVC RTNOPTHY: CPT | Performed by: INTERNAL MEDICINE

## 2020-08-13 PROCEDURE — G8417 CALC BMI ABV UP PARAM F/U: HCPCS | Performed by: INTERNAL MEDICINE

## 2020-08-13 PROCEDURE — 3051F HG A1C>EQUAL 7.0%<8.0%: CPT | Performed by: INTERNAL MEDICINE

## 2020-08-13 PROCEDURE — 1036F TOBACCO NON-USER: CPT | Performed by: INTERNAL MEDICINE

## 2020-08-13 PROCEDURE — 1090F PRES/ABSN URINE INCON ASSESS: CPT | Performed by: INTERNAL MEDICINE

## 2020-08-13 PROCEDURE — 4040F PNEUMOC VAC/ADMIN/RCVD: CPT | Performed by: INTERNAL MEDICINE

## 2020-08-13 PROCEDURE — 3017F COLORECTAL CA SCREEN DOC REV: CPT | Performed by: INTERNAL MEDICINE

## 2020-08-13 NOTE — PROGRESS NOTES
Renal Progress Note    Assessment and Plan:      Diagnosis Orders   1. Microalbuminuria     2. Essential hypertension     3. Diabetic nephropathy associated with type 2 diabetes mellitus ([address]     4. Hyperlipidemia, unspecified hyperlipidemia type     PLAN:  I discussed my thoughts with the patient. She understood. I addressed her questions. We went through the lab report together in epic. Random urine microalbumin creatinine ratio is improved to 69 from 642 six months ago. We started at 2223 mg/g about a year ago. I congratulated her for doing a good job with taking her medicines as prescribed and also doing the best she can with her low protein diet. Medications reviewed  No changes  Will not increase lisinopril for fear of dropping her blood pressure  Return visit in 6 months with labs. Patient Active Problem List   Diagnosis    Hyperlipidemia    GERD (gastroesophageal reflux disease)    Essential hypertension    Insomnia    Type 2 diabetes mellitus with complication, without long-term current use of insulin (HCC)    CAD (coronary artery disease)    Class 1 obesity due to excess calories with serious comorbidity and body mass index (BMI) of 34.0 to 34.9 in adult    Elevated liver enzymes    NAFLD (nonalcoholic fatty liver disease)--Dr. Sondra Hassan    Hypothyroidism    Microalbuminuria    Blood in stool    GI bleeding    Depression    Iron deficiency anemia due to chronic blood loss    Postmenopausal    Senile osteoporosis    Abnormal ECG    Iron deficiency anemia, unspecified       Subjective:   Chief complaint:  Chief Complaint   Patient presents with    Chronic Kidney Disease     Stage II    Other     Microalbuminuria      HPI:This is a follow up visit for Mrs. Hillary Cortez who is here today for return appointment. I see her for microalbuminuria. She was last seen about 6 months ago. Urine microalbumin creatinine ratio was 642 mg/g. In July 29, 2020 it was 31 mg/g.   A repeat on 3 August 2020 was 69 mg/g. Doing well otherwise. No new medications. .  She denies any chest pain or shortness of breath. No fever or chills. No nausea vomiting. No headaches. No difficulties with urination. ROS:Constitutional: negative  Eyes: negative  Ears, nose, mouth, throat, and face: negative  Respiratory: negative  Cardiovascular: negative  Gastrointestinal: negative  Genitourinary:negative  Integument/breast: negative  Hematologic/lymphatic: negative  Musculoskeletal:negative  Neurological: negative  Behavioral/Psych: negative  Endocrine: negative  Allergic/Immunologic: negative     Medications:     Current Outpatient Medications   Medication Sig Dispense Refill    metFORMIN (GLUCOPHAGE-XR) 500 MG extended release tablet TAKE 2 TABLETS TWICE DAILY 360 tablet 3    escitalopram (LEXAPRO) 20 MG tablet TAKE 1 TABLET EVERY DAY 90 tablet 3    pantoprazole (PROTONIX) 40 MG tablet TAKE 1 TABLET TWICE DAILY 180 tablet 3    levothyroxine (SYNTHROID) 50 MCG tablet TAKE 1 TABLET EVERY DAY 90 tablet 3    atenolol (TENORMIN) 50 MG tablet TAKE 1/2 TABLET EVERY DAY 45 tablet 3    atorvastatin (LIPITOR) 40 MG tablet TAKE 1 TABLET EVERY DAY 90 tablet 3    Blood Glucose Monitoring Suppl (ACCU-CHEK ISABELA PLUS) w/Device KIT USE 1 TO CHECK GLUCOSE ONCE DAILY      lisinopril (PRINIVIL;ZESTRIL) 20 MG tablet Take 1 tablet by mouth 2 times daily 180 tablet 3    blood glucose monitor kit and supplies Glucose monitor and supplies, brand per pt preference. Test sugar daily. Dx: E11.9 1 kit 0    blood glucose monitor strips Glucose test strips, brand per pt preference. Test sugar daily. Dx: E11.9 100 strip 3    Lancets MISC Test sugar daily. Brand per pt preference.  Dx: E11.9 100 each 3    glimepiride (AMARYL) 2 MG tablet TAKE 1 TABLET TWICE DAILY (Patient taking differently: TAKE 4 MG IN THE MORNING AND 2 MG PM) 180 tablet 3    buPROPion (WELLBUTRIN SR) 150 MG extended release tablet Take 150 mg by mouth 2 times daily      aspirin 81 MG tablet Take 81 mg by mouth nightly      FISH OIL Take by mouth nightly Arthur Red      Docusate Calcium (STOOL SOFTENER PO) Take by mouth nightly as needed        No current facility-administered medications for this visit.         Lab Results:    CBC:   Lab Results   Component Value Date    WBC 6.3 07/29/2020    HGB 13.1 07/29/2020    HCT 42.9 07/29/2020    MCV 88.8 07/29/2020     (L) 07/29/2020     BMP:    Lab Results   Component Value Date     08/03/2020     07/29/2020     06/11/2020    K 4.6 08/03/2020    K 4.3 07/29/2020    K 4.3 06/11/2020     08/03/2020     07/29/2020     06/11/2020    CO2 19 (L) 08/03/2020    CO2 22 (L) 07/29/2020    CO2 20 (L) 06/11/2020    BUN 14 08/03/2020    BUN 16 07/29/2020    BUN 11 06/11/2020    CREATININE 1.0 08/03/2020    CREATININE 0.7 07/29/2020    CREATININE 0.6 06/11/2020    GLUCOSE 201 (H) 08/03/2020    GLUCOSE 157 (H) 07/29/2020    GLUCOSE 159 (H) 06/11/2020      Hepatic:   Lab Results   Component Value Date    AST 37 07/22/2020    AST 34 06/11/2020    AST 37 07/29/2019    ALT 41 07/22/2020    ALT 33 06/11/2020    ALT 24 07/29/2019    BILITOT 0.3 07/22/2020    BILITOT 0.4 06/11/2020    BILITOT 0.4 07/29/2019    ALKPHOS 66 07/22/2020    ALKPHOS 59 06/11/2020    ALKPHOS 67 07/29/2019     BNP: No results found for: BNP  Lipids:   Lab Results   Component Value Date    CHOL 124 07/29/2019    HDL 37 01/20/2020     INR:   Lab Results   Component Value Date    INR 1.12 12/07/2018    INR 1.19 (H) 02/26/2018     URINE:   Lab Results   Component Value Date    PROTUR 100 02/07/2019     Lab Results   Component Value Date    NITRU Negative 02/07/2019    COLORU Yellow 02/07/2019    COLORU YELLOW 10/24/2018    PHUR 5.0 10/24/2018    WBCUA 2-4 10/24/2018    WBCUA 0-2 11/29/2011    RBCUA 0-2 10/24/2018    YEAST NONE SEEN 10/24/2018    BACTERIA NONE 10/24/2018    CLARITYU cloudy 11/11/2011    SPECGRAV 1.020 11/11/2011 LEUKOCYTESUR NEGATIVE 10/24/2018    UROBILINOGEN 0.20 02/07/2019    BILIRUBINUR Negative 02/07/2019    BILIRUBINUR NEGATIVE 11/29/2011    BLOODU Large 02/07/2019    BLOODU LARGE 10/24/2018    GLUCOSEU Negative 02/07/2019    KETUA Negative 02/07/2019    AMORPHOUS URATES 10/24/2018      Microalbumen/Creatinine ratio:  No components found for: RUCREAT    Objective:   Vitals: /64 (Site: Left Upper Arm, Position: Sitting, Cuff Size: Large Adult)   Pulse 59   Temp 97.4 °F (36.3 °C)   Wt 175 lb 6.4 oz (79.6 kg)   SpO2 96%   BMI 34.26 kg/m²      Constitutional:  Alert, awake, no apparent distress  Skin:normal with no rash or any lesions  HEENT:Pupils are reactive . Throat is clear. Oral mucosa is moist.  Neck:supple with no thyromegaly or bruit Cardiovascular:  S1, S2 without murmur   Respiratory:  Clear to auscultation with no wheezes or rales  Abdomen: +bowel sound, soft, non tender and no bruit  Ext: No LE edema  Musculoskeletal:Intact  Neuro:Alert, awake and oriented with no obvious focal deficit.   Speech is normal.    Electronically signed by Liat Goldstein MD on 8/13/2020 at 1:53 PM

## 2020-09-11 ENCOUNTER — OFFICE VISIT (OUTPATIENT)
Dept: FAMILY MEDICINE CLINIC | Age: 70
End: 2020-09-11
Payer: MEDICARE

## 2020-09-11 VITALS
RESPIRATION RATE: 14 BRPM | DIASTOLIC BLOOD PRESSURE: 68 MMHG | HEIGHT: 63 IN | BODY MASS INDEX: 31.22 KG/M2 | TEMPERATURE: 96.4 F | HEART RATE: 72 BPM | WEIGHT: 176.2 LBS | SYSTOLIC BLOOD PRESSURE: 118 MMHG

## 2020-09-11 PROCEDURE — 3051F HG A1C>EQUAL 7.0%<8.0%: CPT | Performed by: NURSE PRACTITIONER

## 2020-09-11 PROCEDURE — G0438 PPPS, INITIAL VISIT: HCPCS | Performed by: NURSE PRACTITIONER

## 2020-09-11 PROCEDURE — 1123F ACP DISCUSS/DSCN MKR DOCD: CPT | Performed by: NURSE PRACTITIONER

## 2020-09-11 PROCEDURE — 4040F PNEUMOC VAC/ADMIN/RCVD: CPT | Performed by: NURSE PRACTITIONER

## 2020-09-11 PROCEDURE — 3017F COLORECTAL CA SCREEN DOC REV: CPT | Performed by: NURSE PRACTITIONER

## 2020-09-11 RX ORDER — GLIMEPIRIDE 2 MG/1
TABLET ORAL
Qty: 270 TABLET | Refills: 3 | Status: SHIPPED | OUTPATIENT
Start: 2020-09-11 | End: 2021-11-01

## 2020-09-11 ASSESSMENT — PATIENT HEALTH QUESTIONNAIRE - PHQ9
3. TROUBLE FALLING OR STAYING ASLEEP: 0
SUM OF ALL RESPONSES TO PHQ QUESTIONS 1-9: 3
9. THOUGHTS THAT YOU WOULD BE BETTER OFF DEAD, OR OF HURTING YOURSELF: 0
7. TROUBLE CONCENTRATING ON THINGS, SUCH AS READING THE NEWSPAPER OR WATCHING TELEVISION: 0
2. FEELING DOWN, DEPRESSED OR HOPELESS: 0
SUM OF ALL RESPONSES TO PHQ QUESTIONS 1-9: 3
SUM OF ALL RESPONSES TO PHQ9 QUESTIONS 1 & 2: 3
5. POOR APPETITE OR OVEREATING: 0
4. FEELING TIRED OR HAVING LITTLE ENERGY: 0
1. LITTLE INTEREST OR PLEASURE IN DOING THINGS: 3
6. FEELING BAD ABOUT YOURSELF - OR THAT YOU ARE A FAILURE OR HAVE LET YOURSELF OR YOUR FAMILY DOWN: 0
10. IF YOU CHECKED OFF ANY PROBLEMS, HOW DIFFICULT HAVE THESE PROBLEMS MADE IT FOR YOU TO DO YOUR WORK, TAKE CARE OF THINGS AT HOME, OR GET ALONG WITH OTHER PEOPLE: 0
8. MOVING OR SPEAKING SO SLOWLY THAT OTHER PEOPLE COULD HAVE NOTICED. OR THE OPPOSITE, BEING SO FIGETY OR RESTLESS THAT YOU HAVE BEEN MOVING AROUND A LOT MORE THAN USUAL: 0

## 2020-09-11 ASSESSMENT — LIFESTYLE VARIABLES: HOW OFTEN DO YOU HAVE A DRINK CONTAINING ALCOHOL: 0

## 2020-09-11 NOTE — PROGRESS NOTES
trace    Sensory exam:  Monofilament sensation: normal  (minimum of 5 random plantar locations tested, avoiding callused areas - > 1 area with absence of sensation is + for neuropathy)    Plus at least one of the following:  Pulses: normal,   Pinprick: N/A  Proprioception: Intact  Vibration (128 Hz): N/A      Patient's complete Health Risk Assessment and screening values have been reviewed and are found in Flowsheets. The following problems were reviewed today and where indicated follow up appointments were made and/or referrals ordered. Positive Risk Factor Screenings with Interventions:     General Health:  General  In general, how would you say your health is?: Fair  In the past 7 days, have you experienced any of the following? New or Increased Pain, New or Increased Fatigue, Loneliness, Social Isolation, Stress or Anger?: None of These  Do you get the social and emotional support that you need?: Yes  Do you have a Living Will?: (!) No  General Health Risk Interventions:  · No Living Will: patient declined    Health Habits/Nutrition:  Health Habits/Nutrition  Do you exercise for at least 20 minutes 2-3 times per week?: (!) No  Have you lost any weight without trying in the past 3 months?: No  Do you eat fewer than 2 meals per day?: No  Have you seen a dentist within the past year?: Yes  Body mass index is 31.42 kg/m².   Health Habits/Nutrition Interventions:  · Inadequate physical activity:  patient is not ready to increase his/her physical activity level at this time    Safety:  Safety  Do you have working smoke detectors?: Yes  Have all throw rugs been removed or fastened?: Yes  Do you have non-slip mats or surfaces in all bathtubs/showers?: (!) No  Do all of your stairways have a railing or banister?: Yes  Are your doorways, halls and stairs free of clutter?: Yes  Safety Interventions:  · Home safety tips provided    Personalized Preventive Plan   Current Health Maintenance Status  Immunization History (AMARYL) 2 MG tablet; TAKE 4 MG IN THE MORNING AND 2 MG PM    Iron deficiency anemia, unspecified iron deficiency anemia type    Type 2 diabetes mellitus with complication, without long-term current use of insulin ([facility])  -      DIABETES FOOT EXAM    Essential hypertension    Hypothyroidism, unspecified type    Hyperlipidemia, unspecified hyperlipidemia type  -     Lipid Panel; Future          - Encouraged annual FLU VACCINE after October 1st.    - Mammogram- Pt declined    - Labs to be completed with next set of blood work for nephrology    Patient given educational materials - see patient instructions. Discussed use, benefit, and side effects of prescribed medications. All patient questions answered. Pt voiced understanding. Reviewed Health Maintenance.      Electronically signed by FABIENNE Ventura CNP on 9/14/2020 at 8:02 AM

## 2020-09-11 NOTE — PROGRESS NOTES
[address] [address]  Dept: [phone]  Dept Fax: [phone]  Loc: [phone]     2020     Ella Ramos (:  1950) is a 72 y.o. female, here for evaluation of the following medical concerns:    Chief Complaint   Patient presents with    Medicare AWV    3 Month [address] [facility] in lab tab  and 2020       HPI

## 2020-11-16 ENCOUNTER — TELEPHONE (OUTPATIENT)
Dept: FAMILY MEDICINE CLINIC | Age: 70
End: 2020-11-16

## 2020-11-16 NOTE — TELEPHONE ENCOUNTER
Discussed with patient. She states that all she needs is a copy of her lab orders mailed to her again. Order for lipid panel mailed to patient.

## 2020-11-25 ENCOUNTER — HOSPITAL ENCOUNTER (OUTPATIENT)
Age: 70
Discharge: HOME | End: 2020-11-25
Payer: MEDICARE

## 2020-11-25 DIAGNOSIS — E78.5 HYPERLIPIDEMIA, UNSPECIFIED HYPERLIPIDEMIA TYPE: ICD-10-CM

## 2020-11-25 LAB
ALBUMIN SERPL-MCNC: 4.2 G/DL (ref 3.5–5.1)
ALP BLD-CCNC: 58 U/L (ref 38–126)
ALT SERPL-CCNC: 38 U/L (ref 11–66)
AST SERPL-CCNC: 32 U/L (ref 5–40)
BILIRUB SERPL-MCNC: 0.5 MG/DL (ref 0.3–1.2)
BILIRUBIN DIRECT: < 0.2 MG/DL (ref 0–0.3)
CHOLESTEROL, TOTAL: 126 MG/DL (ref 100–199)
ERYTHROCYTE [DISTWIDTH] IN BLOOD BY AUTOMATED COUNT: 14.7 % (ref 11.5–14.5)
ERYTHROCYTE [DISTWIDTH] IN BLOOD BY AUTOMATED COUNT: 50.4 FL (ref 35–45)
HCT VFR BLD CALC: 42.4 % (ref 37–47)
HDLC SERPL-MCNC: 38 MG/DL
HEMOGLOBIN: 13.3 GM/DL (ref 12–16)
INR BLD: 1.07 (ref 0.85–1.13)
LDL CHOLESTEROL CALCULATED: 49 MG/DL
MCH RBC QN AUTO: 28.9 PG (ref 26–33)
MCHC RBC AUTO-ENTMCNC: 31.4 GM/DL (ref 32.2–35.5)
MCV RBC AUTO: 92.2 FL (ref 81–99)
PLATELET # BLD: 120 THOU/MM3 (ref 130–400)
PMV BLD AUTO: 10.8 FL (ref 9.4–12.4)
RBC # BLD: 4.6 MILL/MM3 (ref 4.2–5.4)
TOTAL PROTEIN: 7.2 G/DL (ref 6.1–8)
TRIGL SERPL-MCNC: 193 MG/DL (ref 0–199)
WBC # BLD: 4.6 THOU/MM3 (ref 4.8–10.8)

## 2020-11-25 PROCEDURE — 80076 HEPATIC FUNCTION PANEL: CPT

## 2020-11-25 PROCEDURE — 85027 COMPLETE CBC AUTOMATED: CPT

## 2020-11-25 PROCEDURE — 80061 LIPID PANEL: CPT

## 2020-11-25 PROCEDURE — 36415 COLL VENOUS BLD VENIPUNCTURE: CPT

## 2020-11-25 PROCEDURE — 85610 PROTHROMBIN TIME: CPT

## 2020-12-01 ENCOUNTER — NURSE ONLY (OUTPATIENT)
Dept: FAMILY MEDICINE CLINIC | Age: 70
End: 2020-12-01
Payer: MEDICARE

## 2020-12-01 PROCEDURE — G0008 ADMIN INFLUENZA VIRUS VAC: HCPCS | Performed by: FAMILY MEDICINE

## 2020-12-01 PROCEDURE — 90694 VACC AIIV4 NO PRSRV 0.5ML IM: CPT | Performed by: FAMILY MEDICINE

## 2020-12-01 NOTE — PROGRESS NOTES
Immunization(s) given during visit:    Immunizations Administered     Name Date Dose Route    Influenza, Quadv, adjuvanted, 65 yrs +, IM, PF (Fluad) 12/1/2020 0.5 mL Intramuscular    Site: Deltoid- Left    Lot: [id number]    NDC: 99232-918-35

## 2020-12-07 ENCOUNTER — TELEPHONE (OUTPATIENT)
Dept: FAMILY MEDICINE CLINIC | Age: 70
End: 2020-12-07

## 2020-12-07 NOTE — TELEPHONE ENCOUNTER
Pt woke up on Friday with some mid ow back pain, worse last night. No known injury. Used a tens unit, heat and ice and pain is still there. Pt requesting order for XR to be done at [facility]. Pt will schedule appt after XR done if necessary. OK for order? Please advise. Call pt back at [phone].

## 2020-12-07 NOTE — TELEPHONE ENCOUNTER
OK for office visit as pt likely does not need x-ray at this time, unless some trauma precipitated the issue. WS/ TS ok.  ES

## 2020-12-07 NOTE — TELEPHONE ENCOUNTER
Patient notified of ES's response. States, \"I'm not going to come in so that they can tell me to do the same things I'm already doing so just tell him to forget it\".

## 2021-01-05 DIAGNOSIS — E11.8 TYPE 2 DIABETES MELLITUS WITH COMPLICATION, WITHOUT LONG-TERM CURRENT USE OF INSULIN (HCC): ICD-10-CM

## 2021-01-05 DIAGNOSIS — I10 ESSENTIAL HYPERTENSION: ICD-10-CM

## 2021-01-05 DIAGNOSIS — E78.5 HYPERLIPIDEMIA, UNSPECIFIED HYPERLIPIDEMIA TYPE: ICD-10-CM

## 2021-01-05 DIAGNOSIS — E03.9 HYPOTHYROIDISM, UNSPECIFIED TYPE: ICD-10-CM

## 2021-01-05 RX ORDER — LISINOPRIL 20 MG/1
TABLET ORAL
Qty: 180 TABLET | Refills: 3 | Status: SHIPPED | OUTPATIENT
Start: 2021-01-05 | End: 2022-01-27

## 2021-01-05 RX ORDER — LEVOTHYROXINE SODIUM 0.05 MG/1
TABLET ORAL
Qty: 90 TABLET | Refills: 3 | Status: SHIPPED | OUTPATIENT
Start: 2021-01-05 | End: 2022-01-27

## 2021-01-05 RX ORDER — ATORVASTATIN CALCIUM 40 MG/1
TABLET, FILM COATED ORAL
Qty: 90 TABLET | Refills: 3 | Status: SHIPPED | OUTPATIENT
Start: 2021-01-05 | End: 2022-01-27

## 2021-01-05 RX ORDER — ATENOLOL 50 MG/1
TABLET ORAL
Qty: 45 TABLET | Refills: 3 | Status: SHIPPED | OUTPATIENT
Start: 2021-01-05 | End: 2022-01-27

## 2021-02-04 ENCOUNTER — HOSPITAL ENCOUNTER (OUTPATIENT)
Age: 71
Discharge: HOME | End: 2021-02-04
Payer: MEDICARE

## 2021-02-04 DIAGNOSIS — E11.21 DIABETIC NEPHROPATHY ASSOCIATED WITH TYPE 2 DIABETES MELLITUS (HCC): ICD-10-CM

## 2021-02-04 DIAGNOSIS — R80.9 MICROALBUMINURIA: ICD-10-CM

## 2021-02-04 LAB
ANION GAP SERPL CALCULATED.3IONS-SCNC: 10 MEQ/L (ref 8–16)
BUN BLDV-MCNC: 15 MG/DL (ref 7–22)
CALCIUM SERPL-MCNC: 10 MG/DL (ref 8.5–10.5)
CHLORIDE BLD-SCNC: 104 MEQ/L (ref 98–111)
CO2: 24 MEQ/L (ref 23–33)
CREAT SERPL-MCNC: 0.7 MG/DL (ref 0.4–1.2)
CREATININE, URINE: 55.3 MG/DL
GFR SERPL CREATININE-BSD FRML MDRD: 83 ML/MIN/1.73M2
GLUCOSE BLD-MCNC: 176 MG/DL (ref 70–108)
MICROALBUMIN UR-MCNC: 3.41 MG/DL
MICROALBUMIN/CREAT UR-RTO: 62 MG/G (ref 0–30)
POTASSIUM SERPL-SCNC: 4.8 MEQ/L (ref 3.5–5.2)
SODIUM BLD-SCNC: 138 MEQ/L (ref 135–145)

## 2021-02-04 PROCEDURE — 36415 COLL VENOUS BLD VENIPUNCTURE: CPT

## 2021-02-04 PROCEDURE — 80048 BASIC METABOLIC PNL TOTAL CA: CPT

## 2021-02-04 PROCEDURE — 82043 UR ALBUMIN QUANTITATIVE: CPT

## 2021-02-11 ENCOUNTER — OFFICE VISIT (OUTPATIENT)
Dept: NEPHROLOGY | Age: 71
End: 2021-02-11
Payer: MEDICARE

## 2021-02-11 VITALS
OXYGEN SATURATION: 93 % | WEIGHT: 175.6 LBS | BODY MASS INDEX: 31.31 KG/M2 | SYSTOLIC BLOOD PRESSURE: 131 MMHG | TEMPERATURE: 97.4 F | HEART RATE: 65 BPM | DIASTOLIC BLOOD PRESSURE: 76 MMHG

## 2021-02-11 DIAGNOSIS — E11.21 DIABETIC NEPHROPATHY ASSOCIATED WITH TYPE 2 DIABETES MELLITUS (HCC): ICD-10-CM

## 2021-02-11 DIAGNOSIS — R80.9 MICROALBUMINURIA: Primary | ICD-10-CM

## 2021-02-11 DIAGNOSIS — I10 ESSENTIAL HYPERTENSION: ICD-10-CM

## 2021-02-11 DIAGNOSIS — E78.5 HYPERLIPIDEMIA, UNSPECIFIED HYPERLIPIDEMIA TYPE: ICD-10-CM

## 2021-02-11 PROCEDURE — G8399 PT W/DXA RESULTS DOCUMENT: HCPCS | Performed by: INTERNAL MEDICINE

## 2021-02-11 PROCEDURE — 1123F ACP DISCUSS/DSCN MKR DOCD: CPT | Performed by: INTERNAL MEDICINE

## 2021-02-11 PROCEDURE — 3017F COLORECTAL CA SCREEN DOC REV: CPT | Performed by: INTERNAL MEDICINE

## 2021-02-11 PROCEDURE — 1036F TOBACCO NON-USER: CPT | Performed by: INTERNAL MEDICINE

## 2021-02-11 PROCEDURE — 3046F HEMOGLOBIN A1C LEVEL >9.0%: CPT | Performed by: INTERNAL MEDICINE

## 2021-02-11 PROCEDURE — 2022F DILAT RTA XM EVC RTNOPTHY: CPT | Performed by: INTERNAL MEDICINE

## 2021-02-11 PROCEDURE — G8484 FLU IMMUNIZE NO ADMIN: HCPCS | Performed by: INTERNAL MEDICINE

## 2021-02-11 PROCEDURE — G8427 DOCREV CUR MEDS BY ELIG CLIN: HCPCS | Performed by: INTERNAL MEDICINE

## 2021-02-11 PROCEDURE — G8417 CALC BMI ABV UP PARAM F/U: HCPCS | Performed by: INTERNAL MEDICINE

## 2021-02-11 PROCEDURE — 1090F PRES/ABSN URINE INCON ASSESS: CPT | Performed by: INTERNAL MEDICINE

## 2021-02-11 PROCEDURE — 4040F PNEUMOC VAC/ADMIN/RCVD: CPT | Performed by: INTERNAL MEDICINE

## 2021-02-11 PROCEDURE — 99212 OFFICE O/P EST SF 10 MIN: CPT | Performed by: INTERNAL MEDICINE

## 2021-02-25 ENCOUNTER — OFFICE VISIT (OUTPATIENT)
Dept: CARDIOLOGY CLINIC | Age: 71
End: 2021-02-25
Payer: MEDICARE

## 2021-02-25 VITALS
DIASTOLIC BLOOD PRESSURE: 70 MMHG | WEIGHT: 174.2 LBS | BODY MASS INDEX: 32.06 KG/M2 | HEART RATE: 63 BPM | HEIGHT: 62 IN | SYSTOLIC BLOOD PRESSURE: 146 MMHG

## 2021-02-25 DIAGNOSIS — I10 ESSENTIAL HYPERTENSION: ICD-10-CM

## 2021-02-25 DIAGNOSIS — I25.10 CORONARY ARTERY DISEASE INVOLVING NATIVE CORONARY ARTERY OF NATIVE HEART WITHOUT ANGINA PECTORIS: Primary | ICD-10-CM

## 2021-02-25 DIAGNOSIS — E78.01 FAMILIAL HYPERCHOLESTEROLEMIA: ICD-10-CM

## 2021-02-25 PROCEDURE — 1090F PRES/ABSN URINE INCON ASSESS: CPT | Performed by: NUCLEAR MEDICINE

## 2021-02-25 PROCEDURE — 3017F COLORECTAL CA SCREEN DOC REV: CPT | Performed by: NUCLEAR MEDICINE

## 2021-02-25 PROCEDURE — G8417 CALC BMI ABV UP PARAM F/U: HCPCS | Performed by: NUCLEAR MEDICINE

## 2021-02-25 PROCEDURE — G8399 PT W/DXA RESULTS DOCUMENT: HCPCS | Performed by: NUCLEAR MEDICINE

## 2021-02-25 PROCEDURE — G8427 DOCREV CUR MEDS BY ELIG CLIN: HCPCS | Performed by: NUCLEAR MEDICINE

## 2021-02-25 PROCEDURE — 93000 ELECTROCARDIOGRAM COMPLETE: CPT | Performed by: NUCLEAR MEDICINE

## 2021-02-25 PROCEDURE — 4040F PNEUMOC VAC/ADMIN/RCVD: CPT | Performed by: NUCLEAR MEDICINE

## 2021-02-25 PROCEDURE — 1036F TOBACCO NON-USER: CPT | Performed by: NUCLEAR MEDICINE

## 2021-02-25 PROCEDURE — G8484 FLU IMMUNIZE NO ADMIN: HCPCS | Performed by: NUCLEAR MEDICINE

## 2021-02-25 PROCEDURE — 1123F ACP DISCUSS/DSCN MKR DOCD: CPT | Performed by: NUCLEAR MEDICINE

## 2021-02-25 PROCEDURE — 99213 OFFICE O/P EST LOW 20 MIN: CPT | Performed by: NUCLEAR MEDICINE

## 2021-02-25 NOTE — PROGRESS NOTES
Problem Relation Age of Onset    Heart Disease Father     Heart Attack Father     Other Father         LUNG DISEASE-MACULAR DENERATION    Cancer Mother     High Blood Pressure Mother     Heart Disease Mother     Other Mother         GLAUCOMA    High Blood Pressure Sister     Osteoporosis Sister      Social History     Tobacco Use    Smoking status: Former Smoker     Years: 30.00     Types: Cigarettes     Quit date: 1999     Years since quittin.2    Smokeless tobacco: Former User   Substance Use Topics    Alcohol use: No     Comment: rarely      Current Outpatient Medications   Medication Sig Dispense Refill    lisinopril (PRINIVIL;ZESTRIL) 20 MG tablet TAKE 1 TABLET TWICE DAILY 180 tablet 3    levothyroxine (SYNTHROID) 50 MCG tablet TAKE 1 TABLET EVERY DAY 90 tablet 3    atorvastatin (LIPITOR) 40 MG tablet TAKE 1 TABLET EVERY DAY 90 tablet 3    atenolol (TENORMIN) 50 MG tablet TAKE 1/2 TABLET EVERY DAY 45 tablet 3    glimepiride (AMARYL) 2 MG tablet TAKE 4 MG IN THE MORNING AND 2 MG  tablet 3    metFORMIN (GLUCOPHAGE-XR) 500 MG extended release tablet TAKE 2 TABLETS TWICE DAILY 360 tablet 3    escitalopram (LEXAPRO) 20 MG tablet TAKE 1 TABLET EVERY DAY 90 tablet 3    pantoprazole (PROTONIX) 40 MG tablet TAKE 1 TABLET TWICE DAILY 180 tablet 3    Blood Glucose Monitoring Suppl (ACCU-CHEK ISABELA PLUS) w/Device KIT USE 1 TO CHECK GLUCOSE ONCE DAILY      blood glucose monitor kit and supplies Glucose monitor and supplies, brand per pt preference. Test sugar daily. Dx: E11.9 1 kit 0    blood glucose monitor strips Glucose test strips, brand per pt preference. Test sugar daily. Dx: E11.9 100 strip 3    Lancets MISC Test sugar daily. Brand per pt preference.  Dx: E11.9 100 each 3    buPROPion (WELLBUTRIN SR) 150 MG extended release tablet Take 150 mg by mouth 2 times daily      aspirin 81 MG tablet Take 81 mg by mouth nightly      FISH OIL Take by mouth nightly Arthur Red  Docusate Calcium (STOOL SOFTENER PO) Take by mouth nightly as needed        No current facility-administered medications for this visit. No Known Allergies  Health Maintenance   Topic Date Due    Shingles Vaccine (2 of 3) 06/21/2012    Breast cancer screen  05/14/2020    COVID-19 Vaccine (2 of 2 - Pfizer series) 02/26/2021    TSH testing  06/11/2021    A1C test (Diabetic or Prediabetic)  07/29/2021    Diabetic foot exam  09/11/2021    Annual Wellness Visit (AWV)  09/12/2021    Diabetic retinal exam  10/22/2021    Lipid screen  11/25/2021    Diabetic microalbuminuria test  02/04/2022    Potassium monitoring  02/04/2022    Creatinine monitoring  02/04/2022    DTaP/Tdap/Td vaccine (2 - Td) 09/19/2024    Colon cancer screen colonoscopy  03/01/2028    DEXA (modify frequency per FRAX score)  Completed    Flu vaccine  Completed    Pneumococcal 65+ years Vaccine  Completed    Hepatitis C screen  Completed    Hepatitis A vaccine  Aged Out    Hib vaccine  Aged Out    Meningococcal (ACWY) vaccine  Aged Out       Subjective:  Review of Systems  General:   No fever, no chills, No fatigue or weight loss  Pulmonary:    No dyspnea, no wheezing  Cardiac:    Denies recent chest pain,   GI:     No nausea or vomiting, no abdominal pain  Neuro:    No dizziness or light headedness,   Musculoskeletal:  No recent active issues  Extremities:   No edema, no obvious claudication       Objective:  Physical Exam  BP (!) 146/70   Pulse 63   Ht 5' 2\" (1.575 m)   Wt 174 lb 3.2 oz (79 kg)   BMI 31.86 kg/m²   General:   Well developed, well nourished  Lungs:   Clear to auscultation  Heart:    Normal S1 S2, Slight murmur. no rubs, no gallops  Abdomen:   Soft, non tender, no organomegalies, positive bowel sounds  Extremities:   No edema, no cyanosis, good peripheral pulses  Neurological:   Awake, alert, oriented.  No obvious focal deficits  Musculoskelatal:  No obvious deformities    Assessment:      Diagnosis Orders 1. Coronary artery disease involving native coronary artery of native heart without angina pectoris  EKG 12 Lead   2. Essential hypertension     3. Familial hypercholesterolemia     as above  Cardiac fair for now   ECG in office was done today. I reviewed the ECG. No acute findings      Plan:  No follow-ups on file. Continue risk factor modification and medical management  Thank you for allowing me to participate in the care of your patient. Please don't hesitate to contact me regarding any further issues related to the patient care      Orders Placed:  Orders Placed This Encounter   Procedures    EKG 12 Lead     Order Specific Question:   Reason for Exam?     Answer: Other       Medications Prescribed:  No orders of the defined types were placed in this encounter. Discussed use, benefit, and side effects of prescribed medications. All patient questions answered. Pt voicedunderstanding. Instructed to continue current medications, diet and exercise. Continue risk factor modification and medical management. Patient agreed with treatment plan. Follow up as directed.     Electronically signedby Isreal Edgar MD on 2/25/2021 at 1:19 PM

## 2021-03-17 ENCOUNTER — OFFICE VISIT (OUTPATIENT)
Dept: FAMILY MEDICINE CLINIC | Age: 71
End: 2021-03-17
Payer: MEDICARE

## 2021-03-17 VITALS
BODY MASS INDEX: 31.61 KG/M2 | OXYGEN SATURATION: 99 % | DIASTOLIC BLOOD PRESSURE: 66 MMHG | WEIGHT: 172.8 LBS | TEMPERATURE: 96.8 F | SYSTOLIC BLOOD PRESSURE: 110 MMHG | HEART RATE: 69 BPM

## 2021-03-17 DIAGNOSIS — K21.9 GASTROESOPHAGEAL REFLUX DISEASE, UNSPECIFIED WHETHER ESOPHAGITIS PRESENT: ICD-10-CM

## 2021-03-17 DIAGNOSIS — E03.9 HYPOTHYROIDISM, UNSPECIFIED TYPE: ICD-10-CM

## 2021-03-17 DIAGNOSIS — I25.10 CORONARY ARTERY DISEASE INVOLVING NATIVE CORONARY ARTERY OF NATIVE HEART WITHOUT ANGINA PECTORIS: ICD-10-CM

## 2021-03-17 DIAGNOSIS — F32.A DEPRESSION, UNSPECIFIED DEPRESSION TYPE: ICD-10-CM

## 2021-03-17 DIAGNOSIS — K76.0 NAFLD (NONALCOHOLIC FATTY LIVER DISEASE): ICD-10-CM

## 2021-03-17 DIAGNOSIS — E78.5 HYPERLIPIDEMIA, UNSPECIFIED HYPERLIPIDEMIA TYPE: ICD-10-CM

## 2021-03-17 DIAGNOSIS — I10 ESSENTIAL HYPERTENSION: ICD-10-CM

## 2021-03-17 DIAGNOSIS — E11.8 TYPE 2 DIABETES MELLITUS WITH COMPLICATION, WITHOUT LONG-TERM CURRENT USE OF INSULIN (HCC): ICD-10-CM

## 2021-03-17 DIAGNOSIS — Z00.00 ANNUAL PHYSICAL EXAM: Primary | ICD-10-CM

## 2021-03-17 PROCEDURE — G0439 PPPS, SUBSEQ VISIT: HCPCS | Performed by: NURSE PRACTITIONER

## 2021-03-17 PROCEDURE — G8484 FLU IMMUNIZE NO ADMIN: HCPCS | Performed by: NURSE PRACTITIONER

## 2021-03-17 ASSESSMENT — ENCOUNTER SYMPTOMS
FACIAL SWELLING: 0
DIARRHEA: 0
NAUSEA: 0
SINUS PAIN: 0
WHEEZING: 0
BACK PAIN: 0
COUGH: 0
EYE PAIN: 0
SORE THROAT: 0
SHORTNESS OF BREATH: 0
TROUBLE SWALLOWING: 0
ABDOMINAL PAIN: 0
COLOR CHANGE: 0
VOMITING: 0

## 2021-03-17 NOTE — PATIENT INSTRUCTIONS
Patient Education        Coronary Artery Disease: Care Instructions  Your Care Instructions     The heart is a muscle, and like any muscle, it needs blood to work well. Coronary artery disease occurs when the arteries that bring oxygen-rich blood to your heart have a buildup of plaque--deposits of fats and other substances. Plaque can reduce blood flow to the heart muscle. This can cause angina symptoms such as chest pain or pressure. A heart attack can happen if blood flow is completely blocked. You can do a lot to improve your health and prevent a heart attack. Eating healthy food, not smoking, getting regular exercise, and taking your medicine are the main things you can do every day to stay healthy. Follow-up care is a key part of your treatment and safety. Be sure to make and go to all appointments, and call your doctor if you are having problems. It's also a good idea to know your test results and keep a list of the medicines you take. How can you care for yourself at home? Medicines    · Be safe with medicines. Take your medicines exactly as prescribed. Call your doctor if you think you are having a problem with your medicine. You will get more details on the specific medicines your doctor prescribes.     · You will take medicines that lower your risk of a heart attack and lower your risk of dying early from heart disease. These medicines include:  ? Angiotensin-converting enzyme (ACE) inhibitors or angiotensin II receptor blockers (ARBs). They lower blood pressure. ? Aspirin and other blood thinners. They prevent blood clots that could cause a heart attack. ? Beta-blockers. They lower the heart's workload. ? Statins and other cholesterol medicines. They lower cholesterol.     · If your doctor has given you nitroglycerin for angina symptoms (such as chest pain or pressure) keep it with you at all times. If you have symptoms, sit down and rest, and take the first dose of nitroglycerin as directed.  If your symptoms get worse or are not getting better within 5 minutes, call 911 right away. Stay on the phone. The emergency  will give you further instructions.     · Do not take any over-the-counter medicines, vitamins, or herbal products without talking to your doctor first.   Lifestyle  Ask your doctor if a cardiac rehab program is right for you. Cardiac rehab can help you make lifestyle changes. In cardiac rehab, a team of health professionals provides education and support to help you make new, healthy habits.    · Do not smoke. Avoid secondhand smoke too. Smoking can increase your risk of a heart attack or stroke. If you need help quitting, talk to your doctor about stop-smoking programs and medicines. These can increase your chances of quitting for good.     · Eat heart-healthy foods. These include vegetables, fruits, nuts, beans, lean meat, fish, and whole grains. Limit saturated fat, sodium, and alcohol. Limit drinks and foods with added sugar.     · If your doctor recommends it, get more exercise. Ask your doctor what level of exercise is safe for you. Walking is a good choice. Bit by bit, increase the amount you walk every day. Try for at least 30 minutes on most days of the week. You also may want to swim, bike, or do other activities.     · Stay at a healthy weight. Lose weight if you need to.     · Manage other health problems. These include diabetes, high blood pressure, and high cholesterol. If you think you may have a problem with alcohol or drug use, talk to your doctor.     · If you have angina symptoms, pay attention to your symptoms. This can help you see what causes them and what is typical for you.     · Avoid colds and flu. Get a pneumococcal vaccine shot. If you have had one before, ask your doctor whether you need another dose. Get a flu vaccine every year.  If you must be around people with colds or flu, wash your hands often.     · If you think you have symptoms of depression, talk to your doctor. Symptoms include feeling sad or hopeless most of the time, or losing interest in activities that used to make you happy. When should you call for help? Call 911 anytime you think you may need emergency care. For example, call if:    · You have symptoms of a heart attack. These may include:  ? Chest pain or pressure, or a strange feeling in the chest.  ? Sweating. ? Shortness of breath. ? Nausea or vomiting. ? Pain, pressure, or a strange feeling in the back, neck, jaw, or upper belly or in one or both shoulders or arms. ? Lightheadedness or sudden weakness. ? A fast or irregular heartbeat. After you call 911, the  may tell you to chew 1 adult-strength or 2 to 4 low-dose aspirin. Wait for an ambulance. Do not try to drive yourself.     · You have angina symptoms (such as chest pain or pressure) that do not go away with rest or are not getting better within 5 minutes after you take a dose of nitroglycerin.     · You passed out (lost consciousness). Call your doctor now or seek immediate medical care if:    · You are having angina symptoms, such as chest pain or pressure, more often than usual, or they are different or worse than usual.     · You have new or increased shortness of breath.     · You are dizzy or lightheaded, or you feel like you may faint. Watch closely for changes in your health, and be sure to contact your doctor if you have any problems. Where can you learn more? Go to https://[employer].[facility]. org and sign in to your [employer] account. Enter G062 in the [facility] box to learn more about \"Coronary Artery Disease: Care Instructions. \"     If you do not have an account, please click on the \"Sign Up Now\" link. Current as of: August 31, 2020               Content Version: 12.8  © 5856-3586 Healthwise, Incorporated. Care instructions adapted under license by [facility] ([facility]).  If you have questions about a medical condition or this instruction, always ask your healthcare professional. [place] [address] any warranty or liability for your use of this information.

## 2021-03-17 NOTE — PROGRESS NOTES
Date    CHOL 126 11/25/2020    TRIG 193 11/25/2020    HDL 38 11/25/2020    LDLCALC 49 11/25/2020    LDLDIRECT 79.69 05/14/2015        Hypothyroidism: Recent symptoms: none. She denies fatigue, weight gain, weight loss, cold intolerance and heat intolerance. Patient is  taking her medication consistently on an empty stomach. No results found for: [facility]  Lab Results   Component Value Date    TSH 2.970 06/11/2020    TSH 2.920 07/29/2019    TSH 3.010 07/23/2018       Review of Systems   Constitutional: Negative for chills, fatigue and fever. HENT: Negative for congestion, facial swelling, sinus pain, sore throat and trouble swallowing. Eyes: Negative for pain and visual disturbance. Respiratory: Negative for cough, shortness of breath and wheezing. Cardiovascular: Negative for chest pain and palpitations. Gastrointestinal: Negative for abdominal pain, diarrhea, nausea and vomiting. Genitourinary: Negative for difficulty urinating, dysuria and urgency. Musculoskeletal: Negative for back pain, gait problem and neck pain. Skin: Negative for color change and rash. Neurological: Negative for dizziness, weakness and headaches. Psychiatric/Behavioral: Negative for agitation and sleep disturbance. The patient is not nervous/anxious. Prior to Visit Medications    Medication Sig Taking?  Authorizing Provider   lisinopril (PRINIVIL;ZESTRIL) 20 MG tablet TAKE 1 TABLET TWICE DAILY Yes Gina Jennings MD   levothyroxine (SYNTHROID) 50 MCG tablet TAKE 1 TABLET EVERY DAY Yes Gina Jennings MD   atorvastatin (LIPITOR) 40 MG tablet TAKE 1 TABLET EVERY DAY Yes Gina Jennings MD   atenolol (TENORMIN) 50 MG tablet TAKE 1/2 TABLET EVERY DAY Yes Gina Jennings MD   glimepiride (AMARYL) 2 MG tablet TAKE 4 MG IN THE MORNING AND 2 MG PM Yes FABIENNE Reilly CNP   metFORMIN (GLUCOPHAGE-XR) 500 MG extended release tablet TAKE 2 TABLETS TWICE DAILY Yes Gina Jennings MD   escitalopram (Jose Flakes) 20 MG tablet TAKE 1 TABLET Franco Varghese MD   pantoprazole (PROTONIX) 40 MG tablet TAKE 1 TABLET TWICE DAILY Yes Lila Levy MD   Blood Glucose Monitoring Suppl (ACCU-CHEK ISABELA PLUS) w/Device KIT USE 1 TO CHECK GLUCOSE ONCE DAILY Yes Historical Provider, MD   blood glucose monitor kit and supplies Glucose monitor and supplies, brand per pt preference. Test sugar daily. Dx: E11.9 Yes Lila Levy MD   blood glucose monitor strips Glucose test strips, brand per pt preference. Test sugar daily. Dx: E11.9 Yes Lila Levy MD   Lancets MISC Test sugar daily. Brand per pt preference. Dx: E11.9 Yes Lila Levy MD   buPROPion [facility] SR) 150 MG extended release tablet Take 150 mg by mouth 2 times daily Yes Historical Provider, MD   aspirin 81 MG tablet Take 81 mg by mouth nightly Yes Historical Provider, MD   FISH OIL Take by mouth nightly Arthur Red Yes Historical Provider, MD   Docusate Calcium (STOOL SOFTENER PO) Take by mouth nightly as needed  Yes Historical Provider, MD        Social History     Tobacco Use    Smoking status: Former Smoker     Years: 30.00     Types: Cigarettes     Quit date: 1999     Years since quittin.3    Smokeless tobacco: Former User   Substance Use Topics    Alcohol use: No     Comment: rarely        Vitals:    21 1357   BP: 110/66   Site: Right Upper Arm   Pulse: 69   Temp: 96.8 °F (36 °C)   TempSrc: Temporal   SpO2: 99%   Weight: 172 lb 12.8 oz (78.4 kg)     Estimated body mass index is 31.61 kg/m² as calculated from the following:    Height as of 21: 5' 2\" (1.575 m). Weight as of this encounter: 172 lb 12.8 oz (78.4 kg). Physical Exam  Vitals signs reviewed. Constitutional:       General: She is not in acute distress. Appearance: Normal appearance. She is well-developed. HENT:      Head: Normocephalic and atraumatic.       Right Ear: Hearing, tympanic membrane, ear canal and external ear normal.      Left Ear: Hearing, tympanic membrane, ear canal and external ear normal.      Nose: Nose normal. No nasal tenderness. Mouth/Throat:      Lips: Pink. Mouth: Mucous membranes are moist. No oral lesions. Pharynx: Oropharynx is clear. Uvula midline. Eyes:      General:         Right eye: No discharge. Left eye: No discharge. Conjunctiva/sclera: Conjunctivae normal.   Neck:      Musculoskeletal: Full passive range of motion without pain, normal range of motion and neck supple. Vascular: No carotid bruit. Trachea: No tracheal deviation. Cardiovascular:      Rate and Rhythm: Normal rate and regular rhythm. Heart sounds: Normal heart sounds. No murmur. Pulmonary:      Effort: Pulmonary effort is normal. No respiratory distress. Breath sounds: Normal breath sounds. Abdominal:      General: Bowel sounds are normal.      Palpations: Abdomen is soft. Tenderness: There is no abdominal tenderness. Musculoskeletal:      Right lower leg: No edema. Left lower leg: No edema. Lymphadenopathy:      Head:      Right side of head: No submental, submandibular, tonsillar, preauricular, posterior auricular or occipital adenopathy. Left side of head: No submental, submandibular, tonsillar, preauricular, posterior auricular or occipital adenopathy. Cervical: No cervical adenopathy. Skin:     General: Skin is warm and dry. Findings: No rash. Neurological:      General: No focal deficit present. Mental Status: She is alert and oriented to person, place, and time. Coordination: Coordination normal.   Psychiatric:         Mood and Affect: Mood normal.         Behavior: Behavior normal.         Thought Content: Thought content normal.         Judgment: Judgment normal.         ASSESSMENT/PLAN:  1. Annual physical exam    2. Coronary artery disease involving native coronary artery of native heart without angina pectoris    3. Essential hypertension    4.

## 2021-03-19 DIAGNOSIS — E11.8 TYPE 2 DIABETES MELLITUS WITH COMPLICATION, WITHOUT LONG-TERM CURRENT USE OF INSULIN (HCC): ICD-10-CM

## 2021-03-23 RX ORDER — BLOOD SUGAR DIAGNOSTIC
STRIP MISCELLANEOUS
Qty: 100 EACH | Refills: 3 | Status: SHIPPED | OUTPATIENT
Start: 2021-03-23

## 2021-03-23 NOTE — TELEPHONE ENCOUNTER
Nilayannamarie Bucio needs refill of   Requested Prescriptions     Pending Prescriptions Disp Refills    blood glucose test strips (ACCU-CHEK ISABELA PLUS) strip [Pharmacy Med Name: Accu-Chek Isabela Plus In Vitro Strip] 100 each 0     Sig: USE 1 STRIP TO CHECK GLUCOSE ONCE DAILY       Last Filled on:  1/31/20 100*3    Last Visit Date:  3/17/21-annual  9/11/20-[facility]    Next Visit Date:    Visit date not found

## 2021-03-24 DIAGNOSIS — E11.8 TYPE 2 DIABETES MELLITUS WITH COMPLICATION, WITHOUT LONG-TERM CURRENT USE OF INSULIN (HCC): ICD-10-CM

## 2021-03-25 DIAGNOSIS — E11.8 TYPE 2 DIABETES MELLITUS WITH COMPLICATION, WITHOUT LONG-TERM CURRENT USE OF INSULIN (HCC): ICD-10-CM

## 2021-03-25 RX ORDER — BLOOD SUGAR DIAGNOSTIC
STRIP MISCELLANEOUS
Qty: 100 EACH | Refills: 0 | OUTPATIENT
Start: 2021-03-25

## 2021-03-25 NOTE — TELEPHONE ENCOUNTER
This medication refill is regarding a electronic request by [facility]. Requested Prescriptions     Pending Prescriptions Disp Refills    ACCU-CHEK ISABELA PLUS strip [Pharmacy Med Name: Accu-Chek Isabela Plus In Vitro Strip] 100 each 0     Sig: USE 1 STRIP TO CHECK GLUCOSE ONCE DAILY       Date of last visit: 3/17/2021  Date of next visit: 9/20/2021  Date of last refill: 3/23/2021  100/3    Refused at this time. Script was just sent.

## 2021-03-25 NOTE — TELEPHONE ENCOUNTER
This medication refill is regarding a electronic request by [facility]. Requested Prescriptions     Pending Prescriptions Disp Refills    ACCU-CHEK ISABELA PLUS strip [Pharmacy Med Name: Accu-Chek Isabela Plus In Vitro Strip] 100 each 0     Sig: USE 1 STRIP TO CHECK GLUCOSE ONCE DAILY       Date of last visit: 3/17/2021  Date of next visit: 9/20/2021  Date of last refill: 3/23/2021  100/3    Refused script sent 3/23.

## 2021-03-26 DIAGNOSIS — E11.8 TYPE 2 DIABETES MELLITUS WITH COMPLICATION, WITHOUT LONG-TERM CURRENT USE OF INSULIN (HCC): ICD-10-CM

## 2021-03-26 RX ORDER — BLOOD SUGAR DIAGNOSTIC
STRIP MISCELLANEOUS
Qty: 100 EACH | Refills: 0 | OUTPATIENT
Start: 2021-03-26

## 2021-04-22 DIAGNOSIS — E11.8 TYPE 2 DIABETES MELLITUS WITH COMPLICATION, WITHOUT LONG-TERM CURRENT USE OF INSULIN (HCC): ICD-10-CM

## 2021-04-22 DIAGNOSIS — K21.9 GASTROESOPHAGEAL REFLUX DISEASE: ICD-10-CM

## 2021-04-22 DIAGNOSIS — F32.A DEPRESSION, UNSPECIFIED DEPRESSION TYPE: ICD-10-CM

## 2021-04-22 RX ORDER — PANTOPRAZOLE SODIUM 40 MG/1
TABLET, DELAYED RELEASE ORAL
Qty: 180 TABLET | Refills: 3 | Status: SHIPPED | OUTPATIENT
Start: 2021-04-22 | End: 2022-04-27

## 2021-04-22 RX ORDER — ESCITALOPRAM OXALATE 20 MG/1
TABLET ORAL
Qty: 90 TABLET | Refills: 3 | Status: SHIPPED | OUTPATIENT
Start: 2021-04-22 | End: 2022-04-27

## 2021-04-22 RX ORDER — METFORMIN HYDROCHLORIDE 500 MG/1
TABLET, EXTENDED RELEASE ORAL
Qty: 360 TABLET | Refills: 3 | Status: SHIPPED | OUTPATIENT
Start: 2021-04-22 | End: 2022-04-27

## 2021-04-22 NOTE — TELEPHONE ENCOUNTER
OK for refill. Please encourage patient to get labs done ASAP as recommended per WS at most recent office visit.   ES

## 2021-06-01 ENCOUNTER — VIRTUAL VISIT (OUTPATIENT)
Dept: FAMILY MEDICINE CLINIC | Age: 71
End: 2021-06-01
Payer: MEDICARE

## 2021-06-01 ENCOUNTER — HOSPITAL ENCOUNTER (OUTPATIENT)
Dept: GENERAL RADIOLOGY | Age: 71
Discharge: HOME | End: 2021-06-01
Payer: MEDICARE

## 2021-06-01 ENCOUNTER — HOSPITAL ENCOUNTER (OUTPATIENT)
Age: 71
Discharge: HOME | End: 2021-06-01
Payer: MEDICARE

## 2021-06-01 DIAGNOSIS — J20.9 BRONCHITIS WITH BRONCHOSPASM: Primary | ICD-10-CM

## 2021-06-01 DIAGNOSIS — R05.9 COUGH: ICD-10-CM

## 2021-06-01 DIAGNOSIS — J20.9 BRONCHITIS WITH BRONCHOSPASM: ICD-10-CM

## 2021-06-01 PROCEDURE — U0003 INFECTIOUS AGENT DETECTION BY NUCLEIC ACID (DNA OR RNA); SEVERE ACUTE RESPIRATORY SYNDROME CORONAVIRUS 2 (SARS-COV-2) (CORONAVIRUS DISEASE [COVID-19]), AMPLIFIED PROBE TECHNIQUE, MAKING USE OF HIGH THROUGHPUT TECHNOLOGIES AS DESCRIBED BY CMS-2020-01-R: HCPCS

## 2021-06-01 PROCEDURE — 1090F PRES/ABSN URINE INCON ASSESS: CPT | Performed by: NURSE PRACTITIONER

## 2021-06-01 PROCEDURE — U0005 INFEC AGEN DETEC AMPLI PROBE: HCPCS

## 2021-06-01 PROCEDURE — 71046 X-RAY EXAM CHEST 2 VIEWS: CPT

## 2021-06-01 PROCEDURE — G8427 DOCREV CUR MEDS BY ELIG CLIN: HCPCS | Performed by: NURSE PRACTITIONER

## 2021-06-01 PROCEDURE — 4040F PNEUMOC VAC/ADMIN/RCVD: CPT | Performed by: NURSE PRACTITIONER

## 2021-06-01 PROCEDURE — 99213 OFFICE O/P EST LOW 20 MIN: CPT | Performed by: NURSE PRACTITIONER

## 2021-06-01 PROCEDURE — 3017F COLORECTAL CA SCREEN DOC REV: CPT | Performed by: NURSE PRACTITIONER

## 2021-06-01 PROCEDURE — 1123F ACP DISCUSS/DSCN MKR DOCD: CPT | Performed by: NURSE PRACTITIONER

## 2021-06-01 PROCEDURE — G8399 PT W/DXA RESULTS DOCUMENT: HCPCS | Performed by: NURSE PRACTITIONER

## 2021-06-01 RX ORDER — AZITHROMYCIN 250 MG/1
TABLET, FILM COATED ORAL
Qty: 6 TABLET | Refills: 0 | Status: SHIPPED | OUTPATIENT
Start: 2021-06-01 | End: 2021-06-11

## 2021-06-01 RX ORDER — BENZONATATE 100 MG/1
100 CAPSULE ORAL 3 TIMES DAILY PRN
Qty: 30 CAPSULE | Refills: 0 | Status: SHIPPED | OUTPATIENT
Start: 2021-06-01 | End: 2021-06-08

## 2021-06-01 ASSESSMENT — ENCOUNTER SYMPTOMS
SINUS PRESSURE: 1
BACK PAIN: 0
SINUS COMPLAINT: 1
NAUSEA: 0
SHORTNESS OF BREATH: 0
COUGH: 1
COLOR CHANGE: 0
VOMITING: 0
SORE THROAT: 0
RHINORRHEA: 1
HOARSE VOICE: 0
SINUS PAIN: 0
DIARRHEA: 0

## 2021-06-01 NOTE — PROGRESS NOTES
Suha Ayers (:  1950) is a 80 y.o. female,Established patient, here for evaluation of the following chief complaint(s): Congestion, Cough, and Fatigue         ASSESSMENT/PLAN:  1. Bronchitis with bronchospasm  -     azithromycin (ZITHROMAX Z-CHERYL) 250 MG tablet; 2 pills orally for 1 day, then 1 pill orally for 4 days, Disp-6 tablet, R-0Normal  -     benzonatate (TESSALON) 100 MG capsule; Take 1 capsule by mouth 3 times daily as needed for Cough, Disp-30 capsule, R-0Normal  -     XR CHEST (2 VW); Future  -     COVID-19; Future  2. Cough  -     benzonatate (TESSALON) 100 MG capsule; Take 1 capsule by mouth 3 times daily as needed for Cough, Disp-30 capsule, R-0Normal  -     XR CHEST (2 VW); Future  -     COVID-19; Future    - Rest and increase fluids  - Tylenol as needed for pain and fever  - Isolate until testing results are back  - Good handwashing and clean all surfaces touched  - Call office with any questions or concerns, or if symptoms are getting worse or changing  - ER for any chest pain or SOB      Return if symptoms worsen or fail to improve. SUBJECTIVE/OBJECTIVE:  Pt presents to the office today for cough, congestion and fatigue for 6 days. She reports that she has been taking OTC Coricidin HBP with minimal relief. No fever, but has had body aches and fatigue. With her cough she has trouble catching her breath. Sinus Problem  This is a new problem. The current episode started in the past 7 days. The problem has been gradually worsening since onset. The pain is moderate. Associated symptoms include congestion, coughing, headaches and sinus pressure. Pertinent negatives include no chills, diaphoresis, ear pain, hoarse voice, neck pain, shortness of breath or sore throat. Past treatments include lying down, acetaminophen and oral decongestants. The treatment provided mild relief. Cough  This is a new problem. The current episode started in the past 7 days.  The problem has been gradually worsening. The cough is productive of purulent sputum and productive of sputum. Associated symptoms include headaches, myalgias, nasal congestion, postnasal drip and rhinorrhea. Pertinent negatives include no chest pain, chills, ear congestion, ear pain, fever, rash, sore throat, shortness of breath, sweats or weight loss. The symptoms are aggravated by lying down. She has tried rest and OTC cough suppressant for the symptoms. The treatment provided mild relief. Review of Systems   Constitutional: Positive for fatigue. Negative for chills, diaphoresis, fever and weight loss. HENT: Positive for congestion, postnasal drip, rhinorrhea and sinus pressure. Negative for ear pain, hoarse voice, sinus pain and sore throat. Respiratory: Positive for cough. Negative for shortness of breath. Cardiovascular: Negative for chest pain and palpitations. Gastrointestinal: Negative for diarrhea, nausea and vomiting. Musculoskeletal: Positive for myalgias. Negative for arthralgias, back pain and neck pain. Skin: Negative for color change and rash. Neurological: Positive for headaches. Negative for dizziness and weakness. No flowsheet data found.      Physical Exam    [INSTRUCTIONS:  \"[x]\" Indicates a positive item  \"[]\" Indicates a negative item  -- DELETE ALL ITEMS NOT EXAMINED]    Constitutional: [x] Appears well-developed and well-nourished [x] No apparent distress      [] Abnormal -     Mental status: [x] Alert and awake  [x] Oriented to person/place/time [x] Able to follow commands    [] Abnormal -     Eyes:   EOM    [x]  Normal    [] Abnormal -   Sclera  [x]  Normal    [] Abnormal -          Discharge [x]  None visible   [] Abnormal -     HENT: [x] Normocephalic, atraumatic  [] Abnormal -   [x] Mouth/Throat: Mucous membranes are moist    External Ears [x] Normal  [] Abnormal -    Neck: [x] No visualized mass [] Abnormal -     Pulmonary/Chest: [x] Respiratory effort normal   [x] No visualized signs of difficulty breathing or respiratory distress        [x] Abnormal - harsh congested cough     Musculoskeletal:   [x] Normal gait with no signs of ataxia         [x] Normal range of motion of neck        [] Abnormal -     Neurological:        [x] No Facial Asymmetry (Cranial nerve 7 motor function) (limited exam due to video visit)          [x] No gaze palsy        [] Abnormal -          Skin:        [x] No significant exanthematous lesions or discoloration noted on facial skin         [] Abnormal -            Psychiatric:       [x] Normal Affect [] Abnormal -        [x] No Hallucinations    Other pertinent observable physical exam findings:- pt appears ill, but no acute distress. On this date 6/1/2021 I have spent 20 minutes reviewing previous notes, test results and face to face (virtual) with the patient discussing the diagnosis and importance of compliance with the treatment plan as well as documenting on the day of the visit. Guevara Mascorrours, was evaluated through a synchronous (real-time) audio-video encounter. The patient (or guardian if applicable) is aware that this is a billable service. Verbal consent to proceed has been obtained within the past 12 months. The visit was conducted pursuant to the emergency declaration under the [address] [place] [address], [address] [place] [address] authority and the [employer] and [employer] Act. Patient identification was verified, and a caregiver was present when appropriate. The patient was located in a state where the provider was credentialed to provide care. An electronic signature was used to authenticate this note.     --Nkechi Garrett, APRZOHRA - CNP

## 2021-06-01 NOTE — PATIENT INSTRUCTIONS
Patient Education        Bronchitis: Care Instructions  Your Care Instructions     Bronchitis is inflammation of the bronchial tubes, which carry air to the lungs. The tubes swell and produce mucus, or phlegm. The mucus and inflamed bronchial tubes make you cough. You may have trouble breathing. Most cases of bronchitis are caused by viruses like those that cause colds. Antibiotics usually do not help and they may be harmful. Bronchitis usually develops rapidly and lasts about 2 to 3 weeks in otherwise healthy people. Follow-up care is a key part of your treatment and safety. Be sure to make and go to all appointments, and call your doctor if you are having problems. It's also a good idea to know your test results and keep a list of the medicines you take. How can you care for yourself at home? · Take all medicines exactly as prescribed. Call your doctor if you think you are having a problem with your medicine. · Get some extra rest.  · Take an over-the-counter pain medicine, such as acetaminophen (Tylenol), ibuprofen (Advil, Motrin), or naproxen (Aleve) to reduce fever and relieve body aches. Read and follow all instructions on the label. · Do not take two or more pain medicines at the same time unless the doctor told you to. Many pain medicines have acetaminophen, which is Tylenol. Too much acetaminophen (Tylenol) can be harmful. · Take an over-the-counter cough medicine that contains dextromethorphan to help quiet a dry, hacking cough so that you can sleep. Avoid cough medicines that have more than one active ingredient. Read and follow all instructions on the label. · Breathe moist air from a humidifier, hot shower, or sink filled with hot water. The heat and moisture will thin mucus so you can cough it out. · Do not smoke. Smoking can make bronchitis worse. If you need help quitting, talk to your doctor about stop-smoking programs and medicines. These can increase your chances of quitting for good. When should you call for help? Call 911 anytime you think you may need emergency care. For example, call if:    · You have severe trouble breathing. Call your doctor now or seek immediate medical care if:    · You have new or worse trouble breathing.     · You cough up dark brown or bloody mucus (sputum).     · You have a new or higher fever.     · You have a new rash. Watch closely for changes in your health, and be sure to contact your doctor if:    · You cough more deeply or more often, especially if you notice more mucus or a change in the color of your mucus.     · You are not getting better as expected. Where can you learn more? Go to https://[employer]. org and sign in to your [employer] account. Enter H333 in the [employer] box to learn more about \"Bronchitis: Care Instructions. \"     If you do not have an account, please click on the \"Sign Up Now\" link. Current as of: October 26, 2020               Content Version: 12.8  © 2006-2021 Healthwise, Incorporated. Care instructions adapted under license by [facility] ([facility]). If you have questions about a medical condition or this instruction, always ask your healthcare professional. [place] [address] any warranty or liability for your use of this information.

## 2021-06-02 ENCOUNTER — TELEPHONE (OUTPATIENT)
Dept: FAMILY MEDICINE CLINIC | Age: 71
End: 2021-06-02

## 2021-06-02 NOTE — TELEPHONE ENCOUNTER
Per WS, COVID test is still in process. Will call the pt with all results after the COVID test is finalized.

## 2021-06-02 NOTE — TELEPHONE ENCOUNTER
----- Message from FABIENNE Carranza CNP sent at 6/2/2021  7:45 AM EDT -----  Please let pt know that chest x-ray was normal.  COVID and Flu testing were negative also. Follow up as discussed at appt.  Call office with any questions or concerns, or if symptoms are getting worse or changing  -WS

## 2021-06-03 ENCOUNTER — TELEPHONE (OUTPATIENT)
Dept: FAMILY MEDICINE CLINIC | Age: 71
End: 2021-06-03

## 2021-06-03 LAB
SARS-COV-2: NOT DETECTED
SOURCE: NORMAL

## 2021-06-03 RX ORDER — ALBUTEROL SULFATE 90 UG/1
2 AEROSOL, METERED RESPIRATORY (INHALATION) 4 TIMES DAILY PRN
Qty: 3 INHALER | Refills: 1 | Status: SHIPPED | OUTPATIENT
Start: 2021-06-03 | End: 2022-09-03

## 2021-06-03 NOTE — TELEPHONE ENCOUNTER
Spoke to pt about below message. Pt states she will try the medication. Pt was upset that we didn't find answers. I informed pt the correct steps were tken to ensure she had proper care. Pt stated if she is not feeling well by Monday she would like an appt to see WS. I informed her that is ok. Please give us a call back if she needs an appt.    ALISHA

## 2021-06-03 NOTE — TELEPHONE ENCOUNTER
Noted.  Yes, COVID testing was negative, chest x-ray was normal, and pt is on antibiotics  Pt to follow up if not improving.  Thanks- WS

## 2021-06-03 NOTE — TELEPHONE ENCOUNTER
----- Message from FABIENNE Fishman CNP sent at 6/3/2021 11:00 AM EDT -----  Please call pt and see how she is feeling. Her COVID testing was negative. Continue current treatment plan and Call office with any questions or concerns, or if symptoms are getting worse or changing.  -WS

## 2021-06-03 NOTE — TELEPHONE ENCOUNTER
Noted.  Continue antibiotics and cough medications. Albuterol inhaler sent to [facility] to use every 4-6 hours as needed for cough.  -WS    Orders Placed This Encounter   Medications    albuterol sulfate HFA (VENTOLIN HFA) 108 (90 Base) MCG/ACT inhaler     Sig: Inhale 2 puffs into the lungs 4 times daily as needed for Wheezing     Dispense:  3 Inhaler     Refill:  1

## 2021-07-22 ENCOUNTER — HOSPITAL ENCOUNTER (OUTPATIENT)
Age: 71
Discharge: HOME | End: 2021-07-22
Payer: MEDICARE

## 2021-07-22 LAB
ALBUMIN SERPL-MCNC: 3.9 G/DL (ref 3.5–5.1)
ALP BLD-CCNC: 55 U/L (ref 38–126)
ALT SERPL-CCNC: 24 U/L (ref 11–66)
ANION GAP SERPL CALCULATED.3IONS-SCNC: 13 MEQ/L (ref 8–16)
AST SERPL-CCNC: 27 U/L (ref 5–40)
BASOPHILS # BLD: 0.5 %
BASOPHILS ABSOLUTE: 0 THOU/MM3 (ref 0–0.1)
BILIRUB SERPL-MCNC: 0.4 MG/DL (ref 0.3–1.2)
BILIRUBIN DIRECT: < 0.2 MG/DL (ref 0–0.3)
BUN BLDV-MCNC: 14 MG/DL (ref 7–22)
CALCIUM SERPL-MCNC: 9.6 MG/DL (ref 8.5–10.5)
CHLORIDE BLD-SCNC: 104 MEQ/L (ref 98–111)
CO2: 22 MEQ/L (ref 23–33)
CREAT SERPL-MCNC: 0.7 MG/DL (ref 0.4–1.2)
EOSINOPHIL # BLD: 2.9 %
EOSINOPHILS ABSOLUTE: 0.1 THOU/MM3 (ref 0–0.4)
ERYTHROCYTE [DISTWIDTH] IN BLOOD BY AUTOMATED COUNT: 16.2 % (ref 11.5–14.5)
ERYTHROCYTE [DISTWIDTH] IN BLOOD BY AUTOMATED COUNT: 48.9 FL (ref 35–45)
GFR SERPL CREATININE-BSD FRML MDRD: 83 ML/MIN/1.73M2
GLUCOSE BLD-MCNC: 147 MG/DL (ref 70–108)
HCT VFR BLD CALC: 32.1 % (ref 37–47)
HEMOGLOBIN: 9.3 GM/DL (ref 12–16)
IMMATURE GRANS (ABS): 0.01 THOU/MM3 (ref 0–0.07)
IMMATURE GRANULOCYTES: 0.2 %
INR BLD: 1.2 (ref 0.85–1.13)
LYMPHOCYTES # BLD: 40.4 %
LYMPHOCYTES ABSOLUTE: 1.7 THOU/MM3 (ref 1–4.8)
MCH RBC QN AUTO: 24 PG (ref 26–33)
MCHC RBC AUTO-ENTMCNC: 29 GM/DL (ref 32.2–35.5)
MCV RBC AUTO: 82.7 FL (ref 81–99)
MONOCYTES # BLD: 10.2 %
MONOCYTES ABSOLUTE: 0.4 THOU/MM3 (ref 0.4–1.3)
NUCLEATED RED BLOOD CELLS: 0 /100 WBC
PLATELET # BLD: 124 THOU/MM3 (ref 130–400)
PMV BLD AUTO: 11.2 FL (ref 9.4–12.4)
POTASSIUM SERPL-SCNC: 4.6 MEQ/L (ref 3.5–5.2)
RBC # BLD: 3.88 MILL/MM3 (ref 4.2–5.4)
SEG NEUTROPHILS: 45.8 %
SEGMENTED NEUTROPHILS ABSOLUTE COUNT: 1.9 THOU/MM3 (ref 1.8–7.7)
SODIUM BLD-SCNC: 139 MEQ/L (ref 135–145)
TOTAL PROTEIN: 6.7 G/DL (ref 6.1–8)
WBC # BLD: 4.1 THOU/MM3 (ref 4.8–10.8)

## 2021-07-22 PROCEDURE — 82728 ASSAY OF FERRITIN: CPT

## 2021-07-22 PROCEDURE — 85610 PROTHROMBIN TIME: CPT

## 2021-07-22 PROCEDURE — 85025 COMPLETE CBC W/AUTO DIFF WBC: CPT

## 2021-07-22 PROCEDURE — 82105 ALPHA-FETOPROTEIN SERUM: CPT

## 2021-07-22 PROCEDURE — 82248 BILIRUBIN DIRECT: CPT

## 2021-07-22 PROCEDURE — 83540 ASSAY OF IRON: CPT

## 2021-07-22 PROCEDURE — 36415 COLL VENOUS BLD VENIPUNCTURE: CPT

## 2021-07-22 PROCEDURE — 80053 COMPREHEN METABOLIC PANEL: CPT

## 2021-07-22 PROCEDURE — 84466 ASSAY OF TRANSFERRIN: CPT

## 2021-07-23 LAB
AFP-TUMOR MARKER: 2.2 UG/L
FERRITIN: 6 NG/ML (ref 10–291)
IRON: 24 UG/DL (ref 50–170)
TOTAL IRON BINDING CAPACITY: 406 UG/DL (ref 171–450)
TRANSFERRIN: 347 MG/DL (ref 200–360)

## 2021-07-28 ENCOUNTER — HOSPITAL ENCOUNTER (OUTPATIENT)
Dept: ULTRASOUND IMAGING | Age: 71
Discharge: HOME | End: 2021-07-28
Payer: MEDICARE

## 2021-07-28 ENCOUNTER — TELEPHONE (OUTPATIENT)
Dept: FAMILY MEDICINE CLINIC | Age: 71
End: 2021-07-28

## 2021-07-28 DIAGNOSIS — K74.69 FLORID CIRRHOSIS (HCC): ICD-10-CM

## 2021-07-28 PROBLEM — D64.9 ANEMIA, UNSPECIFIED: Status: ACTIVE | Noted: 2021-07-28

## 2021-07-28 PROCEDURE — 93975 VASCULAR STUDY: CPT

## 2021-07-28 PROCEDURE — 76705 ECHO EXAM OF ABDOMEN: CPT

## 2021-07-28 RX ORDER — DIPHENHYDRAMINE HYDROCHLORIDE 50 MG/ML
50 INJECTION INTRAMUSCULAR; INTRAVENOUS ONCE
Status: CANCELLED | OUTPATIENT
Start: 2021-07-28 | End: 2021-07-28

## 2021-07-28 RX ORDER — EPINEPHRINE 1 MG/ML
0.3 INJECTION, SOLUTION, CONCENTRATE INTRAVENOUS PRN
Status: CANCELLED | OUTPATIENT
Start: 2021-07-28

## 2021-07-28 RX ORDER — SODIUM CHLORIDE 0.9 % (FLUSH) 0.9 %
5-40 SYRINGE (ML) INJECTION PRN
Status: CANCELLED | OUTPATIENT
Start: 2021-07-28

## 2021-07-28 RX ORDER — METHYLPREDNISOLONE SODIUM SUCCINATE 125 MG/2ML
125 INJECTION, POWDER, LYOPHILIZED, FOR SOLUTION INTRAMUSCULAR; INTRAVENOUS ONCE
Status: CANCELLED | OUTPATIENT
Start: 2021-07-28 | End: 2021-07-28

## 2021-07-28 RX ORDER — SODIUM CHLORIDE 9 MG/ML
INJECTION, SOLUTION INTRAVENOUS CONTINUOUS
Status: CANCELLED | OUTPATIENT
Start: 2021-07-28

## 2021-07-28 RX ORDER — HEPARIN SODIUM (PORCINE) LOCK FLUSH IV SOLN 100 UNIT/ML 100 UNIT/ML
500 SOLUTION INTRAVENOUS PRN
Status: CANCELLED | OUTPATIENT
Start: 2021-07-28

## 2021-07-28 RX ORDER — SODIUM CHLORIDE 9 MG/ML
25 INJECTION, SOLUTION INTRAVENOUS PRN
Status: CANCELLED | OUTPATIENT
Start: 2021-07-28

## 2021-07-28 NOTE — TELEPHONE ENCOUNTER
----- Message from Jake Ham MD sent at 7/23/2021  7:49 AM EDT -----  Please confirm ordering provider is managing results.   ES
This message is referring to labs drawn at [facility] on 7/22/21 that were ordered by Reyna Fortune CNP. I called [facility] office at [phone] and left a detailed message on the nurse voicemail asking them to all the office back to let us know if Ascencion Salazar is managing all of the labs that were drawn or if  needs to manage any of them. Will await call back from [facility]as[facility] office.
Unable to offer due to clinical condition

## 2021-08-04 ENCOUNTER — HOSPITAL ENCOUNTER (OUTPATIENT)
Age: 71
Discharge: HOME | End: 2021-08-04
Payer: MEDICARE

## 2021-08-04 DIAGNOSIS — R80.9 MICROALBUMINURIA: ICD-10-CM

## 2021-08-04 LAB
CREATININE, URINE: 81.4 MG/DL
MICROALBUMIN UR-MCNC: 37.14 MG/DL
MICROALBUMIN/CREAT UR-RTO: 456 MG/G (ref 0–30)

## 2021-08-04 PROCEDURE — 82043 UR ALBUMIN QUANTITATIVE: CPT

## 2021-08-05 ENCOUNTER — HOSPITAL ENCOUNTER (OUTPATIENT)
Dept: NURSING | Age: 71
Discharge: HOME | End: 2021-08-05
Payer: MEDICARE

## 2021-08-05 VITALS
BODY MASS INDEX: 31.46 KG/M2 | HEART RATE: 84 BPM | TEMPERATURE: 96.9 F | SYSTOLIC BLOOD PRESSURE: 142 MMHG | RESPIRATION RATE: 20 BRPM | OXYGEN SATURATION: 95 % | WEIGHT: 172 LBS | DIASTOLIC BLOOD PRESSURE: 64 MMHG

## 2021-08-05 DIAGNOSIS — D64.9 ANEMIA, UNSPECIFIED TYPE: Primary | ICD-10-CM

## 2021-08-05 LAB
ANION GAP SERPL CALCULATED.3IONS-SCNC: 13 MEQ/L (ref 8–16)
BUN BLDV-MCNC: 10 MG/DL (ref 7–22)
CALCIUM SERPL-MCNC: 9.8 MG/DL (ref 8.5–10.5)
CHLORIDE BLD-SCNC: 102 MEQ/L (ref 98–111)
CO2: 20 MEQ/L (ref 23–33)
CREAT SERPL-MCNC: 0.7 MG/DL (ref 0.4–1.2)
ERYTHROCYTE [DISTWIDTH] IN BLOOD BY AUTOMATED COUNT: 16.7 % (ref 11.5–14.5)
ERYTHROCYTE [DISTWIDTH] IN BLOOD BY AUTOMATED COUNT: 47.7 FL (ref 35–45)
GFR SERPL CREATININE-BSD FRML MDRD: 83 ML/MIN/1.73M2
GLUCOSE BLD-MCNC: 130 MG/DL (ref 70–108)
HCT VFR BLD CALC: 32.8 % (ref 37–47)
HEMOGLOBIN: 9.6 GM/DL (ref 12–16)
MCH RBC QN AUTO: 23.2 PG (ref 26–33)
MCHC RBC AUTO-ENTMCNC: 29.3 GM/DL (ref 32.2–35.5)
MCV RBC AUTO: 79.4 FL (ref 81–99)
PHOSPHORUS: 3.8 MG/DL (ref 2.4–4.7)
PLATELET # BLD: 141 THOU/MM3 (ref 130–400)
PMV BLD AUTO: 9.6 FL (ref 9.4–12.4)
POTASSIUM SERPL-SCNC: 5.4 MEQ/L (ref 3.5–5.2)
RBC # BLD: 4.13 MILL/MM3 (ref 4.2–5.4)
SODIUM BLD-SCNC: 135 MEQ/L (ref 135–145)
WBC # BLD: 5.1 THOU/MM3 (ref 4.8–10.8)

## 2021-08-05 PROCEDURE — 6360000002 HC RX W HCPCS: Performed by: NURSE PRACTITIONER

## 2021-08-05 PROCEDURE — 36415 COLL VENOUS BLD VENIPUNCTURE: CPT

## 2021-08-05 PROCEDURE — 85027 COMPLETE CBC AUTOMATED: CPT

## 2021-08-05 PROCEDURE — 2580000003 HC RX 258: Performed by: NURSE PRACTITIONER

## 2021-08-05 PROCEDURE — 84100 ASSAY OF PHOSPHORUS: CPT

## 2021-08-05 PROCEDURE — 80048 BASIC METABOLIC PNL TOTAL CA: CPT

## 2021-08-05 PROCEDURE — 96365 THER/PROPH/DIAG IV INF INIT: CPT

## 2021-08-05 RX ORDER — HEPARIN SODIUM (PORCINE) LOCK FLUSH IV SOLN 100 UNIT/ML 100 UNIT/ML
500 SOLUTION INTRAVENOUS PRN
OUTPATIENT
Start: 2021-08-12

## 2021-08-05 RX ORDER — METHYLPREDNISOLONE SODIUM SUCCINATE 125 MG/2ML
125 INJECTION, POWDER, LYOPHILIZED, FOR SOLUTION INTRAMUSCULAR; INTRAVENOUS ONCE
OUTPATIENT
Start: 2021-08-12 | End: 2021-08-12

## 2021-08-05 RX ORDER — SODIUM CHLORIDE 9 MG/ML
INJECTION, SOLUTION INTRAVENOUS CONTINUOUS
OUTPATIENT
Start: 2021-08-12

## 2021-08-05 RX ORDER — SODIUM CHLORIDE 9 MG/ML
25 INJECTION, SOLUTION INTRAVENOUS PRN
OUTPATIENT
Start: 2021-08-12

## 2021-08-05 RX ORDER — SODIUM CHLORIDE 0.9 % (FLUSH) 0.9 %
5-40 SYRINGE (ML) INJECTION PRN
Status: CANCELLED | OUTPATIENT
Start: 2021-08-12

## 2021-08-05 RX ORDER — DIPHENHYDRAMINE HYDROCHLORIDE 50 MG/ML
50 INJECTION INTRAMUSCULAR; INTRAVENOUS ONCE
OUTPATIENT
Start: 2021-08-12 | End: 2021-08-12

## 2021-08-05 RX ADMIN — FERRIC CARBOXYMALTOSE INJECTION 750 MG: 50 INJECTION, SOLUTION INTRAVENOUS at 13:54

## 2021-08-05 ASSESSMENT — PAIN - FUNCTIONAL ASSESSMENT: PAIN_FUNCTIONAL_ASSESSMENT: 0-10

## 2021-08-12 ENCOUNTER — OFFICE VISIT (OUTPATIENT)
Dept: NEPHROLOGY | Age: 71
End: 2021-08-12
Payer: MEDICARE

## 2021-08-12 VITALS
HEART RATE: 72 BPM | BODY MASS INDEX: 30.95 KG/M2 | WEIGHT: 169.2 LBS | SYSTOLIC BLOOD PRESSURE: 136 MMHG | DIASTOLIC BLOOD PRESSURE: 72 MMHG | TEMPERATURE: 97 F | OXYGEN SATURATION: 97 %

## 2021-08-12 DIAGNOSIS — R80.9 MICROALBUMINURIA: Primary | ICD-10-CM

## 2021-08-12 DIAGNOSIS — E78.5 HYPERLIPIDEMIA, UNSPECIFIED HYPERLIPIDEMIA TYPE: ICD-10-CM

## 2021-08-12 DIAGNOSIS — E11.8 TYPE 2 DIABETES MELLITUS WITH COMPLICATION, WITHOUT LONG-TERM CURRENT USE OF INSULIN (HCC): ICD-10-CM

## 2021-08-12 DIAGNOSIS — I10 ESSENTIAL HYPERTENSION: ICD-10-CM

## 2021-08-12 PROCEDURE — G8417 CALC BMI ABV UP PARAM F/U: HCPCS | Performed by: INTERNAL MEDICINE

## 2021-08-12 PROCEDURE — 1123F ACP DISCUSS/DSCN MKR DOCD: CPT | Performed by: INTERNAL MEDICINE

## 2021-08-12 PROCEDURE — 3046F HEMOGLOBIN A1C LEVEL >9.0%: CPT | Performed by: INTERNAL MEDICINE

## 2021-08-12 PROCEDURE — 3017F COLORECTAL CA SCREEN DOC REV: CPT | Performed by: INTERNAL MEDICINE

## 2021-08-12 PROCEDURE — G8399 PT W/DXA RESULTS DOCUMENT: HCPCS | Performed by: INTERNAL MEDICINE

## 2021-08-12 PROCEDURE — 99213 OFFICE O/P EST LOW 20 MIN: CPT | Performed by: INTERNAL MEDICINE

## 2021-08-12 PROCEDURE — 1090F PRES/ABSN URINE INCON ASSESS: CPT | Performed by: INTERNAL MEDICINE

## 2021-08-12 PROCEDURE — 4040F PNEUMOC VAC/ADMIN/RCVD: CPT | Performed by: INTERNAL MEDICINE

## 2021-08-12 PROCEDURE — G8427 DOCREV CUR MEDS BY ELIG CLIN: HCPCS | Performed by: INTERNAL MEDICINE

## 2021-08-12 PROCEDURE — 1036F TOBACCO NON-USER: CPT | Performed by: INTERNAL MEDICINE

## 2021-08-12 PROCEDURE — 2022F DILAT RTA XM EVC RTNOPTHY: CPT | Performed by: INTERNAL MEDICINE

## 2021-08-12 NOTE — PROGRESS NOTES
Renal Progress Note    Assessment and Plan:      Diagnosis Orders   1. Microalbuminuria     2. Type 2 diabetes mellitus with complication, without long-term current use of insulin ([address]     3. Essential hypertension     4. Hyperlipidemia, unspecified hyperlipidemia type       PLAN:   Lab result discussed with the patient. We went through the report together in epic. She understood. Addressed her questions. Serum creatinine remains normal  Serum potassium is very slightly increased to 5.4 mEq/L  Urine microalbumin creatinine ratio is significantly increased to 456 from 62 before  She admits to eating lots of red meat  I reemphasized the importance of low protein diet  Low protein diet information provided to her again  Will not increase the lisinopril since  her potassium level is high  Will not treat the high potassium level either since she is going to have  colonoscopy tomorrow and the prep for that  will bring the potassium down  Return visit in 3 months not 6 months with labs      Patient Active Problem List   Diagnosis    Hyperlipidemia    GERD (gastroesophageal reflux disease)    Essential hypertension    Insomnia    Type 2 diabetes mellitus with complication, without long-term current use of insulin (HCC)    CAD (coronary artery disease)    Class 1 obesity due to excess calories with serious comorbidity and body mass index (BMI) of 34.0 to 34.9 in adult    Elevated liver enzymes    NAFLD (nonalcoholic fatty liver disease)--Dr. Francine Marcelo    Hypothyroidism    Microalbuminuria    Blood in stool    GI bleeding    Depression    Iron deficiency anemia due to chronic blood loss    Postmenopausal    Senile osteoporosis    Abnormal ECG    Iron deficiency anemia, unspecified    Anemia, unspecified       1.      Subjective:   Chief complaint:  Chief Complaint   Patient presents with    Other     Microalbuminuria      HPI:This is a follow up visit for Ms Fina Nichols will be scheduled for TWICE DAILY 180 tablet 3    levothyroxine (SYNTHROID) 50 MCG tablet TAKE 1 TABLET EVERY DAY 90 tablet 3    atorvastatin (LIPITOR) 40 MG tablet TAKE 1 TABLET EVERY DAY 90 tablet 3    atenolol (TENORMIN) 50 MG tablet TAKE 1/2 TABLET EVERY DAY 45 tablet 3    glimepiride (AMARYL) 2 MG tablet TAKE 4 MG IN THE MORNING AND 2 MG  tablet 3    Blood Glucose Monitoring Suppl (ACCU-CHEK ISABELA PLUS) w/Device KIT USE 1 TO CHECK GLUCOSE ONCE DAILY      blood glucose monitor kit and supplies Glucose monitor and supplies, brand per pt preference. Test sugar daily. Dx: E11.9 1 kit 0    Lancets MISC Test sugar daily. Brand per pt preference. Dx: E11.9 100 each 3    buPROPion (WELLBUTRIN SR) 150 MG extended release tablet Take 150 mg by mouth 2 times daily      aspirin 81 MG tablet Take 81 mg by mouth nightly      FISH OIL Take by mouth nightly Arthur Red      Docusate Calcium (STOOL SOFTENER PO) Take by mouth nightly as needed        No current facility-administered medications for this visit.        Lab Results:    CBC:   Lab Results   Component Value Date    WBC 5.1 08/05/2021    HGB 9.6 (L) 08/05/2021    HCT 32.8 (L) 08/05/2021    MCV 79.4 (L) 08/05/2021     08/05/2021     BMP:    Lab Results   Component Value Date     08/05/2021     07/22/2021     02/04/2021    K 5.4 (H) 08/05/2021    K 4.6 07/22/2021    K 4.8 02/04/2021     08/05/2021     07/22/2021     02/04/2021    CO2 20 (L) 08/05/2021    CO2 22 (L) 07/22/2021    CO2 24 02/04/2021    BUN 10 08/05/2021    BUN 14 07/22/2021    BUN 15 02/04/2021    CREATININE 0.7 08/05/2021    CREATININE 0.7 07/22/2021    CREATININE 0.7 02/04/2021    GLUCOSE 130 (H) 08/05/2021    GLUCOSE 147 (H) 07/22/2021    GLUCOSE 176 (H) 02/04/2021      Hepatic:   Lab Results   Component Value Date    AST 27 07/22/2021    AST 32 11/25/2020    AST 37 07/22/2020    ALT 24 07/22/2021    ALT 38 11/25/2020    ALT 41 07/22/2020    BILITOT 0.4 07/22/2021 BILITOT 0.5 11/25/2020    BILITOT 0.3 07/22/2020    ALKPHOS 55 07/22/2021    ALKPHOS 58 11/25/2020    ALKPHOS 66 07/22/2020     BNP: No results found for: BNP  Lipids:   Lab Results   Component Value Date    CHOL 126 11/25/2020    HDL 38 11/25/2020     INR:   Lab Results   Component Value Date    INR 1.20 (H) 07/22/2021    INR 1.07 11/25/2020    INR 1.12 12/07/2018     URINE:   Lab Results   Component Value Date    PROTUR 100 02/07/2019     Lab Results   Component Value Date    NITRU Negative 02/07/2019    COLORU Yellow 02/07/2019    COLORU YELLOW 10/24/2018    PHUR 5.0 10/24/2018    WBCUA 2-4 10/24/2018    WBCUA 0-2 11/29/2011    RBCUA 0-2 10/24/2018    YEAST NONE SEEN 10/24/2018    BACTERIA NONE 10/24/2018    CLARITYU cloudy 11/11/2011    SPECGRAV 1.020 11/11/2011    LEUKOCYTESUR NEGATIVE 10/24/2018    UROBILINOGEN 0.20 02/07/2019    BILIRUBINUR Negative 02/07/2019    BILIRUBINUR NEGATIVE 11/29/2011    BLOODU Large 02/07/2019    BLOODU LARGE 10/24/2018    GLUCOSEU Negative 02/07/2019    KETUA Negative 02/07/2019    AMORPHOUS URATES 10/24/2018      Microalbumen/Creatinine ratio:  No components found for: RUCREAT    Objective:   Vitals: /72 (Site: Left Upper Arm, Position: Sitting, Cuff Size: Large Adult)   Pulse 72   Temp 97 °F (36.1 °C)   Wt 169 lb 3.2 oz (76.7 kg)   SpO2 97%   BMI 30.95 kg/m²      Constitutional:  Alert, awake, no apparent distress  Skin:normal with no rash or any lesions  HEENT:Pupils are reactive . Throat is clear. Oral mucosa is moist.  Neck:supple with no thyromegaly or bruit   Cardiovascular:  S1, S2 without murmur   Respiratory:  Clear to auscultation with no wheezes or rales  Abdomen: +bowel sound, soft, non tender and no bruit  Ext: No LE edema  Musculoskeletal:Intact  Neuro:Alert, awake and oriented with no obvious focal deficit.   Speech is normal.    Electronically signed by Ryan Sánchez MD on 8/12/2021 at 1:32 PM   **This report has been created using voice recognition software. It maycontain minor  errors which are inherent in voice recognition technology. **

## 2021-08-12 NOTE — PROGRESS NOTES
Pt is getting colonoscopy done 8/13/21 due to bleeding. She also has been getting IV iron due to low iron.

## 2021-08-16 ENCOUNTER — HOSPITAL ENCOUNTER (OUTPATIENT)
Dept: NURSING | Age: 71
Discharge: HOME | End: 2021-08-16
Payer: MEDICARE

## 2021-08-16 VITALS
DIASTOLIC BLOOD PRESSURE: 62 MMHG | RESPIRATION RATE: 18 BRPM | OXYGEN SATURATION: 98 % | BODY MASS INDEX: 30.91 KG/M2 | HEART RATE: 60 BPM | WEIGHT: 169 LBS | SYSTOLIC BLOOD PRESSURE: 143 MMHG | TEMPERATURE: 97.9 F

## 2021-08-16 DIAGNOSIS — D64.9 ANEMIA, UNSPECIFIED TYPE: Primary | ICD-10-CM

## 2021-08-16 LAB
ANION GAP SERPL CALCULATED.3IONS-SCNC: 13 MEQ/L (ref 8–16)
BUN BLDV-MCNC: 13 MG/DL (ref 7–22)
CALCIUM SERPL-MCNC: 9.6 MG/DL (ref 8.5–10.5)
CHLORIDE BLD-SCNC: 103 MEQ/L (ref 98–111)
CO2: 21 MEQ/L (ref 23–33)
CREAT SERPL-MCNC: 0.7 MG/DL (ref 0.4–1.2)
ERYTHROCYTE [DISTWIDTH] IN BLOOD BY AUTOMATED COUNT: 22.5 % (ref 11.5–14.5)
ERYTHROCYTE [DISTWIDTH] IN BLOOD BY AUTOMATED COUNT: 66.4 FL (ref 35–45)
GFR SERPL CREATININE-BSD FRML MDRD: 83 ML/MIN/1.73M2
GLUCOSE BLD-MCNC: 131 MG/DL (ref 70–108)
HCT VFR BLD CALC: 35.1 % (ref 37–47)
HEMOGLOBIN: 10.2 GM/DL (ref 12–16)
MCH RBC QN AUTO: 24.6 PG (ref 26–33)
MCHC RBC AUTO-ENTMCNC: 29.1 GM/DL (ref 32.2–35.5)
MCV RBC AUTO: 84.6 FL (ref 81–99)
PHOSPHORUS: 3.7 MG/DL (ref 2.4–4.7)
PLATELET # BLD: 123 THOU/MM3 (ref 130–400)
PMV BLD AUTO: 10.7 FL (ref 9.4–12.4)
POTASSIUM SERPL-SCNC: 5.3 MEQ/L (ref 3.5–5.2)
RBC # BLD: 4.15 MILL/MM3 (ref 4.2–5.4)
SODIUM BLD-SCNC: 137 MEQ/L (ref 135–145)
WBC # BLD: 4.9 THOU/MM3 (ref 4.8–10.8)

## 2021-08-16 PROCEDURE — 36415 COLL VENOUS BLD VENIPUNCTURE: CPT

## 2021-08-16 PROCEDURE — 6360000002 HC RX W HCPCS: Performed by: NURSE PRACTITIONER

## 2021-08-16 PROCEDURE — 96365 THER/PROPH/DIAG IV INF INIT: CPT

## 2021-08-16 PROCEDURE — 85027 COMPLETE CBC AUTOMATED: CPT

## 2021-08-16 PROCEDURE — 80048 BASIC METABOLIC PNL TOTAL CA: CPT

## 2021-08-16 PROCEDURE — 2580000003 HC RX 258: Performed by: NURSE PRACTITIONER

## 2021-08-16 PROCEDURE — 84100 ASSAY OF PHOSPHORUS: CPT

## 2021-08-16 RX ORDER — HEPARIN SODIUM (PORCINE) LOCK FLUSH IV SOLN 100 UNIT/ML 100 UNIT/ML
500 SOLUTION INTRAVENOUS PRN
OUTPATIENT
Start: 2021-08-19

## 2021-08-16 RX ORDER — METHYLPREDNISOLONE SODIUM SUCCINATE 125 MG/2ML
125 INJECTION, POWDER, LYOPHILIZED, FOR SOLUTION INTRAMUSCULAR; INTRAVENOUS ONCE
OUTPATIENT
Start: 2021-08-19 | End: 2021-08-19

## 2021-08-16 RX ORDER — SODIUM CHLORIDE 9 MG/ML
INJECTION, SOLUTION INTRAVENOUS CONTINUOUS
OUTPATIENT
Start: 2021-08-19

## 2021-08-16 RX ORDER — SODIUM CHLORIDE 0.9 % (FLUSH) 0.9 %
5-40 SYRINGE (ML) INJECTION PRN
OUTPATIENT
Start: 2021-08-19

## 2021-08-16 RX ORDER — SODIUM CHLORIDE 9 MG/ML
25 INJECTION, SOLUTION INTRAVENOUS PRN
OUTPATIENT
Start: 2021-08-19

## 2021-08-16 RX ORDER — DIPHENHYDRAMINE HYDROCHLORIDE 50 MG/ML
50 INJECTION INTRAMUSCULAR; INTRAVENOUS ONCE
OUTPATIENT
Start: 2021-08-19 | End: 2021-08-19

## 2021-08-16 RX ORDER — SODIUM CHLORIDE 0.9 % (FLUSH) 0.9 %
5-40 SYRINGE (ML) INJECTION PRN
Status: DISCONTINUED | OUTPATIENT
Start: 2021-08-16 | End: 2021-08-17 | Stop reason: HOSPADM

## 2021-08-16 RX ADMIN — FERRIC CARBOXYMALTOSE INJECTION 750 MG: 50 INJECTION, SOLUTION INTRAVENOUS at 13:16

## 2021-08-16 RX ADMIN — SODIUM CHLORIDE, PRESERVATIVE FREE 10 ML: 5 INJECTION INTRAVENOUS at 13:43

## 2021-08-16 ASSESSMENT — PAIN - FUNCTIONAL ASSESSMENT: PAIN_FUNCTIONAL_ASSESSMENT: 0-10

## 2021-08-16 NOTE — PROGRESS NOTES
__m__ Safety:       (Environmental)   [place] to environment   Ensure ID band is correct and in place/ allergy band as needed   Assess for fall risk   Initiate fall precautions as applicable (fall band, side rails, etc.)   Call light within reach   Bed in low position/ wheels locked    _m___ Pain:        Assess pain level and characteristics   Administer analgesics as ordered   Assess effectiveness of pain management and report to MD as needed    _m___ Knowledge Deficit:   Assess baseline knowledge   Provide teaching at level of understanding   Provide teaching via preferred learning method   Evaluate teaching effectiveness    ___m_ Hemodynamic/Respiratory Status:       (Pre and Post Procedure Monitoring)   Assess/Monitor vital signs and LOC   Assess Baseline SpO2 prior to any sedation   Obtain weight/height   Assess vital signs/ LOC until patient meets discharge criteria   Monitor procedure site and notify MD of any issues    _

## 2021-10-12 ENCOUNTER — OFFICE VISIT (OUTPATIENT)
Dept: FAMILY MEDICINE CLINIC | Age: 71
End: 2021-10-12
Payer: MEDICARE

## 2021-10-12 VITALS
RESPIRATION RATE: 14 BRPM | WEIGHT: 169 LBS | SYSTOLIC BLOOD PRESSURE: 118 MMHG | BODY MASS INDEX: 30.91 KG/M2 | HEART RATE: 68 BPM | DIASTOLIC BLOOD PRESSURE: 60 MMHG

## 2021-10-12 DIAGNOSIS — K21.9 GASTROESOPHAGEAL REFLUX DISEASE, UNSPECIFIED WHETHER ESOPHAGITIS PRESENT: ICD-10-CM

## 2021-10-12 DIAGNOSIS — E11.8 TYPE 2 DIABETES MELLITUS WITH COMPLICATION, WITHOUT LONG-TERM CURRENT USE OF INSULIN (HCC): ICD-10-CM

## 2021-10-12 DIAGNOSIS — E03.9 HYPOTHYROIDISM, UNSPECIFIED TYPE: ICD-10-CM

## 2021-10-12 DIAGNOSIS — D69.6 THROMBOCYTOPENIA (HCC): ICD-10-CM

## 2021-10-12 DIAGNOSIS — E78.5 HYPERLIPIDEMIA, UNSPECIFIED HYPERLIPIDEMIA TYPE: ICD-10-CM

## 2021-10-12 DIAGNOSIS — Z23 NEED FOR INFLUENZA VACCINATION: ICD-10-CM

## 2021-10-12 DIAGNOSIS — I10 ESSENTIAL HYPERTENSION: Primary | ICD-10-CM

## 2021-10-12 DIAGNOSIS — D50.9 IRON DEFICIENCY ANEMIA, UNSPECIFIED IRON DEFICIENCY ANEMIA TYPE: ICD-10-CM

## 2021-10-12 PROCEDURE — 99214 OFFICE O/P EST MOD 30 MIN: CPT | Performed by: NURSE PRACTITIONER

## 2021-10-12 PROCEDURE — G8484 FLU IMMUNIZE NO ADMIN: HCPCS | Performed by: NURSE PRACTITIONER

## 2021-10-12 PROCEDURE — 1123F ACP DISCUSS/DSCN MKR DOCD: CPT | Performed by: NURSE PRACTITIONER

## 2021-10-12 PROCEDURE — 1036F TOBACCO NON-USER: CPT | Performed by: NURSE PRACTITIONER

## 2021-10-12 PROCEDURE — G8399 PT W/DXA RESULTS DOCUMENT: HCPCS | Performed by: NURSE PRACTITIONER

## 2021-10-12 PROCEDURE — G0008 ADMIN INFLUENZA VIRUS VAC: HCPCS | Performed by: NURSE PRACTITIONER

## 2021-10-12 PROCEDURE — 90694 VACC AIIV4 NO PRSRV 0.5ML IM: CPT | Performed by: NURSE PRACTITIONER

## 2021-10-12 PROCEDURE — 1090F PRES/ABSN URINE INCON ASSESS: CPT | Performed by: NURSE PRACTITIONER

## 2021-10-12 PROCEDURE — 4040F PNEUMOC VAC/ADMIN/RCVD: CPT | Performed by: NURSE PRACTITIONER

## 2021-10-12 PROCEDURE — 2022F DILAT RTA XM EVC RTNOPTHY: CPT | Performed by: NURSE PRACTITIONER

## 2021-10-12 PROCEDURE — 3017F COLORECTAL CA SCREEN DOC REV: CPT | Performed by: NURSE PRACTITIONER

## 2021-10-12 PROCEDURE — 3046F HEMOGLOBIN A1C LEVEL >9.0%: CPT | Performed by: NURSE PRACTITIONER

## 2021-10-12 PROCEDURE — G8417 CALC BMI ABV UP PARAM F/U: HCPCS | Performed by: NURSE PRACTITIONER

## 2021-10-12 PROCEDURE — G8427 DOCREV CUR MEDS BY ELIG CLIN: HCPCS | Performed by: NURSE PRACTITIONER

## 2021-10-12 ASSESSMENT — ENCOUNTER SYMPTOMS
COLOR CHANGE: 0
COUGH: 0
WHEEZING: 0
DIARRHEA: 0
BACK PAIN: 0
EYE PAIN: 0
NAUSEA: 0
SHORTNESS OF BREATH: 0
VOMITING: 0
SORE THROAT: 0
FACIAL SWELLING: 0
SINUS PAIN: 0
TROUBLE SWALLOWING: 0
ABDOMINAL PAIN: 0

## 2021-10-12 NOTE — PATIENT INSTRUCTIONS
Patient Education        Influenza (Flu) Vaccine: Care Instructions  Your Care Instructions     Influenza (flu) is an infection in the lungs and breathing passages. It is caused by the influenza virus. There are different strains, or types, of the flu virus every year. The flu comes on quickly. It can cause a cough, stuffy nose, fever, chills, tiredness, and aches and pains. These symptoms may last up to 10 days. The flu can make you feel very sick, but most of the time it doesn't lead to other problems. But it can cause serious problems in people who are older or who have a long-term illness, such as heart disease or diabetes. You can help prevent the flu by getting a flu vaccine every year, as soon as it is available. You cannot get the flu from the vaccine. The vaccine prevents most cases of the flu. But even when the vaccine doesn't prevent the flu, it can make symptoms less severe and reduce the chance of problems from the flu. Sometimes, young children and people who have an immune system problem may have a slight fever or muscle aches or pains 6 to 12 hours after getting the shot. These symptoms usually last 1 or 2 days. Follow-up care is a key part of your treatment and safety. Be sure to make and go to all appointments, and call your doctor if you are having problems. It's also a good idea to know your test results and keep a list of the medicines you take. Who should get the flu vaccine? Everyone age 7 months or older should get a flu vaccine each year. It lowers the chance of getting and spreading the flu. The vaccine is very important for people who are at high risk for getting other health problems from the flu. This includes:  · Anyone 48years of age or older. · People who live in a long-term care center, such as a nursing home. · All children 6 months through 25years of age. · Adults and children 6 months and older who have long-term heart or lung problems, such as asthma.   · Adults and children 6 months and older who needed medical care or were in a hospital during the past year because of diabetes, chronic kidney disease, or a weak immune system (including HIV or AIDS). · Women who will be pregnant during the flu season. · People who have any condition that can make it hard to breathe or swallow (such as a brain injury or muscle disorders). · People who can give the flu to others who are at high risk for problems from the flu. This includes all health care workers and close contacts of people age 72 or older. Who should not get the flu vaccine? The person who gives the vaccine may tell you not to get it if you:  · Have a severe allergy to eggs or any part of the vaccine. · Have had a severe reaction to a flu vaccine in the past.  · Have had Guillain-Barré syndrome (GBS). · Are sick with a fever. (Get the vaccine when symptoms are gone.)  How can you care for yourself at home? · If you or your child has a sore arm or a slight fever after the vaccine, take an over-the-counter pain medicine, such as acetaminophen (Tylenol) or ibuprofen (Advil, Motrin). Read and follow all instructions on the label. Do not give aspirin to anyone younger than 20. It has been linked to Reye syndrome, a serious illness. · Do not take two or more pain medicines at the same time unless the doctor told you to. Many pain medicines have acetaminophen, which is Tylenol. Too much acetaminophen (Tylenol) can be harmful. When should you call for help? Call 911 anytime you think you may need emergency care. For example, call if after getting the flu vaccine:    · You have symptoms of a severe reaction to the flu vaccine. Symptoms of a severe reaction may include:  ? Severe difficulty breathing. ? Sudden raised, red areas (hives) all over your body. ? Severe lightheadedness.    Call your doctor now or seek immediate medical care if after getting the flu vaccine:    · You think you are having a reaction to the flu vaccine, such as a new fever. Watch closely for changes in your health, and be sure to contact your doctor if you have any problems. Where can you learn more? Go to https://[employer]. org and sign in to your [employer] account. Enter E595 in the Bag Borrow or Steal box to learn more about \"Influenza (Flu) Vaccine: Care Instructions. \"     If you do not have an account, please click on the \"Sign Up Now\" link. Current as of: August 31, 2020               Content Version: 13.0  © 9110-5899 Healthwise, Incorporated. Care instructions adapted under license by [facility] ([facility]). If you have questions about a medical condition or this instruction, always ask your healthcare professional. [address] any warranty or liability for your use of this information.

## 2021-10-12 NOTE — PROGRESS NOTES
[facility]  [address] [facility] [address]  Dept: [phone]  Dept Fax: [phone]  Loc: [phone]     10/12/2021     Chito Calloway (:  1950) is a 80 y.o. female, here for evaluation of the following medical concerns:    Chief Complaint   Patient presents with    6 Month Follow-Up     No concerns, wants flu vaccine       Pt presents to the office today for 6 month follow up on chronic conditions. Treatment Adherence:   Medication compliance:  compliant all of the time  Diet compliance:  compliant most of the time  Weight trend: stable  Current exercise: no regular exercise  Barriers: none  Wt Readings from Last 3 Encounters:   10/12/21 169 lb (76.7 kg)   21 169 lb (76.7 kg)   21 169 lb 3.2 oz (76.7 kg)       Diabetes Mellitus Type 2: Current symptoms/problems include none. Home blood sugar records: patient does not test  Any episodes of hypoglycemia? no  Eye exam current (within one year): yes  Tobacco history: She  reports that she quit smoking about 21 years ago. Her smoking use included cigarettes. She quit after 30.00 years of use. She has quit using smokeless tobacco.   Daily Aspirin? Yes    Hypertension:  Home blood pressure monitoring: No.  She is adherent to a low sodium diet. Patient denies chest pain, shortness of breath, headache and lightheadedness. Antihypertensive medication side effects: no medication side effects noted. Use of agents associated with hypertension: none. Hyperlipidemia:  No new myalgias or GI upset on atorvastatin (Lipitor).        Lab Results   Component Value Date    LABA1C 7.1 (H) 2020    LABA1C 6.8 (H) 2020    LABA1C 7.7 (H) 2019     Lab Results   Component Value Date    LABMICR 37.14 2021    CREATININE 0.7 2021     Lab Results   Component Value Date    ALT 24 2021    AST 27 2021     Lab Results   Component Value Date    CHOL 126 11/25/2020    TRIG 193 11/25/2020    HDL 38 11/25/2020    LDLCALC 49 11/25/2020    LDLDIRECT 79.69 05/14/2015        Hypothyroidism: Recent symptoms: none. She denies weight gain, weight loss, cold intolerance and heat intolerance. Patient is  taking her medication consistently on an empty stomach. No results found for: [facility]  Lab Results   Component Value Date    TSH 2.970 06/11/2020    TSH 2.920 07/29/2019    TSH 3.010 07/23/2018     Pt is here for depression. Pt currently taking lexapro. Side effects noted: none    Sleep-OK  Interest- OK  Guilt- OK  Energy- OK  Concentration- OK  Appetite- OK  Psychomotor Retardation- NO  Suicidal/ Homicidal Ideations- NO  Anxiety-OK        Review of Systems   Constitutional: Negative for chills, fatigue and fever. HENT: Negative for congestion, facial swelling, sinus pain, sore throat and trouble swallowing. Eyes: Negative for pain and visual disturbance. Respiratory: Negative for cough, shortness of breath and wheezing. Cardiovascular: Negative for chest pain and palpitations. Gastrointestinal: Negative for abdominal pain, diarrhea, nausea and vomiting. Genitourinary: Negative for difficulty urinating, dysuria and urgency. Musculoskeletal: Negative for back pain, gait problem and neck pain. Skin: Negative for color change and rash. Neurological: Negative for dizziness, weakness and headaches. Psychiatric/Behavioral: Negative for agitation and sleep disturbance. The patient is not nervous/anxious. Prior to Visit Medications    Medication Sig Taking?  Authorizing Provider   albuterol sulfate HFA (VENTOLIN HFA) 108 (90 Base) MCG/ACT inhaler Inhale 2 puffs into the lungs 4 times daily as needed for Wheezing Yes Yaritza Lab, APRN - CNP   escitalopram (LEXAPRO) 20 MG tablet TAKE 1 TABLET EVERY DAY Yes Teetee Russell MD   metFORMIN (GLUCOPHAGE-XR) 500 MG extended release tablet TAKE 2 TABLETS TWICE DAILY Yes Teetee Russell MD pantoprazole (PROTONIX) 40 MG tablet TAKE 1 TABLET TWICE DAILY Yes Stella Sandhoff, MD   blood glucose test strips (ACCU-CHEK ISABELA PLUS) strip USE 1 STRIP TO CHECK GLUCOSE ONCE DAILY Yes Stella Sandhoff, MD   lisinopril (PRINIVIL;ZESTRIL) 20 MG tablet TAKE 1 TABLET TWICE DAILY Yes Stella Sandhoff, MD   levothyroxine (SYNTHROID) 50 MCG tablet TAKE 1 TABLET EVERY DAY Yes Stella Sandhoff, MD   atorvastatin (LIPITOR) 40 MG tablet TAKE 1 TABLET EVERY DAY Yes Stella Sandhoff, MD   atenolol (TENORMIN) 50 MG tablet TAKE 1/2 TABLET EVERY DAY Yes Stella Sandhoff, MD   glimepiride (AMARYL) 2 MG tablet TAKE 4 MG IN THE MORNING AND 2 MG PM Yes FABIENNE Mckeon - CNP   Blood Glucose Monitoring Suppl (ACCU-CHEK ISABELA PLUS) w/Device KIT USE 1 TO CHECK GLUCOSE ONCE DAILY Yes Historical Provider, MD   blood glucose monitor kit and supplies Glucose monitor and supplies, brand per pt preference. Test sugar daily. Dx: E11.9 Yes Stella Sandhoff, MD   Lancets MISC Test sugar daily. Brand per pt preference. Dx: E11.9 Yes Stella Sandhoff, MD   buPROPion [facility] SR) 150 MG extended release tablet Take 150 mg by mouth 2 times daily Yes Historical Provider, MD   aspirin 81 MG tablet Take 81 mg by mouth nightly Yes Historical Provider, MD   FISH OIL Take by mouth nightly Arthur Red Yes Historical Provider, MD   Docusate Calcium (STOOL SOFTENER PO) Take by mouth nightly as needed  Yes Historical Provider, MD        Social History     Tobacco Use    Smoking status: Former Smoker     Years: 30.00     Types: Cigarettes     Quit date: 1999     Years since quittin.8    Smokeless tobacco: Former User   Substance Use Topics    Alcohol use: No     Comment: rarely        Vitals:    10/12/21 1349   BP: 118/60   Pulse: 68   Resp: 14   Weight: 169 lb (76.7 kg)     Estimated body mass index is 30.91 kg/m² as calculated from the following:    Height as of 21: 5' 2\" (1.575 m).     Weight as of this encounter: 169 lb (76.7 kg).    Physical Exam  Vitals reviewed. Constitutional:       General: She is not in acute distress. Appearance: Normal appearance. She is well-developed. HENT:      Head: Normocephalic and atraumatic. Right Ear: Hearing, tympanic membrane, ear canal and external ear normal.      Left Ear: Hearing, tympanic membrane, ear canal and external ear normal.      Nose: Nose normal. No nasal tenderness. Mouth/Throat:      Lips: Pink. Mouth: Mucous membranes are moist. No oral lesions. Pharynx: Oropharynx is clear. Uvula midline. Eyes:      General:         Right eye: No discharge. Left eye: No discharge. Conjunctiva/sclera: Conjunctivae normal.   Neck:      Vascular: No carotid bruit. Trachea: No tracheal deviation. Cardiovascular:      Rate and Rhythm: Normal rate and regular rhythm. Heart sounds: Normal heart sounds. No murmur heard. Pulmonary:      Effort: Pulmonary effort is normal. No respiratory distress. Breath sounds: Normal breath sounds. Abdominal:      General: Bowel sounds are normal.      Palpations: Abdomen is soft. Tenderness: There is no abdominal tenderness. Musculoskeletal:      Cervical back: Full passive range of motion without pain and neck supple. Right lower leg: No edema. Left lower leg: No edema. Lymphadenopathy:      Head:      Right side of head: No submental, submandibular, tonsillar, preauricular, posterior auricular or occipital adenopathy. Left side of head: No submental, submandibular, tonsillar, preauricular, posterior auricular or occipital adenopathy. Cervical: No cervical adenopathy. Skin:     General: Skin is warm and dry. Findings: No rash. Neurological:      General: No focal deficit present. Mental Status: She is alert and oriented to person, place, and time.       Coordination: Coordination normal.   Psychiatric:         Behavior: Behavior normal.         Thought Content: Thought content normal.         Judgment: Judgment normal.     Visual inspection:  Deformity/amputation: absent  Skin lesions/pre-ulcerative calluses: absent  Edema: right- negative, left- negative    Sensory exam:  Monofilament sensation: normal  (minimum of 5 random plantar locations tested, avoiding callused areas - > 1 area with absence of sensation is + for neuropathy)    Plus at least one of the following:  Pulses: normal,   Pinprick: N/A  Proprioception: Intact  Vibration (128 Hz): N/A      ASSESSMENT/PLAN:  1. Essential hypertension    2. Thrombocytopenia ([facility] [address]    3. Gastroesophageal reflux disease, unspecified whether esophagitis present    4. Type 2 diabetes mellitus with complication, without long-term current use of insulin (HCC)  - Hemoglobin A1C; Future  -  DIABETES FOOT EXAM    5. Hypothyroidism, unspecified type  - TSH without Reflex; Future  - T4, Free; Future    6. Iron deficiency anemia, unspecified iron deficiency anemia type    7. Hyperlipidemia, unspecified hyperlipidemia type  - Lipid Panel; Future    8. Need for influenza vaccination  - INFLUENZA, QUADV, ADJUVANTED, 72 YRS =, IM, PF, PREFILL SYR, 0.5ML (FLUAD)      - PELVIC done 12/2/2019 per [facility]- Pt declines further evaluations.  - No further PAP smears indicated as pt has had adequate negative prior screening   - MAMMO overdue at this time- pt declines- aware of risk of cancer, death, etc.  - COLONOSCOPY done 9/3/2021 per . Follow up on 10/25/2021.   - DILATED DIABETIC EYE EXAM- 10/22/2020- Due again     - Have labs completed after 12 hours fasting. Return in about 6 months (around 4/12/2022), or if symptoms worsen or fail to improve, for Medicare AWV, Wellness/Physical.    Patient given educational materials - see patient instructions. Discussed use, benefit, and side effects of prescribed medications. All patient questions answered. Pt voiced understanding. Reviewed health maintenance.      An electronic signature was used to authenticate this note.     --Jenifer Linn, FABIENNE - CNP on 10/12/2021 at 2:23 PM

## 2021-10-12 NOTE — PROGRESS NOTES
Vaccine Information Sheet, \"Influenza - Inactivated\"  given to Sharan Grey and verbalized understanding. Patient responses:    Have you ever had a reaction to a flu vaccine? No  Do you have an allergy to eggs, Latex -induced anaphylaxis, neomycin or polymixin? No  Do you have an allergy to Thimerosal, contact lens solution, or Merthiolate? No  Have you ever had Guillian Sproul Syndrome? No  Do you have any current illness? No  Do you have a temperature above 100.4 degrees? No  Are you pregnant? No  If pregnant, permission obtained from physician? No  Do you have an active neurological disorder? No      Immunizations Administered     Name Date Dose Route    Influenza, Quadv, adjuvanted, 65 yrs +, IM, PF (Fluad) 10/12/2021 0.5 mL Intramuscular    Site: Deltoid- Left    Lot: [id number]    NDC: 33665-673-13        After obtaining consent, and per orders of Miguel Solorio CNP, the injection above was given by Grace Ramsey MA. Patient tolerated well.

## 2021-10-29 DIAGNOSIS — E11.65 TYPE 2 DIABETES MELLITUS WITH HYPERGLYCEMIA, WITHOUT LONG-TERM CURRENT USE OF INSULIN (HCC): ICD-10-CM

## 2021-11-01 ENCOUNTER — HOSPITAL ENCOUNTER (OUTPATIENT)
Age: 71
Discharge: HOME | End: 2021-11-01
Payer: MEDICARE

## 2021-11-01 DIAGNOSIS — E11.8 TYPE 2 DIABETES MELLITUS WITH COMPLICATION, WITHOUT LONG-TERM CURRENT USE OF INSULIN (HCC): ICD-10-CM

## 2021-11-01 DIAGNOSIS — E78.5 HYPERLIPIDEMIA, UNSPECIFIED HYPERLIPIDEMIA TYPE: ICD-10-CM

## 2021-11-01 DIAGNOSIS — E03.9 HYPOTHYROIDISM, UNSPECIFIED TYPE: ICD-10-CM

## 2021-11-01 DIAGNOSIS — R80.9 MICROALBUMINURIA: ICD-10-CM

## 2021-11-01 LAB
ANION GAP SERPL CALCULATED.3IONS-SCNC: 13 MEQ/L (ref 8–16)
AVERAGE GLUCOSE: 171 MG/DL (ref 70–126)
BUN BLDV-MCNC: 15 MG/DL (ref 7–22)
CALCIUM SERPL-MCNC: 10 MG/DL (ref 8.5–10.5)
CHLORIDE BLD-SCNC: 103 MEQ/L (ref 98–111)
CHOLESTEROL, TOTAL: 122 MG/DL (ref 100–199)
CO2: 23 MEQ/L (ref 23–33)
CREAT SERPL-MCNC: 0.7 MG/DL (ref 0.4–1.2)
CREATININE, URINE: 52.2 MG/DL
GFR SERPL CREATININE-BSD FRML MDRD: 83 ML/MIN/1.73M2
GLUCOSE BLD-MCNC: 178 MG/DL (ref 70–108)
HBA1C MFR BLD: 7.7 % (ref 4.4–6.4)
HDLC SERPL-MCNC: 41 MG/DL
LDL CHOLESTEROL CALCULATED: 56 MG/DL
MICROALBUMIN UR-MCNC: 2.33 MG/DL
MICROALBUMIN/CREAT UR-RTO: 45 MG/G (ref 0–30)
POTASSIUM SERPL-SCNC: 4.9 MEQ/L (ref 3.5–5.2)
SODIUM BLD-SCNC: 139 MEQ/L (ref 135–145)
T4 FREE: 1.15 NG/DL (ref 0.93–1.76)
TRIGL SERPL-MCNC: 127 MG/DL (ref 0–199)
TSH SERPL DL<=0.05 MIU/L-ACNC: 2.82 UIU/ML (ref 0.4–4.2)

## 2021-11-01 PROCEDURE — 36415 COLL VENOUS BLD VENIPUNCTURE: CPT

## 2021-11-01 PROCEDURE — 84439 ASSAY OF FREE THYROXINE: CPT

## 2021-11-01 PROCEDURE — 82043 UR ALBUMIN QUANTITATIVE: CPT

## 2021-11-01 PROCEDURE — 83036 HEMOGLOBIN GLYCOSYLATED A1C: CPT

## 2021-11-01 PROCEDURE — 80048 BASIC METABOLIC PNL TOTAL CA: CPT

## 2021-11-01 PROCEDURE — 84443 ASSAY THYROID STIM HORMONE: CPT

## 2021-11-01 PROCEDURE — 80061 LIPID PANEL: CPT

## 2021-11-01 RX ORDER — GLIMEPIRIDE 2 MG/1
TABLET ORAL
Qty: 270 TABLET | Refills: 3 | Status: SHIPPED | OUTPATIENT
Start: 2021-11-01 | End: 2022-10-26

## 2021-11-01 NOTE — TELEPHONE ENCOUNTER
This medication refill is regarding a electronic request.  Refill requested by [facility]. Requested Prescriptions     Pending Prescriptions Disp Refills    glimepiride (AMARYL) 2 MG tablet [Pharmacy Med Name: GLIMEPIRIDE 2 MG Tablet] 270 tablet 3     Sig: TAKE 2 TABLETS (4 MG) IN THE MORNING AND 1 TABLET (2 MG) IN THE EVENING     Date of last visit: 10/12/2021   Date of next visit: None  Date of last refill: 9/11/2020 #270/3    Last Hemoglobin A1C:    Lab Results   Component Value Date    LABA1C 7.1 07/29/2020    AVGG 153 07/29/2020     Rx verified, ordered and set to EP.

## 2021-11-02 ENCOUNTER — TELEPHONE (OUTPATIENT)
Dept: FAMILY MEDICINE CLINIC | Age: 71
End: 2021-11-02

## 2021-11-02 DIAGNOSIS — E11.65 TYPE 2 DIABETES MELLITUS WITH HYPERGLYCEMIA, WITHOUT LONG-TERM CURRENT USE OF INSULIN (HCC): Primary | ICD-10-CM

## 2021-11-02 NOTE — TELEPHONE ENCOUNTER
Spoke to pt. She just got a refill of her Amaryl 2 mg so she is going to start taking two 2 mg tablets in the morning and two 2mg tablets in the evening. Med list updated. When she starts to run out she will then give us a call and we can then send in the 4 mg BID. Lab has been ordered and mailed to pt.

## 2021-11-11 ENCOUNTER — OFFICE VISIT (OUTPATIENT)
Dept: NEPHROLOGY | Age: 71
End: 2021-11-11
Payer: MEDICARE

## 2021-11-11 VITALS
DIASTOLIC BLOOD PRESSURE: 62 MMHG | SYSTOLIC BLOOD PRESSURE: 123 MMHG | BODY MASS INDEX: 31.93 KG/M2 | OXYGEN SATURATION: 96 % | WEIGHT: 174.6 LBS | HEART RATE: 64 BPM | TEMPERATURE: 98.1 F

## 2021-11-11 DIAGNOSIS — R80.9 MICROALBUMINURIA: Primary | ICD-10-CM

## 2021-11-11 DIAGNOSIS — E78.5 HYPERLIPIDEMIA, UNSPECIFIED HYPERLIPIDEMIA TYPE: ICD-10-CM

## 2021-11-11 DIAGNOSIS — I10 ESSENTIAL HYPERTENSION: ICD-10-CM

## 2021-11-11 DIAGNOSIS — E11.21 DIABETIC NEPHROPATHY ASSOCIATED WITH TYPE 2 DIABETES MELLITUS (HCC): ICD-10-CM

## 2021-11-11 DIAGNOSIS — E11.8 TYPE 2 DIABETES MELLITUS WITH COMPLICATION, WITHOUT LONG-TERM CURRENT USE OF INSULIN (HCC): ICD-10-CM

## 2021-11-11 PROCEDURE — G8417 CALC BMI ABV UP PARAM F/U: HCPCS | Performed by: INTERNAL MEDICINE

## 2021-11-11 PROCEDURE — G8484 FLU IMMUNIZE NO ADMIN: HCPCS | Performed by: INTERNAL MEDICINE

## 2021-11-11 PROCEDURE — 2022F DILAT RTA XM EVC RTNOPTHY: CPT | Performed by: INTERNAL MEDICINE

## 2021-11-11 PROCEDURE — 1090F PRES/ABSN URINE INCON ASSESS: CPT | Performed by: INTERNAL MEDICINE

## 2021-11-11 PROCEDURE — 1036F TOBACCO NON-USER: CPT | Performed by: INTERNAL MEDICINE

## 2021-11-11 PROCEDURE — 3017F COLORECTAL CA SCREEN DOC REV: CPT | Performed by: INTERNAL MEDICINE

## 2021-11-11 PROCEDURE — 4040F PNEUMOC VAC/ADMIN/RCVD: CPT | Performed by: INTERNAL MEDICINE

## 2021-11-11 PROCEDURE — G8427 DOCREV CUR MEDS BY ELIG CLIN: HCPCS | Performed by: INTERNAL MEDICINE

## 2021-11-11 PROCEDURE — 1123F ACP DISCUSS/DSCN MKR DOCD: CPT | Performed by: INTERNAL MEDICINE

## 2021-11-11 PROCEDURE — 99213 OFFICE O/P EST LOW 20 MIN: CPT | Performed by: INTERNAL MEDICINE

## 2021-11-11 PROCEDURE — 3051F HG A1C>EQUAL 7.0%<8.0%: CPT | Performed by: INTERNAL MEDICINE

## 2021-11-11 PROCEDURE — G8399 PT W/DXA RESULTS DOCUMENT: HCPCS | Performed by: INTERNAL MEDICINE

## 2021-11-11 NOTE — PROGRESS NOTES
Renal Progress Note    Assessment and Plan:      Diagnosis Orders   1. Microalbuminuria     2. Type 2 diabetes mellitus with complication, without long-term current use of insulin ([facility]     3. Essential hypertension     4. Diabetic nephropathy associated with type 2 diabetes mellitus ([facility]     5. Hyperlipidemia, unspecified hyperlipidemia type       PLAN:   Lab result discussed with patient. Renal function remains normal.  Random urine microalbumin creatinine ratio is improved to 45 from 456. Medications reviewed  No changes  Return visit in 12 months with labs    Patient Active Problem List   Diagnosis    Hyperlipidemia    GERD (gastroesophageal reflux disease)    Essential hypertension    Insomnia    Type 2 diabetes mellitus with complication, without long-term current use of insulin (HCC)    CAD (coronary artery disease)    Class 1 obesity due to excess calories with serious comorbidity and body mass index (BMI) of 34.0 to 34.9 in adult    Elevated liver enzymes    NAFLD (nonalcoholic fatty liver disease)--Dr. Kervin Vance    Hypothyroidism    Microalbuminuria    Blood in stool    GI bleeding    Depression    Iron deficiency anemia due to chronic blood loss    Postmenopausal    Senile osteoporosis    Abnormal ECG    Iron deficiency anemia, unspecified    Anemia, unspecified    Thrombocytopenia (HCC)           Subjective:   Chief complaint:  Chief Complaint   Patient presents with    Other     Microalbuminuria      HPI:This is a follow up visit for Ms. Gael Hung who is here today for return appointment. I see her for microalbuminuria. She was last seen about 3 months ago. Doing well since then. Random urine microalbumin creatinine ratio was 456. As result we asked her to come back in 3 months not 6 months. She stopped eating  peanut butter. Repeat microalbumin creatinine ratio now is profoundly improved to 45 from 456. Reinier Gutter No chest pain. No shortness of breath. .  No fever chills. No headaches. .    ROS:  Pertinent positives stated above in HPI. All other systems were reviewed and were negative. Medications:     Current Outpatient Medications   Medication Sig Dispense Refill    glimepiride (AMARYL) 2 MG tablet TAKE 2 TABLETS (4 MG) IN THE MORNING AND 1 TABLET (2 MG) IN THE EVENING 270 tablet 3    albuterol sulfate HFA (VENTOLIN HFA) 108 (90 Base) MCG/ACT inhaler Inhale 2 puffs into the lungs 4 times daily as needed for Wheezing 3 Inhaler 1    escitalopram (LEXAPRO) 20 MG tablet TAKE 1 TABLET EVERY DAY 90 tablet 3    metFORMIN (GLUCOPHAGE-XR) 500 MG extended release tablet TAKE 2 TABLETS TWICE DAILY 360 tablet 3    pantoprazole (PROTONIX) 40 MG tablet TAKE 1 TABLET TWICE DAILY 180 tablet 3    blood glucose test strips (ACCU-CHEK ISABELA PLUS) strip USE 1 STRIP TO CHECK GLUCOSE ONCE DAILY 100 each 3    lisinopril (PRINIVIL;ZESTRIL) 20 MG tablet TAKE 1 TABLET TWICE DAILY 180 tablet 3    levothyroxine (SYNTHROID) 50 MCG tablet TAKE 1 TABLET EVERY DAY 90 tablet 3    atorvastatin (LIPITOR) 40 MG tablet TAKE 1 TABLET EVERY DAY 90 tablet 3    atenolol (TENORMIN) 50 MG tablet TAKE 1/2 TABLET EVERY DAY 45 tablet 3    Blood Glucose Monitoring Suppl (ACCU-CHEK ISABELA PLUS) w/Device KIT USE 1 TO CHECK GLUCOSE ONCE DAILY      blood glucose monitor kit and supplies Glucose monitor and supplies, brand per pt preference. Test sugar daily. Dx: E11.9 1 kit 0    Lancets MISC Test sugar daily. Brand per pt preference. Dx: E11.9 100 each 3    buPROPion (WELLBUTRIN SR) 150 MG extended release tablet Take 150 mg by mouth 2 times daily      aspirin 81 MG tablet Take 81 mg by mouth nightly      FISH OIL Take by mouth nightly Arthur Red      Docusate Calcium (STOOL SOFTENER PO) Take by mouth nightly as needed        No current facility-administered medications for this visit.        Lab Results:    CBC:   Lab Results   Component Value Date    WBC 4.9 08/16/2021    HGB 10.2 (L) 08/16/2021    HCT 35.1 (L) 08/16/2021    MCV 84.6 08/16/2021     (L) 08/16/2021     BMP:    Lab Results   Component Value Date     11/01/2021     08/16/2021     08/05/2021    K 4.9 11/01/2021    K 5.3 (H) 08/16/2021    K 5.4 (H) 08/05/2021     11/01/2021     08/16/2021     08/05/2021    CO2 23 11/01/2021    CO2 21 (L) 08/16/2021    CO2 20 (L) 08/05/2021    BUN 15 11/01/2021    BUN 13 08/16/2021    BUN 10 08/05/2021    CREATININE 0.7 11/01/2021    CREATININE 0.7 08/16/2021    CREATININE 0.7 08/05/2021    GLUCOSE 178 (H) 11/01/2021    GLUCOSE 131 (H) 08/16/2021    GLUCOSE 130 (H) 08/05/2021      Hepatic:   Lab Results   Component Value Date    AST 27 07/22/2021    AST 32 11/25/2020    AST 37 07/22/2020    ALT 24 07/22/2021    ALT 38 11/25/2020    ALT 41 07/22/2020    BILITOT 0.4 07/22/2021    BILITOT 0.5 11/25/2020    BILITOT 0.3 07/22/2020    ALKPHOS 55 07/22/2021    ALKPHOS 58 11/25/2020    ALKPHOS 66 07/22/2020     BNP: No results found for: BNP  Lipids:   Lab Results   Component Value Date    CHOL 122 11/01/2021    HDL 41 11/01/2021     INR:   Lab Results   Component Value Date    INR 1.20 (H) 07/22/2021    INR 1.07 11/25/2020    INR 1.12 12/07/2018     URINE:   Lab Results   Component Value Date    PROTUR 100 02/07/2019     Lab Results   Component Value Date    NITRU Negative 02/07/2019    COLORU Yellow 02/07/2019    COLORU YELLOW 10/24/2018    PHUR 5.0 10/24/2018    WBCUA 2-4 10/24/2018    WBCUA 0-2 11/29/2011    RBCUA 0-2 10/24/2018    YEAST NONE SEEN 10/24/2018    BACTERIA NONE 10/24/2018    CLARITYU cloudy 11/11/2011    SPECGRAV 1.020 11/11/2011    LEUKOCYTESUR NEGATIVE 10/24/2018    UROBILINOGEN 0.20 02/07/2019    BILIRUBINUR Negative 02/07/2019    BILIRUBINUR NEGATIVE 11/29/2011    BLOODU Large 02/07/2019    BLOODU LARGE 10/24/2018    GLUCOSEU Negative 02/07/2019    KETUA Negative 02/07/2019    AMORPHOUS URATES 10/24/2018      Microalbumen/Creatinine ratio:  No components found for: RUCREAT    Objective:   Vitals: /62 (Site: Left Upper Arm, Position: Sitting, Cuff Size: Large Adult)   Pulse 64   Temp 98.1 °F (36.7 °C)   Wt 174 lb 9.6 oz (79.2 kg)   SpO2 96%   BMI 31.93 kg/m²      Constitutional:  Alert, awake, no apparent distress  Skin:normal with no rash or any lesions  HEENT:Pupils are reactive . Throat is clear. Oral mucosa is moist.  Neck:supple with no thyromegaly or bruit   Cardiovascular:  S1, S2 without murmur   Respiratory:  Clear to auscultation with no wheezes or rales  Abdomen: +bowel sound, soft, non tender and no bruit  Ext: No LE edema  Musculoskeletal:Intact  Neuro:Alert, awake and oriented with no obvious focal deficit. Speech is normal.    Electronically signed by Carlos Higuera MD on 11/11/2021 at 3:24 PM   **This report has been created using voice recognition software. It maycontain minor  errors which are inherent in voice recognition technology. **

## 2021-12-09 ENCOUNTER — HOSPITAL ENCOUNTER (EMERGENCY)
Age: 71
Discharge: HOME | End: 2021-12-09
Attending: EMERGENCY MEDICINE
Payer: MEDICARE

## 2021-12-09 VITALS
BODY MASS INDEX: 28.66 KG/M2 | OXYGEN SATURATION: 92 % | SYSTOLIC BLOOD PRESSURE: 148 MMHG | TEMPERATURE: 97.4 F | HEIGHT: 65 IN | WEIGHT: 172 LBS | HEART RATE: 65 BPM | RESPIRATION RATE: 18 BRPM | DIASTOLIC BLOOD PRESSURE: 67 MMHG

## 2021-12-09 DIAGNOSIS — K62.5 HEMORRHAGE OF RECTUM AND ANUS: Primary | ICD-10-CM

## 2021-12-09 LAB
ABO: NORMAL
ALBUMIN SERPL-MCNC: 4.3 G/DL (ref 3.5–5.1)
ALP BLD-CCNC: 72 U/L (ref 38–126)
ALT SERPL-CCNC: 71 U/L (ref 11–66)
ANION GAP SERPL CALCULATED.3IONS-SCNC: 15 MEQ/L (ref 8–16)
ANTIBODY SCREEN: NORMAL
APTT: 33.1 SECONDS (ref 22–38)
AST SERPL-CCNC: 57 U/L (ref 5–40)
BACTERIA: ABNORMAL /HPF
BASOPHILS # BLD: 0.4 %
BASOPHILS ABSOLUTE: 0 THOU/MM3 (ref 0–0.1)
BILIRUB SERPL-MCNC: 0.4 MG/DL (ref 0.3–1.2)
BILIRUBIN URINE: NEGATIVE
BLOOD, URINE: ABNORMAL
BUN BLDV-MCNC: 17 MG/DL (ref 7–22)
CALCIUM SERPL-MCNC: 9.6 MG/DL (ref 8.5–10.5)
CASTS 2: ABNORMAL /LPF
CASTS UA: ABNORMAL /LPF
CHARACTER, URINE: CLEAR
CHLORIDE BLD-SCNC: 101 MEQ/L (ref 98–111)
CO2: 20 MEQ/L (ref 23–33)
COLOR: YELLOW
CREAT SERPL-MCNC: 0.8 MG/DL (ref 0.4–1.2)
CRYSTALS, UA: ABNORMAL
EOSINOPHIL # BLD: 2.9 %
EOSINOPHILS ABSOLUTE: 0.2 THOU/MM3 (ref 0–0.4)
EPITHELIAL CELLS, UA: ABNORMAL /HPF
ERYTHROCYTE [DISTWIDTH] IN BLOOD BY AUTOMATED COUNT: 13.5 % (ref 11.5–14.5)
ERYTHROCYTE [DISTWIDTH] IN BLOOD BY AUTOMATED COUNT: 44.9 FL (ref 35–45)
GFR SERPL CREATININE-BSD FRML MDRD: 71 ML/MIN/1.73M2
GLUCOSE BLD-MCNC: 238 MG/DL (ref 70–108)
GLUCOSE URINE: NEGATIVE MG/DL
HCT VFR BLD CALC: 39.6 % (ref 37–47)
HEMOCCULT STL QL: NEGATIVE
HEMOGLOBIN: 12.5 GM/DL (ref 12–16)
IMMATURE GRANS (ABS): 0.02 THOU/MM3 (ref 0–0.07)
IMMATURE GRANULOCYTES: 0.4 %
INR BLD: 1.13 (ref 0.85–1.13)
KETONES, URINE: NEGATIVE
LEUKOCYTE ESTERASE, URINE: ABNORMAL
LIPASE: 44.3 U/L (ref 5.6–51.3)
LYMPHOCYTES # BLD: 30.8 %
LYMPHOCYTES ABSOLUTE: 1.6 THOU/MM3 (ref 1–4.8)
MCH RBC QN AUTO: 28.9 PG (ref 26–33)
MCHC RBC AUTO-ENTMCNC: 31.6 GM/DL (ref 32.2–35.5)
MCV RBC AUTO: 91.5 FL (ref 81–99)
MISCELLANEOUS 2: ABNORMAL
MONOCYTES # BLD: 8.3 %
MONOCYTES ABSOLUTE: 0.4 THOU/MM3 (ref 0.4–1.3)
NITRITE, URINE: NEGATIVE
NUCLEATED RED BLOOD CELLS: 0 /100 WBC
OSMOLALITY CALCULATION: 281.3 MOSMOL/KG (ref 275–300)
PH UA: 5 (ref 5–9)
PLATELET # BLD: 117 THOU/MM3 (ref 130–400)
PMV BLD AUTO: 9.9 FL (ref 9.4–12.4)
POTASSIUM REFLEX MAGNESIUM: 4.6 MEQ/L (ref 3.5–5.2)
PROTEIN UA: NEGATIVE
RBC # BLD: 4.33 MILL/MM3 (ref 4.2–5.4)
RBC URINE: ABNORMAL /HPF
RENAL EPITHELIAL, UA: ABNORMAL
RH FACTOR: NORMAL
SEG NEUTROPHILS: 57.2 %
SEGMENTED NEUTROPHILS ABSOLUTE COUNT: 3 THOU/MM3 (ref 1.8–7.7)
SODIUM BLD-SCNC: 136 MEQ/L (ref 135–145)
SPECIFIC GRAVITY, URINE: 1.01 (ref 1–1.03)
TOTAL PROTEIN: 7.2 G/DL (ref 6.1–8)
UROBILINOGEN, URINE: 0.2 EU/DL (ref 0–1)
WBC # BLD: 5.2 THOU/MM3 (ref 4.8–10.8)
WBC UA: ABNORMAL /HPF
YEAST: ABNORMAL

## 2021-12-09 PROCEDURE — 87077 CULTURE AEROBIC IDENTIFY: CPT

## 2021-12-09 PROCEDURE — 83690 ASSAY OF LIPASE: CPT

## 2021-12-09 PROCEDURE — 36415 COLL VENOUS BLD VENIPUNCTURE: CPT

## 2021-12-09 PROCEDURE — 86901 BLOOD TYPING SEROLOGIC RH(D): CPT

## 2021-12-09 PROCEDURE — 82272 OCCULT BLD FECES 1-3 TESTS: CPT

## 2021-12-09 PROCEDURE — 99282 EMERGENCY DEPT VISIT SF MDM: CPT

## 2021-12-09 PROCEDURE — 86850 RBC ANTIBODY SCREEN: CPT

## 2021-12-09 PROCEDURE — 80053 COMPREHEN METABOLIC PANEL: CPT

## 2021-12-09 PROCEDURE — 86900 BLOOD TYPING SEROLOGIC ABO: CPT

## 2021-12-09 PROCEDURE — 85610 PROTHROMBIN TIME: CPT

## 2021-12-09 PROCEDURE — 6360000002 HC RX W HCPCS

## 2021-12-09 PROCEDURE — 87086 URINE CULTURE/COLONY COUNT: CPT

## 2021-12-09 PROCEDURE — 96374 THER/PROPH/DIAG INJ IV PUSH: CPT

## 2021-12-09 PROCEDURE — 85730 THROMBOPLASTIN TIME PARTIAL: CPT

## 2021-12-09 PROCEDURE — 2580000003 HC RX 258

## 2021-12-09 PROCEDURE — 85025 COMPLETE CBC W/AUTO DIFF WBC: CPT

## 2021-12-09 PROCEDURE — 81001 URINALYSIS AUTO W/SCOPE: CPT

## 2021-12-09 PROCEDURE — C9113 INJ PANTOPRAZOLE SODIUM, VIA: HCPCS

## 2021-12-09 PROCEDURE — 87186 SC STD MICRODIL/AGAR DIL: CPT

## 2021-12-09 RX ORDER — SODIUM CHLORIDE 9 MG/ML
INJECTION, SOLUTION INTRAVENOUS CONTINUOUS
Status: DISCONTINUED | OUTPATIENT
Start: 2021-12-09 | End: 2021-12-09 | Stop reason: HOSPADM

## 2021-12-09 RX ORDER — SODIUM CHLORIDE 9 MG/ML
10 INJECTION INTRAVENOUS DAILY
Status: DISCONTINUED | OUTPATIENT
Start: 2021-12-09 | End: 2021-12-09 | Stop reason: HOSPADM

## 2021-12-09 RX ORDER — HYDROCORTISONE ACETATE 25 MG/1
25 SUPPOSITORY RECTAL 2 TIMES DAILY
Qty: 10 SUPPOSITORY | Refills: 0 | Status: SHIPPED | OUTPATIENT
Start: 2021-12-09 | End: 2021-12-14

## 2021-12-09 RX ORDER — HYDROCORTISONE ACETATE 25 MG/1
25 SUPPOSITORY RECTAL 2 TIMES DAILY
Status: DISCONTINUED | OUTPATIENT
Start: 2021-12-09 | End: 2021-12-09 | Stop reason: HOSPADM

## 2021-12-09 RX ORDER — PANTOPRAZOLE SODIUM 40 MG/10ML
40 INJECTION, POWDER, LYOPHILIZED, FOR SOLUTION INTRAVENOUS DAILY
Status: DISCONTINUED | OUTPATIENT
Start: 2021-12-09 | End: 2021-12-09 | Stop reason: HOSPADM

## 2021-12-09 RX ADMIN — SODIUM CHLORIDE 10 ML: 9 INJECTION, SOLUTION INTRAMUSCULAR; INTRAVENOUS; SUBCUTANEOUS at 15:16

## 2021-12-09 RX ADMIN — PANTOPRAZOLE SODIUM 40 MG: 40 INJECTION, POWDER, FOR SOLUTION INTRAVENOUS at 15:16

## 2021-12-09 RX ADMIN — SODIUM CHLORIDE: 9 INJECTION, SOLUTION INTRAVENOUS at 15:15

## 2021-12-09 ASSESSMENT — ENCOUNTER SYMPTOMS
VOMITING: 0
SHORTNESS OF BREATH: 0
ANAL BLEEDING: 1
WHEEZING: 0
CHEST TIGHTNESS: 0
TROUBLE SWALLOWING: 0
VOICE CHANGE: 0
CHOKING: 0
ABDOMINAL PAIN: 0
ABDOMINAL DISTENTION: 0
RECTAL PAIN: 0
EYE DISCHARGE: 0
EYE ITCHING: 0
NAUSEA: 0
BLOOD IN STOOL: 1
CONSTIPATION: 0
EYE PAIN: 0

## 2021-12-09 ASSESSMENT — PAIN SCALES - GENERAL: PAINLEVEL_OUTOF10: 2

## 2021-12-09 ASSESSMENT — PAIN DESCRIPTION - PAIN TYPE: TYPE: ACUTE PAIN

## 2021-12-09 ASSESSMENT — PAIN DESCRIPTION - LOCATION: LOCATION: ABDOMEN

## 2021-12-09 ASSESSMENT — PAIN DESCRIPTION - ORIENTATION: ORIENTATION: LOWER

## 2021-12-09 NOTE — ED PROVIDER NOTES
[place] ENCOUNTER          Pt Name: Aime Wray  MRN: [id number]  [place] 1950  Date of evaluation: 12/9/2021  Treating Resident Physician: Warren Hernandez DO  Supervising Physician: Dr. Courtney Morales   Patient presents with    Rectal Bleeding     History obtained from the patient. HISTORY OF PRESENT ILLNESS    HPI  Aime Wray is a 80 y.o. female who presents to the emergency department for evaluation of rectal bleeding. Rectal bleeding this time started a yr ago, frequency was once a while and worsened quickly past couple weeks. Within past yr, she had 3 times hemorrhoids banding - early this year, but bleeding did not resolve completely. In October, she had scheduled colonoscopy and was told \"OK' but recently bleeding worsened, within past couple days 2-3 times per day. Bleeding - looks fresh red blood, not always with stools, volume varies time-to-time from few blood spots till entire fecal mass covered with blood. It is  Not associated with pain, no constipation. Time-to time, blood spots appear on cloth without bowel movement. She called today early to there GI doctor and told the worsening of bleeding and she was told to visit ED. Currently, she denies fever, chills, seizures, headaches, dizziness, lightheadness, chest pain/tightness, heart palpitations, abdominal pain, dysuria. The patient has no other acute complaints at this time. REVIEW OF SYSTEMS   Review of Systems   Constitutional: Positive for fatigue. Negative for appetite change, chills, fever and unexpected weight change. HENT: Negative for congestion, hearing loss, trouble swallowing and voice change. Eyes: Negative for pain, discharge and itching. Respiratory: Negative for choking, chest tightness, shortness of breath and wheezing. Cardiovascular: Negative for chest pain, palpitations and leg swelling. Gastrointestinal: Positive for anal bleeding and blood in stool. Negative for abdominal distention, abdominal pain, constipation, nausea, rectal pain and vomiting. Endocrine: Negative for cold intolerance, heat intolerance and polydipsia. Genitourinary: Negative for difficulty urinating, dysuria, flank pain and hematuria. Musculoskeletal: Negative for neck pain and neck stiffness. Skin: Negative for pallor and wound. Neurological: Negative for dizziness, tremors, seizures, syncope, speech difficulty, weakness and headaches. Psychiatric/Behavioral: Negative for agitation, hallucinations, sleep disturbance and suicidal ideas. The patient is not nervous/anxious and is not hyperactive. PAST MEDICAL AND SURGICAL HISTORY     Past Medical History:   Diagnosis Date    CAD (coronary artery disease)     [facility]est[facility] failure[facility]     Depression 2000    GERD (gastroesophageal reflux disease)     Headache(784.0)     Heart attack ([facility] [address]     Hyperlipidemia     Hypertension     Hypothyroidism 2/11/2015    Liver disease     [facility] 11/99, 10/01    x 2     Obesity     Osteoarthritis     B/L knees--Dr. Santiago Record.     PONV (postoperative nausea and vomiting)     Type 2 diabetes mellitus without complication (HCC)     Type II or unspecified type diabetes mellitus without mention of complication, not stated as uncontrolled 2014     Past Surgical History:   Procedure Laterality Date    APPENDECTOMY  1978    BLADDER SUSPENSION  1978    CARDIAC CATHETERIZATION  07/29/2009    Diagnostic Cath EF 40-45% (Dr Carlos Rose)   [address]    COLONOSCOPY      KNEE ARTHROSCOPY Right     HI COLON CA SCRN NOT  [address] Left 3/1/2018    COLONOSCOPY performed by Issac Hammonds MD at [facility]    PTCA      Shunt 10/01--Stent 11/99    TONSILLECTOMY AND ADENOIDECTOMY      UPPER GASTROINTESTINAL ENDOSCOPY      UPPER GASTROINTESTINAL ENDOSCOPY Left 2/27/2018    EGD BAND LIGATION performed by Clary Ugalde MD at [facility] N/A 4/19/2018    EGD BIOPSY performed by Edwina De La Torre MD at [facility]         MEDICATIONS     Current Facility-Administered Medications:     0.9 % sodium chloride infusion, , IntraVENous, Continuous, Valentino Rosado DO, Last Rate: 100 mL/hr at 12/09/21 1515, New Bag at 12/09/21 1515    pantoprazole (PROTONIX) injection 40 mg, 40 mg, IntraVENous, Daily, 40 mg at 12/09/21 1516 **AND** sodium chloride (PF) 0.9 % injection 10 mL, 10 mL, IntraVENous, Daily, Valentino Rosado DO, 10 mL at 12/09/21 1516    hydrocortisone (ANUSOL-HC) suppository 25 mg, 25 mg, Rectal, BID, Valentino Rosado DO    Current Outpatient Medications:     hydrocortisone (ANUSOL-HC) 25 MG suppository, Place 1 suppository rectally 2 times daily for 5 days, Disp: 10 suppository, Rfl: 0    glimepiride (AMARYL) 2 MG tablet, TAKE 2 TABLETS (4 MG) IN THE MORNING AND 1 TABLET (2 MG) IN THE EVENING, Disp: 270 tablet, Rfl: 3    albuterol sulfate HFA (VENTOLIN HFA) 108 (90 Base) MCG/ACT inhaler, Inhale 2 puffs into the lungs 4 times daily as needed for Wheezing, Disp: 3 Inhaler, Rfl: 1    escitalopram (LEXAPRO) 20 MG tablet, TAKE 1 TABLET EVERY DAY, Disp: 90 tablet, Rfl: 3    metFORMIN (GLUCOPHAGE-XR) 500 MG extended release tablet, TAKE 2 TABLETS TWICE DAILY, Disp: 360 tablet, Rfl: 3    pantoprazole (PROTONIX) 40 MG tablet, TAKE 1 TABLET TWICE DAILY, Disp: 180 tablet, Rfl: 3    blood glucose test strips (ACCU-CHEK ISABELA PLUS) strip, USE 1 STRIP TO CHECK GLUCOSE ONCE DAILY, Disp: 100 each, Rfl: 3    lisinopril (PRINIVIL;ZESTRIL) 20 MG tablet, TAKE 1 TABLET TWICE DAILY, Disp: 180 tablet, Rfl: 3    levothyroxine (SYNTHROID) 50 MCG tablet, TAKE 1 TABLET EVERY DAY, Disp: 90 tablet, Rfl: 3    atorvastatin (LIPITOR) 40 MG tablet, TAKE 1 TABLET EVERY DAY, Disp: 90 tablet, Rfl: 3    atenolol (TENORMIN) 50 MG tablet, TAKE 1/2 TABLET EVERY DAY, Disp: 45 tablet, Rfl: 3    Blood Glucose Monitoring Suppl (ACCU-CHEK ISABELA PLUS) w/Device KIT, USE 1 TO CHECK GLUCOSE ONCE DAILY, Disp: , Rfl:     blood glucose monitor kit and supplies, Glucose monitor and supplies, brand per pt preference. Test sugar daily. Dx: E11.9, Disp: 1 kit, Rfl: 0    Lancets MISC, Test sugar daily. Brand per pt preference. Dx: E11.9, Disp: 100 each, Rfl: 3    buPROPion (WELLBUTRIN SR) 150 MG extended release tablet, Take 150 mg by mouth 2 times daily, Disp: , Rfl:     aspirin 81 MG tablet, Take 81 mg by mouth nightly, Disp: , Rfl:     FISH OIL, Take by mouth nightly Arthur Red, Disp: , Rfl:     Docusate Calcium (STOOL SOFTENER PO), Take by mouth nightly as needed , Disp: , Rfl:       SOCIAL HISTORY     Social History     Social History Narrative    Not on file     Social History     Tobacco Use    Smoking status: Former Smoker     Years: 30.00     Types: Cigarettes     Quit date: 1999     Years since quittin.0    Smokeless tobacco: Former User   Vaping Use    Vaping Use: Never used   Substance Use Topics    Alcohol use: No     Comment: rarely    Drug use: No         ALLERGIES   No Known Allergies      FAMILY HISTORY     Family History   Problem Relation Age of Onset    Heart Disease Father     Heart Attack Father     Other Father         LUNG DISEASE-MACULAR DENERATION    Cancer Mother     High Blood Pressure Mother     Heart Disease Mother     Other Mother         GLAUCOMA    High Blood Pressure Sister     Osteoporosis Sister          PREVIOUS RECORDS   Previous records reviewed:   PHYSICAL EXAM     ED Triage Vitals [21 1423]   BP Temp Temp src Pulse Resp SpO2 Height Weight   (!) 148/67 97.4 °F (36.3 °C) -- 65 18 92 % 5' 5\" (1.651 m) 172 lb (78 kg)     Initial vital signs and nursing assessment reviewed and normal. Body mass index is 28.62 kg/m². Pulsoximetry is normal per my interpretation.     Additional Vital Signs:  Vitals:    21 1423   BP: (!) 148/67   Pulse: 65   Resp: 18   Temp: 97.4 °F (36.3 °C)   SpO2: 92%       Physical Exam  Constitutional:       General: She is not in acute distress. Appearance: Normal appearance. She is not ill-appearing, toxic-appearing or diaphoretic. HENT:      Head: Atraumatic. Nose: No congestion or rhinorrhea. Mouth/Throat:      Mouth: Mucous membranes are moist.      Pharynx: No oropharyngeal exudate. Eyes:      General: No scleral icterus. Right eye: No discharge. Left eye: No discharge. Cardiovascular:      Rate and Rhythm: Normal rate and regular rhythm. Heart sounds: No murmur heard. No gallop. Pulmonary:      Breath sounds: No stridor. No wheezing, rhonchi or rales. Chest:      Chest wall: No tenderness. Abdominal:      General: There is no distension. Tenderness: There is no abdominal tenderness. There is no right CVA tenderness or left CVA tenderness. Musculoskeletal:      Cervical back: No rigidity or tenderness. Right lower leg: No edema. Left lower leg: No edema. Lymphadenopathy:      Cervical: No cervical adenopathy. Skin:     Coloration: Skin is not jaundiced or pale. Neurological:      General: No focal deficit present. Mental Status: She is alert and oriented to person, place, and time. Psychiatric:         Mood and Affect: Mood normal.         Thought Content: Thought content normal.         Judgment: Judgment normal.       Rectal exam; no blood/fecal mass noted. No massed appreciated on exam      MEDICAL DECISION MAKING   Initial Assessment:   1. Lower GI bleeding . Plan:    CBC, Bmp, INR, aPTT, lipase, UA with reflex   GI consult considered   protonix IV, NPO, IV NS maintenance fluid .         ED RESULTS   Laboratory results:  Labs Reviewed   CBC WITH AUTO DIFFERENTIAL - Abnormal; Notable for the following components:       Result Value    MCHC 31.6 (*)     Platelets 147 (*)     All other components within normal limits   COMPREHENSIVE METABOLIC PANEL W/ REFLEX TO MG FOR LOW K - Abnormal; Notable for the following components:    Glucose 238 (*)     CO2 20 (*)     AST 57 (*)     ALT 71 (*)     All other components within normal limits   GLOMERULAR FILTRATION RATE, ESTIMATED - Abnormal; Notable for the following components:    Est, Glom Filt Rate 71 (*)     All other components within normal limits   URINE WITH REFLEXED MICRO - Abnormal; Notable for the following components:    Blood, Urine SMALL (*)     Leukocyte Esterase, Urine MODERATE (*)     All other components within normal limits   CULTURE, REFLEXED, URINE    Narrative:     Source: urine, clean catch       Site:           Current Antibiotics: not stated   LIPASE   APTT   PROTIME-INR   ANION GAP   OSMOLALITY   BLOOD OCCULT STOOL SCREEN #1   TYPE AND SCREEN       Radiologic studies results:  No orders to display       ED Medications administered this visit:   Medications   0.9 % sodium chloride infusion ( IntraVENous New Bag 12/9/21 1515)   pantoprazole (PROTONIX) injection 40 mg (40 mg IntraVENous Given 12/9/21 1516)     And   sodium chloride (PF) 0.9 % injection 10 mL (10 mLs IntraVENous Given 12/9/21 1516)   hydrocortisone (ANUSOL-HC) suppository 25 mg (has no administration in time range)         ED COURSE    1. Rectal/anus hemorrhage. , most likely due to hemorrhoids- resolved before ED arrival.  -initially given Protonix 40 mg IV, NPO, IV NS maintenance fluid   -Rectal exam reveals no blood/fecal mass/ no hemorrhoidal nodes appreciated  -CBC- HB-12,5 RBC-4.33, WBC-5.2   -GI consulted and Dr. Merlin Collado consulted over phone.  Per Dr. Merlin Collado, patient can be discharged home, anusol supp twice dily for 5 days and follow up within 5 days at GI clinic  -stable vital signs, no bleeding episode in ED    2. asymptomatic pyuria   -UA positive for LE, bacteria, epithelial cell 3-5  -patient is asymptomatic, denies urinary frequency, dysuria, cloudy urine  -no abx is indicated for now. Patient is ware, instructions given for close monitoring for now. Follow up with  PCP      Strict return precautions and follow up instructions were discussed with the patient prior to discharge, with which the patient agrees. MEDICATION CHANGES     New Prescriptions    HYDROCORTISONE (ANUSOL-HC) 25 MG SUPPOSITORY    Place 1 suppository rectally 2 times daily for 5 days         FINAL DISPOSITION     Final diagnoses:   Hemorrhage of rectum and anus     Condition: condition: stable  Dispo: Discharge to home      This transcription was electronically signed. Parts of this transcriptions may have been dictated by use of voice recognition software and electronically transcribed, and parts may have been transcribed with the assistance of an ED scribe. The transcription may contain errors not detected in proofreading. Please refer to my supervising physician's documentation if my documentation differs.     Electronically Signed: Valentino Rosado DO, 12/09/21, 4:39 PM        Valentino De Leon DO  Resident  12/09/21 [id number]

## 2021-12-09 NOTE — ED TRIAGE NOTES
Patient to ED from home with complaints of rectal bleeding x1 month. Patient states she was supposed to get a scope done but refused a month ago.

## 2021-12-09 NOTE — ED NOTES
Rectal exam done with this RN and Resident in bereavement room.       Blaine Grande RN  12/09/21 0455

## 2021-12-10 ENCOUNTER — TELEPHONE (OUTPATIENT)
Dept: FAMILY MEDICINE CLINIC | Age: 71
End: 2021-12-10

## 2021-12-10 LAB
ORGANISM: ABNORMAL
URINE CULTURE REFLEX: ABNORMAL

## 2021-12-10 NOTE — TELEPHONE ENCOUNTER
----- Message from Herminio Underwood MD sent at 12/10/2021  8:59 AM EST -----  Urine culture from ED/UC reviewed. Likely UTI based on culture-is patient symptomatic? If yes, start Macrobid 100 mg - 1 pill twice daily x5 days. #10/no refillsIf not symptomatic, no indication for treatment, as patient needs criteria for asymptomatic bacteriuria. Let me know.   ES

## 2021-12-11 NOTE — TELEPHONE ENCOUNTER
Lab Results   Component Value Date     12/09/2021    K 4.6 12/09/2021     12/09/2021    CO2 20 12/09/2021    BUN 17 12/09/2021    CREATININE 0.8 12/09/2021    GLUCOSE 238 12/09/2021    GLUCOSE 132 10/15/2011    CALCIUM 9.6 12/09/2021          Urine culture finalized. Culture with intermediate sensitivity to nitrofurantoin-change antibiotic to Cipro 250 mg - 1 pill twice daily x5 days  (#10/no refills) if patient symptomatic.   ES

## 2021-12-13 ENCOUNTER — TELEPHONE (OUTPATIENT)
Dept: PHARMACY | Age: 71
End: 2021-12-13

## 2021-12-13 NOTE — TELEPHONE ENCOUNTER
Pharmacy Note  ED Culture Follow-up    Ana Maria Goodson is a 80 y.o. female. Allergies: Patient has no known allergies. Labs:  Lab Results   Component Value Date    BUN 17 12/09/2021    CREATININE 0.8 12/09/2021    WBC 5.2 12/09/2021     Estimated Creatinine Clearance: 68 mL/min (based on SCr of 0.8 mg/dL). Current antimicrobials:   · None    ASSESSMENT:  Micro results:   Urine culture: positive for Klebsiella pneumoniae     PLAN:  Need for intervention: Possibly if having UTI symptoms  Discussed with: JOSE Gómez  Chosen treatment:    · Patient will be treated by PCP. Dr. Payton  office has a telephone encounter 12/10/21 that states a plan to assess patient for UTI symptoms and to start Cipro 250 mg BID x5 days if symptomatic. · Patient never called PCP back. Okay with their plan to treat with Cipro if symptomatic so also attempting to contact patient. Patient response:   · Call attempt #1, did not reach patient. Left message for patient to call back. Called/sent in prescription to: Not applicable    Please call with any questions.  RONNA Mei [facility], PharmD 3:43 PM 12/13/2021

## 2021-12-14 RX ORDER — CIPROFLOXACIN 250 MG/1
250 TABLET, FILM COATED ORAL 2 TIMES DAILY
Qty: 10 TABLET | Refills: 0 | Status: SHIPPED | OUTPATIENT
Start: 2021-12-14 | End: 2021-12-19

## 2022-01-26 DIAGNOSIS — I10 ESSENTIAL HYPERTENSION: ICD-10-CM

## 2022-01-26 DIAGNOSIS — E11.8 TYPE 2 DIABETES MELLITUS WITH COMPLICATION, WITHOUT LONG-TERM CURRENT USE OF INSULIN (HCC): ICD-10-CM

## 2022-01-26 DIAGNOSIS — E78.5 HYPERLIPIDEMIA, UNSPECIFIED HYPERLIPIDEMIA TYPE: ICD-10-CM

## 2022-01-26 DIAGNOSIS — E03.9 HYPOTHYROIDISM, UNSPECIFIED TYPE: ICD-10-CM

## 2022-01-27 RX ORDER — ATORVASTATIN CALCIUM 40 MG/1
TABLET, FILM COATED ORAL
Qty: 90 TABLET | Refills: 3 | Status: SHIPPED | OUTPATIENT
Start: 2022-01-27

## 2022-01-27 RX ORDER — ATENOLOL 50 MG/1
TABLET ORAL
Qty: 45 TABLET | Refills: 3 | Status: SHIPPED | OUTPATIENT
Start: 2022-01-27

## 2022-01-27 RX ORDER — LISINOPRIL 20 MG/1
TABLET ORAL
Qty: 180 TABLET | Refills: 3 | Status: SHIPPED | OUTPATIENT
Start: 2022-01-27

## 2022-01-27 RX ORDER — LEVOTHYROXINE SODIUM 0.05 MG/1
TABLET ORAL
Qty: 90 TABLET | Refills: 3 | Status: SHIPPED | OUTPATIENT
Start: 2022-01-27

## 2022-01-27 NOTE — TELEPHONE ENCOUNTER
This medication refill is regarding a electronic request.  Refill requested by [facility]. Requested Prescriptions     Pending Prescriptions Disp Refills    lisinopril (PRINIVIL;ZESTRIL) 20 MG tablet [Pharmacy Med Name: LISINOPRIL 20 MG Tablet] 180 tablet 3     Sig: TAKE 1 TABLET TWICE DAILY    levothyroxine (SYNTHROID) 50 MCG tablet [Pharmacy Med Name: LEVOTHYROXINE SODIUM 50 MCG Tablet] 90 tablet 3     Sig: TAKE 1 TABLET EVERY DAY    atorvastatin (LIPITOR) 40 MG tablet [Pharmacy Med Name: ATORVASTATIN CALCIUM 40 MG Tablet] 90 tablet 3     Sig: TAKE 1 TABLET EVERY DAY    atenolol (TENORMIN) 50 MG tablet [Pharmacy Med Name: ATENOLOL 50 MG Tablet] 45 tablet 3     Sig: TAKE 1/2 TABLET EVERY DAY     Date of last visit: 10/12/2021   Date of next visit: None  Date of last refill:    -Atenolol 1/5/2021 #45/3   -Atorvastatin 1/5/2021 #90/3   -Levothyroxine 1/5/2021 #90/3   -Lisinopril 1/5/2021 #180/3    Last Lipid Panel:    Lab Results   Component Value Date    CHOL 122 11/01/2021    TRIG 127 11/01/2021    HDL 41 11/01/2021    LDLCALC 56 11/01/2021     Last CMP:   Lab Results   Component Value Date     12/09/2021    K 4.6 12/09/2021     12/09/2021    CO2 20 (L) 12/09/2021    BUN 17 12/09/2021    CREATININE 0.8 12/09/2021    GLUCOSE 238 (H) 12/09/2021    CALCIUM 9.6 12/09/2021    PROT 7.2 12/09/2021    LABALBU 4.3 12/09/2021    BILITOT 0.4 12/09/2021    ALKPHOS 72 12/09/2021    AST 57 (H) 12/09/2021    ALT 71 (H) 12/09/2021    LABGLOM 71 (A) 12/09/2021     Last Thyroid:    Lab Results   Component Value Date    TSH 2.820 11/01/2021    T4FREE 1.15 11/01/2021     Rx verified, ordered and set to EP.

## 2022-02-16 ENCOUNTER — HOSPITAL ENCOUNTER (OUTPATIENT)
Age: 72
Discharge: HOME | End: 2022-02-16
Payer: MEDICARE

## 2022-02-16 DIAGNOSIS — E11.65 TYPE 2 DIABETES MELLITUS WITH HYPERGLYCEMIA, WITHOUT LONG-TERM CURRENT USE OF INSULIN (HCC): ICD-10-CM

## 2022-02-16 PROCEDURE — 83036 HEMOGLOBIN GLYCOSYLATED A1C: CPT

## 2022-02-16 PROCEDURE — 36415 COLL VENOUS BLD VENIPUNCTURE: CPT

## 2022-02-17 LAB
AVERAGE GLUCOSE: 153 MG/DL (ref 70–126)
HBA1C MFR BLD: 7.1 % (ref 4.4–6.4)

## 2022-03-03 ENCOUNTER — OFFICE VISIT (OUTPATIENT)
Dept: CARDIOLOGY CLINIC | Age: 72
End: 2022-03-03
Payer: MEDICARE

## 2022-03-03 VITALS
BODY MASS INDEX: 33.77 KG/M2 | HEART RATE: 65 BPM | DIASTOLIC BLOOD PRESSURE: 62 MMHG | HEIGHT: 60 IN | WEIGHT: 172 LBS | SYSTOLIC BLOOD PRESSURE: 112 MMHG

## 2022-03-03 DIAGNOSIS — E78.01 FAMILIAL HYPERCHOLESTEROLEMIA: ICD-10-CM

## 2022-03-03 DIAGNOSIS — I25.10 CORONARY ARTERY DISEASE INVOLVING NATIVE CORONARY ARTERY OF NATIVE HEART WITHOUT ANGINA PECTORIS: Primary | ICD-10-CM

## 2022-03-03 DIAGNOSIS — I10 PRIMARY HYPERTENSION: ICD-10-CM

## 2022-03-03 PROCEDURE — G8484 FLU IMMUNIZE NO ADMIN: HCPCS | Performed by: NUCLEAR MEDICINE

## 2022-03-03 PROCEDURE — 3017F COLORECTAL CA SCREEN DOC REV: CPT | Performed by: NUCLEAR MEDICINE

## 2022-03-03 PROCEDURE — 93000 ELECTROCARDIOGRAM COMPLETE: CPT | Performed by: NUCLEAR MEDICINE

## 2022-03-03 PROCEDURE — 1036F TOBACCO NON-USER: CPT | Performed by: NUCLEAR MEDICINE

## 2022-03-03 PROCEDURE — G8399 PT W/DXA RESULTS DOCUMENT: HCPCS | Performed by: NUCLEAR MEDICINE

## 2022-03-03 PROCEDURE — G8427 DOCREV CUR MEDS BY ELIG CLIN: HCPCS | Performed by: NUCLEAR MEDICINE

## 2022-03-03 PROCEDURE — 4040F PNEUMOC VAC/ADMIN/RCVD: CPT | Performed by: NUCLEAR MEDICINE

## 2022-03-03 PROCEDURE — G8417 CALC BMI ABV UP PARAM F/U: HCPCS | Performed by: NUCLEAR MEDICINE

## 2022-03-03 PROCEDURE — 1090F PRES/ABSN URINE INCON ASSESS: CPT | Performed by: NUCLEAR MEDICINE

## 2022-03-03 PROCEDURE — 1123F ACP DISCUSS/DSCN MKR DOCD: CPT | Performed by: NUCLEAR MEDICINE

## 2022-03-03 PROCEDURE — 99213 OFFICE O/P EST LOW 20 MIN: CPT | Performed by: NUCLEAR MEDICINE

## 2022-03-03 NOTE — PROGRESS NOTES
 Heart Disease Father     Heart Attack Father     Other Father         LUNG DISEASE-MACULAR DENERATION    Cancer Mother     High Blood Pressure Mother     Heart Disease Mother     Other Mother         GLAUCOMA    High Blood Pressure Sister     Osteoporosis Sister      Social History     Tobacco Use    Smoking status: Former Smoker     Years: 30.00     Types: Cigarettes     Quit date: 1999     Years since quittin.2    Smokeless tobacco: Former User   Substance Use Topics    Alcohol use: No     Comment: rarely      Current Outpatient Medications   Medication Sig Dispense Refill    lisinopril (PRINIVIL;ZESTRIL) 20 MG tablet TAKE 1 TABLET TWICE DAILY 180 tablet 3    levothyroxine (SYNTHROID) 50 MCG tablet TAKE 1 TABLET EVERY DAY 90 tablet 3    atorvastatin (LIPITOR) 40 MG tablet TAKE 1 TABLET EVERY DAY 90 tablet 3    atenolol (TENORMIN) 50 MG tablet TAKE 1/2 TABLET EVERY DAY 45 tablet 3    glimepiride (AMARYL) 2 MG tablet TAKE 2 TABLETS (4 MG) IN THE MORNING AND 1 TABLET (2 MG) IN THE EVENING 270 tablet 3    albuterol sulfate HFA (VENTOLIN HFA) 108 (90 Base) MCG/ACT inhaler Inhale 2 puffs into the lungs 4 times daily as needed for Wheezing 3 Inhaler 1    escitalopram (LEXAPRO) 20 MG tablet TAKE 1 TABLET EVERY DAY 90 tablet 3    metFORMIN (GLUCOPHAGE-XR) 500 MG extended release tablet TAKE 2 TABLETS TWICE DAILY 360 tablet 3    pantoprazole (PROTONIX) 40 MG tablet TAKE 1 TABLET TWICE DAILY 180 tablet 3    blood glucose test strips (ACCU-CHEK ISABELA PLUS) strip USE 1 STRIP TO CHECK GLUCOSE ONCE DAILY 100 each 3    Blood Glucose Monitoring Suppl (ACCU-CHEK ISABELA PLUS) w/Device KIT USE 1 TO CHECK GLUCOSE ONCE DAILY      blood glucose monitor kit and supplies Glucose monitor and supplies, brand per pt preference. Test sugar daily. Dx: E11.9 1 kit 0    Lancets MISC Test sugar daily. Brand per pt preference.  Dx: E11.9 100 each 3    buPROPion (WELLBUTRIN SR) 150 MG extended release tablet Take 150 mg by mouth 2 times daily      aspirin 81 MG tablet Take 81 mg by mouth nightly      FISH OIL Take by mouth nightly Arthur Red      Docusate Calcium (STOOL SOFTENER PO) Take by mouth nightly as needed        No current facility-administered medications for this visit. No Known Allergies  Health Maintenance   Topic Date Due    Depression Monitoring  Never done    Shingles Vaccine (2 of 3) 06/21/2012    Breast cancer screen  05/14/2020    Annual Wellness Visit (AWV)  03/18/2022    Hepatitis A vaccine (1 of 2 - Risk 2-dose series) 04/12/2022 (Originally 1/29/1951)    Diabetic foot exam  10/12/2022    Diabetic microalbuminuria test  11/01/2022    Lipid screen  11/01/2022    TSH testing  11/01/2022    Diabetic retinal exam  11/29/2022    Potassium monitoring  12/09/2022    Creatinine monitoring  12/09/2022    A1C test (Diabetic or Prediabetic)  02/16/2023    DTaP/Tdap/Td vaccine (2 - Td or Tdap) 09/19/2024    Colorectal Cancer Screen  09/03/2031    DEXA (modify frequency per FRAX score)  Completed    Flu vaccine  Completed    Pneumococcal 65+ years Vaccine  Completed    COVID-19 Vaccine  Completed    Hepatitis C screen  Completed    Hib vaccine  Aged Out    Meningococcal (ACWY) vaccine  Aged Out       Subjective:  Review of Systems  General:   No fever, no chills, No fatigue or weight loss  Pulmonary:    some dyspnea, no wheezing  Cardiac:    Denies recent chest pain,   GI:     No nausea or vomiting, no abdominal pain  Neuro:    No dizziness or light headedness,   Musculoskeletal:  No recent active issues  Extremities:   No edema, no obvious claudication       Objective:  Physical Exam  /62   Pulse 65   Ht 5' (1.524 m)   Wt 172 lb (78 kg)   BMI 33.59 kg/m²   General:   Well developed, well nourished  Lungs:   Clear to auscultation  Heart:    Normal S1 S2, Slight murmur.  no rubs, no gallops  Abdomen:   Soft, non tender, no organomegalies, positive bowel sounds  Extremities: No edema, no cyanosis, good peripheral pulses  Neurological:   Awake, alert, oriented. No obvious focal deficits  Musculoskelatal:  No obvious deformities    Assessment:      Diagnosis Orders   1. Coronary artery disease involving native coronary artery of native heart without angina pectoris  EKG 12 Lead   2. Primary hypertension     3. Familial hypercholesterolemia     as above  Cardiac fair for now   ECG in office was done today. I reviewed the ECG. No acute findings      Plan:  No follow-ups on file. As above  Continue risk factor modification and medical management  Thank you for allowing me to participate in the care of your patient. Please don't hesitate to contact me regarding any further issues related to the patient care    Orders Placed:  Orders Placed This Encounter   Procedures    EKG 12 Lead     Order Specific Question:   Reason for Exam?     Answer: Other       Medications Prescribed:  No orders of the defined types were placed in this encounter. Discussed use, benefit, and side effects of prescribed medications. All patient questions answered. Pt voicedunderstanding. Instructed to continue current medications, diet and exercise. Continue risk factor modification and medical management. Patient agreed with treatment plan. Follow up as directed.     Electronically signedby Shefali Horowitz MD on 3/3/2022 at 1:18 PM

## 2022-03-28 ENCOUNTER — OFFICE VISIT (OUTPATIENT)
Dept: FAMILY MEDICINE CLINIC | Age: 72
End: 2022-03-28
Payer: MEDICARE

## 2022-03-28 VITALS
RESPIRATION RATE: 16 BRPM | BODY MASS INDEX: 32.81 KG/M2 | DIASTOLIC BLOOD PRESSURE: 82 MMHG | WEIGHT: 168 LBS | SYSTOLIC BLOOD PRESSURE: 136 MMHG | HEART RATE: 76 BPM

## 2022-03-28 DIAGNOSIS — R30.0 DYSURIA: Primary | ICD-10-CM

## 2022-03-28 DIAGNOSIS — L30.9 DERMATITIS: ICD-10-CM

## 2022-03-28 DIAGNOSIS — N30.01 ACUTE CYSTITIS WITH HEMATURIA: ICD-10-CM

## 2022-03-28 LAB
BILIRUBIN URINE: NEGATIVE
BLOOD URINE, POC: ABNORMAL
CHARACTER, URINE: CLEAR
COLOR, URINE: YELLOW
GLUCOSE URINE: NEGATIVE MG/DL
KETONES, URINE: NEGATIVE
LEUKOCYTE CLUMPS, URINE: ABNORMAL
NITRITE, URINE: NEGATIVE
PH, URINE: 5.5 (ref 5–9)
PROTEIN, URINE: ABNORMAL MG/DL
SPECIFIC GRAVITY, URINE: 1.02 (ref 1–1.03)
UROBILINOGEN, URINE: 0.2 EU/DL (ref 0–1)

## 2022-03-28 PROCEDURE — 4040F PNEUMOC VAC/ADMIN/RCVD: CPT | Performed by: NURSE PRACTITIONER

## 2022-03-28 PROCEDURE — 3017F COLORECTAL CA SCREEN DOC REV: CPT | Performed by: NURSE PRACTITIONER

## 2022-03-28 PROCEDURE — G8484 FLU IMMUNIZE NO ADMIN: HCPCS | Performed by: NURSE PRACTITIONER

## 2022-03-28 PROCEDURE — 99213 OFFICE O/P EST LOW 20 MIN: CPT | Performed by: NURSE PRACTITIONER

## 2022-03-28 PROCEDURE — 81003 URINALYSIS AUTO W/O SCOPE: CPT | Performed by: NURSE PRACTITIONER

## 2022-03-28 PROCEDURE — 1090F PRES/ABSN URINE INCON ASSESS: CPT | Performed by: NURSE PRACTITIONER

## 2022-03-28 PROCEDURE — 1123F ACP DISCUSS/DSCN MKR DOCD: CPT | Performed by: NURSE PRACTITIONER

## 2022-03-28 PROCEDURE — G8399 PT W/DXA RESULTS DOCUMENT: HCPCS | Performed by: NURSE PRACTITIONER

## 2022-03-28 PROCEDURE — 1036F TOBACCO NON-USER: CPT | Performed by: NURSE PRACTITIONER

## 2022-03-28 PROCEDURE — G8427 DOCREV CUR MEDS BY ELIG CLIN: HCPCS | Performed by: NURSE PRACTITIONER

## 2022-03-28 PROCEDURE — G8417 CALC BMI ABV UP PARAM F/U: HCPCS | Performed by: NURSE PRACTITIONER

## 2022-03-28 RX ORDER — CIPROFLOXACIN 250 MG/1
250 TABLET, FILM COATED ORAL 2 TIMES DAILY
Qty: 14 TABLET | Refills: 0 | Status: SHIPPED | OUTPATIENT
Start: 2022-03-28 | End: 2022-04-04

## 2022-03-28 ASSESSMENT — ENCOUNTER SYMPTOMS
VOMITING: 0
NAUSEA: 0

## 2022-03-28 NOTE — PROGRESS NOTES
[facility]  [id number] [facility] [place] [address]  Dept: [phone]  Dept Fax: [phone]: [phone]     Visit Date:  3/28/2022      Patient:  Denise Welch  YOB: 1950    HPI:     Chief Complaint   Patient presents with    Dysuria     C/O dysuria since friday. Has been taking azo. Pt presents to the office today for dysuria for the past few days. She does have a HX of UTI's and this feels like another one. She has been using AZO at home with some relief. Increased fluids also. Pt also has a rash to her low back that she noticed a few weeks ago. She has been using triple antibiotic cream with mild relief. Dysuria   This is a new problem. The current episode started in the past 7 days. The problem occurs every urination. The problem has been gradually worsening. The quality of the pain is described as burning. The pain is moderate. There has been no fever. She is not sexually active. There is no history of pyelonephritis. Associated symptoms include frequency, hesitancy and urgency. Pertinent negatives include no chills, discharge, flank pain, hematuria, nausea, possible pregnancy or vomiting. She has tried increased fluids and home medications for the symptoms. The treatment provided moderate relief. Her past medical history is significant for recurrent UTIs. There is no history of catheterization, kidney stones, a single kidney, urinary stasis or a urological procedure. Rash  This is a new problem. The current episode started 1 to 4 weeks ago. The problem has been gradually worsening since onset. The affected locations include the back. The rash is characterized by redness and itchiness. She was exposed to plant contact. Pertinent negatives include no congestion, cough, diarrhea, facial edema, fatigue, fever, joint pain, nail changes, rhinorrhea, shortness of breath, sore throat or vomiting.  Past treatments include antibiotic cream. The treatment provided mild relief. There is no history of allergies, asthma, eczema or varicella. Medications    Current Outpatient Medications:     ciprofloxacin (CIPRO) 250 MG tablet, Take 1 tablet by mouth 2 times daily for 7 days, Disp: 14 tablet, Rfl: 0    hydrocortisone 2.5 % cream, Apply topically 2 times daily. , Disp: 1 each, Rfl: 0    lisinopril (PRINIVIL;ZESTRIL) 20 MG tablet, TAKE 1 TABLET TWICE DAILY, Disp: 180 tablet, Rfl: 3    levothyroxine (SYNTHROID) 50 MCG tablet, TAKE 1 TABLET EVERY DAY, Disp: 90 tablet, Rfl: 3    atorvastatin (LIPITOR) 40 MG tablet, TAKE 1 TABLET EVERY DAY, Disp: 90 tablet, Rfl: 3    atenolol (TENORMIN) 50 MG tablet, TAKE 1/2 TABLET EVERY DAY, Disp: 45 tablet, Rfl: 3    glimepiride (AMARYL) 2 MG tablet, TAKE 2 TABLETS (4 MG) IN THE MORNING AND 1 TABLET (2 MG) IN THE EVENING, Disp: 270 tablet, Rfl: 3    albuterol sulfate HFA (VENTOLIN HFA) 108 (90 Base) MCG/ACT inhaler, Inhale 2 puffs into the lungs 4 times daily as needed for Wheezing, Disp: 3 Inhaler, Rfl: 1    escitalopram (LEXAPRO) 20 MG tablet, TAKE 1 TABLET EVERY DAY, Disp: 90 tablet, Rfl: 3    metFORMIN (GLUCOPHAGE-XR) 500 MG extended release tablet, TAKE 2 TABLETS TWICE DAILY, Disp: 360 tablet, Rfl: 3    pantoprazole (PROTONIX) 40 MG tablet, TAKE 1 TABLET TWICE DAILY, Disp: 180 tablet, Rfl: 3    blood glucose test strips (ACCU-CHEK ISABELA PLUS) strip, USE 1 STRIP TO CHECK GLUCOSE ONCE DAILY, Disp: 100 each, Rfl: 3    Blood Glucose Monitoring Suppl (ACCU-CHEK ISABELA PLUS) w/Device KIT, USE 1 TO CHECK GLUCOSE ONCE DAILY, Disp: , Rfl:     blood glucose monitor kit and supplies, Glucose monitor and supplies, brand per pt preference. Test sugar daily. Dx: E11.9, Disp: 1 kit, Rfl: 0    Lancets MISC, Test sugar daily. Brand per pt preference.  Dx: E11.9, Disp: 100 each, Rfl: 3    buPROPion (WELLBUTRIN SR) 150 MG extended release tablet, Take 150 mg by mouth 2 times daily, Disp: , Rfl:     aspirin 81 MG tablet, Take 81 mg by mouth nightly, Disp: , Rfl:     FISH OIL, Take by mouth nightly Arthur Red, Disp: , Rfl:     Docusate Calcium (STOOL SOFTENER PO), Take by mouth nightly as needed , Disp: , Rfl:     The patient has No Known Allergies. Past Medical History  Yamile Pickering  has a past medical history of CAD (coronary artery disease), CHF (congestive heart failure) ([address] Depression, GERD (gastroesophageal reflux disease), Headache(784.0), Heart attack ([address] Hyperlipidemia, Hypertension, Hypothyroidism, Liver disease, MI (myocardial infarction) ([address] Obesity, Osteoarthritis, PONV (postoperative nausea and vomiting), Type 2 diabetes mellitus without complication ([facility] [address] and Type II or unspecified type diabetes mellitus without mention of complication, not stated as uncontrolled. Subjective:      Review of Systems   Constitutional: Negative for chills, fatigue and fever. HENT: Negative for congestion, rhinorrhea and sore throat. Respiratory: Negative for cough and shortness of breath. Gastrointestinal: Negative for diarrhea, nausea and vomiting. Genitourinary: Positive for dysuria, frequency, hesitancy and urgency. Negative for flank pain and hematuria. Musculoskeletal: Negative for joint pain. Skin: Positive for rash. Negative for color change and nail changes. Objective:     /82   Pulse 76   Resp 16   Wt 168 lb (76.2 kg)   BMI 32.81 kg/m²     Physical Exam  Vitals reviewed. Constitutional:       General: She is not in acute distress. Appearance: She is well-developed. HENT:      Head: Normocephalic and atraumatic. Eyes:      General:         Right eye: No discharge. Left eye: No discharge. Conjunctiva/sclera: Conjunctivae normal.   Pulmonary:      Effort: Pulmonary effort is normal. No respiratory distress. Abdominal:      General: Bowel sounds are normal.      Palpations: Abdomen is soft. Tenderness:  There is no abdominal tenderness. There is no right CVA tenderness or left CVA tenderness. Skin:     General: Skin is warm and dry. Neurological:      General: No focal deficit present. Mental Status: She is alert and oriented to person, place, and time. Coordination: Coordination normal.   Psychiatric:         Behavior: Behavior normal.         Thought Content: Thought content normal.         Judgment: Judgment normal.         Results for POC orders placed in visit on 03/28/22   POCT Urinalysis No Micro (Auto)   Result Value Ref Range    Glucose, Ur Negative NEGATIVE mg/dl    Bilirubin Urine Negative     Ketones, Urine Negative NEGATIVE    Specific Gravity, Urine 1.020 1.002 - 1.030    Blood, UA POC Moderate (A) NEGATIVE    pH, Urine 5.50 5.0 - 9.0    Protein, Urine Trace (A) NEGATIVE mg/dl    Urobilinogen, Urine 0.20 0.0 - 1.0 eu/dl    Nitrite, Urine Negative NEGATIVE    Leukocyte Clumps, Urine Moderate (A) NEGATIVE    Color, Urine Yellow [id number]-STRAW    Character, Urine Clear [id number]       Assessment/Plan:      Zaida Pena was seen today for dysuria. Diagnoses and all orders for this visit:    Dysuria  -     POCT Urinalysis No Micro (Auto)    Acute cystitis with hematuria  -     ciprofloxacin (CIPRO) 250 MG tablet; Take 1 tablet by mouth 2 times daily for 7 days  -     Culture, Urine    Dermatitis  -     hydrocortisone 2.5 % cream; Apply topically 2 times daily.    - Rest and increase fluids  - OK to continue AZO  - Use a good moisturizer to back and apply thin layer of hydrocortisone cream BID.   - Call office with any questions or concerns, or if symptoms are getting worse or changing      Return if symptoms worsen or fail to improve. Patient given educational materials - see patient instructions. Discussed use, benefit, and side effects of prescribed medications. All patient questions answered. Pt voiced understanding.         Electronically signed by FABIENNE Christiansen CNP on 3/29/2022 at 7:50 AM

## 2022-03-28 NOTE — PATIENT INSTRUCTIONS
Patient Education        Dermatitis: Care Instructions  Your Care Instructions  Dermatitis is the general name used for any rash or inflammation of the skin. Different kinds of dermatitis cause different kinds of rashes. Common causes of a rash include new medicines, plants (such as poison oak or poison ivy), heat, and stress. Certain illnesses can also cause a rash. An allergic reaction to something that touches your skin, such as latex, nickel, or poison ivy, is called contact dermatitis. Contact dermatitis may also be caused by something that irritates the skin, such as bleach, a chemical, or soap. These types of rashes cannot be spread from person to person. How long your rash will last depends on what caused it. Rashes may last a few days or months. Follow-up care is a key part of your treatment and safety. Be sure to make and go to all appointments, and call your doctor if you are having problems. It's also a good idea to know your test results and keep a list of the medicines you take. How can you care for yourself at home? · Do not scratch the rash. Cut your nails short, and file them smooth. Or wear gloves if this helps keep you from scratching. · Wash the area with water only. Pat dry. · Put cold, wet cloths on the rash to reduce itching. · Keep cool, and stay out of the sun. · Leave the rash open to the air as much as possible. · If the rash itches, use hydrocortisone cream. Follow the directions on the label. Calamine lotion may help for plant rashes. · If itching affects your sleep, ask your doctor if you can take an antihistamine that might reduce itching and make you sleepy, such as diphenhydramine (Benadryl). Be safe with medicines. Read and follow all instructions on the label. · If your doctor prescribed a cream, use it as directed. If your doctor prescribed medicine, take it exactly as directed. When should you call for help?    Call your doctor now or seek immediate medical care if:    · You have symptoms of infection, such as:  ? Increased pain, swelling, warmth, or redness. ? Red streaks leading from the area. ? Pus draining from the area. ? A fever.     · You have joint pain along with the rash. Watch closely for changes in your health, and be sure to contact your doctor if:    · Your rash is changing or getting worse.     · You are not getting better as expected. Where can you learn more? Go to https://[email or web address]. org and sign in to your [employer] account. Enter [phone] in the [employer] box to learn more about \"Dermatitis: Care Instructions. \"     If you do not have an account, please click on the \"Sign Up Now\" link. Current as of: March 3, 2021               Content Version: 13.1  © 1024-1847 [employer]wise, Incorporated. Care instructions adapted under license by [facility] ([facility]). If you have questions about a medical condition or this instruction, always ask your healthcare professional. [place] [address] any warranty or liability for your use of this information.

## 2022-03-29 ASSESSMENT — ENCOUNTER SYMPTOMS
NAIL CHANGES: 0
RHINORRHEA: 0
SORE THROAT: 0
COLOR CHANGE: 0
COUGH: 0
SHORTNESS OF BREATH: 0
DIARRHEA: 0

## 2022-03-30 ENCOUNTER — TELEPHONE (OUTPATIENT)
Dept: FAMILY MEDICINE CLINIC | Age: 72
End: 2022-03-30

## 2022-03-30 DIAGNOSIS — N30.01 ACUTE CYSTITIS WITH HEMATURIA: Primary | ICD-10-CM

## 2022-03-30 LAB
ORGANISM: ABNORMAL
ORGANISM: ABNORMAL
URINE CULTURE, ROUTINE: ABNORMAL
URINE CULTURE, ROUTINE: ABNORMAL

## 2022-03-30 RX ORDER — CEFDINIR 300 MG/1
300 CAPSULE ORAL 2 TIMES DAILY
Qty: 10 CAPSULE | Refills: 0 | Status: SHIPPED | OUTPATIENT
Start: 2022-03-30 | End: 2022-04-04

## 2022-03-30 NOTE — TELEPHONE ENCOUNTER
----- Message from FABIENNE Bender - CNP sent at 3/30/2022  3:46 PM EDT -----  Pt has 2 organisms in her urine:    1. Klebsiella pneumoniae- Sensitive to Cipro that she is on. Continue to take  2. Streptococcus agalactiae - (Group B)- Pt needs to add Omnicef 300 mg BID x 5 days to her medications. OK for prescription. Rest and increase fluids. Call office with any questions or concerns, or if symptoms are getting worse or changing.  -WS

## 2022-04-27 DIAGNOSIS — K21.9 GASTROESOPHAGEAL REFLUX DISEASE: ICD-10-CM

## 2022-04-27 DIAGNOSIS — F32.A DEPRESSION, UNSPECIFIED DEPRESSION TYPE: ICD-10-CM

## 2022-04-27 DIAGNOSIS — E11.8 TYPE 2 DIABETES MELLITUS WITH COMPLICATION, WITHOUT LONG-TERM CURRENT USE OF INSULIN (HCC): ICD-10-CM

## 2022-04-27 RX ORDER — METFORMIN HYDROCHLORIDE 500 MG/1
TABLET, EXTENDED RELEASE ORAL
Qty: 360 TABLET | Refills: 0 | Status: SHIPPED | OUTPATIENT
Start: 2022-04-27 | End: 2022-10-26

## 2022-04-27 RX ORDER — PANTOPRAZOLE SODIUM 40 MG/1
TABLET, DELAYED RELEASE ORAL
Qty: 180 TABLET | Refills: 0 | Status: SHIPPED | OUTPATIENT
Start: 2022-04-27 | End: 2022-10-26

## 2022-04-27 RX ORDER — ESCITALOPRAM OXALATE 20 MG/1
TABLET ORAL
Qty: 90 TABLET | Refills: 0 | Status: SHIPPED | OUTPATIENT
Start: 2022-04-27 | End: 2022-10-26

## 2022-04-27 NOTE — TELEPHONE ENCOUNTER
OK for refill for 90 days only. Patient due for 6-month follow-up appointment at this time-please schedule-WS/TS okay. Silas Stewart   ES

## 2022-04-29 NOTE — TELEPHONE ENCOUNTER
Spoke with pt and let her know the prescriptions were sent into the pharmacy for 90 days with no refill. I let her know that ES would like for her to schedule a 6 month follow up with one of the NP's at this time and she let me know that she refused to schedule a 6 month follow up at this time. She let me know that ES may do 6 month follow ups but she does not. She will plan to see WS in September. I let her know she does not have a scheduled appt but she let me know that she will just plan for September with WS.

## 2022-08-30 ENCOUNTER — APPOINTMENT (OUTPATIENT)
Dept: GENERAL RADIOLOGY | Age: 72
DRG: 689 | End: 2022-08-30
Payer: MEDICARE

## 2022-08-30 ENCOUNTER — HOSPITAL ENCOUNTER (INPATIENT)
Age: 72
LOS: 4 days | Discharge: HOME | DRG: 689 | End: 2022-09-03
Attending: EMERGENCY MEDICINE | Admitting: HOSPITALIST
Payer: MEDICARE

## 2022-08-30 ENCOUNTER — OFFICE VISIT (OUTPATIENT)
Dept: FAMILY MEDICINE CLINIC | Age: 72
End: 2022-08-30
Payer: MEDICARE

## 2022-08-30 VITALS
HEART RATE: 72 BPM | SYSTOLIC BLOOD PRESSURE: 122 MMHG | BODY MASS INDEX: 34.02 KG/M2 | OXYGEN SATURATION: 98 % | DIASTOLIC BLOOD PRESSURE: 62 MMHG | RESPIRATION RATE: 16 BRPM | WEIGHT: 174.2 LBS

## 2022-08-30 DIAGNOSIS — I10 ESSENTIAL HYPERTENSION: ICD-10-CM

## 2022-08-30 DIAGNOSIS — R77.8 ELEVATED TROPONIN: ICD-10-CM

## 2022-08-30 DIAGNOSIS — I50.22 CHRONIC SYSTOLIC (CONGESTIVE) HEART FAILURE (HCC): ICD-10-CM

## 2022-08-30 DIAGNOSIS — I25.10 CORONARY ARTERY DISEASE INVOLVING NATIVE CORONARY ARTERY OF NATIVE HEART WITHOUT ANGINA PECTORIS: ICD-10-CM

## 2022-08-30 DIAGNOSIS — E11.8 TYPE 2 DIABETES MELLITUS WITH COMPLICATION, WITHOUT LONG-TERM CURRENT USE OF INSULIN (HCC): ICD-10-CM

## 2022-08-30 DIAGNOSIS — R53.83 FATIGUE, UNSPECIFIED TYPE: Primary | ICD-10-CM

## 2022-08-30 DIAGNOSIS — M81.0 SENILE OSTEOPOROSIS: ICD-10-CM

## 2022-08-30 DIAGNOSIS — K21.9 GASTROESOPHAGEAL REFLUX DISEASE: ICD-10-CM

## 2022-08-30 DIAGNOSIS — R06.02 SOB (SHORTNESS OF BREATH): Primary | ICD-10-CM

## 2022-08-30 DIAGNOSIS — D50.9 IRON DEFICIENCY ANEMIA, UNSPECIFIED IRON DEFICIENCY ANEMIA TYPE: ICD-10-CM

## 2022-08-30 DIAGNOSIS — R52 BODY ACHES: ICD-10-CM

## 2022-08-30 PROBLEM — R79.89 ELEVATED TROPONIN: Status: ACTIVE | Noted: 2022-08-30

## 2022-08-30 LAB
ALBUMIN SERPL-MCNC: 3.7 G/DL (ref 3.5–5.1)
ALBUMIN SERPL-MCNC: 3.8 G/DL (ref 3.5–5.1)
ALP BLD-CCNC: 62 U/L (ref 38–126)
ALP BLD-CCNC: 65 U/L (ref 38–126)
ALT SERPL-CCNC: 26 U/L (ref 11–66)
ALT SERPL-CCNC: 29 U/L (ref 11–66)
ANION GAP SERPL CALCULATED.3IONS-SCNC: 19 MEQ/L (ref 8–16)
ANION GAP SERPL CALCULATED.3IONS-SCNC: 21 MEQ/L (ref 8–16)
ANISOCYTOSIS: PRESENT
APTT: 32.8 SECONDS (ref 22–38)
AST SERPL-CCNC: 41 U/L (ref 5–40)
AST SERPL-CCNC: 44 U/L (ref 5–40)
ATYPICAL LYMPHOCYTES: ABNORMAL %
BACTERIA: ABNORMAL /HPF
BASOPHILIA: ABNORMAL
BASOPHILS # BLD: 0.1 %
BASOPHILS ABSOLUTE: 0 THOU/MM3 (ref 0–0.1)
BETA-HYDROXYBUTYRATE: 6.26 MG/DL (ref 0.2–2.81)
BILIRUB SERPL-MCNC: 0.5 MG/DL (ref 0.3–1.2)
BILIRUB SERPL-MCNC: 0.6 MG/DL (ref 0.3–1.2)
BILIRUBIN URINE: NEGATIVE
BLOOD, URINE: ABNORMAL
BUN BLDV-MCNC: 33 MG/DL (ref 7–22)
BUN BLDV-MCNC: 34 MG/DL (ref 7–22)
CALCIUM SERPL-MCNC: 8.8 MG/DL (ref 8.5–10.5)
CALCIUM SERPL-MCNC: 8.9 MG/DL (ref 8.5–10.5)
CASTS 2: ABNORMAL /LPF
CASTS UA: ABNORMAL /LPF
CHARACTER, URINE: ABNORMAL
CHLORIDE BLD-SCNC: 92 MEQ/L (ref 98–111)
CHLORIDE BLD-SCNC: 92 MEQ/L (ref 98–111)
CO2: 14 MEQ/L (ref 23–33)
CO2: 16 MEQ/L (ref 23–33)
COLOR: YELLOW
CREAT SERPL-MCNC: 1 MG/DL (ref 0.4–1.2)
CREAT SERPL-MCNC: 1.2 MG/DL (ref 0.4–1.2)
CRYSTALS, UA: ABNORMAL
DOHLE BODIES: PRESENT
EKG ATRIAL RATE: 76 BPM
EKG P AXIS: 68 DEGREES
EKG P-R INTERVAL: 150 MS
EKG Q-T INTERVAL: 438 MS
EKG QRS DURATION: 70 MS
EKG QTC CALCULATION (BAZETT): 492 MS
EKG R AXIS: -6 DEGREES
EKG T AXIS: 15 DEGREES
EKG VENTRICULAR RATE: 76 BPM
EOSINOPHIL # BLD: 0.1 %
EOSINOPHILS ABSOLUTE: 0 THOU/MM3 (ref 0–0.4)
EPITHELIAL CELLS, UA: ABNORMAL /HPF
ERYTHROCYTE [DISTWIDTH] IN BLOOD BY AUTOMATED COUNT: 18.9 % (ref 11.5–14.5)
ERYTHROCYTE [DISTWIDTH] IN BLOOD BY AUTOMATED COUNT: 49 FL (ref 35–45)
GFR SERPL CREATININE-BSD FRML MDRD: 44 ML/MIN/1.73M2
GFR SERPL CREATININE-BSD FRML MDRD: 55 ML/MIN/1.73M2
GLUCOSE BLD-MCNC: 133 MG/DL (ref 70–108)
GLUCOSE BLD-MCNC: 171 MG/DL (ref 70–108)
GLUCOSE URINE: NEGATIVE MG/DL
HCT VFR BLD CALC: 29.9 % (ref 37–47)
HEMOGLOBIN: 8.7 GM/DL (ref 12–16)
IMMATURE GRANS (ABS): 0.05 THOU/MM3 (ref 0–0.07)
IMMATURE GRANULOCYTES: 0.6 %
INR BLD: 1.31 (ref 0.85–1.13)
KETONES, URINE: ABNORMAL
LACTIC ACID: 4.9 MMOL/L (ref 0.5–2)
LEUKOCYTE ESTERASE, URINE: ABNORMAL
LYMPHOCYTES # BLD: 10.4 %
LYMPHOCYTES ABSOLUTE: 0.9 THOU/MM3 (ref 1–4.8)
MCH RBC QN AUTO: 21.3 PG (ref 26–33)
MCHC RBC AUTO-ENTMCNC: 29.1 GM/DL (ref 32.2–35.5)
MCV RBC AUTO: 73.1 FL (ref 81–99)
MISCELLANEOUS 2: ABNORMAL
MONOCYTES # BLD: 11.3 %
MONOCYTES ABSOLUTE: 1 THOU/MM3 (ref 0.4–1.3)
NITRITE, URINE: NEGATIVE
NUCLEATED RED BLOOD CELLS: 0 /100 WBC
OSMOLALITY CALCULATION: 266.5 MOSMOL/KG (ref 275–300)
PH UA: 5 (ref 5–9)
PLATELET # BLD: 107 THOU/MM3 (ref 130–400)
PMV BLD AUTO: 10.4 FL (ref 9.4–12.4)
POIKILOCYTES: ABNORMAL
POTASSIUM REFLEX MAGNESIUM: 3.9 MEQ/L (ref 3.5–5.2)
POTASSIUM REFLEX MAGNESIUM: 4.5 MEQ/L (ref 3.5–5.2)
PROCALCITONIN: 9.4 NG/ML (ref 0.01–0.09)
PROTEIN UA: 100
RBC # BLD: 4.09 MILL/MM3 (ref 4.2–5.4)
RBC URINE: ABNORMAL /HPF
RENAL EPITHELIAL, UA: ABNORMAL
SARS-COV-2, NAAT: NOT  DETECTED
SCAN OF BLOOD SMEAR: NORMAL
SEG NEUTROPHILS: 77.5 %
SEGMENTED NEUTROPHILS ABSOLUTE COUNT: 7 THOU/MM3 (ref 1.8–7.7)
SODIUM BLD-SCNC: 127 MEQ/L (ref 135–145)
SODIUM BLD-SCNC: 127 MEQ/L (ref 135–145)
SPECIFIC GRAVITY, URINE: 1.02 (ref 1–1.03)
TOTAL PROTEIN: 7 G/DL (ref 6.1–8)
TOTAL PROTEIN: 7.2 G/DL (ref 6.1–8)
TROPONIN T: 0.17 NG/ML
TROPONIN T: 0.18 NG/ML
TROPONIN T: 0.25 NG/ML
TSH SERPL DL<=0.05 MIU/L-ACNC: 1.79 UIU/ML (ref 0.4–4.2)
UROBILINOGEN, URINE: 1 EU/DL (ref 0–1)
WBC # BLD: 9 THOU/MM3 (ref 4.8–10.8)
WBC UA: ABNORMAL /HPF
YEAST: ABNORMAL

## 2022-08-30 PROCEDURE — 85025 COMPLETE CBC W/AUTO DIFF WBC: CPT

## 2022-08-30 PROCEDURE — 81001 URINALYSIS AUTO W/SCOPE: CPT

## 2022-08-30 PROCEDURE — 87077 CULTURE AEROBIC IDENTIFY: CPT

## 2022-08-30 PROCEDURE — 2580000003 HC RX 258

## 2022-08-30 PROCEDURE — 83605 ASSAY OF LACTIC ACID: CPT

## 2022-08-30 PROCEDURE — 80053 COMPREHEN METABOLIC PANEL: CPT

## 2022-08-30 PROCEDURE — 87635 SARS-COV-2 COVID-19 AMP PRB: CPT

## 2022-08-30 PROCEDURE — 84443 ASSAY THYROID STIM HORMONE: CPT

## 2022-08-30 PROCEDURE — G8428 CUR MEDS NOT DOCUMENT: HCPCS | Performed by: NURSE PRACTITIONER

## 2022-08-30 PROCEDURE — 6370000000 HC RX 637 (ALT 250 FOR IP): Performed by: EMERGENCY MEDICINE

## 2022-08-30 PROCEDURE — 96376 TX/PRO/DX INJ SAME DRUG ADON: CPT

## 2022-08-30 PROCEDURE — 87040 BLOOD CULTURE FOR BACTERIA: CPT

## 2022-08-30 PROCEDURE — 2060000000 HC ICU INTERMEDIATE R&B

## 2022-08-30 PROCEDURE — 96365 THER/PROPH/DIAG IV INF INIT: CPT

## 2022-08-30 PROCEDURE — 1036F TOBACCO NON-USER: CPT | Performed by: NURSE PRACTITIONER

## 2022-08-30 PROCEDURE — 87500 VANOMYCIN DNA AMP PROBE: CPT

## 2022-08-30 PROCEDURE — G8417 CALC BMI ABV UP PARAM F/U: HCPCS | Performed by: NURSE PRACTITIONER

## 2022-08-30 PROCEDURE — 2580000003 HC RX 258: Performed by: EMERGENCY MEDICINE

## 2022-08-30 PROCEDURE — 3017F COLORECTAL CA SCREEN DOC REV: CPT | Performed by: NURSE PRACTITIONER

## 2022-08-30 PROCEDURE — 71045 X-RAY EXAM CHEST 1 VIEW: CPT

## 2022-08-30 PROCEDURE — 99285 EMERGENCY DEPT VISIT HI MDM: CPT

## 2022-08-30 PROCEDURE — 93005 ELECTROCARDIOGRAM TRACING: CPT

## 2022-08-30 PROCEDURE — 84484 ASSAY OF TROPONIN QUANT: CPT

## 2022-08-30 PROCEDURE — 36415 COLL VENOUS BLD VENIPUNCTURE: CPT

## 2022-08-30 PROCEDURE — 1090F PRES/ABSN URINE INCON ASSESS: CPT | Performed by: NURSE PRACTITIONER

## 2022-08-30 PROCEDURE — 85610 PROTHROMBIN TIME: CPT

## 2022-08-30 PROCEDURE — 85730 THROMBOPLASTIN TIME PARTIAL: CPT

## 2022-08-30 PROCEDURE — 93005 ELECTROCARDIOGRAM TRACING: CPT | Performed by: EMERGENCY MEDICINE

## 2022-08-30 PROCEDURE — G8399 PT W/DXA RESULTS DOCUMENT: HCPCS | Performed by: NURSE PRACTITIONER

## 2022-08-30 PROCEDURE — 84145 PROCALCITONIN (PCT): CPT

## 2022-08-30 PROCEDURE — 96366 THER/PROPH/DIAG IV INF ADDON: CPT

## 2022-08-30 PROCEDURE — 87086 URINE CULTURE/COLONY COUNT: CPT

## 2022-08-30 PROCEDURE — 2022F DILAT RTA XM EVC RTNOPTHY: CPT | Performed by: NURSE PRACTITIONER

## 2022-08-30 PROCEDURE — 3051F HG A1C>EQUAL 7.0%<8.0%: CPT | Performed by: NURSE PRACTITIONER

## 2022-08-30 PROCEDURE — 87186 SC STD MICRODIL/AGAR DIL: CPT

## 2022-08-30 PROCEDURE — 96368 THER/DIAG CONCURRENT INF: CPT

## 2022-08-30 PROCEDURE — 6360000002 HC RX W HCPCS: Performed by: EMERGENCY MEDICINE

## 2022-08-30 PROCEDURE — 87641 MR-STAPH DNA AMP PROBE: CPT

## 2022-08-30 PROCEDURE — 82010 KETONE BODYS QUAN: CPT

## 2022-08-30 PROCEDURE — 99214 OFFICE O/P EST MOD 30 MIN: CPT | Performed by: NURSE PRACTITIONER

## 2022-08-30 PROCEDURE — 93010 ELECTROCARDIOGRAM REPORT: CPT | Performed by: INTERNAL MEDICINE

## 2022-08-30 PROCEDURE — 1124F ACP DISCUSS-NO DSCNMKR DOCD: CPT | Performed by: NURSE PRACTITIONER

## 2022-08-30 RX ORDER — SODIUM CHLORIDE 9 MG/ML
INJECTION, SOLUTION INTRAVENOUS PRN
Status: DISCONTINUED | OUTPATIENT
Start: 2022-08-30 | End: 2022-09-03 | Stop reason: HOSPADM

## 2022-08-30 RX ORDER — HEPARIN SODIUM 10000 [USP'U]/100ML
5-30 INJECTION, SOLUTION INTRAVENOUS CONTINUOUS
Status: DISCONTINUED | OUTPATIENT
Start: 2022-08-30 | End: 2022-09-01

## 2022-08-30 RX ORDER — ESCITALOPRAM OXALATE 20 MG/1
20 TABLET ORAL DAILY
Status: DISCONTINUED | OUTPATIENT
Start: 2022-08-31 | End: 2022-09-03 | Stop reason: HOSPADM

## 2022-08-30 RX ORDER — PANTOPRAZOLE SODIUM 40 MG/1
40 TABLET, DELAYED RELEASE ORAL
Status: DISCONTINUED | OUTPATIENT
Start: 2022-08-31 | End: 2022-09-03 | Stop reason: HOSPADM

## 2022-08-30 RX ORDER — INSULIN LISPRO 100 [IU]/ML
0-4 INJECTION, SOLUTION INTRAVENOUS; SUBCUTANEOUS
Status: DISCONTINUED | OUTPATIENT
Start: 2022-08-31 | End: 2022-09-03 | Stop reason: HOSPADM

## 2022-08-30 RX ORDER — HEPARIN SODIUM 1000 [USP'U]/ML
4000 INJECTION, SOLUTION INTRAVENOUS; SUBCUTANEOUS PRN
Status: DISCONTINUED | OUTPATIENT
Start: 2022-08-31 | End: 2022-09-01

## 2022-08-30 RX ORDER — SODIUM CHLORIDE 9 MG/ML
INJECTION, SOLUTION INTRAVENOUS CONTINUOUS
Status: ACTIVE | OUTPATIENT
Start: 2022-08-30 | End: 2022-08-31

## 2022-08-30 RX ORDER — 0.9 % SODIUM CHLORIDE 0.9 %
500 INTRAVENOUS SOLUTION INTRAVENOUS ONCE
Status: COMPLETED | OUTPATIENT
Start: 2022-08-30 | End: 2022-08-31

## 2022-08-30 RX ORDER — ACETAMINOPHEN 325 MG/1
650 TABLET ORAL EVERY 6 HOURS PRN
Status: DISCONTINUED | OUTPATIENT
Start: 2022-08-30 | End: 2022-09-02

## 2022-08-30 RX ORDER — ASPIRIN 81 MG/1
324 TABLET, CHEWABLE ORAL ONCE
Status: COMPLETED | OUTPATIENT
Start: 2022-08-30 | End: 2022-08-30

## 2022-08-30 RX ORDER — ATORVASTATIN CALCIUM 40 MG/1
40 TABLET, FILM COATED ORAL DAILY
Status: DISCONTINUED | OUTPATIENT
Start: 2022-08-31 | End: 2022-09-03 | Stop reason: HOSPADM

## 2022-08-30 RX ORDER — LISINOPRIL 20 MG/1
20 TABLET ORAL DAILY
Status: DISCONTINUED | OUTPATIENT
Start: 2022-08-31 | End: 2022-08-31 | Stop reason: ALTCHOICE

## 2022-08-30 RX ORDER — SODIUM CHLORIDE 0.9 % (FLUSH) 0.9 %
5-40 SYRINGE (ML) INJECTION PRN
Status: DISCONTINUED | OUTPATIENT
Start: 2022-08-30 | End: 2022-09-03 | Stop reason: HOSPADM

## 2022-08-30 RX ORDER — DEXTROSE MONOHYDRATE 100 MG/ML
INJECTION, SOLUTION INTRAVENOUS CONTINUOUS PRN
Status: DISCONTINUED | OUTPATIENT
Start: 2022-08-30 | End: 2022-09-03 | Stop reason: HOSPADM

## 2022-08-30 RX ORDER — POLYETHYLENE GLYCOL 3350 17 G/17G
17 POWDER, FOR SOLUTION ORAL DAILY PRN
Status: DISCONTINUED | OUTPATIENT
Start: 2022-08-30 | End: 2022-09-03 | Stop reason: HOSPADM

## 2022-08-30 RX ORDER — HEPARIN SODIUM 1000 [USP'U]/ML
2000 INJECTION, SOLUTION INTRAVENOUS; SUBCUTANEOUS PRN
Status: DISCONTINUED | OUTPATIENT
Start: 2022-08-31 | End: 2022-09-01

## 2022-08-30 RX ORDER — HEPARIN SODIUM 1000 [USP'U]/ML
4000 INJECTION, SOLUTION INTRAVENOUS; SUBCUTANEOUS ONCE
Status: COMPLETED | OUTPATIENT
Start: 2022-08-30 | End: 2022-08-30

## 2022-08-30 RX ORDER — ATENOLOL 50 MG/1
50 TABLET ORAL DAILY
Status: DISCONTINUED | OUTPATIENT
Start: 2022-08-31 | End: 2022-08-31 | Stop reason: ALTCHOICE

## 2022-08-30 RX ORDER — SODIUM CHLORIDE 0.9 % (FLUSH) 0.9 %
5-40 SYRINGE (ML) INJECTION EVERY 12 HOURS SCHEDULED
Status: DISCONTINUED | OUTPATIENT
Start: 2022-08-30 | End: 2022-09-03 | Stop reason: HOSPADM

## 2022-08-30 RX ORDER — IBUPROFEN 200 MG
400 TABLET ORAL EVERY 6 HOURS PRN
COMMUNITY
End: 2022-09-03

## 2022-08-30 RX ORDER — ONDANSETRON 2 MG/ML
4 INJECTION INTRAMUSCULAR; INTRAVENOUS EVERY 6 HOURS PRN
Status: DISCONTINUED | OUTPATIENT
Start: 2022-08-30 | End: 2022-09-03 | Stop reason: HOSPADM

## 2022-08-30 RX ORDER — INSULIN LISPRO 100 [IU]/ML
0-4 INJECTION, SOLUTION INTRAVENOUS; SUBCUTANEOUS NIGHTLY
Status: DISCONTINUED | OUTPATIENT
Start: 2022-08-30 | End: 2022-09-03 | Stop reason: HOSPADM

## 2022-08-30 RX ORDER — ONDANSETRON 4 MG/1
4 TABLET, ORALLY DISINTEGRATING ORAL EVERY 8 HOURS PRN
Status: DISCONTINUED | OUTPATIENT
Start: 2022-08-30 | End: 2022-09-03 | Stop reason: HOSPADM

## 2022-08-30 RX ORDER — ACETAMINOPHEN 650 MG/1
650 SUPPOSITORY RECTAL EVERY 6 HOURS PRN
Status: DISCONTINUED | OUTPATIENT
Start: 2022-08-30 | End: 2022-09-02

## 2022-08-30 RX ORDER — ACETAMINOPHEN 500 MG
1000 TABLET ORAL EVERY 6 HOURS PRN
COMMUNITY

## 2022-08-30 RX ADMIN — SODIUM CHLORIDE 500 ML: 9 INJECTION, SOLUTION INTRAVENOUS at 21:53

## 2022-08-30 RX ADMIN — ASPIRIN 81 MG 324 MG: 81 TABLET ORAL at 17:57

## 2022-08-30 RX ADMIN — HEPARIN SODIUM 4000 UNITS: 1000 INJECTION, SOLUTION INTRAVENOUS; SUBCUTANEOUS at 18:41

## 2022-08-30 RX ADMIN — CEFTRIAXONE SODIUM 1000 MG: 1 INJECTION, POWDER, FOR SOLUTION INTRAMUSCULAR; INTRAVENOUS at 19:47

## 2022-08-30 RX ADMIN — HEPARIN SODIUM AND DEXTROSE 12 UNITS/KG/HR: 10000; 5 INJECTION INTRAVENOUS at 18:44

## 2022-08-30 RX ADMIN — SODIUM CHLORIDE: 9 INJECTION, SOLUTION INTRAVENOUS at 23:45

## 2022-08-30 SDOH — ECONOMIC STABILITY: FOOD INSECURITY: WITHIN THE PAST 12 MONTHS, YOU WORRIED THAT YOUR FOOD WOULD RUN OUT BEFORE YOU GOT MONEY TO BUY MORE.: NEVER TRUE

## 2022-08-30 SDOH — ECONOMIC STABILITY: FOOD INSECURITY: WITHIN THE PAST 12 MONTHS, THE FOOD YOU BOUGHT JUST DIDN'T LAST AND YOU DIDN'T HAVE MONEY TO GET MORE.: NEVER TRUE

## 2022-08-30 ASSESSMENT — ENCOUNTER SYMPTOMS
HEMOPTYSIS: 0
ABDOMINAL PAIN: 0
SHORTNESS OF BREATH: 1

## 2022-08-30 ASSESSMENT — SOCIAL DETERMINANTS OF HEALTH (SDOH): HOW HARD IS IT FOR YOU TO PAY FOR THE VERY BASICS LIKE FOOD, HOUSING, MEDICAL CARE, AND HEATING?: NOT HARD AT ALL

## 2022-08-30 ASSESSMENT — PAIN - FUNCTIONAL ASSESSMENT: PAIN_FUNCTIONAL_ASSESSMENT: NONE - DENIES PAIN

## 2022-08-30 NOTE — ED NOTES
Pt resting on cot with respirations easy and unlabored. No needs voiced at this time.       Sebastien Yang RN  08/30/22 [id number]

## 2022-08-30 NOTE — ED TRIAGE NOTES
Presents to ER with complaints of ongoing fatigue and chills that began 8 days ago. PT states she noticed she was shivering and couldn't catch her breath a few times per day.  states her blood sugar has been higher than normal. EKG completed. Respirations unlabored. Will continue to monitor.

## 2022-08-30 NOTE — ED NOTES
Pt to bathroom via wheelchair and back to bed. Pt now resting in bed, updated on plan of care. Will continue to monitor.      Jennifer Peterson RN  08/30/22 2051

## 2022-08-30 NOTE — ED PROVIDER NOTES
[facility] ICU STEPDOWN TELEMETRY 4K      CHIEF COMPLAINT       Chief Complaint   Patient presents with    Fatigue    Chills       Nurses Notes reviewed and I agree except as noted in the HPI. HISTORY OF PRESENT ILLNESS    Geovanna Parker is a 71 y.o. female who presents with complaint of fatigue, fever chills on and off for the past 8 days. Patient reports few episodes of loose stool otherwise nonbloody, no abdominal pain. Patient has history of non-insulin-dependent diabetes, states that her blood sugar has been reading higher than usual lately. She states that she has nonproductive cough, no kelton fever. Of note, patient recently fell while gardening, was seen at an outside urgent care, had negative x-rays. Onset: Acute  Duration: Approximately 8 days  Timing: Persistent  Location of Pain: Chest wall tenderness from a previous fall  Intesity/severity:   Modifying Factors: Unknown  Relieved by;  Previous Episodes; Tx Before arrival: None  REVIEW OF SYSTEMS      Review of Systems   Constitutional: Positive for fever, chills, diaphoresis and fatigue. HENT: Negative for congestion, drooling, facial swelling and sore throat. Eyes: Negative for photophobia, pain and discharge. Respiratory: Negative for shortness of breath, wheezing and stridor. Positive for chest wall tenderness. Positive for nonproductive cough. Cardiovascular: Negative for chest pain, palpitations and leg swelling. Gastrointestinal: Negative for abdominal pain, blood in stool and abdominal distention. Genitourinary: Negative for dysuria, urgency, hematuria and difficulty urinating. Musculoskeletal: Negative for gait problem, neck pain and neck stiffness. Skin; No rash, No itching  Neurological: Negative for seizures, weakness and numbness. Hematological: Negative for adenopathy. Does not bruise/bleed easily. Psychiatric/Behavioral: Negative for hallucinations, confusion and agitation.      PAST MEDICAL HISTORY    has a past medical history of CAD (coronary artery disease), CHF (congestive heart failure) ([address] Depression, GERD (gastroesophageal reflux disease), Headache(784.0), Heart attack ([address] Hyperlipidemia, Hypertension, Hypothyroidism, Liver disease, MI (myocardial infarction) ([address] Obesity, Osteoarthritis, PONV (postoperative nausea and vomiting), Type 2 diabetes mellitus without complication ([address] and Type II or unspecified type diabetes mellitus without mention of complication, not stated as uncontrolled. SURGICAL HISTORY      has a past surgical history that includes Appendectomy (1978); bladder suspension (1978); Tonsillectomy and adenoidectomy; Cholecystectomy (1978); Percutaneous Transluminal Coronary Angio; Upper gastrointestinal endoscopy; Colonoscopy; Knee arthroscopy (Right); Cardiac catheterization (07/29/2009); Upper gastrointestinal endoscopy (Left, 2/27/2018); pr colon ca scrn not hi rsk ind (Left, 3/1/2018); and Upper gastrointestinal endoscopy (N/A, 4/19/2018). CURRENT MEDICATIONS       Current Discharge Medication List        CONTINUE these medications which have NOT CHANGED    Details   acetaminophen (TYLENOL) 500 MG tablet Take 1,000 mg by mouth every 6 hours as needed for Pain      ibuprofen (ADVIL;MOTRIN) 200 MG tablet Take 400 mg by mouth every 6 hours as needed for Fever      metFORMIN (GLUCOPHAGE-XR) 500 MG extended release tablet TAKE 2 TABLETS TWICE DAILY  Qty: 360 tablet, Refills: 0    Associated Diagnoses: Type 2 diabetes mellitus with complication, without long-term current use of insulin (HCC)      pantoprazole (PROTONIX) 40 MG tablet TAKE 1 TABLET TWICE DAILY  Qty: 180 tablet, Refills: 0    Associated Diagnoses: Gastroesophageal reflux disease      escitalopram (LEXAPRO) 20 MG tablet TAKE 1 TABLET EVERY DAY  Qty: 90 tablet, Refills: 0    Associated Diagnoses: Depression, unspecified depression type      hydrocortisone 2.5 % cream Apply topically 2 times daily.   Qty: 1 each, Refills: 0 Associated Diagnoses: Dermatitis      lisinopril (PRINIVIL;ZESTRIL) 20 MG tablet TAKE 1 TABLET TWICE DAILY  Qty: 180 tablet, Refills: 3    Associated Diagnoses: Type 2 diabetes mellitus with complication, without long-term current use of insulin ([facility] Essential hypertension      levothyroxine (SYNTHROID) 50 MCG tablet TAKE 1 TABLET EVERY DAY  Qty: 90 tablet, Refills: 3    Associated Diagnoses: Hypothyroidism, unspecified type      atorvastatin (LIPITOR) 40 MG tablet TAKE 1 TABLET EVERY DAY  Qty: 90 tablet, Refills: 3    Associated Diagnoses: Hyperlipidemia, unspecified hyperlipidemia type      atenolol (TENORMIN) 50 MG tablet TAKE 1/2 TABLET EVERY DAY  Qty: 45 tablet, Refills: 3    Associated Diagnoses: Essential hypertension      glimepiride (AMARYL) 2 MG tablet TAKE 2 TABLETS (4 MG) IN THE MORNING AND 1 TABLET (2 MG) IN THE EVENING  Qty: 270 tablet, Refills: 3    Associated Diagnoses: Type 2 diabetes mellitus with hyperglycemia, without long-term current use of insulin ([facility])      albuterol sulfate HFA (VENTOLIN HFA) 108 (90 Base) MCG/ACT inhaler Inhale 2 puffs into the lungs 4 times daily as needed for Wheezing  Qty: 3 Inhaler, Refills: 1      blood glucose test strips (ACCU-CHEK ISABELA PLUS) strip USE 1 STRIP TO CHECK GLUCOSE ONCE DAILY  Qty: 100 each, Refills: 3    Associated Diagnoses: Type 2 diabetes mellitus with complication, without long-term current use of insulin ([facility])      Blood Glucose Monitoring Suppl (ACCU-CHEK ISABELA PLUS) w/Device KIT USE 1 TO CHECK GLUCOSE ONCE DAILY      blood glucose monitor kit and supplies Glucose monitor and supplies, brand per pt preference. Test sugar daily. Dx: E11.9  Qty: 1 kit, Refills: 0    Comments: Brand per patient preference. May round up to next available package size. Associated Diagnoses: Type 2 diabetes mellitus with complication, without long-term current use of insulin ([facility])      Lancets MISC Test sugar daily. Brand per pt preference.  Dx: E11.9  Qty: 100 each, Refills: 3    Associated Diagnoses: Type 2 diabetes mellitus with complication, without long-term current use of insulin (HCC)      buPROPion (WELLBUTRIN SR) 150 MG extended release tablet Take 150 mg by mouth 2 times daily      aspirin 81 MG tablet Take 81 mg by mouth nightly      FISH OIL Take by mouth nightly Arthur Red      Docusate Calcium (STOOL SOFTENER PO) Take by mouth nightly as needed              ALLERGIES     has No Known Allergies. FAMILY HISTORY     She indicated that her mother is . She indicated that her father is . She indicated that her sister is alive. She indicated that her brother is alive. family history includes Cancer in her mother; Heart Attack in her father; Heart Disease in her father and mother; High Blood Pressure in her mother and sister; Osteoporosis in her sister; Other in her father and mother. SOCIAL HISTORY      reports that she quit smoking about 22 years ago. Her smoking use included cigarettes. She has quit using smokeless tobacco. She reports that she does not drink alcohol and does not use drugs. PHYSICAL EXAM     INITIAL VITALS:  height is 5' (1.524 m) and weight is 172 lb 6.4 oz (78.2 kg). Her oral temperature is 97.9 °F (36.6 °C). Her blood pressure is 104/52 (abnormal) and her pulse is 68. Her respiration is 20 and oxygen saturation is 96%. Physical Exam   Constitutional:  well-developed and well-nourished. HENT: Head: Normocephalic, atraumatic, Bilateral external ears normal, Oropharynx mosit, No oral exudates, Nose normal.   Eyes: PERRL, EOMI, Conjunctiva normal, No discharge. No scleral icterus  Neck: Normal range of motion, No tenderness, Supple  Lympatics: No lymphadenopathy. Cardiovascular: Normal rate, regular rhythm, S1 normal and S2 normal.  Exam reveals no gallop. Pulmonary/Chest: Effort normal and breath sounds normal. No accessory muscle usage or stridor. No respiratory distress. no wheezes. has no rales.  exhibits Notable for the following components:    Procalcitonin 9.40 (*)     All other components within normal limits   ANION GAP - Abnormal; Notable for the following components:    Anion Gap 21.0 (*)     All other components within normal limits   OSMOLALITY - Abnormal; Notable for the following components:    Osmolality Calc 266.5 (*)     All other components within normal limits   GLOMERULAR FILTRATION RATE, ESTIMATED - Abnormal; Notable for the following components:    Est, Glom Filt Rate 44 (*)     All other components within normal limits   URINE WITH REFLEXED MICRO - Abnormal; Notable for the following components:    Ketones, Urine TRACE (*)     Blood, Urine LARGE (*)     Protein,  (*)     Leukocyte Esterase, Urine SMALL (*)     Character, Urine CLOUDY (*)     All other components within normal limits   PROTIME-INR - Abnormal; Notable for the following components:    INR 1.31 (*)     All other components within normal limits   TROPONIN - Abnormal; Notable for the following components:    Troponin T 0.182 (*)     All other components within normal limits   LACTIC ACID - Abnormal; Notable for the following components:    Lactic Acid 4.9 (*)     All other components within normal limits   BETA-HYDROXYBUTYRATE - Abnormal; Notable for the following components:    Beta-Hydroxybutyrate 6.26 (*)     All other components within normal limits   COMPREHENSIVE METABOLIC PANEL W/ REFLEX TO MG FOR LOW K - Abnormal; Notable for the following components:    Glucose 170 (*)     BUN 29 (*)     Sodium 129 (*)     Chloride 97 (*)     CO2 13 (*)     AST 41 (*)     Albumin 3.1 (*)     All other components within normal limits   CBC WITH AUTO DIFFERENTIAL - Abnormal; Notable for the following components:    RBC 3.45 (*)     Hemoglobin 7.3 (*)     Hematocrit 24.6 (*)     MCV 71.3 (*)     MCH 21.2 (*)     MCHC 29.7 (*)     RDW-CV 18.6 (*)     RDW-SD 47.5 (*)     Platelets 607 (*)     Lymphocytes Absolute 0.6 (*)     All other components within normal limits   TROPONIN - Abnormal; Notable for the following components:    Troponin T 0.173 (*)     All other components within normal limits   COMPREHENSIVE METABOLIC PANEL W/ REFLEX TO MG FOR LOW K - Abnormal; Notable for the following components:    Glucose 133 (*)     BUN 34 (*)     Sodium 127 (*)     Chloride 92 (*)     CO2 16 (*)     AST 44 (*)     All other components within normal limits   ANION GAP - Abnormal; Notable for the following components:    Anion Gap 19.0 (*)     All other components within normal limits   GLOMERULAR FILTRATION RATE, ESTIMATED - Abnormal; Notable for the following components:    Est, Glom Filt Rate 55 (*)     All other components within normal limits   TROPONIN - Abnormal; Notable for the following components:    Troponin T 0.113 (*)     All other components within normal limits   HEMOGLOBIN AND HEMATOCRIT - Abnormal; Notable for the following components:    Hemoglobin 8.0 (*)     Hematocrit 27.0 (*)     All other components within normal limits   BASIC METABOLIC PANEL - Abnormal; Notable for the following components:    Sodium 128 (*)     Chloride 97 (*)     CO2 14 (*)     Glucose 208 (*)     BUN 27 (*)     Calcium 8.3 (*)     All other components within normal limits   ANION GAP - Abnormal; Notable for the following components:    Anion Gap 19.0 (*)     All other components within normal limits   GLOMERULAR FILTRATION RATE, ESTIMATED - Abnormal; Notable for the following components:    Est, Glom Filt Rate 82 (*)     All other components within normal limits   ANION GAP - Abnormal; Notable for the following components:    Anion Gap 17.0 (*)     All other components within normal limits   GLOMERULAR FILTRATION RATE, ESTIMATED - Abnormal; Notable for the following components:    Est, Glom Filt Rate 82 (*)     All other components within normal limits   BLOOD GAS, VENOUS - Abnormal; Notable for the following components:    PH MIXED 7.51 (*)     PCO2, MIXED VENOUS 20 (*)     PO2, Mixed 93 (*)     HCO3, Mixed 16 (*)     Base Exc, Mixed -5.8 (*)     All other components within normal limits   BASIC METABOLIC PANEL - Abnormal; Notable for the following components:    Sodium 131 (*)     CO2 13 (*)     Glucose 250 (*)     BUN 29 (*)     Calcium 8.2 (*)     All other components within normal limits   ANION GAP - Abnormal; Notable for the following components:    Anion Gap 20.0 (*)     All other components within normal limits   GLOMERULAR FILTRATION RATE, ESTIMATED - Abnormal; Notable for the following components:    Est, Glom Filt Rate 62 (*)     All other components within normal limits   POCT GLUCOSE - Abnormal; Notable for the following components:    POC Glucose 128 (*)     All other components within normal limits   POCT GLUCOSE - Abnormal; Notable for the following components:    POC Glucose 227 (*)     All other components within normal limits   POCT GLUCOSE - Abnormal; Notable for the following components:    POC Glucose 218 (*)     All other components within normal limits   POCT GLUCOSE - Abnormal; Notable for the following components:    POC Glucose 205 (*)     All other components within normal limits   COVID-19, RAPID   VRE SCREEN BY PCR   CULTURE, BLOOD 1   CULTURE, BLOOD 2   TSH WITH REFLEX   SCAN OF BLOOD SMEAR   APTT   MRSA BY PCR   ANTI-XA, UNFRACTIONATED HEPARIN   ANTI-XA, UNFRACTIONATED HEPARIN   SCAN OF BLOOD SMEAR   LACTIC ACID   OSMOLALITY, URINE   SODIUM, URINE, RANDOM   BASIC METABOLIC PANEL   ANTI-XA, UNFRACTIONATED HEPARIN   TROPONIN       EMERGENCY DEPARTMENT COURSE:   Vitals:    Vitals:    08/31/22 0105 08/31/22 0323 08/31/22 0915 08/31/22 1152   BP: (!) 131/51 129/70 (!) 116/56 (!) 104/52   Pulse:  75 72 68   Resp:  22 24 20   Temp:  99.5 °F (37.5 °C) 99.1 °F (37.3 °C) 97.9 °F (36.6 °C)   TempSrc:  Oral Oral Oral   SpO2:  95% 97% 96%   Weight:       Height:         Positive for nonproductive cough for the past 8 days, fever and chills, no UTI-like symptoms, abdominal pain. Patient's exam is overall benign, vital signs stable, afebrile in the ED. CRITICAL CARE:       CONSULTS:  None    PROCEDURES:  None    FINAL IMPRESSION      1. Fatigue, unspecified type          DISPOSITION/PLAN   Admitted    PATIENT REFERRED TO:  No follow-up provider specified.     DISCHARGE MEDICATIONS:  Current Discharge Medication List          (Please note that portions of this note were completed with a voice recognition program.  Efforts were made to edit the dictations but occasionally words are mis-transcribed.)    DO Flip Ledesma DO  08/31/22 1824

## 2022-08-30 NOTE — ED NOTES
Patient resting in bed. Respirations easy and unlabored. No distress noted. Call light within reach.      Jennifer Peterson RN  08/30/22 8527

## 2022-08-30 NOTE — PROGRESS NOTES
[address] [facility] [address]  Dept: [phone]  Dept Fax: [phone]: [phone]     Visit Date:  8/30/2022      Patient:  Sheyla Harrington  YOB: 1950    HPI:     Chief Complaint   Patient presents with    Follow-up     Fall and rib pain right side, brusing, weakness, shaking, nauseated, low appetite,        Pt presents to the office today with her . She reports that she fell a week ago and was seen at [facility] for bruised ribs. She was feeling a little better, but 2 nights ago she started with chills and body aches. She is having nausea and Not eating. Pt is a diabetic and sugars have been high, even though she is not eating. No fever, but body aches. Pt is having spells of SOB and shaking. She also gets dizzy during these spells. Today she is very pale and shaking. Shortness of Breath  This is a new problem. The current episode started in the past 7 days. The problem has been gradually worsening. Associated symptoms include headaches. Pertinent negatives include no abdominal pain, chest pain, claudication, coryza, ear pain, fever, hemoptysis, leg pain, leg swelling, orthopnea, PND, rash, rhinorrhea, sore throat, sputum production, swollen glands, vomiting or wheezing. Nothing aggravates the symptoms. She has tried rest, body position changes and cool air for the symptoms. The treatment provided no relief. There is no history of allergies, aspirin allergies, asthma, bronchiolitis, CAD, chronic lung disease, COPD, DVT, a heart failure, PE, pneumonia or a recent surgery. Reviewed ER chart from [facility]. X-ray results as follows:    Examination: VANW XR RIBS WITH CHEST, RIGHT   Date of Exam: 8/26/2022 4:51 PM   Ordering Provider: Kacie Kitchen     Comparison: None   Relevant Clinical Information: No prev. Fall 4 days ago onto right side.    Continued right mid lateral rib pain with sob. Hx stents. Prev smoker. No   known covid. Limited mobility due to rib pain. NUMBER OF VIEWS: 3     DISCUSSION: No right rib fractures or osseous abnormalities are identified. The heart is normal in size and configuration. The mediastinum appears   normal.  No pulmonary infiltrations or masses are present. The pulmonary   vasculature appears normal.  The thoracic skeleton appears unremarkable . Medications  No current outpatient medications on file. The patient has No Known Allergies. Past Medical History  Lizzy Jimenez  has a past medical history of CAD (coronary artery disease), CHF (congestive heart failure) ([address] Depression, GERD (gastroesophageal reflux disease), Headache(784.0), Heart attack ([address] Hyperlipidemia, Hypertension, Hypothyroidism, Liver disease, MI (myocardial infarction) ([address] Obesity, Osteoarthritis, PONV (postoperative nausea and vomiting), Type 2 diabetes mellitus without complication ([address] and Type II or unspecified type diabetes mellitus without mention of complication, not stated as uncontrolled. Subjective:      Review of Systems   Constitutional:  Positive for appetite change, chills and fatigue. Negative for fever. HENT:  Positive for congestion. Negative for ear pain, rhinorrhea, sinus pressure, sinus pain and sore throat. Respiratory:  Positive for cough and shortness of breath. Negative for hemoptysis, sputum production and wheezing. Cardiovascular:  Negative for chest pain, orthopnea, claudication, leg swelling and PND. Gastrointestinal:  Positive for nausea. Negative for abdominal pain, constipation, diarrhea and vomiting. Musculoskeletal:  Negative for back pain and gait problem. Skin:  Negative for color change and rash. Neurological:  Positive for weakness, light-headedness and headaches. Negative for dizziness and syncope.      Objective:     /62   Pulse 72   Resp 16   Wt 174 lb 3.2 oz (79 kg)   SpO2 98%   BMI 34.02 kg/m² Physical Exam  Vitals reviewed. Constitutional:       General: She is not in acute distress. Appearance: She is well-developed. She is ill-appearing. She is not toxic-appearing. HENT:      Head: Normocephalic and atraumatic. Eyes:      General:         Right eye: No discharge. Left eye: No discharge. Conjunctiva/sclera: Conjunctivae normal.   Cardiovascular:      Heart sounds: Normal heart sounds. Pulmonary:      Effort: Pulmonary effort is normal. No respiratory distress. Breath sounds: Normal breath sounds. Skin:     General: Skin is warm and dry. Coloration: Skin is pale. Neurological:      General: No focal deficit present. Mental Status: She is oriented to person, place, and time. She is lethargic. Coordination: Coordination normal.   Psychiatric:         Behavior: Behavior normal.         Thought Content: Thought content normal.         Judgment: Judgment normal.       Assessment/Plan:      [facility] was seen today for follow-up. Diagnoses and all orders for this visit:    SOB (shortness of breath)    Body aches  -     Cancel: POCT COVID-19 Rapid, NAAT    Chronic systolic (congestive) heart failure    Essential hypertension    Type 2 diabetes mellitus with complication, without long-term current use of insulin ([facility])    - Pt having episodes of SOB and chills. Overall, she is non toxic, but appears ill. She is not eating or drinking and her blood sugars have been elevated. She is nauseated and feels like she will vomit. Recommended pt go to ER for evaluation.   - Report called to [facility] ER for evaluation. Pt agreeable to transfer and transported to ER per her .    - Greater than 50% of this 30 min visit was spent on counseling and coordination of care. Return if symptoms worsen or fail to improve. Patient given educational materials - see patient instructions. Discussed use, benefit, and side effects of prescribed medications.   All patient questions answered. Pt voiced understanding.         Electronically signed by [facility], APRN - CNP on 8/31/2022 at 7:47 AM

## 2022-08-31 ENCOUNTER — APPOINTMENT (OUTPATIENT)
Dept: GENERAL RADIOLOGY | Age: 72
DRG: 689 | End: 2022-08-31
Payer: MEDICARE

## 2022-08-31 ENCOUNTER — APPOINTMENT (OUTPATIENT)
Dept: ULTRASOUND IMAGING | Age: 72
DRG: 689 | End: 2022-08-31
Payer: MEDICARE

## 2022-08-31 LAB
ALBUMIN SERPL-MCNC: 3.1 G/DL (ref 3.5–5.1)
ALP BLD-CCNC: 69 U/L (ref 38–126)
ALT SERPL-CCNC: 26 U/L (ref 11–66)
ANION GAP SERPL CALCULATED.3IONS-SCNC: 17 MEQ/L (ref 8–16)
ANION GAP SERPL CALCULATED.3IONS-SCNC: 19 MEQ/L (ref 8–16)
ANION GAP SERPL CALCULATED.3IONS-SCNC: 20 MEQ/L (ref 8–16)
ANISOCYTOSIS: PRESENT
AST SERPL-CCNC: 41 U/L (ref 5–40)
BASE EXCESS MIXED: -5.8 MMOL/L (ref -2–3)
BASOPHILS # BLD: 0.1 %
BASOPHILS ABSOLUTE: 0 THOU/MM3 (ref 0–0.1)
BILIRUB SERPL-MCNC: 0.5 MG/DL (ref 0.3–1.2)
BUN BLDV-MCNC: 27 MG/DL (ref 7–22)
BUN BLDV-MCNC: 29 MG/DL (ref 7–22)
BUN BLDV-MCNC: 29 MG/DL (ref 7–22)
CALCIUM SERPL-MCNC: 8.2 MG/DL (ref 8.5–10.5)
CALCIUM SERPL-MCNC: 8.3 MG/DL (ref 8.5–10.5)
CALCIUM SERPL-MCNC: 8.5 MG/DL (ref 8.5–10.5)
CHLORIDE BLD-SCNC: 97 MEQ/L (ref 98–111)
CHLORIDE BLD-SCNC: 97 MEQ/L (ref 98–111)
CHLORIDE BLD-SCNC: 98 MEQ/L (ref 98–111)
CO2: 13 MEQ/L (ref 23–33)
CO2: 13 MEQ/L (ref 23–33)
CO2: 14 MEQ/L (ref 23–33)
COLLECTED BY:: ABNORMAL
CREAT SERPL-MCNC: 0.7 MG/DL (ref 0.4–1.2)
CREAT SERPL-MCNC: 0.7 MG/DL (ref 0.4–1.2)
CREAT SERPL-MCNC: 0.9 MG/DL (ref 0.4–1.2)
DEVICE: ABNORMAL
EKG ATRIAL RATE: 66 BPM
EKG ATRIAL RATE: 71 BPM
EKG P AXIS: 22 DEGREES
EKG P AXIS: 57 DEGREES
EKG P-R INTERVAL: 142 MS
EKG P-R INTERVAL: 142 MS
EKG Q-T INTERVAL: 450 MS
EKG Q-T INTERVAL: 480 MS
EKG QRS DURATION: 74 MS
EKG QRS DURATION: 76 MS
EKG QTC CALCULATION (BAZETT): 489 MS
EKG QTC CALCULATION (BAZETT): 503 MS
EKG R AXIS: -8 DEGREES
EKG R AXIS: -9 DEGREES
EKG T AXIS: 18 DEGREES
EKG T AXIS: 20 DEGREES
EKG VENTRICULAR RATE: 66 BPM
EKG VENTRICULAR RATE: 71 BPM
EOSINOPHIL # BLD: 0.3 %
EOSINOPHILS ABSOLUTE: 0 THOU/MM3 (ref 0–0.4)
ERYTHROCYTE [DISTWIDTH] IN BLOOD BY AUTOMATED COUNT: 18.6 % (ref 11.5–14.5)
ERYTHROCYTE [DISTWIDTH] IN BLOOD BY AUTOMATED COUNT: 47.5 FL (ref 35–45)
GFR SERPL CREATININE-BSD FRML MDRD: 62 ML/MIN/1.73M2
GFR SERPL CREATININE-BSD FRML MDRD: 82 ML/MIN/1.73M2
GFR SERPL CREATININE-BSD FRML MDRD: 82 ML/MIN/1.73M2
GLUCOSE BLD-MCNC: 128 MG/DL (ref 70–108)
GLUCOSE BLD-MCNC: 170 MG/DL (ref 70–108)
GLUCOSE BLD-MCNC: 187 MG/DL (ref 70–108)
GLUCOSE BLD-MCNC: 205 MG/DL (ref 70–108)
GLUCOSE BLD-MCNC: 208 MG/DL (ref 70–108)
GLUCOSE BLD-MCNC: 218 MG/DL (ref 70–108)
GLUCOSE BLD-MCNC: 227 MG/DL (ref 70–108)
GLUCOSE BLD-MCNC: 250 MG/DL (ref 70–108)
HCO3, MIXED: 16 MMOL/L (ref 23–28)
HCT VFR BLD CALC: 24.6 % (ref 37–47)
HCT VFR BLD CALC: 27 % (ref 37–47)
HEMOGLOBIN: 7.3 GM/DL (ref 12–16)
HEMOGLOBIN: 8 GM/DL (ref 12–16)
HEPARIN UNFRACTIONATED: 0.32 U/ML (ref 0.3–0.7)
HEPARIN UNFRACTIONATED: 0.35 U/ML (ref 0.3–0.7)
IMMATURE GRANS (ABS): 0.04 THOU/MM3 (ref 0–0.07)
IMMATURE GRANULOCYTES: 0.5 %
LACTIC ACID: 1.6 MMOL/L (ref 0.5–2)
LV EF: 40 %
LVEF MODALITY: NORMAL
LYMPHOCYTES # BLD: 8.5 %
LYMPHOCYTES ABSOLUTE: 0.6 THOU/MM3 (ref 1–4.8)
MCH RBC QN AUTO: 21.2 PG (ref 26–33)
MCHC RBC AUTO-ENTMCNC: 29.7 GM/DL (ref 32.2–35.5)
MCV RBC AUTO: 71.3 FL (ref 81–99)
MONOCYTES # BLD: 13.3 %
MONOCYTES ABSOLUTE: 1 THOU/MM3 (ref 0.4–1.3)
MRSA SCREEN RT-PCR: NEGATIVE
NUCLEATED RED BLOOD CELLS: 0 /100 WBC
O2 SAT, MIXED: 98 %
ORGANISM: ABNORMAL
OSMOLALITY URINE: 423 MOSMOL/KG (ref 250–750)
PCO2, MIXED VENOUS: 20 MMHG (ref 41–51)
PH, MIXED: 7.51 (ref 7.31–7.41)
PLATELET # BLD: 101 THOU/MM3 (ref 130–400)
PLATELET ESTIMATE: ABNORMAL
PMV BLD AUTO: 10 FL (ref 9.4–12.4)
PO2 MIXED: 93 MMHG (ref 25–40)
POIKILOCYTES: ABNORMAL
POTASSIUM REFLEX MAGNESIUM: 4 MEQ/L (ref 3.5–5.2)
POTASSIUM SERPL-SCNC: 3.8 MEQ/L (ref 3.5–5.2)
POTASSIUM SERPL-SCNC: 4.2 MEQ/L (ref 3.5–5.2)
RBC # BLD: 3.45 MILL/MM3 (ref 4.2–5.4)
SCAN OF BLOOD SMEAR: NORMAL
SEG NEUTROPHILS: 77.3 %
SEGMENTED NEUTROPHILS ABSOLUTE COUNT: 5.6 THOU/MM3 (ref 1.8–7.7)
SODIUM BLD-SCNC: 128 MEQ/L (ref 135–145)
SODIUM BLD-SCNC: 129 MEQ/L (ref 135–145)
SODIUM BLD-SCNC: 131 MEQ/L (ref 135–145)
SODIUM URINE: 20 MEQ/L
TOTAL PROTEIN: 6.2 G/DL (ref 6.1–8)
TROPONIN T: 0.11 NG/ML
URINE CULTURE REFLEX: ABNORMAL
VANCOMYCIN RESISTANT ENTEROCOCCUS: NEGATIVE
WBC # BLD: 7.3 THOU/MM3 (ref 4.8–10.8)

## 2022-08-31 PROCEDURE — 6370000000 HC RX 637 (ALT 250 FOR IP)

## 2022-08-31 PROCEDURE — 93010 ELECTROCARDIOGRAM REPORT: CPT | Performed by: INTERNAL MEDICINE

## 2022-08-31 PROCEDURE — 2060000000 HC ICU INTERMEDIATE R&B

## 2022-08-31 PROCEDURE — 82803 BLOOD GASES ANY COMBINATION: CPT

## 2022-08-31 PROCEDURE — 6360000002 HC RX W HCPCS: Performed by: EMERGENCY MEDICINE

## 2022-08-31 PROCEDURE — 83605 ASSAY OF LACTIC ACID: CPT

## 2022-08-31 PROCEDURE — 6370000000 HC RX 637 (ALT 250 FOR IP): Performed by: STUDENT IN AN ORGANIZED HEALTH CARE EDUCATION/TRAINING PROGRAM

## 2022-08-31 PROCEDURE — 85025 COMPLETE CBC W/AUTO DIFF WBC: CPT

## 2022-08-31 PROCEDURE — 83935 ASSAY OF URINE OSMOLALITY: CPT

## 2022-08-31 PROCEDURE — 80053 COMPREHEN METABOLIC PANEL: CPT

## 2022-08-31 PROCEDURE — 93306 TTE W/DOPPLER COMPLETE: CPT

## 2022-08-31 PROCEDURE — 84484 ASSAY OF TROPONIN QUANT: CPT

## 2022-08-31 PROCEDURE — 99223 1ST HOSP IP/OBS HIGH 75: CPT | Performed by: INTERNAL MEDICINE

## 2022-08-31 PROCEDURE — 2580000003 HC RX 258

## 2022-08-31 PROCEDURE — 36415 COLL VENOUS BLD VENIPUNCTURE: CPT

## 2022-08-31 PROCEDURE — 84300 ASSAY OF URINE SODIUM: CPT

## 2022-08-31 PROCEDURE — 76770 US EXAM ABDO BACK WALL COMP: CPT

## 2022-08-31 PROCEDURE — 99233 SBSQ HOSP IP/OBS HIGH 50: CPT | Performed by: INTERNAL MEDICINE

## 2022-08-31 PROCEDURE — 71045 X-RAY EXAM CHEST 1 VIEW: CPT

## 2022-08-31 PROCEDURE — 85014 HEMATOCRIT: CPT

## 2022-08-31 PROCEDURE — 51798 US URINE CAPACITY MEASURE: CPT

## 2022-08-31 PROCEDURE — 85520 HEPARIN ASSAY: CPT

## 2022-08-31 PROCEDURE — 85018 HEMOGLOBIN: CPT

## 2022-08-31 PROCEDURE — 82948 REAGENT STRIP/BLOOD GLUCOSE: CPT

## 2022-08-31 PROCEDURE — 2580000003 HC RX 258: Performed by: STUDENT IN AN ORGANIZED HEALTH CARE EDUCATION/TRAINING PROGRAM

## 2022-08-31 PROCEDURE — 6360000002 HC RX W HCPCS

## 2022-08-31 RX ORDER — LIDOCAINE 4 G/G
3 PATCH TOPICAL DAILY
Status: DISCONTINUED | OUTPATIENT
Start: 2022-08-31 | End: 2022-09-03 | Stop reason: HOSPADM

## 2022-08-31 RX ORDER — ATENOLOL 25 MG/1
25 TABLET ORAL DAILY
Status: DISCONTINUED | OUTPATIENT
Start: 2022-08-31 | End: 2022-09-03 | Stop reason: HOSPADM

## 2022-08-31 RX ORDER — LEVOTHYROXINE SODIUM 0.05 MG/1
50 TABLET ORAL NIGHTLY
Status: DISCONTINUED | OUTPATIENT
Start: 2022-08-31 | End: 2022-09-03 | Stop reason: HOSPADM

## 2022-08-31 RX ORDER — LISINOPRIL 20 MG/1
20 TABLET ORAL 2 TIMES DAILY
Status: DISCONTINUED | OUTPATIENT
Start: 2022-08-31 | End: 2022-09-01

## 2022-08-31 RX ORDER — SODIUM CHLORIDE 9 MG/ML
INJECTION, SOLUTION INTRAVENOUS CONTINUOUS
Status: DISCONTINUED | OUTPATIENT
Start: 2022-08-31 | End: 2022-09-03 | Stop reason: HOSPADM

## 2022-08-31 RX ADMIN — LISINOPRIL 20 MG: 20 TABLET ORAL at 01:05

## 2022-08-31 RX ADMIN — ESCITALOPRAM 20 MG: 20 TABLET, FILM COATED ORAL at 01:05

## 2022-08-31 RX ADMIN — SODIUM CHLORIDE, PRESERVATIVE FREE 10 ML: 5 INJECTION INTRAVENOUS at 20:13

## 2022-08-31 RX ADMIN — SODIUM CHLORIDE 100 ML/HR: 9 INJECTION, SOLUTION INTRAVENOUS at 22:16

## 2022-08-31 RX ADMIN — ATENOLOL 25 MG: 25 TABLET ORAL at 01:05

## 2022-08-31 RX ADMIN — CEFTRIAXONE SODIUM 1000 MG: 1 INJECTION, POWDER, FOR SOLUTION INTRAMUSCULAR; INTRAVENOUS at 20:00

## 2022-08-31 RX ADMIN — ATENOLOL 25 MG: 25 TABLET ORAL at 20:13

## 2022-08-31 RX ADMIN — ACETAMINOPHEN 650 MG: 325 TABLET ORAL at 09:42

## 2022-08-31 RX ADMIN — INSULIN LISPRO 1 UNITS: 100 INJECTION, SOLUTION INTRAVENOUS; SUBCUTANEOUS at 12:19

## 2022-08-31 RX ADMIN — PANTOPRAZOLE SODIUM 40 MG: 40 TABLET, DELAYED RELEASE ORAL at 06:40

## 2022-08-31 RX ADMIN — ATORVASTATIN CALCIUM 40 MG: 40 TABLET, FILM COATED ORAL at 20:13

## 2022-08-31 RX ADMIN — INSULIN LISPRO 1 UNITS: 100 INJECTION, SOLUTION INTRAVENOUS; SUBCUTANEOUS at 17:17

## 2022-08-31 RX ADMIN — LISINOPRIL 20 MG: 20 TABLET ORAL at 20:13

## 2022-08-31 RX ADMIN — INSULIN LISPRO 1 UNITS: 100 INJECTION, SOLUTION INTRAVENOUS; SUBCUTANEOUS at 09:36

## 2022-08-31 RX ADMIN — PANTOPRAZOLE SODIUM 40 MG: 40 TABLET, DELAYED RELEASE ORAL at 17:17

## 2022-08-31 RX ADMIN — LEVOTHYROXINE SODIUM 50 MCG: 0.05 TABLET ORAL at 20:13

## 2022-08-31 RX ADMIN — SODIUM CHLORIDE: 9 INJECTION, SOLUTION INTRAVENOUS at 11:37

## 2022-08-31 RX ADMIN — SODIUM CHLORIDE, PRESERVATIVE FREE 10 ML: 5 INJECTION INTRAVENOUS at 09:36

## 2022-08-31 RX ADMIN — ACETAMINOPHEN 650 MG: 325 TABLET ORAL at 18:49

## 2022-08-31 RX ADMIN — LISINOPRIL 20 MG: 20 TABLET ORAL at 09:36

## 2022-08-31 RX ADMIN — HEPARIN SODIUM AND DEXTROSE 12.04 UNITS/KG/HR: 10000; 5 INJECTION INTRAVENOUS at 22:19

## 2022-08-31 ASSESSMENT — PAIN DESCRIPTION - ONSET: ONSET: GRADUAL

## 2022-08-31 ASSESSMENT — PAIN DESCRIPTION - FREQUENCY: FREQUENCY: INTERMITTENT

## 2022-08-31 ASSESSMENT — PAIN SCALES - GENERAL
PAINLEVEL_OUTOF10: 4
PAINLEVEL_OUTOF10: 3
PAINLEVEL_OUTOF10: 1

## 2022-08-31 ASSESSMENT — PAIN DESCRIPTION - ORIENTATION
ORIENTATION: MID
ORIENTATION: RIGHT

## 2022-08-31 ASSESSMENT — ENCOUNTER SYMPTOMS
BACK PAIN: 0
WHEEZING: 0
SINUS PAIN: 0
RHINORRHEA: 0
CONSTIPATION: 0
COUGH: 1
SWOLLEN GLANDS: 0
DIARRHEA: 0
SPUTUM PRODUCTION: 0
VOMITING: 0
COLOR CHANGE: 0
SORE THROAT: 0
SINUS PRESSURE: 0
ORTHOPNEA: 0
NAUSEA: 1

## 2022-08-31 ASSESSMENT — PAIN DESCRIPTION - LOCATION
LOCATION: RIB CAGE
LOCATION: HEAD
LOCATION: GENERALIZED;HEAD

## 2022-08-31 ASSESSMENT — PAIN DESCRIPTION - DESCRIPTORS
DESCRIPTORS: ACHING
DESCRIPTORS: ACHING
DESCRIPTORS: ACHING;CRUSHING;DULL

## 2022-08-31 ASSESSMENT — PAIN DESCRIPTION - PAIN TYPE: TYPE: ACUTE PAIN

## 2022-08-31 ASSESSMENT — PAIN - FUNCTIONAL ASSESSMENT
PAIN_FUNCTIONAL_ASSESSMENT: ACTIVITIES ARE NOT PREVENTED

## 2022-08-31 NOTE — PROGRESS NOTES
Pt admitted to  [facility] ambulatory and from ED. Complaints: Shortness of breath. IV normal saline infusing into the forearm left, condition patent and no redness at a rate of 50 mls/ hour. IV site free of s/s of infection or infiltration. Vital signs obtained. Assessment and data collection initiated. Two nurse skin assessment performed by Zulema Fontaine RN and [facility] RN. Oriented to room. Policies and procedures for 4 explained. All questions answered with no further questions at this time. Fall prevention and safety brochure discussed with patient. Bed alarm on. Call light in reach.

## 2022-08-31 NOTE — FLOWSHEET NOTE
08/31/22 0912   Safe Environment   Safety Measures Other (comment)  (virtual nurse safety rounds complete)   Staff and family in room with pt

## 2022-08-31 NOTE — PROGRESS NOTES
Admission history and medication reconciliation completed with patient. Patient's  and daughter in room throughout health history.

## 2022-08-31 NOTE — PLAN OF CARE
Assessment and plan:  NSTEMI, suspected  Previous history of MI, stenting x2, 1999, 2001. Last cath in 2018 showed stents with nonobstructive disease unchanged from 2015 cath. EKG shows septal inverted T waves. Initial troponin 0.246, downtrending. Underwent echo today with results pending.  -Continuous telemetry  -Cardiology following    Right rib pain  Patient reports fall 8/22. CXR 8/31 shows no acute fracture  -Start lidocaine patch    Hypovolemic hyponatremia  Sodium 127 on admission. Patient was given 500 mL NS bolus. Currently 131 on last BMP. Serum osmolality 266.5. Urine sodium 20. Urine osmolality 423.  -Start NS at 100 mL/h    Urinary tract infection  Febrile. UA positive for leukocyte esterase, trace ketones, blood, bacteria. Lactic acid 4.9 on admission. Repeat lactic acid WNL. Patient not hypotensive, tachycardic. Urine culture growing gram-negative bacilli. -Continue Rocephin  -Blood cultures pending  -IVF as above    Iron deficiency anemia  Chronic. Hgb 8.7 on admission. Most recent Hgb 8.0. No signs of bleeding.  -Monitor CBC  -Transfuse if Hgb < 7 or symptomatic    HFrEF  Echo in 2018 EF 45%. Echo ordered with results pending. Hyperlipidemia  -Continue statin    Hypothyroidism  TSH WNL. -Continue Synthroid    Hypertension, controlled  BP controlled during mission  -Continue lisinopril  -Continue atenolol    Type II diabetes mellitus  -Continue low-dose SSI, hypoglycemia protocol in place    Depression  Lexapro held due to possibility of SIADH    Subjective: Patient was examined at bedside. Patient reports tiredness. Patient denies SOB, chest pain. Patient reports pain with movement in her rib cage. Patient denies pain with urination. Patient has had a poor appetite and has not been drinking much recently. Patient reports diarrhea started at home and has continued.   Patient states she did have some nausea and vomiting yesterday prior to coming to the ED, however she has had no nausea/vomiting since admission. Patient reports episodes of \"shakes\" which limits for 30 to 45 minutes. Patient states that this began on Sunday and has happened 3-4 times since.

## 2022-08-31 NOTE — CONSULTS
UTI  Elevated troponin Type I versus type II  CAD s/p stent  Acidosis  Hyponat 127 to 131  Anemia severe  Htn  Hlp  Dm ii    Plan    Cont heparin  Echo  Trend trop and ekg    [id number]        Ron Miles MD

## 2022-08-31 NOTE — H&P
Internal Medicine Resident History and Physical          Patient: Tam Bravo  : 1950  MRN: [id number]     Acct: [de-identified]    PCP: FABIENNE German CNP  Date of Admission: 2022  Date of Service: Pt seen/examined on 22  and Admitted to Inpatient with expected LOS greater than two midnights due to medical therapy. Hospital Problems             Last Modified POA    * (Principal) Elevated troponin 2022 Yes     Assessment and Plan:  Acute coronary syndrome, suspected: Previous history of MI, stenting x2, 1999. Last cath was in 2018 which showed stents with nonobstructive disease unchanged from 2015 catheterization. Serial ECGs in the ER obtained showed normal sinus rhythm. Initial troponin was 0.246. Patient was then started on aspirin loading dose. And was started on heparin drip.  -Placed on telemetry for continuous monitoring.  -Repeat ECGs ordered. -Repeat troponins ordered. Troponin downtrending x2. With 0.182, 0.173  -Echo was ordered for further evaluation.  -Consult to cardiology was placed for further evaluation.  -Patient was made n.p.o. at midnight  -Patient takes high-dose intensity statin, atorvastatin 40 mg  -Chest x-ray ordered, showed cardiomegaly. Urinary tract infection: UA positive. Patient was given 1 g ceftriaxone in the ER. Ceftriaxone was continued for continued treatment of UTI. Lactic acid was 4.9. Patient is not hypotensive tachycardic or tachypneic. Afebrile patient is placed on continuous telemetry we will continue to monitor. Blood cultures x2 obtained. Urine culture obtained can change antibiotics as necessary blood cultures come back. Hyponatremia: Sodium upon admission 127. Repeat CMP showed sodium 127. Patient was given 500 normal saline bolus. Normal saline infusion at 50 mL/h for 10 hours. We will continue to monitor. Likely due to dehydration and nausea vomiting.   Chronic Conditions (reviewed and stable unless otherwise stated)   Anion gap metabolic acidosis: Patient has a gap of 21. This is likely due to patients dehydration without eating or drinking past 3 days. And patient's vomiting and diarrhea. Patient was given 500 L bolus of normal saline with follow-up maintenance of 50 mL/h. We will continue to monitor. revaluated as needed. Beta hydroxybutyrate elevated at 6.26. Anemia: Patient has a history of iron deficiency anemia. This likely iron deficiency anemia with dilution from fluids given. Patient's hemoglobin upon admission was 8.7. Repeat hemoglobin was 7.3. Chest x-ray was ordered for rule out of bleed. Repeat H&H was ordered. Awaiting results for further evaluation. Patient is on telemetry we will continue to monitor closely. Evaluate for possible bleeding. Hemoglobin drops below 7 transfuse. HFrEF: Stable. EF in 2018 45%. Repeat echo ordered for further evaluation. Depression: Stable. Home medications ordered. Hyperlipidemia: Stable. Home medications ordered of atorvastatin 40 mg. Hypothyroidism: Stable. TSH normal.  Hypertension: Stable. Home medications ordered. Type 2 diabetes mellitus: Home medications held. Hypoglycemia protocol in place. Low dose insulin correction sliding scale ordered  PE/DVT prophylaxis: Patient is currently on a heparin drip. When drip is stopped switched to Lovenox for prophylaxis.          =======================================================================      Chief Complaint: Chills and fatigue    History Of Present Illness:  Vipin Haley is a 71 y.o. female with PMHx of coronary artery disease, CHF, previous MIs with stenting, hyperlipidemia, hypertension, hypothyroidism, diabetes mellitus type 2 who presents to [address] [place] [address] for evaluation of fatigue and chills that been going on for the last week. Patient reports she was gardening and fell 1 week ago. She reports this was a mechanical fall of just tripping in her garden. Patient states she did not hit her head but patient reports she possibly could have lost consciousness. Patient does complain of painful ribs on the right side. She states when she fell in the garden she hit her ribs. This fall occurred on Monday the 22nd 2022. Patient states she then went to urgent care the following day to get a chest x-ray, x-ray showed no broken ribs chest bruising per patient. On 8/23/2022 patient states that she started to develop chills. She would have periodic episodes of chills but denies any fevers. During these episodes of chills patient reports she was having difficulty breathing and will have to take short shallow breaths. Patient adds she needed to electric blankets on for warmth. This occurred the next 2 days. Patient states over the weekend of 26 through the 28th she had no symptoms. Patient reports she is having some associated fatigue. Patient states starting on Sunday she developed nausea and vomiting. Patient states Sunday was the last day that she ate. Since then she has been unable to due to nausea and dry heaving. Patient reports some additional diarrhea. Patient does complain of new weakness dizziness and headache that started in the last 2 days. Patient's been unable to eat and drink very little over this past 3 days. Patient denies vision changes. Patient denies chest pain, palpitations, pleuritic chest pain, cough, no shortness of breath at this moment. Patient denies any abdominal pain at this time patient does not have any nausea currently. Patient denies burning with urination, increased frequency. Patient does admit to some increased urgency recently.     ED course:   -Serial ECGs were obtained, showed normal sinus rhythm  -Initial troponin was 0.246  -Aspirin 324 loading dose given in the ER.  -Patient was started on the heparin drip  -UA positive for UTI, patient received 1g ceftriaxone  Past Medical History:        Diagnosis Date    CAD (coronary artery disease)     CHF (congestive heart failure) (HCC)     Depression 2000    GERD (gastroesophageal reflux disease)     Headache(784.0)     Heart attack ([facility]     Hyperlipidemia     Hypertension     Hypothyroidism 2/11/2015    Liver disease     [facility] 11/99, 10/01    x 2     Obesity     Osteoarthritis     B/L knees--Dr. Christine Cooper. PONV (postoperative nausea and vomiting)     Type 2 diabetes mellitus without complication (HCC)     Type II or unspecified type diabetes mellitus without mention of complication, not stated as uncontrolled 2014     Past Surgical History:        Procedure Laterality Date    Susan  07/29/2009    Diagnostic Cath EF 40-45% (Dr Dank Munguia)    [address] ARTHROSCOPY Right     IA COLON CA SCRN NOT  [address] IND Left 3/1/2018    COLONOSCOPY performed by Lori Pressley MD at [facility]    PTCA      Shunt 10/01--Stent 11/99    TONSILLECTOMY AND ADENOIDECTOMY      UPPER GASTROINTESTINAL ENDOSCOPY      UPPER GASTROINTESTINAL ENDOSCOPY Left 2/27/2018    EGD BAND LIGATION performed by Lori Pressley MD at [facility] 1 4/19/2018    EGD BIOPSY performed by Sneha Simmons MD at [facility]     Medications Prior to Admission:   Prior to Admission medications    Medication Sig Start Date End Date Taking?  Authorizing Provider   acetaminophen (TYLENOL) 500 MG tablet Take 1,000 mg by mouth every 6 hours as needed for Pain   Yes Historical Provider, MD   ibuprofen (ADVIL;MOTRIN) 200 MG tablet Take 400 mg by mouth every 6 hours as needed for Fever   Yes Historical Provider, MD   metFORMIN (GLUCOPHAGE-XR) 500 MG extended release tablet TAKE 2 TABLETS TWICE DAILY 4/27/22   Lauren Adkins MD   pantoprazole (PROTONIX) 40 MG tablet TAKE 1 TABLET TWICE DAILY  Patient taking differently: Take 40 mg by mouth daily TAKE 1 TABLET TWICE DAILY 4/27/22   Herminio Underwood MD   escitalopram (LEXAPRO) 20 MG tablet TAKE 1 TABLET EVERY DAY 4/27/22   Herminio Underwood MD   hydrocortisone 2.5 % cream Apply topically 2 times daily. Patient not taking: Reported on 8/30/2022 3/28/22   FABIENNE Adrian CNP   lisinopril (PRINIVIL;ZESTRIL) 20 MG tablet TAKE 1 TABLET TWICE DAILY 1/27/22   Herminio Underwood MD   levothyroxine (SYNTHROID) 50 MCG tablet TAKE 1 TABLET EVERY DAY 1/27/22   Herminio Underwood MD   atorvastatin (LIPITOR) 40 MG tablet TAKE 1 TABLET EVERY DAY 1/27/22   Herminio Underwood MD   atenolol (TENORMIN) 50 MG tablet TAKE 1/2 TABLET EVERY DAY  Patient taking differently: 25 mg TAKE 1/2 TABLET EVERY DAY 1/27/22   Herminio Underwood MD   glimepiride (AMARYL) 2 MG tablet TAKE 2 TABLETS (4 MG) IN THE MORNING AND 1 TABLET (2 MG) IN THE EVENING 11/1/21   FABIENNE Adrian CNP   albuterol sulfate HFA (VENTOLIN HFA) 108 (90 Base) MCG/ACT inhaler Inhale 2 puffs into the lungs 4 times daily as needed for Wheezing  Patient not taking: Reported on 8/30/2022 6/3/21   FABIENNE Adrian CNP   blood glucose test strips (ACCU-CHEK ISABELA PLUS) strip USE 1 STRIP TO CHECK GLUCOSE ONCE DAILY 3/23/21   Herminio Underwood MD   Blood Glucose Monitoring Suppl (ACCU-CHEK ISABELA PLUS) w/Device KIT USE 1 TO CHECK GLUCOSE ONCE DAILY 1/31/20   Historical Provider, MD   blood glucose monitor kit and supplies Glucose monitor and supplies, brand per pt preference. Test sugar daily. Dx: E11.9 1/31/20   Herminio Underwood MD   Lancets MISC Test sugar daily. Brand per pt preference.  Dx: E11.9 1/31/20   Herminio Underwood MD   buPROPion [facility]) 150 MG extended release tablet Take 150 mg by mouth 2 times daily  Patient not taking: Reported on 8/30/2022    Historical Provider, MD   aspirin 81 MG tablet Take 81 mg by mouth nightly    Historical Provider, MD   FISH OIL Take by mouth nightly Arthur Red    Historical Provider, MD   Docusate Calcium (STOOL SOFTENER PO) Take by mouth nightly as needed     Historical Provider, MD     Allergies:  Patient has no known allergies. Social History:      Tobacco use:   reports that she quit smoking about 22 years ago. Her smoking use included cigarettes. She has quit using smokeless tobacco.  Alcohol use:   reports no history of alcohol use. Drug use:  reports no history of drug use. Family History:      Problem Relation Age of Onset    Heart Disease Father     Heart Attack Father     Other Father         LUNG Wilene Bel Mother     High Blood Pressure Mother     Heart Disease Mother     Other Mother         GLAUCOMA    High Blood Pressure Sister     Osteoporosis Sister      Review of Systems:   Pertinent positives and negatives as noted in the HPI. Otherwise complete ROS negative. Physical Exam:    /70   Pulse 75   Temp 99.5 °F (37.5 °C) (Oral)   Resp 22   Ht 5' (1.524 m)   Wt 172 lb 6.4 oz (78.2 kg)   SpO2 95%   BMI 33.67 kg/m²       General appearance: No apparent distress, appears stated age. Patient is in no acute distress or discomfort. Resting comfortably in the ER. Eyes:  Pupils equal, round. Conjunctivae/corneas clear. HENT: Head normal in appearance. External nares normal. Oral mucosa moist without lesions. Hearing grossly intact. Neck: Supple, with full range of motion. Trachea midline. No gross JVD appreciated. Respiratory:  Normal respiratory effort. Clear to auscultation, bilaterally without rales or wheezes or rhonchi. Cardiovascular: Normal rate, regular rhythm with normal S1/S2 without murmurs. No lower extremity edema. Minimal swelling of feet below the ankles. Abdomen: Soft, non-tender, non-distended with normal bowel sounds. Musculoskeletal: No joint swelling or tenderness. Normal tone. No abnormal movements. Skin: Warm and dry. No rashes or lesions. Neurologic: No focal sensory/motor deficits in the upper and lower extremities. Cranial nerves:  grossly non-focal 2-12. Psychiatric: Alert and oriented, normal insight and thought content. Capillary Refill: Brisk,< 3 seconds. Peripheral Pulses: +2 palpable, equal bilaterally. Labs:     Recent Labs     08/30/22  1610 08/31/22  0233   WBC 9.0 7.3   HGB 8.7* 7.3*   HCT 29.9* 24.6*   * 101*     Recent Labs     08/30/22  1610 08/30/22  2155   * 127*   K 4.5 3.9   CL 92* 92*   CO2 14* 16*   BUN 33* 34*   CREATININE 1.2 1.0   CALCIUM 8.8 8.9     Recent Labs     08/30/22  1610 08/30/22  2155   AST 41* 44*   ALT 26 29   BILITOT 0.6 0.5   ALKPHOS 62 65     Recent Labs     08/30/22  1818   INR 1.31*     No results for input(s): CKTOTAL, TROPONINI in the last 72 hours. Lab Results   Component Value Date/Time    NITRU NEGATIVE 08/30/2022 05:10 PM    WBCUA 10-15 08/30/2022 05:10 PM    WBCUA 0-2 11/29/2011 09:10 AM    BACTERIA MANY 08/30/2022 05:10 PM    RBCUA 5-10 08/30/2022 05:10 PM    BLOODU LARGE 08/30/2022 05:10 PM    SPECGRAV 1.020 11/11/2011 02:11 PM    GLUCOSEU NEGATIVE 08/30/2022 05:10 PM     Radiology:     XR CHEST PORTABLE   Final Result   Impression:   1. Cardiomegaly without heart failure. This document has been electronically signed by: Carlos Wright MD on    08/31/2022 04:55 AM      XR CHEST PORTABLE   Final Result   Impression:   1. Cardiomegaly without failure. This document has been electronically signed by: Libertad Navarrete MD on 08/30/2022 09:27 PM         EKG:  I have reviewed the EKG with the following interpretation:  ECG from 1402, showed normal sinus rhythm with rate of 76  ECG from 1824, showed normal sinus rhythm rate of 71  ECG from 1925, showed normal sinus rhythm 66    PT/OT Eval Status: Will be assessed  Diet: Diet NPO  DVT prophylaxis: Currently on a heparin drip  Code Status: Full Code  Disposition: Admit to medicine    Thank you FABIENNE Lutz - SARBJIT for the opportunity to be involved in this patient's care.     Electronically signed by Bernabe Kurtz DO, PGY-1 on 8/31/2022 at 6:09 AM.     Case discussed with Attending, Dr. Ashley Gilliland MD.

## 2022-08-31 NOTE — ED NOTES
ED to inpatient nurses report    Chief Complaint   Patient presents with    Fatigue    Chills      Present to ED from home  LOC: alert and orientated to name, place, date  Vital signs   Vitals:    08/30/22 1827 08/30/22 1858 08/30/22 1948 08/30/22 2049   BP:  118/62 110/64 (!) 104/52   Pulse: 67 70 65 64   Resp: 22 21 28 24   Temp:       TempSrc:       SpO2: 98% 97% 98% 97%   Weight:       Height:          Oxygen Baseline none    Current needs required none Bipap/Cpap No  LDAs:   Peripheral IV 08/30/22 Right Antecubital (Active)   Site Assessment Clean, dry & intact 08/30/22 1609     Mobility: Requires assistance * 1  Pending ED orders: none  Present condition: stable      C-SSRS    Swallow Screening    Preferred Language: [place]     Electronically signed by Rae Cleary, RN on 8/30/2022 at 9:29 PM       Lam Yang RN  08/30/22 2844

## 2022-08-31 NOTE — PLAN OF CARE
Problem: Discharge Planning  Goal: Discharge to home or other facility with appropriate resources  8/31/2022 0458 by Calderon Perdomo RN  Outcome: Progressing  Flowsheets (Taken 8/30/2022 2320)  Discharge to home or other facility with appropriate resources:   Identify barriers to discharge with patient and caregiver   Arrange for needed discharge resources and transportation as appropriate   Arrange for interpreters to assist at discharge as needed  8/31/2022 0457 by Calderon Perdomo RN  Outcome: Progressing  Flowsheets  Taken 8/30/2022 2320 by Calderon Perdomo RN  Discharge to home or other facility with appropriate resources:   Identify barriers to discharge with patient and caregiver   Arrange for needed discharge resources and transportation as appropriate   Arrange for interpreters to assist at discharge as needed  Taken 8/30/2022 2316 by Taniya Bravo RN  Discharge to home or other facility with appropriate resources: Arrange for needed discharge resources and transportation as appropriate     Problem: Safety - Adult  Goal: Free from fall injury  8/31/2022 0458 by Calderon Perdomo RN  Outcome: Progressing  Flowsheets (Taken 8/31/2022 0458)  Free From Fall Injury: Instruct family/caregiver on patient safety  8/31/2022 0457 by Calderon Perdomo RN  Outcome: Progressing     Problem: ABCDS Injury Assessment  Goal: Absence of physical injury  8/31/2022 0458 by Calderon Perdomo RN  Outcome: Progressing  Flowsheets (Taken 8/31/2022 0458)  Absence of Physical Injury: Implement safety measures based on patient assessment  8/31/2022 0457 by Calderon Perdomo RN  Outcome: Progressing     Problem: Skin/Tissue Integrity - Adult  Goal: Skin integrity remains intact  8/31/2022 0458 by Calderon Perdomo RN  Outcome: Progressing  Flowsheets (Taken 8/31/2022 0458)  Skin Integrity Remains Intact: Monitor for areas of redness and/or skin breakdown  8/31/2022 0457 by Calderon Perdomo RN  Outcome: Progressing     Problem: Metabolic/Fluid and Electrolytes - Adult  Goal: Electrolytes maintained within normal limits  8/31/2022 0458 by Jada Strong RN  Outcome: Progressing  Flowsheets (Taken 8/31/2022 0458)  Electrolytes maintained within normal limits:   Monitor labs and assess patient for signs and symptoms of electrolyte imbalances   Administer electrolyte replacement as ordered   Monitor response to electrolyte replacements, including repeat lab results as appropriate   Instruct patient on fluid and nutrition restrictions as appropriate  8/31/2022 0457 by Jada Strong RN  Outcome: Progressing  Care plan reviewed with patient. Patient verbalizes understanding of the care plan and contributed to goal setting.

## 2022-08-31 NOTE — ED NOTES
This nurse in to check on pt and she states she is feeling sick. This nurse called pharmacy to verify zofran and this nurse went into administer and pt states she was feeling better and didn't want the zofran at this time.       Leonie Yang RN  08/30/22 7508

## 2022-09-01 ENCOUNTER — APPOINTMENT (OUTPATIENT)
Dept: GENERAL RADIOLOGY | Age: 72
DRG: 689 | End: 2022-09-01
Payer: MEDICARE

## 2022-09-01 PROBLEM — R53.83 FATIGUE: Status: ACTIVE | Noted: 2022-09-01

## 2022-09-01 PROBLEM — N30.00 ACUTE CYSTITIS WITHOUT HEMATURIA: Status: ACTIVE | Noted: 2022-09-01

## 2022-09-01 PROBLEM — N30.01 ACUTE CYSTITIS WITH HEMATURIA: Status: ACTIVE | Noted: 2022-09-01

## 2022-09-01 LAB
ANION GAP SERPL CALCULATED.3IONS-SCNC: 17 MEQ/L (ref 8–16)
BUN BLDV-MCNC: 21 MG/DL (ref 7–22)
CALCIUM SERPL-MCNC: 7.9 MG/DL (ref 8.5–10.5)
CHLORIDE BLD-SCNC: 99 MEQ/L (ref 98–111)
CO2: 13 MEQ/L (ref 23–33)
CREAT SERPL-MCNC: 0.7 MG/DL (ref 0.4–1.2)
EKG ATRIAL RATE: 78 BPM
EKG P AXIS: 77 DEGREES
EKG P-R INTERVAL: 142 MS
EKG Q-T INTERVAL: 424 MS
EKG QRS DURATION: 74 MS
EKG QTC CALCULATION (BAZETT): 483 MS
EKG R AXIS: -8 DEGREES
EKG T AXIS: 3 DEGREES
EKG VENTRICULAR RATE: 78 BPM
ERYTHROCYTE [DISTWIDTH] IN BLOOD BY AUTOMATED COUNT: 19 % (ref 11.5–14.5)
ERYTHROCYTE [DISTWIDTH] IN BLOOD BY AUTOMATED COUNT: 49.2 FL (ref 35–45)
GFR SERPL CREATININE-BSD FRML MDRD: 82 ML/MIN/1.73M2
GLUCOSE BLD-MCNC: 175 MG/DL (ref 70–108)
GLUCOSE BLD-MCNC: 176 MG/DL (ref 70–108)
GLUCOSE BLD-MCNC: 203 MG/DL (ref 70–108)
GLUCOSE BLD-MCNC: 220 MG/DL (ref 70–108)
GLUCOSE BLD-MCNC: 255 MG/DL (ref 70–108)
GLUCOSE BLD-MCNC: 271 MG/DL (ref 70–108)
HCT VFR BLD CALC: 25.3 % (ref 37–47)
HCT VFR BLD CALC: 25.5 % (ref 37–47)
HEMOGLOBIN: 7.4 GM/DL (ref 12–16)
HEMOGLOBIN: 7.6 GM/DL (ref 12–16)
HEPARIN UNFRACTIONATED: 0.31 U/ML (ref 0.3–0.7)
MCH RBC QN AUTO: 21.1 PG (ref 26–33)
MCHC RBC AUTO-ENTMCNC: 29.2 GM/DL (ref 32.2–35.5)
MCV RBC AUTO: 72.1 FL (ref 81–99)
PLATELET # BLD: 105 THOU/MM3 (ref 130–400)
PMV BLD AUTO: 10.3 FL (ref 9.4–12.4)
POTASSIUM SERPL-SCNC: 3.3 MEQ/L (ref 3.5–5.2)
RBC # BLD: 3.51 MILL/MM3 (ref 4.2–5.4)
SODIUM BLD-SCNC: 129 MEQ/L (ref 135–145)
TROPONIN T: 0.13 NG/ML
WBC # BLD: 5.5 THOU/MM3 (ref 4.8–10.8)

## 2022-09-01 PROCEDURE — 2060000000 HC ICU INTERMEDIATE R&B

## 2022-09-01 PROCEDURE — 6370000000 HC RX 637 (ALT 250 FOR IP): Performed by: INTERNAL MEDICINE

## 2022-09-01 PROCEDURE — 80048 BASIC METABOLIC PNL TOTAL CA: CPT

## 2022-09-01 PROCEDURE — 85018 HEMOGLOBIN: CPT

## 2022-09-01 PROCEDURE — 6360000002 HC RX W HCPCS

## 2022-09-01 PROCEDURE — 82272 OCCULT BLD FECES 1-3 TESTS: CPT

## 2022-09-01 PROCEDURE — 99232 SBSQ HOSP IP/OBS MODERATE 35: CPT | Performed by: INTERNAL MEDICINE

## 2022-09-01 PROCEDURE — 6370000000 HC RX 637 (ALT 250 FOR IP)

## 2022-09-01 PROCEDURE — 71100 X-RAY EXAM RIBS UNI 2 VIEWS: CPT

## 2022-09-01 PROCEDURE — 51798 US URINE CAPACITY MEASURE: CPT

## 2022-09-01 PROCEDURE — 85027 COMPLETE CBC AUTOMATED: CPT

## 2022-09-01 PROCEDURE — 85520 HEPARIN ASSAY: CPT

## 2022-09-01 PROCEDURE — 85014 HEMATOCRIT: CPT

## 2022-09-01 PROCEDURE — 36415 COLL VENOUS BLD VENIPUNCTURE: CPT

## 2022-09-01 PROCEDURE — 84484 ASSAY OF TROPONIN QUANT: CPT

## 2022-09-01 PROCEDURE — 82948 REAGENT STRIP/BLOOD GLUCOSE: CPT

## 2022-09-01 PROCEDURE — 93010 ELECTROCARDIOGRAM REPORT: CPT | Performed by: INTERNAL MEDICINE

## 2022-09-01 PROCEDURE — 99232 SBSQ HOSP IP/OBS MODERATE 35: CPT | Performed by: PHYSICIAN ASSISTANT

## 2022-09-01 PROCEDURE — 2580000003 HC RX 258

## 2022-09-01 PROCEDURE — 93005 ELECTROCARDIOGRAM TRACING: CPT | Performed by: INTERNAL MEDICINE

## 2022-09-01 RX ORDER — LISINOPRIL 20 MG/1
20 TABLET ORAL DAILY
Status: DISCONTINUED | OUTPATIENT
Start: 2022-09-02 | End: 2022-09-03 | Stop reason: HOSPADM

## 2022-09-01 RX ORDER — POTASSIUM CHLORIDE 7.45 MG/ML
10 INJECTION INTRAVENOUS PRN
Status: DISCONTINUED | OUTPATIENT
Start: 2022-09-01 | End: 2022-09-03 | Stop reason: HOSPADM

## 2022-09-01 RX ORDER — POTASSIUM CHLORIDE 20 MEQ/1
40 TABLET, EXTENDED RELEASE ORAL PRN
Status: DISCONTINUED | OUTPATIENT
Start: 2022-09-01 | End: 2022-09-03 | Stop reason: HOSPADM

## 2022-09-01 RX ORDER — ASPIRIN 81 MG/1
81 TABLET, CHEWABLE ORAL DAILY
Status: DISCONTINUED | OUTPATIENT
Start: 2022-09-01 | End: 2022-09-03 | Stop reason: HOSPADM

## 2022-09-01 RX ADMIN — INSULIN LISPRO 1 UNITS: 100 INJECTION, SOLUTION INTRAVENOUS; SUBCUTANEOUS at 17:10

## 2022-09-01 RX ADMIN — ACETAMINOPHEN 650 MG: 325 TABLET ORAL at 16:12

## 2022-09-01 RX ADMIN — LEVOTHYROXINE SODIUM 50 MCG: 0.05 TABLET ORAL at 21:41

## 2022-09-01 RX ADMIN — PANTOPRAZOLE SODIUM 40 MG: 40 TABLET, DELAYED RELEASE ORAL at 16:12

## 2022-09-01 RX ADMIN — ASPIRIN 81 MG 81 MG: 81 TABLET ORAL at 08:41

## 2022-09-01 RX ADMIN — ACETAMINOPHEN 650 MG: 325 TABLET ORAL at 04:50

## 2022-09-01 RX ADMIN — ATORVASTATIN CALCIUM 40 MG: 40 TABLET, FILM COATED ORAL at 21:41

## 2022-09-01 RX ADMIN — POTASSIUM CHLORIDE 40 MEQ: 1500 TABLET, EXTENDED RELEASE ORAL at 16:12

## 2022-09-01 RX ADMIN — CEFTRIAXONE SODIUM 1000 MG: 1 INJECTION, POWDER, FOR SOLUTION INTRAMUSCULAR; INTRAVENOUS at 21:38

## 2022-09-01 RX ADMIN — ATENOLOL 25 MG: 25 TABLET ORAL at 21:40

## 2022-09-01 RX ADMIN — PANTOPRAZOLE SODIUM 40 MG: 40 TABLET, DELAYED RELEASE ORAL at 08:41

## 2022-09-01 RX ADMIN — LISINOPRIL 20 MG: 20 TABLET ORAL at 08:41

## 2022-09-01 ASSESSMENT — PAIN - FUNCTIONAL ASSESSMENT
PAIN_FUNCTIONAL_ASSESSMENT: ACTIVITIES ARE NOT PREVENTED
PAIN_FUNCTIONAL_ASSESSMENT: ACTIVITIES ARE NOT PREVENTED

## 2022-09-01 ASSESSMENT — PAIN SCALES - WONG BAKER

## 2022-09-01 ASSESSMENT — PAIN SCALES - GENERAL
PAINLEVEL_OUTOF10: 1
PAINLEVEL_OUTOF10: 0
PAINLEVEL_OUTOF10: 0
PAINLEVEL_OUTOF10: 3
PAINLEVEL_OUTOF10: 2

## 2022-09-01 ASSESSMENT — PAIN DESCRIPTION - LOCATION
LOCATION: HEAD
LOCATION: CHEST

## 2022-09-01 ASSESSMENT — PAIN DESCRIPTION - ORIENTATION: ORIENTATION: RIGHT

## 2022-09-01 ASSESSMENT — PAIN DESCRIPTION - DESCRIPTORS
DESCRIPTORS: ACHING
DESCRIPTORS: ACHING

## 2022-09-01 NOTE — FLOWSHEET NOTE
09/01/22 1002   Safe Environment   Safety Measures Other (comment)  (Virtual Safety Round Complete)   Virtual Nurse introduced, pt denies needs at this time.   Spouse bedside at this time

## 2022-09-01 NOTE — PROGRESS NOTES
Patient presents to the ED via EMS with mother with c/o shortness of breath, mild intercostal retractions, congestions, cough  Pt mother endorses pt was here yesterday dx with RSV, mother took pt to [facility] where pt mother was told to bring pt to ED if breathing got worsen  . +Capillary, Motor, Sensory. Pt mother denies feeling N&V, diarrhea. Patient Alert and acting age appropriate. Mother  at bedside. Pt updated on POC, call light within reach. EDMD assessing at bedside   Physician Progress Note      Hyacinth Denise  CSN #:                  [id number]  :                       1950  ADMIT DATE:       2022 3:57 PM  [address] [place] DATE:  RESPONDING  PROVIDER #:        DANI Hein MD          QUERY TEXT:    Pt admitted with UTI and noted documentation of possible sepsis in consult   note from cardiology. If possible, please document in progress notes and   discharge summary:    The medical record reflects the following:  Risk Factors: UTI  Clinical Indicators: on arrival WBC 9.0, PCT 9.40, temp 98.4, HR 73, RR 21;   urine culture + Klebsiella pneumoniae with colony count >100,00 CFU/mL  Treatment: Labs, imaging, Rocephin, urine culture  Options provided:  -- Sepsis confirmed present on admission  -- Sepsis confirmed not present on admission  -- Sepsis ruled out  -- Other - I will add my own diagnosis  -- Disagree - Not applicable / Not valid  -- Disagree - Clinically unable to determine / Unknown  -- Refer to Clinical Documentation Reviewer    PROVIDER RESPONSE TEXT:    The diagnosis of sepsis was confirmed not present on admission.     Query created by: Rickey Bocanegra on 2022 9:27 AM      Electronically signed by:  Royann Goodell MD 2022 9:57 AM

## 2022-09-01 NOTE — CARE COORDINATION
Collaborative Discharge Planning    Melany Layne  :  1950  MRN:  [id number]    ADMIT DATE:  2022      Discharge Planning Discharge Planning  Type of Residence: House  Living Arrangements: Spouse/Significant Other, Children, Family Members  Support Systems: Spouse/Significant Other, Children  Current Services Prior To Admission: Durable Medical Equipment  Potential Assistance Needed: N/A  DME: Glucometer  DME Ordered?: No  Potential Assistance Purchasing Medications: No  Type of Home Care Services: None  Patient expects to be discharged to[de-identified] House  Follow Up Appointment: Best Day/Time :  (Prefers to avoid follow up appointments if possible)  One/Two Story Residence: One story  History of falls?: Yes  White Board Notes /Social Work Whiteboard Notes  /Social Work Whiteboard:  CM; plans home w spouse [address] with family  plans home w spouse Chace Nap independently as PTA when medically cleared  Discharge Milestones and Delays: Clinical status    Elevated Troponin/Klebsiella UTI Sepsis  History: CHF, MI  ECHO planned today  IV AB, Heparin gtt, IVF  H 7.4, Na+ 129, K+ 3.3,     SIGNED:  Jones Gordon RN   2022, 12:02 PM

## 2022-09-01 NOTE — CONSULTS
[address] [id number]                                  CONSULTATION    PATIENT NAME: Mary Le                  :        1950  MED REC NO:   [id number]                           ROOM:       0006  ACCOUNT NO:   [de-identified]                           ADMIT DATE: 2022  PROVIDER:     Judy Hernandez. Maria Esther Escobar M.D.    Enoch Joseph:  2022    REASON FOR CONSULTATION:  Elevated troponin in a 78-year-old female  patient presented with fever, chills and acute urinary tract infection. PRIMARY CARDIOLOGIST:  Amena Eason MD    HISTORY OF PRESENT ILLNESS:  This is a 78-year-old  female  patient who has been in usual state of health one week back and one week  back, the patient had a mechanical fall in the garden and hit her right  side of her chest wall and had some chest wall pain. She did not have  any loss of consciousness at that time except the pain at the site of  the fall on her ribs and did not have any chest pain. Three days prior  to admission, the patient started to have sudden onset of chills, but  denied having a fever, chills and rigors, but no fever, and that has  been going on for three days and on the third day, the patient decided  to come to the emergency room and was found to have urinary tract  infection and she came to the emergency room yesterday on 2022 and  was found to have acute urinary tract infection, started on antibiotics  and troponin, was noted to be increased, hence Cardiology evaluation was  sought. The patient denied having any chest pain. She has some  shortness of breath on exertion, but no chest pain, no orthopnea or  paroxysmal nocturnal dyspnea. No nausea or vomiting. Overnight, she  has been on antibiotics. Today, she feels much better. She denied  having dysuria and frequency, but she stated that she has some urgency  that is all.     REVIEW OF SYSTEMS: has been  noted, QT prolongation, nonspecific ST-T abnormality in lateral lead  appears to be noted. She had an echocardiogram on previous admission in  2018, ejection fraction 45%. Cardiac catheterization done in 12/2018,  patent stent with nonobstructive lesions. Urinalysis revealed small leukocyte esterase, white blood cells 10 to  15, this was cloudy and leukocyte clumps not seen. Blood chemistry:  Sodium on admission 127, now 131; potassium 3.5; BUN  20; creatinine 0.9. GFR of 60. Cardiac enzymes:  Troponin 0.24, 0.18,  0.13, 0.11, 0.13, 0.113; so trending down. Hematology:  White blood  cell 9, hemoglobin 8.7 and platelets 916. Today, hemoglobin is 8 and  platelets 015. So certainly there is severe anemia. MCV, MCH, MCHC all  are low, so microcytic hypochromic anemia has been noted. ASSESSMENT:  1. Acute urinary tract infection of a significant degree, one should  rule out pyelonephritis. Otherwise, the patient has no dysuria or  frequency, except urgency. So I suggest possible upper urinary tract  infection should be considered and possibly urosepsis should be  considered. I recommend blood culture if not done. 2.  Hyponatremia. Sodium 127 on arrival, now 131, improving. 3.  Metabolic acidosis with bicarb has been at 14, 16, 13 and there is a  metabolic acidosis with bicarb that is still on the lower side. 4.  Anemia of a severe degree, hemoglobin 8, microcytic hypochromic. Need further evaluation on that end. I will leave at the discretion of  hospitalist.  5.  Elevated troponin, maximum of 0.24 and trending down, could be  underlying infectious process. Elevated troponin could be type 1 versus  type 2, difficult to tell and trending down. No acute EKG abnormality  at this time. We will do an echocardiogram to assess LV function and  certainly needs ischemia workup to be determined based on course. 6.  Hypertension. 7.  Hyperlipidemia. 8.  Hypothyroidism.   9.  History of depression. 10.  Cardiomyopathy, ejection fraction 40%. 11.  Diabetes type 2. PLAN AND RECOMMENDATIONS:  1. At this level, we will continue with aggressive antibiotic therapy  for urinary tract infection, possible pyelonephritis. 2.  Trend the troponin. We will repeat an EKG. We will get an  echocardiogram to assess LV function and continue heparin, treat as  NSTEMI. Though it looks like more of type 2, but still one cannot rule  out type 1 myocardial infarction with certainty. So we will continue  with heparin drip, at least for total of 48 hours and based on the  cardiographic finding and the trend of the troponin and EKG findings, we  will gauge further care. The patient does not have any anginal type of  any pain and so based on the course, we will gauge further care. Certainly, the patient needs ischemia workup, cardiac catheterization  versus noninvasive ischemia workup, to be determined based on the  echocardiographic finding and the course of the patient. Thank you for allowing me to participate in the care of this patient. I  will follow up with you. Silva Cabrera.  Russel Arreola M.D.    D: 08/31/2022 08:16:77       T: 08/31/2022 19:30:51     [email or web address]  Job#: [id number]     Doc#: [id number]

## 2022-09-01 NOTE — DISCHARGE INSTRUCTIONS
Lexiscan stress test 1-2 weeks  F/up dr Eddie Lynn 4 weeks  Return to ER for chest pain, shortness of breath, syncope, or any change in medical status or emergent health concerns     Follow up with you primary care doctor in 7-10 days,  Continue to take your medications as recommended

## 2022-09-01 NOTE — PLAN OF CARE
Problem: Discharge Planning  Goal: Discharge to home or other facility with appropriate resources  Outcome: Progressing  Flowsheets (Taken 9/1/2022 0826)  Discharge to home or other facility with appropriate resources: Identify barriers to discharge with patient and caregiver     Problem: Safety - Adult  Goal: Free from fall injury  Outcome: Progressing     Problem: ABCDS Injury Assessment  Goal: Absence of physical injury  Outcome: Progressing     Problem: Skin/Tissue Integrity - Adult  Goal: Skin integrity remains intact  Outcome: Progressing  Flowsheets (Taken 9/1/2022 0826)  Skin Integrity Remains Intact: Monitor for areas of redness and/or skin breakdown     Problem: Metabolic/Fluid and Electrolytes - Adult  Goal: Electrolytes maintained within normal limits  Outcome: Progressing  Flowsheets (Taken 9/1/2022 0826)  Electrolytes maintained within normal limits: Monitor labs and assess patient for signs and symptoms of electrolyte imbalances     Problem: Chronic Conditions and Co-morbidities  Goal: Patient's chronic conditions and co-morbidity symptoms are monitored and maintained or improved  Outcome: Progressing  Flowsheets (Taken 9/1/2022 0826)  Care Plan - Patient's Chronic Conditions and Co-Morbidity Symptoms are Monitored and Maintained or Improved: Monitor and assess patient's chronic conditions and comorbid symptoms for stability, deterioration, or improvement     Problem: Pain  Goal: Verbalizes/displays adequate comfort level or baseline comfort level  Outcome: Progressing  Flowsheets (Taken 9/1/2022 0826)  Verbalizes/displays adequate comfort level or baseline comfort level: Encourage patient to monitor pain and request assistance   Care plan reviewed with patient and family. Patient and family verbalize understanding of the plan of care and contribute to goal setting.

## 2022-09-01 NOTE — PROGRESS NOTES
Cardiology Progress Note      Patient:  Chase Garcia  YOB: 1950  MRN: [id number]   Acct: [de-identified]  Admit Date:  8/30/2022  Primary Cardiologist: Noreen Freeman MD    Note per dr Jazmin Phillips FOR CONSULTATION:  Elevated troponin in a 67-year-old female  patient presented with fever, chills and acute urinary tract infection. PRIMARY CARDIOLOGIST:  Noreen Freeman MD     HISTORY OF PRESENT ILLNESS:  This is a 67-year-old  female  patient who has been in usual state of health one week back and one week  back, the patient had a mechanical fall in the garden and hit her right  side of her chest wall and had some chest wall pain. She did not have  any loss of consciousness at that time except the pain at the site of  the fall on her ribs and did not have any chest pain. Three days prior  to admission, the patient started to have sudden onset of chills, but  denied having a fever, chills and rigors, but no fever, and that has  been going on for three days and on the third day, the patient decided  to come to the emergency room and was found to have urinary tract  infection and she came to the emergency room yesterday on 08/30/2022 and  was found to have acute urinary tract infection, started on antibiotics  and troponin, was noted to be increased, hence Cardiology evaluation was  sought. The patient denied having any chest pain. She has some  shortness of breath on exertion, but no chest pain, no orthopnea or  paroxysmal nocturnal dyspnea. No nausea or vomiting. Overnight, she  has been on antibiotics. Today, she feels much better. She denied  having dysuria and frequency, but she stated that she has some urgency  that is all. \"    Subjective (Events in last 24 hours): pt awake and alert. NAD. No cp or sob. No recent issues of cp/sob. Pt had episode of chills last night. Overall feels tired and weak.   Has had some dyspnea in past, but none currently  On RA  On heparin PRN  polyethylene glycol, 17 g, Daily PRN  acetaminophen, 650 mg, Q6H PRN   Or  acetaminophen, 650 mg, Q6H PRN  glucose, 4 tablet, PRN  dextrose bolus, 125 mL, PRN   Or  dextrose bolus, 250 mL, PRN  glucagon (rDNA), 1 mg, PRN  dextrose, , Continuous PRN        Diagnostics:  TTE 8/31/22  Summary   Left Ventricular size is Mildly increased . Normal left ventricular wall thickness but distal septal and apical   thnning noted. Akinetic motion of the apex and distal septal wall noted in the left   ventricle. Severly hypokinetic motion of the anteroseptal wall noted in the left   ventricle. preserved wall motion on the rest of LV. Systolic function was moderately reduced. Ejection fraction is visually estimated at 40%. The left atrium is Mild to moderate dilated. Mildly enlarged right atrium size. Mild to Moderately enlarged right ventricle cavity. Right ventricle global systolic function is normal .   There is mild aortic stenosis with valve area of 1.8 sq cm. The maximum aortic valve gradient is 18 mmHg, the mean gradient is 9 mmHg,   and the peak velocity is 2.1 m/s. Signature      ----------------------------------------------------------------   Electronically signed by Murray Mishra MD (Interpreting   physician) on 08/31/2022 at 07:46 PM      Lab Data:    Cardiac Enzymes:  No results for input(s): CKTOTAL, CKMB, CKMBINDEX, TROPONINI in the last 72 hours.     CBC:   Lab Results   Component Value Date/Time    WBC 5.5 09/01/2022 05:57 AM    RBC 3.51 09/01/2022 05:57 AM    HGB 7.4 09/01/2022 05:57 AM    HCT 25.3 09/01/2022 05:57 AM     09/01/2022 05:57 AM       CMP:    Lab Results   Component Value Date/Time     09/01/2022 05:57 AM    K 3.3 09/01/2022 05:57 AM    K 4.0 08/31/2022 06:02 AM    CL 99 09/01/2022 05:57 AM    CO2 13 09/01/2022 05:57 AM    BUN 21 09/01/2022 05:57 AM    CREATININE 0.7 09/01/2022 05:57 AM    LABGLOM 82 09/01/2022 05:57 AM    GLUCOSE 176 09/01/2022 05:57 AM GLUCOSE 132 10/15/2011 08:25 AM    CALCIUM 7.9 09/01/2022 05:57 AM       Hepatic Function Panel:    Lab Results   Component Value Date/Time    ALKPHOS 69 08/31/2022 06:02 AM    ALT 26 08/31/2022 06:02 AM    AST 41 08/31/2022 06:02 AM    PROT 6.2 08/31/2022 06:02 AM    BILITOT 0.5 08/31/2022 06:02 AM    BILIDIR <0.2 07/22/2021 01:46 PM    LABALBU 3.1 08/31/2022 06:02 AM    LABALBU 4.3 03/20/2012 09:51 AM       Magnesium:    Lab Results   Component Value Date/Time    MG 1.8 03/02/2018 07:38 AM       PT/INR:    Lab Results   Component Value Date/Time    INR 1.31 08/30/2022 06:18 PM       HgBA1c:    Lab Results   Component Value Date/Time    LABA1C 7.1 02/16/2022 12:47 PM       FLP:    Lab Results   Component Value Date/Time    TRIG 127 11/01/2021 10:43 AM    HDL 41 11/01/2021 10:43 AM    LDLCALC 56 11/01/2021 10:43 AM    LDLDIRECT 79.69 05/14/2015 08:19 AM       TSH:    Lab Results   Component Value Date/Time    TSH 1.790 08/30/2022 04:10 PM         Assessment:    UTI - ATB per attending  Recent mechanical fall  Metabolic acidosis  Anemia - attending following   Hx MARCO A per patient  Elevated troponins - likely demand - denies any cp/sob  CMP - ef 40 per TTE 8/31/22, ef 45 per TTE 11/2018 - reviewed echos with dr Soledad Light, and they are unchanged essentially  Hx CAD and PCIs  HTN, lower bp today  HLD  DM      Plan:    Reviewed with dr Soledad Light current and old echo - essentially unchanged  Recommend stress test, can be done as outpt to allow pt to recover from UTI and to allow further evaluation of anemia  Schedule stress test as outpt- both pt and  agree with plan  They understand possibility of worsening CAD that can progress to MI/death, and accept that risk   Ok to stop heparin gtt from cardiology standpoint due to hemoglobin 7.4 and will update attending  Outpt stress test  F/up dr Jeremiah Umaña 4 weeks       Electronically signed by Rock Benitez PA-C on 9/1/2022 at 2:11 PM

## 2022-09-01 NOTE — PROGRESS NOTES
Pt developed chills. She states this has been going on since Monday. Temp was 97.5 but pt refused to allow nurse to take any other vitals. Nurse did turn off fan in pt's room and provided warm blankets for pt. . Pt was coughing and spitting up but refused any other interventions from nurse. Will continue to monitor.

## 2022-09-02 ENCOUNTER — APPOINTMENT (OUTPATIENT)
Dept: ULTRASOUND IMAGING | Age: 72
DRG: 689 | End: 2022-09-02
Payer: MEDICARE

## 2022-09-02 LAB
ANION GAP SERPL CALCULATED.3IONS-SCNC: 14 MEQ/L (ref 8–16)
BUN BLDV-MCNC: 13 MG/DL (ref 7–22)
CALCIUM SERPL-MCNC: 8.1 MG/DL (ref 8.5–10.5)
CHLORIDE BLD-SCNC: 103 MEQ/L (ref 98–111)
CO2: 15 MEQ/L (ref 23–33)
CREAT SERPL-MCNC: 0.6 MG/DL (ref 0.4–1.2)
ERYTHROCYTE [DISTWIDTH] IN BLOOD BY AUTOMATED COUNT: 19.4 % (ref 11.5–14.5)
ERYTHROCYTE [DISTWIDTH] IN BLOOD BY AUTOMATED COUNT: 51 FL (ref 35–45)
GFR SERPL CREATININE-BSD FRML MDRD: > 90 ML/MIN/1.73M2
GLUCOSE BLD-MCNC: 179 MG/DL (ref 70–108)
GLUCOSE BLD-MCNC: 190 MG/DL (ref 70–108)
GLUCOSE BLD-MCNC: 210 MG/DL (ref 70–108)
GLUCOSE BLD-MCNC: 274 MG/DL (ref 70–108)
GLUCOSE BLD-MCNC: 290 MG/DL (ref 70–108)
HCT VFR BLD CALC: 26.2 % (ref 37–47)
HEMOCCULT STL QL: NEGATIVE
HEMOGLOBIN: 7.5 GM/DL (ref 12–16)
MCH RBC QN AUTO: 21.1 PG (ref 26–33)
MCHC RBC AUTO-ENTMCNC: 28.6 GM/DL (ref 32.2–35.5)
MCV RBC AUTO: 73.6 FL (ref 81–99)
PLATELET # BLD: 111 THOU/MM3 (ref 130–400)
PMV BLD AUTO: 9.8 FL (ref 9.4–12.4)
POTASSIUM SERPL-SCNC: 3.6 MEQ/L (ref 3.5–5.2)
RBC # BLD: 3.56 MILL/MM3 (ref 4.2–5.4)
SODIUM BLD-SCNC: 132 MEQ/L (ref 135–145)
WBC # BLD: 7.5 THOU/MM3 (ref 4.8–10.8)

## 2022-09-02 PROCEDURE — 6360000002 HC RX W HCPCS

## 2022-09-02 PROCEDURE — 99232 SBSQ HOSP IP/OBS MODERATE 35: CPT | Performed by: INTERNAL MEDICINE

## 2022-09-02 PROCEDURE — 80048 BASIC METABOLIC PNL TOTAL CA: CPT

## 2022-09-02 PROCEDURE — 2580000003 HC RX 258

## 2022-09-02 PROCEDURE — 6370000000 HC RX 637 (ALT 250 FOR IP): Performed by: INTERNAL MEDICINE

## 2022-09-02 PROCEDURE — 6370000000 HC RX 637 (ALT 250 FOR IP)

## 2022-09-02 PROCEDURE — 82948 REAGENT STRIP/BLOOD GLUCOSE: CPT

## 2022-09-02 PROCEDURE — 6370000000 HC RX 637 (ALT 250 FOR IP): Performed by: PHYSICIAN ASSISTANT

## 2022-09-02 PROCEDURE — 85027 COMPLETE CBC AUTOMATED: CPT

## 2022-09-02 PROCEDURE — 2580000003 HC RX 258: Performed by: INTERNAL MEDICINE

## 2022-09-02 PROCEDURE — 2060000000 HC ICU INTERMEDIATE R&B

## 2022-09-02 PROCEDURE — 76705 ECHO EXAM OF ABDOMEN: CPT

## 2022-09-02 PROCEDURE — 36415 COLL VENOUS BLD VENIPUNCTURE: CPT

## 2022-09-02 RX ORDER — ACETAMINOPHEN 500 MG
500 TABLET ORAL EVERY 6 HOURS PRN
Status: DISCONTINUED | OUTPATIENT
Start: 2022-09-02 | End: 2022-09-03 | Stop reason: HOSPADM

## 2022-09-02 RX ADMIN — LISINOPRIL 20 MG: 20 TABLET ORAL at 08:53

## 2022-09-02 RX ADMIN — ASPIRIN 81 MG 81 MG: 81 TABLET ORAL at 08:53

## 2022-09-02 RX ADMIN — INSULIN LISPRO 2 UNITS: 100 INJECTION, SOLUTION INTRAVENOUS; SUBCUTANEOUS at 08:52

## 2022-09-02 RX ADMIN — CEFTRIAXONE SODIUM 1000 MG: 1 INJECTION, POWDER, FOR SOLUTION INTRAMUSCULAR; INTRAVENOUS at 20:04

## 2022-09-02 RX ADMIN — ATORVASTATIN CALCIUM 40 MG: 40 TABLET, FILM COATED ORAL at 20:06

## 2022-09-02 RX ADMIN — PANTOPRAZOLE SODIUM 40 MG: 40 TABLET, DELAYED RELEASE ORAL at 17:20

## 2022-09-02 RX ADMIN — SODIUM CHLORIDE, PRESERVATIVE FREE 10 ML: 5 INJECTION INTRAVENOUS at 20:07

## 2022-09-02 RX ADMIN — INSULIN LISPRO 2 UNITS: 100 INJECTION, SOLUTION INTRAVENOUS; SUBCUTANEOUS at 12:13

## 2022-09-02 RX ADMIN — SODIUM CHLORIDE: 9 INJECTION, SOLUTION INTRAVENOUS at 05:57

## 2022-09-02 RX ADMIN — ACETAMINOPHEN 650 MG: 325 TABLET ORAL at 08:59

## 2022-09-02 RX ADMIN — ONDANSETRON 4 MG: 2 INJECTION INTRAMUSCULAR; INTRAVENOUS at 17:13

## 2022-09-02 RX ADMIN — PANTOPRAZOLE SODIUM 40 MG: 40 TABLET, DELAYED RELEASE ORAL at 05:59

## 2022-09-02 RX ADMIN — ATENOLOL 25 MG: 25 TABLET ORAL at 20:06

## 2022-09-02 RX ADMIN — ONDANSETRON 4 MG: 2 INJECTION INTRAMUSCULAR; INTRAVENOUS at 03:03

## 2022-09-02 RX ADMIN — LEVOTHYROXINE SODIUM 50 MCG: 0.05 TABLET ORAL at 20:06

## 2022-09-02 RX ADMIN — SODIUM CHLORIDE, PRESERVATIVE FREE 10 ML: 5 INJECTION INTRAVENOUS at 08:53

## 2022-09-02 ASSESSMENT — PAIN SCALES - WONG BAKER

## 2022-09-02 ASSESSMENT — PAIN SCALES - GENERAL
PAINLEVEL_OUTOF10: 0
PAINLEVEL_OUTOF10: 0

## 2022-09-02 NOTE — PROGRESS NOTES
Internal Medicine Resident Progress Note    Patient:  Corey Millan    YOB: 1950  Unit/Bed:4K-06/006-A  MRN: [id number]    Acct: [de-identified]   PCP: FABIENNE Rodriguez CNP    Date of Admission: 8/30/2022    Assessment/Plan:    NSTEMI, suspected  Previous history of MI, stenting x2, 1999, 2001. Last cath in 2018 showed stents with nonobstructive disease unchanged from 2015 cath. EKG shows septal inverted T waves. Initial troponin 0.246, downtrending.    -Continuous telemetry  -Cardiology was following  -Echo showed EF of 40%. -Heparin drip was discontinued 9/2/2022 as recommended by cardiology.  -Patient will follow with cardiology in the outpatient setting for ischemic work-up. -Cardiology has signed off     Right rib pain  Patient reports fall 8/22. -CXR 8/31 shows no acute fracture  -X-ray ribs, right showed no definite acute rib fracture seen.  -Lidocaine patch    Hypovolemic hyponatremia  Sodium 127 on admission. Patient was given 500 mL NS bolus. Currently 132 on last BMP. Serum osmolality 266.5. Urine sodium 20. Urine osmolality 423.  -Continuing NS at 50 mL/hr    Urinary tract infection  Afebrile. UA positive for leukocyte esterase, trace ketones, blood, bacteria. Initial lactic acid 4.9 on admission. Repeat lactic acid WNL. Patient not hypotensive, tachycardic.   -Urine culture came back positive for Klebsiella  -Continue Rocephin  -Blood cultures pending  -Continue normal saline 50 mL/hr    Iron deficiency anemia  Chronic. Hgb 8.7 on admission. Most recent Hgb 7.5. No signs of bleeding.  -Monitor CBC, closely following.  -Transfuse if Hgb < 7 or symptomatic  -Heparin's been stopped per recommendation of cardiology  -GI consult been placed for evaluation of anemia, thrombocytopenia with prior EGD and ultrashort sound suggesting cirrhosis. Patient follows with Joshua Adler.  -Patient will follow-up with GI, outpatient upon discharge.   -GI placed order for liver ultrasound for continued evaluation  -Stool, blood screen was negative    HFrEF  -Echo in 2018 EF 45%. -Echo, 8/31/2022: Showed EF estimated at 40%. -Patient will follow with cardiology outpatient upon discharge. Hyperlipidemia  -Stable. -Continue statin    Hypothyroidism  -TSH WNL. -Continue Synthroid    Hypertension, controlled  -Stable. -Continue lisinopril  -Continue atenolol    Type II diabetes mellitus:  -Continue low-dose SSI,   -Hypoglycemia protocol in place  -POCT glucose checks    Depression:  Lexapro held due to possibility of SIADH       Expected discharge date: Tomorrow, 9/3/2022    Disposition:  [x] Home, with   [] TCU  [] Rehab  [] Psych  [] SNF  [] [facility]  [] Other-    ===================================================================      Chief Complaint: Chills and fatigue    Hospital Course:   Chito Shaffer is a 71 y.o. female with PMHx of coronary artery disease, CHF, previous MIs with stenting, hyperlipidemia, hypertension, hypothyroidism, diabetes mellitus type 2 who presents to [address] [place] [address] for evaluation of fatigue and chills that been going on for the last week. Patient reports she was gardening and fell 1 week ago. She reports this was a mechanical fall of just tripping in her garden. Patient states she did not hit her head but patient reports she possibly could have lost consciousness. Patient does complain of painful ribs on the right side. She states when she fell in the garden she hit her ribs. This fall occurred on Monday the 22nd 2022. Patient states she then went to urgent care the following day to get a chest x-ray, x-ray showed no broken ribs chest bruising per patient. On 8/23/2022 patient states that she started to develop chills. She would have periodic episodes of chills but denies any fevers. During these episodes of chills patient reports she was having difficulty breathing and will have to take short shallow breaths.   Patient Reviewed    Infusion Medications    sodium chloride 50 mL/hr at 09/02/22 0557    sodium chloride      dextrose       Scheduled Medications    aspirin  81 mg Oral Daily    lisinopril  20 mg Oral Daily    atenolol  25 mg Oral Daily    levothyroxine  50 mcg Oral Nightly    lidocaine  3 patch TransDERmal Daily    sodium chloride flush  5-40 mL IntraVENous 2 times per day    atorvastatin  40 mg Oral Daily    [Held by provider] escitalopram  20 mg Oral Daily    pantoprazole  40 mg Oral BID AC    insulin lispro  0-4 Units SubCUTAneous TID WC    insulin lispro  0-4 Units SubCUTAneous Nightly    cefTRIAXone (ROCEPHIN) IV  1,000 mg IntraVENous Q24H     PRN Meds: potassium chloride **OR** potassium alternative oral replacement **OR** potassium chloride, sodium chloride flush, sodium chloride, ondansetron **OR** ondansetron, polyethylene glycol, acetaminophen **OR** acetaminophen, glucose, dextrose bolus **OR** dextrose bolus, glucagon (rDNA), dextrose        Intake/Output Summary (Last 24 hours) at 9/2/2022 0816  Last data filed at 9/2/2022 0335  Gross per 24 hour   Intake 800 ml   Output 300 ml   Net 500 ml       Exam:  BP (!) 120/57   Pulse 78   Temp 98.4 °F (36.9 °C) (Oral)   Resp 18   Ht 5' (1.524 m)   Wt 178 lb 1.6 oz (80.8 kg)   SpO2 98%   BMI 34.78 kg/m²     General: No distress, appears stated age. Resting comfortably in room. Patient is in no respiratory distress. No accessory muscle use. Patient is not tachypneic  Eyes:  PERRL. Conjunctivae/corneas clear. HENT: Head normal appearing. Nares normal. Oral mucosa moist. Hearing intact. Neck: Supple, with full range of motion. Trachea midline. No gross JVD appreciated. Respiratory: Normal effort. Clear to auscultation, without rales or wheezes or rhonchi. Cardiovascular: Normal rate, regular rhythm with normal S1/S2 without murmurs. No lower extremity edema. Abdomen: Soft, non-tender, non-distended with normal bowel sounds.   Musculoskeletal: No joint swelling or tenderness. Normal tone. No abnormal movements. Skin: Warm and dry. No rashes or lesions. Neurologic:  No focal sensory/motor deficits in the upper or lower extremities. Cranial nerves:  grossly non-focal 2-12. Psychiatric: Alert and oriented, normal insight and thought content. Capillary Refill: Brisk,< 3 seconds. Peripheral Pulses: +2 palpable, equal bilaterally. Labs:   Recent Labs     08/31/22  0233 08/31/22  0602 09/01/22  0557 09/01/22  1734 09/02/22  0356   WBC 7.3  --  5.5  --  7.5   HGB 7.3*   < > 7.4* 7.6* 7.5*   HCT 24.6*   < > 25.3* 25.5* 26.2*   *  --  105*  --  111*    < > = values in this interval not displayed. Recent Labs     08/31/22  1348 09/01/22  0557 09/02/22  0356   * 129* 132*   K 3.8 3.3* 3.6   CL 98 99 103   CO2 13* 13* 15*   BUN 29* 21 13   CREATININE 0.9 0.7 0.6   CALCIUM 8.2* 7.9* 8.1*     Recent Labs     08/30/22  1610 08/30/22  2155 08/31/22  0602   AST 41* 44* 41*   ALT 26 29 26   BILITOT 0.6 0.5 0.5   ALKPHOS 62 65 69     Recent Labs     08/30/22  1818   INR 1.31*     No results for input(s): CKTOTAL, TROPONINI in the last 72 hours. Recent Labs     08/30/22  1610   PROCAL 9.40*      Lab Results   Component Value Date/Time    NITRU NEGATIVE 08/30/2022 05:10 PM    WBCUA 10-15 08/30/2022 05:10 PM    WBCUA 0-2 11/29/2011 09:10 AM    BACTERIA MANY 08/30/2022 05:10 PM    RBCUA 5-10 08/30/2022 05:10 PM    BLOODU LARGE 08/30/2022 05:10 PM    SPECGRAV 1.020 11/11/2011 02:11 PM    GLUCOSEU NEGATIVE 08/30/2022 05:10 PM       Radiology (48 hours):  XR RIBS RIGHT (2 VIEWS)    Result Date: 9/1/2022  1. No definite acute rib fracture seen. 2. Suggestion of mild atelectasis/pneumonia right lung base. **This report has been created using voice recognition software. It may contain minor errors which are inherent in voice recognition technology. ** Final report electronically signed by Dr. Carlos Conner on 9/1/2022 4:11 PM    US RENAL COMPLETE    Result Date: 8/31/2022  Impression: No significant abnormalities. This document has been electronically signed by: Jake Cramer MD on 08/31/2022 08:27 PM       DVT prophylaxis:    [] Lovenox  [x] SCDs  [] SQ Heparin  [] Encourage ambulation   [] Already on Anticoagulation       Diet: ADULT DIET;  Regular  Code Status: Full Code  PT/OT: Ordered, patient will be evaluated  Tele: Yes  IVF: NS 50 ml/hr    Electronically signed by Sreedhar Dorado DO, PGY-1 on 9/2/2022 at 8:16 AM    Case was discussed with Attending, Dr. Dasha Uriostegui MD

## 2022-09-02 NOTE — FLOWSHEET NOTE
09/02/22 1420   Safe Environment   Safety Measures   (virtual safety round complete)     Pt lying in bed resting quietly, call light in reach, family at bedside.

## 2022-09-02 NOTE — CARE COORDINATION
Collaborative Discharge Planning    Stephanie Montero  :  1950  MRN:  [id number]    ADMIT DATE:  2022      Discharge Planning Discharge Planning  Type of Residence: House  Living Arrangements: Spouse/Significant Other, Children, Family Members  Support Systems: Spouse/Significant Other, Children  Current Services Prior To Admission: Durable Medical Equipment  Potential Assistance Needed: N/A  DME: Glucometer  DME Ordered?: No  Potential Assistance Purchasing Medications: No  Type of Home Care Services: None  Patient expects to be discharged to[de-identified] [facility]  Follow Up Appointment: Best Day/Time :  (Prefers to avoid follow up appointments if possible)  One/Two Story Residence: One story  History of falls?: Yes  White Board Notes /Social Work Whiteboard Notes  /Social Work Whiteboard:  CM; plans home w spouse [id number] with family  plans home w spouse Rabia De La Cruz independently as PTA when medically cleared  Discharge Milestones and Delays: Clinical status  Elevated Troponin/Klebsiella UTI Sepsis/Thrombocytopenia  History: CHF, MI, WALTER Cirrhosis  IV AB, IVF  H 7.5, Na+ 132,   Cardiology plans OP Stress Test  SIGNED:  Ute Bond RN   2022, 11:55 AM    22, 11:58 AM EDT    Patient goals/plan/ treatment preferences discussed by  and . Patient goals/plan/ treatment preferences reviewed with patient/ family. Patient/ family verbalize understanding of discharge plan and are in agreement with goal/plan/treatment preferences. Understanding was demonstrated using the teach back method. AVS provided by RN at time of discharge, which includes all necessary medical information pertaining to the patients current course of illness, treatment, post-discharge goals of care, and treatment preferences.      Services At/After Discharge: None       IMM Letter  IMM Letter given to Patient/Family/Significant other/Guardian/POA/by[de-identified] Joya Melo RN CM  IMM Letter date given[de-identified] 09/02/22  IMM Letter time given[de-identified] 3720

## 2022-09-02 NOTE — CONSULTS
Consult History & Physical      Patient:  Anson Tinoco  YOB: 1950  MRN: [id number]     Acct: [de-identified]    Chief Complaint:    Chief Complaint   Patient presents with    Fatigue    Chills       Date of Service: Pt seen/examined in consultation on 9/2/2022    History Of Present Illness:      71 y.o. female who we are asked to see/evaluate by Marissa Engel MD for medical management of anemia, thrombocytopenia. She has apast medical history of CAD, CHF, previous MIs with PCI, hyperlipidemia, hypertension, hypothyroidism, type 2 diabetes mellitus and WALTER cirrhosis. She presented after a fall a week ago with complaints of chills, SOB, fatigue    Patient is currently treated for suspected NSTEMI. She had an initial troponin of 0.246, currently downtrending. Cardiology is following. Cardiology recommended follow-up for ischemic work-up in the outpatient setting. She is also being treated for right rib pain with a lidocaine patch. She was also noted to have a UTI currently treated with Rocephin. Patient denies changes in abdominal size, constipation, vomiting, blood in the stool. Patient admitted to nausea, diarrhea. Past Medical History:    Past Medical History:   Diagnosis Date    CAD (coronary artery disease)     CHF (congestive heart failure) ([facility] [address]     Depression 2000    GERD (gastroesophageal reflux disease)     Headache(784.0)     Heart attack ([facility] [address]     Hyperlipidemia     Hypertension     Hypothyroidism 2/11/2015    Liver disease     MI (myocardial infarction) [facility] 11/99, 10/01    x 2     Obesity     Osteoarthritis     B/L knees--Dr. Jesus Carranza. PONV (postoperative nausea and vomiting)     Type 2 diabetes mellitus without complication (HCC)     Type II or unspecified type diabetes mellitus without mention of complication, not stated as uncontrolled 2014       Home Medications:  Prior to Admission medications    Medication Sig Start Date End Date Taking?  Authorizing Provider   acetaminophen (TYLENOL) 500 MG tablet Take 1,000 mg by mouth every 6 hours as needed for Pain   Yes Historical Provider, MD   ibuprofen (ADVIL;MOTRIN) 200 MG tablet Take 400 mg by mouth every 6 hours as needed for Fever   Yes Historical Provider, MD   metFORMIN (GLUCOPHAGE-XR) 500 MG extended release tablet TAKE 2 TABLETS TWICE DAILY 4/27/22   Tyrone Reyes MD   pantoprazole (PROTONIX) 40 MG tablet TAKE 1 TABLET TWICE DAILY  Patient taking differently: Take 40 mg by mouth daily TAKE 1 TABLET TWICE DAILY 4/27/22   Tyrone Reyes MD   escitalopram (LEXAPRO) 20 MG tablet TAKE 1 TABLET EVERY DAY 4/27/22   Tyrone Reyes MD   hydrocortisone 2.5 % cream Apply topically 2 times daily. Patient not taking: Reported on 8/30/2022 3/28/22   FABIENNE Ovalle CNP   lisinopril (PRINIVIL;ZESTRIL) 20 MG tablet TAKE 1 TABLET TWICE DAILY 1/27/22   Tyrone Reyes MD   levothyroxine (SYNTHROID) 50 MCG tablet TAKE 1 TABLET EVERY DAY 1/27/22   Tyrone Reyes MD   atorvastatin (LIPITOR) 40 MG tablet TAKE 1 TABLET EVERY DAY 1/27/22   Tyrone Reyes MD   atenolol (TENORMIN) 50 MG tablet TAKE 1/2 TABLET EVERY DAY  Patient taking differently: 25 mg TAKE 1/2 TABLET EVERY DAY 1/27/22   Tyrone Reyes MD   glimepiride (AMARYL) 2 MG tablet TAKE 2 TABLETS (4 MG) IN THE MORNING AND 1 TABLET (2 MG) IN THE EVENING 11/1/21   FABIENNE Ovalle CNP   albuterol sulfate HFA (VENTOLIN HFA) 108 (90 Base) MCG/ACT inhaler Inhale 2 puffs into the lungs 4 times daily as needed for Wheezing  Patient not taking: Reported on 8/30/2022 6/3/21   FABIENNE Ovalle CNP   blood glucose test strips (ACCU-CHEK ISABELA PLUS) strip USE 1 STRIP TO CHECK GLUCOSE ONCE DAILY 3/23/21   Tyrone Reyes MD   Blood Glucose Monitoring Suppl (ACCU-CHEK ISABELA PLUS) w/Device KIT USE 1 TO CHECK GLUCOSE ONCE DAILY 1/31/20   Historical Provider, MD   blood glucose monitor kit and supplies Glucose monitor and supplies, brand per pt preference. Test sugar daily. Dx: E11.9 1/31/20   Phyllis Barger MD   Lancets MISC Test sugar daily. Brand per pt preference. Dx: E11.9 1/31/20   Phyllis Barger MD   buPROPion [facility] SR) 150 MG extended release tablet Take 150 mg by mouth 2 times daily  Patient not taking: Reported on 8/30/2022    Historical Provider, MD   aspirin 81 MG tablet Take 81 mg by mouth nightly    Historical Provider, MD   FISH OIL Take by mouth nightly Arthur Red    Historical Provider, MD   Docusate Calcium (STOOL SOFTENER PO) Take by mouth nightly as needed     Historical Provider, MD       Surgical History:  Past Surgical History:   Procedure Laterality Date    [employer]  07/29/2009    Diagnostic Cath EF 40-45% (Dr Luis Sprague)    [address] ARTHROSCOPY Right     PA COLON CA SCRN NOT  [address] IND Left 3/1/2018    COLONOSCOPY performed by Sy uRiz MD at [facility]    PTCA      Shunt 10/01--Stent 11/99    TONSILLECTOMY AND ADENOIDECTOMY      UPPER GASTROINTESTINAL ENDOSCOPY      UPPER GASTROINTESTINAL ENDOSCOPY Left 2/27/2018    EGD BAND LIGATION performed by Sy Ruiz MD at [facility]    UPPER GASTROINTESTINAL ENDOSCOPY N/A 4/19/2018    EGD BIOPSY performed by Daniel Diaz MD at [facility]       Family History:  Family History   Problem Relation Age of Onset    Heart Disease Father     Heart Attack Father     Other Father         LUNG Cristian Inoue Mother     High Blood Pressure Mother     Heart Disease Mother     Other Mother         GLAUCOMA    High Blood Pressure Sister     Osteoporosis Sister        Past GI History:    Patient's last visit to GI was 10/25/2021.   Active problems at her last visit were abnormal serum enzyme level, unspecified, diverticulosis without perforation or abscess, without bleeding, primary hypertension, other cirrhosis of liver-NAFLD, other diseases of stomach and duodenum, type 2 diabetes mellitus without complications and esophogeal banding. Patient's last colonoscopy was completed on 9/2021, last EGD completed in 4/2018, impression was nodular gastritis, biopsies displayed chronic gastritis, negative for H. pylori, metaplasia, dysplasia. Patient had some scarring in the distal esophagus consistent with remote esophageal variceal bleeding. Ultrasound of the liver completed on 7/20/2021 displayed mildly heterogeneous echotexture of the liver, otherwise normal.    On 1/18/2022, patient stated Henry Clifton is fine and for  to leave her alone \". Patient follows Dr. Sadiq Cabrera:  Patient has no known allergies. Social History:   TOBACCO:   reports that she quit smoking about 22 years ago. Her smoking use included cigarettes. She has quit using smokeless tobacco.  ETOH:   reports no history of alcohol use. Review Of Systems  GENERAL: No fever, chills or weight loss. EYES:  No blurred vision, double vision   CARDIOVASCULAR: No chest pain or palpitations. RESPIRATORY:  No dyspnea or cough. GI:  See HPI  MUSCULOSKELETAL: No new painful or swollen joints or myalgias. :   No dysuria or hematuria. SKIN:  No rashes or jaundice. NEUROLOGIC:  No headaches or seizures, numbness or tingling of arms, or legs. PSYCH:  No anxiety or depression. ENDOCRINE:  No polyuria or polydipsia. BLOOD: Iron deficient anemia, no bleeding disorder, no recent blood or blood product transfusions. PHYSICAL EXAM:  BP (!) 120/57   Pulse 78   Temp 98.4 °F (36.9 °C) (Oral)   Resp 18   Ht 5' (1.524 m)   Wt 178 lb 1.6 oz (80.8 kg)   SpO2 98%   BMI 34.78 kg/m²     General appearance: No apparent distress, appears stated age and cooperative. HEENT: Normal cephalic, atraumatic without obvious deformity. Pupils equal, round, and reactive to light. Neck: Supple, with full range of motion. No jugular venous distention. Trachea midline.   Respiratory:  Normal respiratory effort. Clear to auscultation, bilaterally without Rales/Wheezes/Rhonchi. Cardiovascular: Regular rate and rhythm without murmurs, rubs or gallops. Abdomen: Soft, non-tender, non-distended with active bowel sounds. Musculoskeletal: No clubbing, cyanosis or edema bilaterally. Skin: Pink, warm, dry. No rashes or lesions. Psychiatric: Alert and oriented, thought content appropriate, normal insight    Labs:   Recent Labs     09/02/22  0356   WBC 7.5   HGB 7.5*   HCT 26.2*   *     Recent Labs     09/02/22  0356   *   K 3.6      CO2 15*   BUN 13   CREATININE 0.6   CALCIUM 8.1*     Recent Labs     08/31/22  0602   AST 41*   ALT 26   BILITOT 0.5   ALKPHOS 71     Recent Labs     08/30/22  1818   INR 1.31*       Radiology:   None  Code Status: Full Code    ASSESSMENT:  Cirrhosis, WALTER, compensated  NSTEMI  Iron deficiency anemia   Hyponatremia  Thrombocytopenia  Coagulopathy  Right rib pain  UTI  Medical noncompliance   CAD s/p MI with PCI   CHF  HLP  HTN  Hypothyroidism  DMII    PLAN:    Avoid hepatotoxic medications  Daily weights, strict I/Os   Order iron studies, reticulocyte count  Continue Protonix bid, home dose  On antibiotics per primary for UTI  Could consider RUQ US for [facility] [address] surveillance can be done as outpatient if plans to discharge  Consider EGD as an outpatient for EV surveillance   Follow up in GI offices in 4-6 weeks       Case reviewed and impression/plan reviewed in collaboration with Dr. Radha Gary  Electronically signed by Jennifer Adorno DO on 9/2/2022 at 8:38 AM    [facility]  Thank you for the consultation.

## 2022-09-02 NOTE — PLAN OF CARE
Problem: Discharge Planning  Goal: Discharge to home or other facility with appropriate resources  9/1/2022 2313 by Simran Watson RN  Outcome: Progressing  Flowsheets (Taken 9/1/2022 0826 by Crow Segal RN)  Discharge to home or other facility with appropriate resources: Identify barriers to discharge with patient and caregiver     Problem: Safety - Adult  Goal: Free from fall injury  9/1/2022 2313 by Simran Watson RN  Outcome: Progressing  Flowsheets (Taken 8/31/2022 0458 by Beatrice Landau, RN)  Free From Fall Injury: Instruct family/caregiver on patient safety     Problem: ABCDS Injury Assessment  Goal: Absence of physical injury  9/1/2022 2313 by Simran Watson RN  Outcome: Progressing  Flowsheets (Taken 8/31/2022 0458 by Beatrice Landau, RN)  Absence of Physical Injury: Implement safety measures based on patient assessment     Problem: Metabolic/Fluid and Electrolytes - Adult  Goal: Electrolytes maintained within normal limits  9/1/2022 2313 by Simran Watson RN  Outcome: Progressing  Flowsheets (Taken 9/1/2022 0826 by Crow Segal RN)  Electrolytes maintained within normal limits: Monitor labs and assess patient for signs and symptoms of electrolyte imbalances     Problem: Chronic Conditions and Co-morbidities  Goal: Patient's chronic conditions and co-morbidity symptoms are monitored and maintained or improved  9/1/2022 2313 by Simran Watson RN  Outcome: Progressing  Flowsheets (Taken 9/1/2022 0826 by Crow Segal RN)  Care Plan - Patient's Chronic Conditions and Co-Morbidity Symptoms are Monitored and Maintained or Improved: Monitor and assess patient's chronic conditions and comorbid symptoms for stability, deterioration, or improvement     Problem: Pain  Goal: Verbalizes/displays adequate comfort level or baseline comfort level  9/1/2022 2313 by Simran Watson RN  Outcome: Progressing  Flowsheets (Taken 9/1/2022 0826 by Crow Segal RN)  Verbalizes/displays adequate comfort level or baseline comfort level: Encourage patient to monitor pain and request assistance     Care plan reviewed with patient. Patient verbalize understanding of the plan of care and contribute to goal setting.

## 2022-09-02 NOTE — PROGRESS NOTES
Pt is a 73y.o. female, sitting in easychair visiting with spouse and children, in 4K-06.  introduced himself and asked to visit for a bit. Isadora waved her arm as if pushing, shook her head no and said 'see ya.'  wished them a good evening and left the room. 09/02/22 1853   Encounter Summary   Encounter Overview/Reason  Attempted Encounter   Service Provided For: Patient and family together   Referral/Consult From: [address] [place] [address] Drive; Family members   Last Encounter  09/02/22   Begin Time 1850   End Time  1850   Total Time Calculated 0 min   Assessment/Intervention/Outcome   Outcome Refused/Declined 06-Apr-2022 11:00

## 2022-09-03 VITALS
WEIGHT: 178.1 LBS | HEIGHT: 60 IN | DIASTOLIC BLOOD PRESSURE: 66 MMHG | RESPIRATION RATE: 18 BRPM | TEMPERATURE: 98.3 F | SYSTOLIC BLOOD PRESSURE: 126 MMHG | BODY MASS INDEX: 34.96 KG/M2 | HEART RATE: 68 BPM | OXYGEN SATURATION: 96 %

## 2022-09-03 LAB
ABSOLUTE RETIC #: 58 THOU/MM3 (ref 20–115)
ANION GAP SERPL CALCULATED.3IONS-SCNC: 12 MEQ/L (ref 8–16)
BUN BLDV-MCNC: 8 MG/DL (ref 7–22)
CALCIUM SERPL-MCNC: 8.3 MG/DL (ref 8.5–10.5)
CHLORIDE BLD-SCNC: 106 MEQ/L (ref 98–111)
CO2: 17 MEQ/L (ref 23–33)
CREAT SERPL-MCNC: 0.6 MG/DL (ref 0.4–1.2)
ERYTHROCYTE [DISTWIDTH] IN BLOOD BY AUTOMATED COUNT: 19.5 % (ref 11.5–14.5)
ERYTHROCYTE [DISTWIDTH] IN BLOOD BY AUTOMATED COUNT: 50.1 FL (ref 35–45)
FERRITIN: 32 NG/ML (ref 10–291)
GFR SERPL CREATININE-BSD FRML MDRD: > 90 ML/MIN/1.73M2
GLUCOSE BLD-MCNC: 203 MG/DL (ref 70–108)
GLUCOSE BLD-MCNC: 229 MG/DL (ref 70–108)
GLUCOSE BLD-MCNC: 241 MG/DL (ref 70–108)
HCT VFR BLD CALC: 25.5 % (ref 37–47)
HEMOGLOBIN: 7.5 GM/DL (ref 12–16)
IMMATURE RETIC FRACT: 34.6 % (ref 3–15.9)
INR BLD: 1.12 (ref 0.85–1.13)
IRON SATURATION: 6 % (ref 20–50)
IRON: 17 UG/DL (ref 50–170)
MCH RBC QN AUTO: 21.1 PG (ref 26–33)
MCHC RBC AUTO-ENTMCNC: 29.4 GM/DL (ref 32.2–35.5)
MCV RBC AUTO: 71.8 FL (ref 81–99)
PLATELET # BLD: 138 THOU/MM3 (ref 130–400)
PMV BLD AUTO: 9.9 FL (ref 9.4–12.4)
POTASSIUM SERPL-SCNC: 3.6 MEQ/L (ref 3.5–5.2)
RBC # BLD: 3.55 MILL/MM3 (ref 4.2–5.4)
RETIC HEMOGLOBIN: 16 PG (ref 28.2–35.7)
RETICULOCYTE ABSOLUTE COUNT: 1.6 % (ref 0.5–2)
SODIUM BLD-SCNC: 135 MEQ/L (ref 135–145)
TOTAL IRON BINDING CAPACITY: 287 UG/DL (ref 171–450)
WBC # BLD: 6.9 THOU/MM3 (ref 4.8–10.8)

## 2022-09-03 PROCEDURE — 99239 HOSP IP/OBS DSCHRG MGMT >30: CPT | Performed by: INTERNAL MEDICINE

## 2022-09-03 PROCEDURE — 6370000000 HC RX 637 (ALT 250 FOR IP): Performed by: PSYCHIATRY & NEUROLOGY

## 2022-09-03 PROCEDURE — 80048 BASIC METABOLIC PNL TOTAL CA: CPT

## 2022-09-03 PROCEDURE — 85046 RETICYTE/HGB CONCENTRATE: CPT

## 2022-09-03 PROCEDURE — 82728 ASSAY OF FERRITIN: CPT

## 2022-09-03 PROCEDURE — 83550 IRON BINDING TEST: CPT

## 2022-09-03 PROCEDURE — 6370000000 HC RX 637 (ALT 250 FOR IP): Performed by: INTERNAL MEDICINE

## 2022-09-03 PROCEDURE — 85610 PROTHROMBIN TIME: CPT

## 2022-09-03 PROCEDURE — 83540 ASSAY OF IRON: CPT

## 2022-09-03 PROCEDURE — 6370000000 HC RX 637 (ALT 250 FOR IP)

## 2022-09-03 PROCEDURE — 6370000000 HC RX 637 (ALT 250 FOR IP): Performed by: PHYSICIAN ASSISTANT

## 2022-09-03 PROCEDURE — 36415 COLL VENOUS BLD VENIPUNCTURE: CPT

## 2022-09-03 PROCEDURE — 82948 REAGENT STRIP/BLOOD GLUCOSE: CPT

## 2022-09-03 PROCEDURE — 85027 COMPLETE CBC AUTOMATED: CPT

## 2022-09-03 RX ORDER — AMOXICILLIN AND CLAVULANATE POTASSIUM 500; 125 MG/1; MG/1
1 TABLET, FILM COATED ORAL EVERY 12 HOURS
Qty: 10 TABLET | Refills: 0 | Status: SHIPPED | OUTPATIENT
Start: 2022-09-03 | End: 2022-09-08

## 2022-09-03 RX ADMIN — ACETAMINOPHEN 500 MG: 500 TABLET ORAL at 05:19

## 2022-09-03 RX ADMIN — ASPIRIN 81 MG 81 MG: 81 TABLET ORAL at 08:58

## 2022-09-03 RX ADMIN — LISINOPRIL 20 MG: 20 TABLET ORAL at 08:59

## 2022-09-03 RX ADMIN — PANTOPRAZOLE SODIUM 40 MG: 40 TABLET, DELAYED RELEASE ORAL at 05:19

## 2022-09-03 ASSESSMENT — PAIN SCALES - WONG BAKER
WONGBAKER_NUMERICALRESPONSE: 0

## 2022-09-03 ASSESSMENT — PAIN SCALES - GENERAL
PAINLEVEL_OUTOF10: 0
PAINLEVEL_OUTOF10: 0

## 2022-09-03 NOTE — PLAN OF CARE
Problem: Discharge Planning  Goal: Discharge to home or other facility with appropriate resources  9/3/2022 1411 by Yeni Guzmán RN  Outcome: Completed  9/3/2022 1346 by Amy Gooden RN  Outcome: Adequate for Discharge  Flowsheets (Taken 9/3/2022 8069 by Yeni Guzmán RN)  Discharge to home or other facility with appropriate resources:   Identify barriers to discharge with patient and caregiver   Arrange for needed discharge resources and transportation as appropriate   Identify discharge learning needs (meds, wound care, etc)  9/3/2022 0657 by Any Moore RN  Outcome: Progressing     Problem: Safety - Adult  Goal: Free from fall injury  9/3/2022 1411 by Yeni Guzmán RN  Outcome: Completed  9/3/2022 1346 by Amy Gooden RN  Outcome: Adequate for Discharge  9/3/2022 0657 by Any Moore RN  Outcome: Progressing     Problem: ABCDS Injury Assessment  Goal: Absence of physical injury  9/3/2022 1411 by Yeni Guzmán RN  Outcome: Completed  9/3/2022 1346 by Amy Gooden RN  Outcome: Adequate for Discharge  9/3/2022 0657 by Any Moore RN  Outcome: Progressing     Problem: Skin/Tissue Integrity - Adult  Goal: Skin integrity remains intact  9/3/2022 1411 by Yeni Guzmán RN  Outcome: Completed  9/3/2022 1346 by Amy Gooden RN  Outcome: Adequate for Discharge  Flowsheets (Taken 9/3/2022 0832 by Yeni Guzmán RN)  Skin Integrity Remains Intact:   Monitor for areas of redness and/or skin breakdown   Assess vascular access sites hourly   Every 4-6 hours: If on nasal continuous positive airway pressure, respiratory therapy assesses nares and determine need for appliance change or resting period  9/3/2022 0657 by Any Moore RN  Outcome: Progressing     Problem: Metabolic/Fluid and Electrolytes - Adult  Goal: Electrolytes maintained within normal limits  9/3/2022 1411 by Yeni Guzmán RN  Outcome: Completed  9/3/2022 1346 by Amy Gooden RN  Outcome: Adequate for Discharge  Flowsheets (Taken 9/3/2022 8645 by Anatoly Hernández RN)  Electrolytes maintained within normal limits:   Monitor labs and assess patient for signs and symptoms of electrolyte imbalances   Administer electrolyte replacement as ordered   Monitor response to electrolyte replacements, including repeat lab results as appropriate   Fluid restriction as ordered   Instruct patient on fluid and nutrition restrictions as appropriate  9/3/2022 0657 by Junie Herbert RN  Outcome: Progressing     Problem: Chronic Conditions and Co-morbidities  Goal: Patient's chronic conditions and co-morbidity symptoms are monitored and maintained or improved  9/3/2022 1411 by Anatoly Hernández RN  Outcome: Completed  9/3/2022 1346 by Violet Tiwari RN  Outcome: Adequate for Discharge  Flowsheets (Taken 9/3/2022 0832 by Anatoly Hernández RN)  Care Plan - Patient's Chronic Conditions and Co-Morbidity Symptoms are Monitored and Maintained or Improved:   Monitor and assess patient's chronic conditions and comorbid symptoms for stability, deterioration, or improvement   Collaborate with multidisciplinary team to address chronic and comorbid conditions and prevent exacerbation or deterioration   Update acute care plan with appropriate goals if chronic or comorbid symptoms are exacerbated and prevent overall improvement and discharge  9/3/2022 0657 by Junie Herbert RN  Outcome: Progressing     Problem: Pain  Goal: Verbalizes/displays adequate comfort level or baseline comfort level  9/3/2022 1411 by Anatoly Hernández RN  Outcome: Completed  9/3/2022 1346 by Violet Tiwari RN  Outcome: Adequate for Discharge  Flowsheets (Taken 9/3/2022 0832 by Anatoly Hernández RN)  Verbalizes/displays adequate comfort level or baseline comfort level:   Encourage patient to monitor pain and request assistance   Assess pain using appropriate pain scale   Notify Licensed Independent Practitioner if interventions unsuccessful or patient reports new pain   Administer analgesics based on type and severity of pain and evaluate response   Implement non-pharmacological measures as appropriate and evaluate response  9/3/2022 0657 by Mehreen Nathan RN  Outcome: Progressing    Care plan reviewed with patient and . Patient and  verbalize understanding of the plan of care and contribute to goal setting.

## 2022-09-03 NOTE — PLAN OF CARE
Problem: Discharge Planning  Goal: Discharge to home or other facility with appropriate resources  9/3/2022 1346 by Cliff Coffey RN  Outcome: Adequate for Discharge  Flowsheets (Taken 9/3/2022 5001 by Ni Perez RN)  Discharge to home or other facility with appropriate resources:   Identify barriers to discharge with patient and caregiver   Arrange for needed discharge resources and transportation as appropriate   Identify discharge learning needs (meds, wound care, etc)  9/3/2022 0657 by Amparo Gilliam RN  Outcome: Progressing     Problem: Safety - Adult  Goal: Free from fall injury  9/3/2022 1346 by Cliff Coffey RN  Outcome: Adequate for Discharge  9/3/2022 0657 by Amparo Gilliam RN  Outcome: Progressing     Problem: ABCDS Injury Assessment  Goal: Absence of physical injury  9/3/2022 1346 by Cliff Coffey RN  Outcome: Adequate for Discharge  9/3/2022 0657 by Amparo Gilliam RN  Outcome: Progressing     Problem: Skin/Tissue Integrity - Adult  Goal: Skin integrity remains intact  9/3/2022 1346 by Cliff Coffey RN  Outcome: Adequate for Discharge  Flowsheets (Taken 9/3/2022 0417 by Ni Perez RN)  Skin Integrity Remains Intact:   Monitor for areas of redness and/or skin breakdown   Assess vascular access sites hourly   Every 4-6 hours: If on nasal continuous positive airway pressure, respiratory therapy assesses nares and determine need for appliance change or resting period  9/3/2022 0657 by Amparo Gilliam RN  Outcome: Progressing     Problem: Metabolic/Fluid and Electrolytes - Adult  Goal: Electrolytes maintained within normal limits  9/3/2022 1346 by Cliff Coffey RN  Outcome: Adequate for Discharge  Flowsheets (Taken 9/3/2022 3070 by Ni Perez RN)  Electrolytes maintained within normal limits:   Monitor labs and assess patient for signs and symptoms of electrolyte imbalances   Administer electrolyte replacement as ordered   Monitor response to electrolyte replacements, including repeat lab results as appropriate   Fluid restriction as ordered   Instruct patient on fluid and nutrition restrictions as appropriate  9/3/2022 0657 by Sarahi Pate RN  Outcome: Progressing     Problem: Chronic Conditions and Co-morbidities  Goal: Patient's chronic conditions and co-morbidity symptoms are monitored and maintained or improved  9/3/2022 1346 by Cosimo Schwab, RN  Outcome: Adequate for Discharge  Flowsheets (Taken 9/3/2022 1238 by Luis Roa RN)  Care Plan - Patient's Chronic Conditions and Co-Morbidity Symptoms are Monitored and Maintained or Improved:   Monitor and assess patient's chronic conditions and comorbid symptoms for stability, deterioration, or improvement   Collaborate with multidisciplinary team to address chronic and comorbid conditions and prevent exacerbation or deterioration   Update acute care plan with appropriate goals if chronic or comorbid symptoms are exacerbated and prevent overall improvement and discharge  9/3/2022 0657 by Sarahi Pate RN  Outcome: Progressing     Problem: Pain  Goal: Verbalizes/displays adequate comfort level or baseline comfort level  9/3/2022 1346 by Cosimo Schwab, RN  Outcome: Adequate for Discharge  Flowsheets (Taken 9/3/2022 0776 by Luis Roa RN)  Verbalizes/displays adequate comfort level or baseline comfort level:   Encourage patient to monitor pain and request assistance   Assess pain using appropriate pain scale   Notify Licensed Independent Practitioner if interventions unsuccessful or patient reports new pain   Administer analgesics based on type and severity of pain and evaluate response   Implement non-pharmacological measures as appropriate and evaluate response  9/3/2022 0657 by Sarahi Pate RN  Outcome: Progressing

## 2022-09-03 NOTE — PROGRESS NOTES
AVS reviewed with patient and spouse. Reviewed medication list which included but not limited to new medications, medication changes, continuation of medications, and discontinued medications. Disease specific education provided as well. Heart failure and MI education provided. Patient voices understanding. Patient belongings are at bedside with patient.

## 2022-09-03 NOTE — PROGRESS NOTES
Pt resting in bed. Alert and oriented. Denies concerns. Speech clear and appropriate. PERRLA, 4mm down to 3mm. Bilateral upper extremities warm and dry. Sensations present, denies numbness/tingling. Skin turgor and capillary refill less than 3 seconds. Hand grasp strong and equal bilaterally. Arm drift negative. Heart rhythm regular. Lung sounds clear throughout. Rib pain when transferring in and out of bed. Chest rise symmetrical. Respirations unlabored. Bowel sounds active in all 4 quadrants. Abdomen soft, non-tender, and non-distended. Bilateral lower extremities warm and dry. Sensations present, denies numbness/tingling. No edema present. Peripheral pulses present. Pedal pull and push strong and equal. Skin over bony prominences pink, dry, and intact. Able to move freely in bed.  SN Debbie/C

## 2022-09-03 NOTE — PROGRESS NOTES
Internal Medicine Resident Progress Note    Patient:  Chito Calloway    YOB: 1950  Unit/Bed:4-06/006-A  MRN: [id number]    Acct: [de-identified]   PCP: FABIENNE Shannon CNP    Date of Admission: 8/30/2022    Discharge date  9/3/22     Assessment/Plan:    NSTEMI, suspected  Previous history of MI, stenting x2, 1999, 2001. Last cath in 2018 showed stents with nonobstructive disease unchanged from 2015 cath. EKG shows septal inverted T waves. Initial troponin 0.246, downtrending.    -Continuous telemetry  -Cardiology previously following  -Echo showed EF of 40%. -Heparin drip was discontinued 9/2/2022 as recommended by cardiology.  -Patient will follow with cardiology in the outpatient setting for ischemic work-up. -Cardiology has signed off     Right rib pain  Patient reports fall 8/22. -CXR 8/31 shows no acute fracture  -X-ray ribs, right showed no definite acute rib fracture seen.  -Lidocaine patch    Urinary tract infection  Afebrile. UA positive for leukocyte esterase, trace ketones, blood, bacteria. Initial lactic acid 4.9 on admission. Repeat lactic acid WNL. Patient not hypotensive, tachycardic.   -Urine culture, positive for Klebsiella  -Continue Rocephin 1 g  -Blood cultures pending  -Continue normal saline 50 mL/hr  -Patient will be started on Augmentin for 5 days upon discharge. Iron deficiency anemia  Chronic. Hgb 8.7 on admission. Most recent Hgb 7.5. No signs of bleeding.  -Ferritin 32, iron 17, iron saturation 6, TIBC 287  -Monitor CBC, closely following.  -Transfuse if Hgb < 7 or symptomatic  -Heparin's been stopped per recommendation of cardiology  -GI consult been placed for evaluation of anemia, thrombocytopenia with prior EGD and ultrasound suggesting cirrhosis. -Patient follows with  dr. Alycia Sagastume. , per GI team pt does not follow up with them as directed.    -Limited abdominal ultrasound performed 9/2/2022 showed normal liver ultrasound  -Stool, blood screen was negative  -GI has signed off  -Follow-up with GI outpatient     HFrEF  -Echo in 2018 EF 45%. -Echo, 8/31/2022, showed EF 40%. -Patient will follow with cardiology outpatient upon discharge.  -Plan to follow-up in 4 weeks with cardiology.    -Patient's Lexiscan stress test was scheduled for 2 weeks out. Hyperlipidemia  -Stable. -Continue statin    Hypothyroidism  -TSH WNL. -Continue Synthroid    Hypertension, controlled  -Stable. -Continue lisinopril  -Continue atenolol    Type II diabetes mellitus:  -Continue low-dose SSI,   -Hypoglycemia protocol in place  -POCT glucose checks    Hypovolemic hyponatremia, resolved  Sodium 127 on admission. Patient was given 500 mL NS bolus. Currently 135 on last BMP. Serum osmolality 266.5. Urine sodium 20. Urine osmolality 423.  -Continuing NS at 50 mL/hr    Expected discharge date: Today, 9/3/22    Disposition:   [x] Home, with   [] TCU  [] Rehab  [] Psych  [] SNF  [] [facility]  [] Other-    ===================================================================      Chief Complaint: Chills & Fatigue    Hospital Course:    Kassidy Green is a 71 y.o. female with PMHx of coronary artery disease, CHF, previous MIs with stenting, hyperlipidemia, hypertension, hypothyroidism, diabetes mellitus type 2 who presents to [address] [place] [address] for evaluation of fatigue and chills that been going on for the last week. Patient reports she was gardening and fell 1 week ago. She reports this was a mechanical fall of just tripping in her garden. Patient states she did not hit her head but patient reports she possibly could have lost consciousness. Patient does complain of painful ribs on the right side. She states when she fell in the garden she hit her ribs. This fall occurred on Monday the 22nd 2022. Patient states she then went to urgent care the following day to get a chest x-ray, x-ray showed no broken ribs chest bruising per patient.   On 8/23/2022 patient states that she started to develop chills. She would have periodic episodes of chills but denies any fevers. During these episodes of chills patient reports she was having difficulty breathing and will have to take short shallow breaths. Patient adds she needed to electric blankets on for warmth. This occurred the next 2 days. Patient states over the weekend of 26 through the 28th she had no symptoms. Patient reports she is having some associated fatigue. Patient states starting on Sunday she developed nausea and vomiting. Patient states Sunday was the last day that she ate. Since then she has been unable to due to nausea and dry heaving. Patient reports some additional diarrhea. Patient does complain of new weakness dizziness and headache that started in the last 2 days. Patient's been unable to eat and drink very little over this past 3 days. Patient denies vision changes. Patient denies chest pain, palpitations, pleuritic chest pain, cough, no shortness of breath at this moment. Patient denies any abdominal pain at this time patient does not have any nausea currently. Patient denies burning with urination, increased frequency. Patient does admit to some increased urgency recently. 9/1/22:  She states she had an episode of chills and shortness of breath last night. This episode was reported to have lasted approximately 1 hour. Patient's vitals were checked during this time and there was stable at that time. Patient was afebrile. Patient reports she does not remember the entire episode. Patient still afebrile. Patient denies any chest pain, pleuritic chest pain, palpitations, shortness of breath. 9/2/22: No recent chills or episodes of shortness of breath in the last 24 hours. Patient reports she is doing well and is interested in physical therapy evaluation.  Patient still afebrile, no chest pain, pleuritic chest pain, shortness of breath, palpitations, weakness, dizziness. Subjective (past 24 hours):   Pallor reports she is doing well today. She has no specific concerns or complaints. Patient does report she had an episode of tachypnea that was associated with nausea. Patient reports this episode occurred after she was getting help getting back into bed last night. And the shortness of breath was due to her rib pain. Patient is afebrile. Patient denies any chest pain, pleuritic chest pain, palpitations, shortness of breath, weakness, dizziness. No episodes of syncope or presyncope. ROS: reviewed complete ROS unchanged unless otherwise stated in hospital course/subjective portion. Medications:  Reviewed    Infusion Medications   REM    Scheduled Medications   REM    PRN Meds: REM      Intake/Output Summary (Last 24 hours) at 9/3/2022 1717  Last data filed at 9/3/2022 1325  Gross per 24 hour   Intake 4704.63 ml   Output --   Net 4704.63 ml       Exam:  /66   Pulse 68   Temp 98.3 °F (36.8 °C) (Oral)   Resp 18   Ht 5' (1.524 m)   Wt 178 lb 1.6 oz (80.8 kg)   SpO2 96%   BMI 34.78 kg/m²     General: No distress, appears stated age. Patient is resting comfortably in her room.  at bedside. No respiratory distress, no accessory muscle use, not tachypneic  Eyes: PERRL. Conjunctivae/corneas clear. HENT: Head normal appearing. Nares normal. Oral mucosa moist.  Hearing intact. Neck: Supple, with full range of motion. Trachea midline. No gross JVD appreciated. Respiratory:  Normal effort. Clear to auscultation, without rales or wheezes or rhonchi. Cardiovascular: Normal rate, regular rhythm with normal S1/S2 without murmurs. No lower extremity edema. Abdomen: Soft, non-tender, non-distended with normal bowel sounds. Musculoskeletal: No joint swelling or tenderness. Normal tone. No abnormal movements. Skin: Warm and dry. No rashes or lesions. Neurologic:  No focal sensory/motor deficits in the upper or lower extremities.  Cranial nerves: grossly non-focal 2-12. Psychiatric: Alert and oriented, normal insight and thought content. Capillary Refill: Brisk,< 3 seconds. Peripheral Pulses: +2 palpable, equal bilaterally. Labs:   Recent Labs     09/01/22  0557 09/01/22  1734 09/02/22  0356 09/03/22  0519   WBC 5.5  --  7.5 6.9   HGB 7.4* 7.6* 7.5* 7.5*   HCT 25.3* 25.5* 26.2* 25.5*   *  --  111* 138     Recent Labs     09/01/22  0557 09/02/22  0356 09/03/22  0519   * 132* 135   K 3.3* 3.6 3.6   CL 99 103 106   CO2 13* 15* 17*   BUN 21 13 8   CREATININE 0.7 0.6 0.6   CALCIUM 7.9* 8.1* 8.3*     No results for input(s): AST, ALT, BILIDIR, BILITOT, ALKPHOS in the last 72 hours. Recent Labs     09/03/22  0519   INR 1.12     No results for input(s): Rhae Childes in the last 72 hours. No results for input(s): PROCAL in the last 72 hours. Lab Results   Component Value Date/Time    NITRU NEGATIVE 08/30/2022 05:10 PM    WBCUA 10-15 08/30/2022 05:10 PM    WBCUA 0-2 11/29/2011 09:10 AM    BACTERIA MANY 08/30/2022 05:10 PM    RBCUA 5-10 08/30/2022 05:10 PM    BLOODU LARGE 08/30/2022 05:10 PM    SPECGRAV 1.020 11/11/2011 02:11 PM    GLUCOSEU NEGATIVE 08/30/2022 05:10 PM       Radiology (48 hours):  XR RIBS RIGHT (2 VIEWS)    Result Date: 9/1/2022  1. No definite acute rib fracture seen. 2. Suggestion of mild atelectasis/pneumonia right lung base. **This report has been created using voice recognition software. It may contain minor errors which are inherent in voice recognition technology. ** Final report electronically signed by Dr. Beverly Torres on 9/1/2022 4:11 PM    [address] organ? LIVER    Result Date: 9/2/2022  Normal liver ultrasound.  Final report electronically signed by Dr. Rochelle Reddy on 9/2/2022 4:34 PM       DVT prophylaxis:    [] Lovenox  [] SCDs  [] SQ Heparin  [] Encourage ambulation   [] Already on Anticoagulation       Diet: No diet orders on file  Code Status: Prior  PT/OT: Following  Tele: Yes      Electronically signed by Jannet Townsend MD PGY-1on 9/3/2022 at 5:17 PM    Case was discussed with Attending, Dr. Elfego Montano MD

## 2022-09-04 LAB
BLOOD CULTURE, ROUTINE: NORMAL
BLOOD CULTURE, ROUTINE: NORMAL

## 2022-09-06 ENCOUNTER — CARE COORDINATION (OUTPATIENT)
Dept: CASE MANAGEMENT | Age: 72
End: 2022-09-06

## 2022-09-07 ENCOUNTER — CARE COORDINATION (OUTPATIENT)
Dept: CASE MANAGEMENT | Age: 72
End: 2022-09-07

## 2022-09-07 DIAGNOSIS — N30.01 ACUTE CYSTITIS WITH HEMATURIA: Primary | ICD-10-CM

## 2022-09-07 PROCEDURE — 1111F DSCHRG MED/CURRENT MED MERGE: CPT | Performed by: NURSE PRACTITIONER

## 2022-09-07 NOTE — CARE COORDINATION
[facility] Transitions Initial Follow Up Call    Call within 2 business days of discharge: Yes    Patient: Royce Lundberg Patient : 1950   MRN: [id number]  Reason for Admission: Acute cystitis with hematuria  Discharge Date: 9/3/22 RARS: Readmission Risk Score: 10.3      Last Discharge [facility]       Date Complaint Diagnosis Description Type Department Provider    22 Fatigue; Chills Fatigue, unspecified type . .. ED to Hosp-Admission (Discharged) (ADMITTED) Harleen Prince MD; Sameera Gunn DO. .. Transitions of Care Initial Call:  Explained the role of Care Transition Nurse and the Transition program, patient is agreeable to follow up calls Post discharge from the [place] [address] and spoke with patient. Patient states she is doing well. Denies urinary burning, pain, frequency, fever, odor, incomplete emptying of bladder. Denies visible blood in urine. Voiding adequate amount of urine - color yellow. Encourage to drink adequate fluids. Blood sugar not taken today. Patient has no symptoms of hyperglycemia or hypoglycemia. Taking all Diabetic medications as directed. Reviewed medications with Patient. Continues ABX as directed. Confirmed Patient obtained and is taking medications as directed. There are no questions concerning medications at this time. Stressed importance of medication compliance. 1111F Medication Reconciliation completed. Routed to PCP. Offered patient assistance making PCP appointment, Declined. Patient states she will call and make appointment, just doesn't feel like going anywhere today. Needs 7 day discharge appointment. Will send message through [address] [employer] that patient needs appointment. Instructed patient that PCP will need to be seen within 7 days of discharge. Expresses understanding. Explained the Transition to Home Program to patient.  Patient is called after discharge by CTN to make sure all needs are met and to see if patient has any questions or problems. Expresses understanding. Patient is aware of when to contact MD with any new or worsening symptoms. Advised to contact PCP / with any health concerns for early outpatient intervention in an effort to avoid hospitalization. Report any worsening symptoms to PCP and/or Call 911 and/or GO TO  EMERGENCY ROOM if symptoms are severe. Expresses understanding. Will continue to follow. Claudia GarciaGIOVANNY    [phone]  [address] [place] [address] / [place] [address] Coordinator        Was this an external facility discharge? No Discharge Facility: [facility]    Challenges to be reviewed by the provider   Additional needs identified to be addressed with provider Yes  Needs appointment in 1 week after discharge. Method of communication with provider : chart routing      Advance Care Planning:   Does patient have an Advance Directive:  reviewed and current. Was this a readmission? No  Patient stated reason for admission: Acute cystitis with hematuria  Patients top risk factors for readmission: lack of knowledge about disease  level of motivation  medical condition-Acute cystitis with hematuria  medication management    Care Transition Nurse (CTN) contacted the patient by telephone to perform post hospital discharge assessment. Verified name and  with patient as identifiers. Provided introduction to self, and explanation of the CTN role. CTN reviewed discharge instructions, medical action plan and red flags with patient who verbalized understanding. Patient given an opportunity to ask questions and does not have any further questions or concerns at this time. Were discharge instructions available to patient? Yes. Reviewed appropriate site of care based on symptoms and resources available to patient including: PCP  Specialist  When to call [address]    The patient agrees to contact the PCP office for questions related to

## 2022-09-14 ENCOUNTER — CARE COORDINATION (OUTPATIENT)
Dept: CASE MANAGEMENT | Age: 72
End: 2022-09-14

## 2022-09-14 NOTE — CARE COORDINATION
[facility] Transitions Follow Up Call    2022    Patient: Caleb Salcido  Patient : 1950   MRN: [id number]  Reason for Admission: Acute cystitis with hematuria  Discharge Date: 9/3/22 RARS: Readmission Risk Score: 10.3    Attempted to contact patient for Transition Subsequent call. Left HIPAA Compliant message on Voice Mail to call CTN (number given) with any questions and an update on patient's condition since discharge. Will continue to follow. Arias Brar LPN    [phone]  [facility] / [address] Transitions Subsequent and Final Call    Subsequent and Final Calls  Care Transitions Interventions  Other Interventions:              Follow Up  Future Appointments   Date Time Provider Thom Lock   2022  1:00 PM FABIENNE Vasquez - [facility] - [address] [facility]   2022  1:30 PM STR NUC MED HC  INJECT  [address] [facility]   2022  2:00 PM STR NUCLEAR MEDICINE HEART CENTER ROOM [address]   2022  2:30 PM  [address] [place] [facility]   2022  3:00 PM STR NUCLEAR MEDICINE HEART CENTER ROOM [address]   11/10/2022  2:40 PM Abdulaziz Sapp MD [facility], [facility]. [facility]   2022 12:30 PM [facility]   3/9/2023  1:15 PM Jen Dominguez MD [address] [facility]       Arias Brar LPN

## 2022-09-16 ENCOUNTER — CARE COORDINATION (OUTPATIENT)
Dept: CASE MANAGEMENT | Age: 72
End: 2022-09-16

## 2022-09-16 NOTE — CARE COORDINATION
[place] [address] Transitions Follow Up Call    2022    Patient: Bigg Jurado  Patient : 1950   MRN: [id number]  Reason for Admission:  Acute cystitis with hematuria  Discharge Date: 9/3/22 RARS: Readmission Risk Score: 10.3    Care Transitions Follow Up Call:  Patient states she is doing fine. Still has some burning upon urination. Denies pain, frequency, fever, odor, incomplete emptying of bladder. Denies visible blood in urine. Voiding adequate amount of urine - color yellow. Encourage to drink adequate fluids. Has appointment 22 with PCP with discuss urinary burning at that time. Patient has no needs or concerns for writer at this time. Will continue to follow. Vito Ramos LPN    [phone]  [facility] / [place] [address] Coordinator        Needs to be reviewed by the provider   Additional needs identified to be addressed with provider No  none             Method of communication with provider : none      Care Transition Nurse (CTN) contacted the patient by telephone to follow up after admission on 2022     Verified name and  with patient as identifiers. Discussed follow-up appointments. If no appointment was previously scheduled, appointment scheduling offered: No   Is follow up appointment scheduled within 7 days of discharge? Yes    Advance Care Planning:   Does patient have an Advance Directive:  reviewed and current. CTN reviewed discharge instructions, medical action plan and red flags with patient and discussed any barriers to care and/or understanding of plan of care after discharge. Discussed appropriate site of care based on symptoms and resources available to patient including: PCP  When to call [address] The patient agrees to contact the PCP office for questions related to their healthcare.      Patients top risk factors for readmission: lack of knowledge about disease  medical condition- Acute cystitis with hematuria  medication management  Interventions to address risk factors: Obtained and reviewed discharge summary and/or continuity of care documents    Non-[facility] follow up appointment(s): 9/20/2022    CTN provided contact information for future needs. Plan for follow-up call in 5-7 days based on severity of symptoms and risk factors. Plan for next call: symptom management- Acute cystitis with hematuria           Care Transitions Subsequent and Final Call    Subsequent and Final Calls  Care Transitions Interventions  Other Interventions:              Follow Up  Future Appointments   Date Time Provider Thom Lock   9/20/2022  1:00 PM FABIENNE Garza - CNP [facility] [address]   9/22/2022  1:30 PM STR NUC MED HC  INJECT  [address] H[address]   9/22/2022  2:00 PM STR [facility] ROOM [address]   9/22/2022  2:30 PM  [address] [place] [address] HOD   9/22/2022  3:00 PM STR [facility] MEDICINE HEART CENTER ROOM [address]   11/10/2022  2:40 PM Abdulaziz Sanchez MD [facility], [facility]. [facility]   12/5/2022 12:30 PM Mati Huston MD [facility] Heart [facility]   3/9/2023  1:15 PM Mati Huston MD [facility]       Angelina Ibrahim LPN

## 2022-09-17 NOTE — PROGRESS NOTES
- Mr. Damir Faria presents with dizziness and light headedness that started after sleeping   - CT Head negative   - fall precautions   - Neuro consulted : mri brain unremarkable  Stable for dc with vestibular therapy, ent, meclizine and zofran   Renal Progress Note    Assessment and Plan:      Diagnosis Orders   1. Microalbuminuria     2. Essential hypertension     3. Diabetic nephropathy associated with type 2 diabetes mellitus ([facility] [address]     4. Hyperlipidemia, unspecified hyperlipidemia type       PLAN:  I discussed my thoughts at length with the patient. She understood. She had no questions. Lab result reviewed together with her in epic. Urine microalbumin creatinine ratio slightly decreased to 62 from 69 g/mg. Medications reviewed. She remains on lisinopril 20 mg twice a day. I am reluctant to increase the dose for fear of further drop in her blood pressure. Therefore we have to stay with low protein diet for now. I discussed that with her. Return visit in 6 months and may be 12 months thereafter depending on the report.       Patient Active Problem List   Diagnosis    Hyperlipidemia    GERD (gastroesophageal reflux disease)    Essential hypertension    Insomnia    Type 2 diabetes mellitus with complication, without long-term current use of insulin (HCC)    CAD (coronary artery disease)    Class 1 obesity due to excess calories with serious comorbidity and body mass index (BMI) of 34.0 to 34.9 in adult    Elevated liver enzymes    NAFLD (nonalcoholic fatty liver disease)--Dr. Sarah Oviedo    Hypothyroidism    Microalbuminuria    Blood in stool    GI bleeding    Depression    Iron deficiency anemia due to chronic blood loss    Postmenopausal    Senile osteoporosis    Abnormal ECG    Iron deficiency anemia, unspecified       Subjective:   Chief complaint:  Chief Complaint   Patient presents with    Other     Microalbuminuria HPI:This is a follow up visit for Mrs. Jesus Prince who is here today for return appointment. I see her for microalbuminuria. She was last seen about 6 months ago. Urine microalbumin creatinine ratio was 69. Today it is a 58. When I first saw her it was 2223. Doing well with no complaint. No chest pain or shortness of breath. No nausea vomiting. No lower extremity edema. No difficulty with urination. No fever or chills. Appetite is good. ROS:  Pertinent positives stated above in HPI. All other systems were reviewed and were negative. Medications:     Current Outpatient Medications   Medication Sig Dispense Refill    lisinopril (PRINIVIL;ZESTRIL) 20 MG tablet TAKE 1 TABLET TWICE DAILY 180 tablet 3    levothyroxine (SYNTHROID) 50 MCG tablet TAKE 1 TABLET EVERY DAY 90 tablet 3    atorvastatin (LIPITOR) 40 MG tablet TAKE 1 TABLET EVERY DAY 90 tablet 3    atenolol (TENORMIN) 50 MG tablet TAKE 1/2 TABLET EVERY DAY 45 tablet 3    glimepiride (AMARYL) 2 MG tablet TAKE 4 MG IN THE MORNING AND 2 MG  tablet 3    metFORMIN (GLUCOPHAGE-XR) 500 MG extended release tablet TAKE 2 TABLETS TWICE DAILY 360 tablet 3    escitalopram (LEXAPRO) 20 MG tablet TAKE 1 TABLET EVERY DAY 90 tablet 3    pantoprazole (PROTONIX) 40 MG tablet TAKE 1 TABLET TWICE DAILY 180 tablet 3    Blood Glucose Monitoring Suppl (ACCU-CHEK ISABELA PLUS) w/Device KIT USE 1 TO CHECK GLUCOSE ONCE DAILY      blood glucose monitor kit and supplies Glucose monitor and supplies, brand per pt preference. Test sugar daily. Dx: E11.9 1 kit 0    blood glucose monitor strips Glucose test strips, brand per pt preference. Test sugar daily. Dx: E11.9 100 strip 3    Lancets MISC Test sugar daily. Brand per pt preference.  Dx: E11.9 100 each 3    buPROPion (WELLBUTRIN SR) 150 MG extended release tablet Take 150 mg by mouth 2 times daily      aspirin 81 MG tablet Take 81 mg by mouth nightly      FISH OIL Take by mouth nightly Arthur Red BILIRUBINUR Negative 02/07/2019    BILIRUBINUR NEGATIVE 11/29/2011    BLOODU Large 02/07/2019    BLOODU LARGE 10/24/2018    GLUCOSEU Negative 02/07/2019    KETUA Negative 02/07/2019    AMORPHOUS URATES 10/24/2018      Microalbumen/Creatinine ratio:  No components found for: RUCREAT    Objective:   Vitals: /76 (Site: Left Upper Arm, Position: Sitting, Cuff Size: Large Adult)   Pulse 65   Temp 97.4 °F (36.3 °C)   Wt 175 lb 9.6 oz (79.7 kg)   SpO2 93%   BMI 31.31 kg/m²      Constitutional:  Alert, awake, no apparent distress  Skin:normal with no rash or any lesions  HEENT:Pupils are reactive . Throat is clear. Oral mucosa is moist.  Neck:supple with no thyromegaly or bruit   Cardiovascular:  S1, S2 without murmur   Respiratory:  Clear to auscultation with no wheezes or rales  Abdomen: +bowel sound, soft, non tender and no bruit  Ext: No LE edema  Musculoskeletal:Intact  Neuro:Alert, awake and oriented with no obvious focal deficit. Speech is normal.    Electronically signed by Lance Nevarez MD on 2/11/2021 at 1:17 PM   **This report has been created using voice recognition software. It maycontain minor  errors which are inherent in voice recognition technology. **

## 2022-09-20 ENCOUNTER — OFFICE VISIT (OUTPATIENT)
Dept: FAMILY MEDICINE CLINIC | Age: 72
End: 2022-09-20
Payer: MEDICARE

## 2022-09-20 VITALS
RESPIRATION RATE: 16 BRPM | BODY MASS INDEX: 32.69 KG/M2 | DIASTOLIC BLOOD PRESSURE: 66 MMHG | WEIGHT: 167.4 LBS | HEART RATE: 68 BPM | SYSTOLIC BLOOD PRESSURE: 132 MMHG

## 2022-09-20 DIAGNOSIS — I10 ESSENTIAL HYPERTENSION: ICD-10-CM

## 2022-09-20 DIAGNOSIS — E03.9 HYPOTHYROIDISM, UNSPECIFIED TYPE: ICD-10-CM

## 2022-09-20 DIAGNOSIS — Z09 HOSPITAL DISCHARGE FOLLOW-UP: Primary | ICD-10-CM

## 2022-09-20 DIAGNOSIS — D69.6 THROMBOCYTOPENIA (HCC): ICD-10-CM

## 2022-09-20 DIAGNOSIS — E11.8 TYPE 2 DIABETES MELLITUS WITH COMPLICATION, WITHOUT LONG-TERM CURRENT USE OF INSULIN (HCC): ICD-10-CM

## 2022-09-20 DIAGNOSIS — I50.22 CHRONIC SYSTOLIC (CONGESTIVE) HEART FAILURE (HCC): ICD-10-CM

## 2022-09-20 PROCEDURE — G8417 CALC BMI ABV UP PARAM F/U: HCPCS | Performed by: NURSE PRACTITIONER

## 2022-09-20 PROCEDURE — 3051F HG A1C>EQUAL 7.0%<8.0%: CPT | Performed by: NURSE PRACTITIONER

## 2022-09-20 PROCEDURE — G8427 DOCREV CUR MEDS BY ELIG CLIN: HCPCS | Performed by: NURSE PRACTITIONER

## 2022-09-20 PROCEDURE — 1124F ACP DISCUSS-NO DSCNMKR DOCD: CPT | Performed by: NURSE PRACTITIONER

## 2022-09-20 PROCEDURE — 1111F DSCHRG MED/CURRENT MED MERGE: CPT | Performed by: NURSE PRACTITIONER

## 2022-09-20 PROCEDURE — 2022F DILAT RTA XM EVC RTNOPTHY: CPT | Performed by: NURSE PRACTITIONER

## 2022-09-20 PROCEDURE — 1090F PRES/ABSN URINE INCON ASSESS: CPT | Performed by: NURSE PRACTITIONER

## 2022-09-20 PROCEDURE — 1036F TOBACCO NON-USER: CPT | Performed by: NURSE PRACTITIONER

## 2022-09-20 PROCEDURE — G8399 PT W/DXA RESULTS DOCUMENT: HCPCS | Performed by: NURSE PRACTITIONER

## 2022-09-20 PROCEDURE — 99214 OFFICE O/P EST MOD 30 MIN: CPT | Performed by: NURSE PRACTITIONER

## 2022-09-20 PROCEDURE — 3017F COLORECTAL CA SCREEN DOC REV: CPT | Performed by: NURSE PRACTITIONER

## 2022-09-20 ASSESSMENT — PATIENT HEALTH QUESTIONNAIRE - PHQ9
2. FEELING DOWN, DEPRESSED OR HOPELESS: 0
3. TROUBLE FALLING OR STAYING ASLEEP: 0
7. TROUBLE CONCENTRATING ON THINGS, SUCH AS READING THE NEWSPAPER OR WATCHING TELEVISION: 0
10. IF YOU CHECKED OFF ANY PROBLEMS, HOW DIFFICULT HAVE THESE PROBLEMS MADE IT FOR YOU TO DO YOUR WORK, TAKE CARE OF THINGS AT HOME, OR GET ALONG WITH OTHER PEOPLE: 0
SUM OF ALL RESPONSES TO PHQ QUESTIONS 1-9: 1
SUM OF ALL RESPONSES TO PHQ9 QUESTIONS 1 & 2: 0
SUM OF ALL RESPONSES TO PHQ QUESTIONS 1-9: 1
6. FEELING BAD ABOUT YOURSELF - OR THAT YOU ARE A FAILURE OR HAVE LET YOURSELF OR YOUR FAMILY DOWN: 0
8. MOVING OR SPEAKING SO SLOWLY THAT OTHER PEOPLE COULD HAVE NOTICED. OR THE OPPOSITE, BEING SO FIGETY OR RESTLESS THAT YOU HAVE BEEN MOVING AROUND A LOT MORE THAN USUAL: 0
1. LITTLE INTEREST OR PLEASURE IN DOING THINGS: 0
5. POOR APPETITE OR OVEREATING: 1
9. THOUGHTS THAT YOU WOULD BE BETTER OFF DEAD, OR OF HURTING YOURSELF: 0
SUM OF ALL RESPONSES TO PHQ QUESTIONS 1-9: 1
4. FEELING TIRED OR HAVING LITTLE ENERGY: 0
SUM OF ALL RESPONSES TO PHQ QUESTIONS 1-9: 1

## 2022-09-20 NOTE — PROGRESS NOTES
Powderly Yoandy  [facility]  [address] [facility] [address]  Dept: [phone]  Dept Fax: [phone]  Loc: [phone]       Post-Discharge Transitional Care  Follow Up      Sherrie Tirado   YOB: 1950    Date of Office Visit:  9/20/2022  Date of Hospital Admission: 8/30/22  Date of Hospital Discharge: 9/3/22  Risk of hospital readmission (high >=14%. Medium >=10%) :Readmission Risk Score: 10.3      Care management risk score Rising risk (score 2-5) and Complex Care (Scores >=6): No Risk Score On File     Non face to face  following discharge, date last encounter closed (first attempt may have been earlier): 09/07/2022    Call initiated 2 business days of discharge: Yes    ASSESSMENT/PLAN:   Hospital discharge follow-up  -     AL DISCHARGE MEDS RECONCILED W/ CURRENT OUTPATIENT MED LIST  Chronic systolic (congestive) heart failure (HCC)  Essential hypertension  -     Basic Metabolic Panel; Future  -     CBC with Auto Differential; Future  Type 2 diabetes mellitus with complication, without long-term current use of insulin (HCC)  -     Hemoglobin A1C; Future  Hypothyroidism, unspecified type  -     TSH; Future  -     T4, Free; Future  Thrombocytopenia (HCC)    - Pt feeling better overall. Follow up labs ordered and can be completed with other blood work that is due.   - Rest and increase fluids  - Tylenol as needed for pain  - Follow up with stress test on 10/5 as planned. - Call office with any questions or concerns, or if symptoms are getting worse or changing  - Patient given educational materials - see patient instructions. Discussed use, benefit, and side effects of prescribed medications. All patient questions answered. Pt voiced understanding. Reviewed Health Maintenance. Medical Decision Making: moderate complexity  Return in 3 months (on 12/20/2022), or if symptoms worsen or fail to improve, for Medicare AWV. Subjective:   HPI:  Follow up of Hospital problems/diagnosis(es):   Chief Complaint   Patient presents with    Follow-Up from Hospital     Follow up from [facility] stay on 22. Inpatient course: Discharge summary reviewed- see chart. Patient Identification:  Amita Campuzano  : 1950  MRN: [id number]   Account: [de-identified]      Admit date: 2022  Discharge date: 9/3/22   Attending provider: No att. providers found        Primary care provider: FABIENNE Lewis - CNP      Discharge Diagnoses:   Principal Problem:    Acute cystitis with hematuria  Active Problems:    Elevated troponin    Fatigue    Type 2 diabetes mellitus with complication, without long-term current use of insulin (HCC)    CAD (coronary artery disease)    Abnormal ECG  Resolved Problems:    * No resolved hospital problems. *        NSTEMI, suspected  Previous history of MI, stenting x2, , . Last cath in 2018 showed stents with nonobstructive disease unchanged from 2015 cath. EKG shows septal inverted T waves. Initial troponin 0.246, downtrending.    -Continuous telemetry  -Cardiology previously following  -Echo showed EF of 40%. -Heparin drip was discontinued 2022 as recommended by cardiology.  -Patient will follow with cardiology in the outpatient setting for ischemic work-up. -Cardiology has signed off     Right rib pain  Patient reports fall . -CXR  shows no acute fracture  -X-ray ribs, right showed no definite acute rib fracture seen.  -Lidocaine patch    Urinary tract infection  Afebrile. UA positive for leukocyte esterase, trace ketones, blood, bacteria. Initial lactic acid 4.9 on admission. Repeat lactic acid WNL. Patient not hypotensive, tachycardic.   -Urine culture, positive for Klebsiella  -Continue Rocephin 1 g  -Blood cultures pending  -Continue normal saline 50 mL/hr  -Patient will be started on Augmentin for 5 days upon discharge. Iron deficiency anemia  Chronic.  Hgb 8.7 on admission. Most recent Hgb 7.5. No signs of bleeding.  -Ferritin 32, iron 17, iron saturation 6, TIBC 287  -Monitor CBC, closely following.  -Transfuse if Hgb < 7 or symptomatic  -Heparin's been stopped per recommendation of cardiology  -GI consult been placed for evaluation of anemia, thrombocytopenia with prior EGD and ultrasound suggesting cirrhosis. -Patient follows with [facility].  -Limited abdominal ultrasound performed 9/2/2022 showed normal liver ultrasound  -Stool, blood screen was negative  -GI has signed off  -Follow-up with GI outpatient     HFrEF  -Echo in 2018 EF 45%. -Echo, 8/31/2022, showed EF 40%. -Patient will follow with cardiology outpatient upon discharge.  -Plan to follow-up in 4 weeks with cardiology.    -Patient's Lexiscan stress test was scheduled for 2 weeks out. Hyperlipidemia  -Stable. -Continue statin    Hypothyroidism  -TSH WNL. -Continue Synthroid    Hypertension, controlled  -Stable. -Continue lisinopril  -Continue atenolol    Type II diabetes mellitus:  -Continue low-dose SSI,   -Hypoglycemia protocol in place  -POCT glucose checks     Hypovolemic hyponatremia, resolved  Sodium 127 on admission. Patient was given 500 mL NS bolus. Currently 135 on last BMP. Serum osmolality 266.5. Urine sodium 20. Urine osmolality 423.  -Continuing NS at 50 mL/hr           Hospital Course:   Royec Lundberg is a 71 y.o. female admitted to [address] [place] [address] on 8/30/2022 for chills and fatigue. Royce Lundberg is a 71 y.o. female with PMHx of coronary artery disease, CHF, previous MIs with stenting, hyperlipidemia, hypertension, hypothyroidism, diabetes mellitus type 2 who presents to [address] [place] [address] for evaluation of fatigue and chills that been going on for the last week. Patient reports she was gardening and fell 1 week ago. She reports this was a mechanical fall of just tripping in her garden.   Patient states she did not hit her head but patient reports she possibly could have lost consciousness. Patient does complain of painful ribs on the right side. She states when she fell in the garden she hit her ribs. This fall occurred on Monday the 22nd 2022. Patient states she then went to urgent care the following day to get a chest x-ray, x-ray showed no broken ribs chest bruising per patient. On 8/23/2022 patient states that she started to develop chills. She would have periodic episodes of chills but denies any fevers. During these episodes of chills patient reports she was having difficulty breathing and will have to take short shallow breaths. Patient adds she needed to electric blankets on for warmth. This occurred the next 2 days. Patient states over the weekend of 26 through the 28th she had no symptoms. Patient reports she is having some associated fatigue. Patient states starting on Sunday she developed nausea and vomiting. Patient states Sunday was the last day that she ate. Since then she has been unable to due to nausea and dry heaving. Patient reports some additional diarrhea. Patient does complain of new weakness dizziness and headache that started in the last 2 days. Patient's been unable to eat and drink very little over this past 3 days. Patient denies vision changes. Patient denies chest pain, palpitations, pleuritic chest pain, cough, no shortness of breath at this moment. Patient denies any abdominal pain at this time patient does not have any nausea currently. Patient denies burning with urination, increased frequency. Patient does admit to some increased urgency recently. 9/1/22: She states she had an episode of chills and shortness of breath last night. This episode was reported to have lasted approximately 1 hour. Patient's vitals were checked during this time and there was stable at that time. Patient was afebrile. Patient reports she does not remember the entire episode. 9/2/22: No recent chills or episodes of shortness of breath in the last 24 hours. Patient did have an episode of tachypnea that was associated with some nausea last night. Patient reports this episode occurred when she was getting back into bed and was having pain in her ribs. Patient reports this is what was causing her shallow quick breathing. Patient had remained afebrile, with no chest pain, pleuritic chest pain, shortness of breath, palpitations, weakness, dizziness. Interval history/Current status: doing better since being home. Still having fatigue, but no SOB, chest pain or weakness. She has a Stress test scheduled for 10/5/22 and follow up with cardiology. She still needs to make appt for GI follow up. No falls or syncope. No repeat labs at this time. Patient Active Problem List   Diagnosis    Hyperlipidemia    GERD (gastroesophageal reflux disease)    Essential hypertension    Insomnia    Type 2 diabetes mellitus with complication, without long-term current use of insulin (HCC)    CAD (coronary artery disease)    Class 1 obesity due to excess calories with serious comorbidity and body mass index (BMI) of 34.0 to 34.9 in adult    Elevated liver enzymes    NAFLD (nonalcoholic fatty liver disease)--Dr. Martine Zhao    Hypothyroidism    Microalbuminuria    Blood in stool    GI bleeding    Depression    Iron deficiency anemia due to chronic blood loss    Postmenopausal    Senile osteoporosis    Abnormal ECG    Iron deficiency anemia, unspecified    Anemia, unspecified    Thrombocytopenia (HCC)    Chronic systolic (congestive) heart failure    Elevated troponin    Acute cystitis with hematuria    Fatigue       Medications listed as ordered at the time of discharge from hospital     Medication List            Accurate as of September 20, 2022  1:23 PM. If you have any questions, ask your nurse or doctor.                 CONTINUE taking these medications      Accu-Chek Sanjuanita Plus strip  Generic drug: blood glucose test strips  USE 1 STRIP TO CHECK GLUCOSE ONCE DAILY     acetaminophen 500 MG tablet  Commonly known as: TYLENOL     aspirin 81 MG tablet     atenolol 50 MG tablet  Commonly known as: TENORMIN  TAKE 1/2 TABLET EVERY DAY     atorvastatin 40 MG tablet  Commonly known as: LIPITOR  TAKE 1 TABLET EVERY DAY     * blood glucose monitor kit and supplies  Glucose monitor and supplies, brand per pt preference. Test sugar daily. Dx: E11.9     * Accu-Chek Sanjuanita Plus w/Device Kit     escitalopram 20 MG tablet  Commonly known as: LEXAPRO  TAKE 1 TABLET EVERY DAY     FISH OIL     glimepiride 2 MG tablet  Commonly known as: AMARYL  TAKE 2 TABLETS (4 MG) IN THE MORNING AND 1 TABLET (2 MG) IN THE EVENING     Lancets Misc  Test sugar daily. Brand per pt preference. Dx: E11.9     levothyroxine 50 MCG tablet  Commonly known as: SYNTHROID  TAKE 1 TABLET EVERY DAY     lisinopril 20 MG tablet  Commonly known as: PRINIVIL;ZESTRIL  TAKE 1 TABLET TWICE DAILY     metFORMIN 500 MG extended release tablet  Commonly known as: GLUCOPHAGE-XR  TAKE 2 TABLETS TWICE DAILY     pantoprazole 40 MG tablet  Commonly known as: PROTONIX  TAKE 1 TABLET TWICE DAILY     STOOL SOFTENER PO           * This list has 2 medication(s) that are the same as other medications prescribed for you. Read the directions carefully, and ask your doctor or other care provider to review them with you.                     Medications marked \"taking\" at this time  Outpatient Medications Marked as Taking for the 9/20/22 encounter (Office Visit) with FABIENNE Salazar CNP   Medication Sig Dispense Refill    acetaminophen (TYLENOL) 500 MG tablet Take 1,000 mg by mouth every 6 hours as needed for Pain      metFORMIN (GLUCOPHAGE-XR) 500 MG extended release tablet TAKE 2 TABLETS TWICE DAILY 360 tablet 0    pantoprazole (PROTONIX) 40 MG tablet TAKE 1 TABLET TWICE DAILY 180 tablet 0    escitalopram (LEXAPRO) 20 MG tablet TAKE 1 TABLET EVERY DAY 90 tablet 0    lisinopril (PRINIVIL;ZESTRIL) 20 MG tablet TAKE 1 TABLET TWICE DAILY 180 tablet 3    levothyroxine (SYNTHROID) 50 MCG tablet TAKE 1 TABLET EVERY DAY 90 tablet 3    atorvastatin (LIPITOR) 40 MG tablet TAKE 1 TABLET EVERY DAY 90 tablet 3    atenolol (TENORMIN) 50 MG tablet TAKE 1/2 TABLET EVERY DAY 45 tablet 3    glimepiride (AMARYL) 2 MG tablet TAKE 2 TABLETS (4 MG) IN THE MORNING AND 1 TABLET (2 MG) IN THE EVENING 270 tablet 3    blood glucose test strips (ACCU-CHEK ISABELA PLUS) strip USE 1 STRIP TO CHECK GLUCOSE ONCE DAILY 100 each 3    Blood Glucose Monitoring Suppl (ACCU-CHEK ISABELA PLUS) w/Device KIT USE 1 TO CHECK GLUCOSE ONCE DAILY      blood glucose monitor kit and supplies Glucose monitor and supplies, brand per pt preference. Test sugar daily. Dx: E11.9 1 kit 0    Lancets MISC Test sugar daily. Brand per pt preference. Dx: E11.9 100 each 3    aspirin 81 MG tablet Take 81 mg by mouth nightly      FISH OIL Take by mouth nightly Arthur Red      Docusate Calcium (STOOL SOFTENER PO) Take by mouth nightly as needed           Medications patient taking as of now reconciled against medications ordered at time of hospital discharge: Yes    A comprehensive review of systems was negative except for what was noted in the HPI.     Objective:    /66   Pulse 68   Resp 16   Wt 167 lb 6.4 oz (75.9 kg)   BMI 32.69 kg/m²   General Appearance: alert and oriented to person, place and time, well-developed and well-nourished, in no acute distress  Skin: warm and dry, no rash or erythema  Head: normocephalic and atraumatic  Eyes: conjunctivae normal  ENT: tympanic membrane, external ear and ear canal normal bilaterally, oropharynx clear and moist with normal mucous membranes  Neck: neck supple and non tender without mass   Pulmonary/Chest: clear to auscultation bilaterally- no wheezes, rales or rhonchi, normal air movement, no respiratory distress  Cardiovascular: normal rate, normal S1 and S2, no gallops, intact distal pulses, and no carotid bruits  Abdomen: soft, non-tender, non-distended, normal bowel sounds, no masses or organomegaly  Extremities: no cyanosis and no clubbing  Musculoskeletal: no tender joints  Neurologic: gait and coordination normal and speech normal      An electronic signature was used to authenticate this note.   --FABIENNE Medina - CNP

## 2022-09-21 ENCOUNTER — CARE COORDINATION (OUTPATIENT)
Dept: CASE MANAGEMENT | Age: 72
End: 2022-09-21

## 2022-09-21 NOTE — CARE COORDINATION
[facility] Transitions Follow Up Call    2022    Patient: Geovanna Parker  Patient : 1950   MRN: [id number]  Reason for Admission: Acute cystitis with hematuria  Discharge Date: 9/3/22 RARS: Readmission Risk Score: 10.3    Attempted to contact patient for Transition Subsequent call. Left HIPAA Compliant message on Voice Mail to call [facility] (number given) with any questions and an update on patient's condition since discharge. Left HIPAA Compliant message on voice mail for Patient that this is the Final Transition Call and to call their Specialist/PCP for any problems or issues that may occur. Daniel Sánchez LPN    [phone]  [facility] / [address] [place] [address] Transitions Subsequent and Final Call    Subsequent and Final Calls  Care Transitions Interventions  Other Interventions:              Follow Up  Future Appointments   Date Time Provider Thom Lock   10/5/2022  2:30 PM STR [facility].  INJECT  [address] HOD   10/5/2022  3:00 PM STR [facility]   10/5/2022  3:30 PM STR [facility] HOD   10/5/2022  4:00 PM [facility] ROOM [facility] 94 HOD   11/10/2022  2:40 PM Abdulaziz Mane MD [facility]   2022 12:30 PM Vivica Heimlich, MD N [facility]   2022  2:00 PM FABIENNE Sloan - CNP [facility] Medicine [facility]   3/9/2023  1:15 PM Vivica Heimlich, MD [address] [facility]       Daniel Sánchez LPN

## 2022-10-01 PROBLEM — R79.89 ELEVATED TROPONIN: Status: RESOLVED | Noted: 2022-08-30 | Resolved: 2022-10-01

## 2022-10-01 PROBLEM — R77.8 ELEVATED TROPONIN: Status: RESOLVED | Noted: 2022-08-30 | Resolved: 2022-10-01

## 2022-10-05 ENCOUNTER — HOSPITAL ENCOUNTER (OUTPATIENT)
Dept: NON INVASIVE DIAGNOSTICS | Age: 72
Discharge: HOME | End: 2022-10-05
Payer: MEDICARE

## 2022-10-05 DIAGNOSIS — M81.0 SENILE OSTEOPOROSIS: ICD-10-CM

## 2022-10-05 DIAGNOSIS — R77.8 ELEVATED TROPONIN: ICD-10-CM

## 2022-10-05 DIAGNOSIS — R53.83 FATIGUE, UNSPECIFIED TYPE: ICD-10-CM

## 2022-10-05 DIAGNOSIS — I25.10 CORONARY ARTERY DISEASE INVOLVING NATIVE CORONARY ARTERY OF NATIVE HEART WITHOUT ANGINA PECTORIS: ICD-10-CM

## 2022-10-05 PROCEDURE — A9500 TC99M SESTAMIBI: HCPCS | Performed by: NUCLEAR MEDICINE

## 2022-10-05 PROCEDURE — 6360000002 HC RX W HCPCS

## 2022-10-05 PROCEDURE — 3430000000 HC RX DIAGNOSTIC RADIOPHARMACEUTICAL: Performed by: NUCLEAR MEDICINE

## 2022-10-05 PROCEDURE — 93017 CV STRESS TEST TRACING ONLY: CPT | Performed by: NUCLEAR MEDICINE

## 2022-10-05 PROCEDURE — 78452 HT MUSCLE IMAGE SPECT MULT: CPT

## 2022-10-05 RX ADMIN — Medication 9.8 MILLICURIE: at 14:10

## 2022-10-05 RX ADMIN — Medication 30.4 MILLICURIE: at 15:05

## 2022-10-06 ENCOUNTER — TELEPHONE (OUTPATIENT)
Dept: CARDIOLOGY CLINIC | Age: 72
End: 2022-10-06

## 2022-10-26 DIAGNOSIS — E11.65 TYPE 2 DIABETES MELLITUS WITH HYPERGLYCEMIA, WITHOUT LONG-TERM CURRENT USE OF INSULIN (HCC): ICD-10-CM

## 2022-10-26 DIAGNOSIS — F32.A DEPRESSION, UNSPECIFIED DEPRESSION TYPE: ICD-10-CM

## 2022-10-26 DIAGNOSIS — E11.8 TYPE 2 DIABETES MELLITUS WITH COMPLICATION, WITHOUT LONG-TERM CURRENT USE OF INSULIN (HCC): ICD-10-CM

## 2022-10-26 DIAGNOSIS — K21.9 GASTROESOPHAGEAL REFLUX DISEASE: ICD-10-CM

## 2022-10-26 RX ORDER — ESCITALOPRAM OXALATE 20 MG/1
TABLET ORAL
Qty: 90 TABLET | Refills: 0 | Status: SHIPPED | OUTPATIENT
Start: 2022-10-26

## 2022-10-26 RX ORDER — PANTOPRAZOLE SODIUM 40 MG/1
TABLET, DELAYED RELEASE ORAL
Qty: 180 TABLET | Refills: 0 | Status: SHIPPED | OUTPATIENT
Start: 2022-10-26

## 2022-10-26 RX ORDER — GLIMEPIRIDE 2 MG/1
TABLET ORAL
Qty: 270 TABLET | Refills: 0 | Status: SHIPPED | OUTPATIENT
Start: 2022-10-26

## 2022-10-26 RX ORDER — METFORMIN HYDROCHLORIDE 500 MG/1
TABLET, EXTENDED RELEASE ORAL
Qty: 360 TABLET | Refills: 0 | Status: SHIPPED | OUTPATIENT
Start: 2022-10-26

## 2022-10-26 NOTE — TELEPHONE ENCOUNTER
This medication refill is regarding a electronic request.  Refill requested by  [employer] . Requested Prescriptions     Pending Prescriptions Disp Refills    glimepiride (AMARYL) 2 MG tablet [Pharmacy Med Name: GLIMEPIRIDE 2 MG Tablet] 270 tablet 0     Sig: TAKE 2 TABLETS (4 MG) IN THE MORNING AND 1 TABLET (2 MG) IN THE EVENING     Date of last visit: 9/20/2022   Date of next visit: 12/20/2022  Date of last refill: 11/1/21 #270/3    Last Hemoglobin A1C:    Lab Results   Component Value Date/Time    LABA1C 7.1 02/16/2022 12:47 PM    AVGG 153 02/16/2022 12:47 PM     Rx verified, ordered and set to EP.

## 2022-10-26 NOTE — TELEPHONE ENCOUNTER
This medication refill is regarding a electronic request.  Refill requested by  [employer] . Requested Prescriptions     Pending Prescriptions Disp Refills    pantoprazole (PROTONIX) 40 MG tablet [Pharmacy Med Name: PANTOPRAZOLE SODIUM 40 MG Tablet Delayed Release] 180 tablet 0     Sig: TAKE 1 TABLET TWICE DAILY    metFORMIN (GLUCOPHAGE-XR) 500 MG extended release tablet [Pharmacy Med Name: METFORMIN HYDROCHLORIDE  MG Tablet Extended Release 24 Hour] 360 tablet 0     Sig: TAKE 2 TABLETS TWICE DAILY    escitalopram (LEXAPRO) 20 MG tablet [Pharmacy Med Name: ESCITALOPRAM OXALATE 20 MG Tablet] 90 tablet 0     Sig: TAKE 1 TABLET EVERY DAY     Date of last visit: 9/20/2022   Date of next visit: 12/20/22  Date of last refill:    -Metformin 4/27/22 #360/0   -Escitalopram 4/27/22 #90/0   -Pantoprazole 4/27/22 #180/0    Last Hemoglobin A1C:    Lab Results   Component Value Date/Time    LABA1C 7.1 02/16/2022 12:47 PM    AVGG 153 02/16/2022 12:47 PM     Rx verified, ordered and set to EP.

## 2022-11-01 ENCOUNTER — HOSPITAL ENCOUNTER (OUTPATIENT)
Age: 72
Discharge: HOME | End: 2022-11-01
Payer: MEDICARE

## 2022-11-01 DIAGNOSIS — I10 ESSENTIAL HYPERTENSION: ICD-10-CM

## 2022-11-01 DIAGNOSIS — E11.8 TYPE 2 DIABETES MELLITUS WITH COMPLICATION, WITHOUT LONG-TERM CURRENT USE OF INSULIN (HCC): ICD-10-CM

## 2022-11-01 DIAGNOSIS — R80.9 MICROALBUMINURIA: ICD-10-CM

## 2022-11-01 DIAGNOSIS — E03.9 HYPOTHYROIDISM, UNSPECIFIED TYPE: ICD-10-CM

## 2022-11-01 LAB
ANION GAP SERPL CALCULATED.3IONS-SCNC: 16 MEQ/L (ref 8–16)
BASOPHILS # BLD: 0.5 %
BASOPHILS ABSOLUTE: 0 THOU/MM3 (ref 0–0.1)
BUN BLDV-MCNC: 10 MG/DL (ref 7–22)
CALCIUM SERPL-MCNC: 9.5 MG/DL (ref 8.5–10.5)
CHLORIDE BLD-SCNC: 98 MEQ/L (ref 98–111)
CO2: 19 MEQ/L (ref 23–33)
CREAT SERPL-MCNC: 0.8 MG/DL (ref 0.4–1.2)
CREATININE, URINE: 59.8 MG/DL
EOSINOPHIL # BLD: 0.6 %
EOSINOPHILS ABSOLUTE: 0 THOU/MM3 (ref 0–0.4)
ERYTHROCYTE [DISTWIDTH] IN BLOOD BY AUTOMATED COUNT: 20.1 % (ref 11.5–14.5)
ERYTHROCYTE [DISTWIDTH] IN BLOOD BY AUTOMATED COUNT: 51.8 FL (ref 35–45)
GFR SERPL CREATININE-BSD FRML MDRD: > 60 ML/MIN/1.73M2
GLUCOSE BLD-MCNC: 163 MG/DL (ref 70–108)
HCT VFR BLD CALC: 32.8 % (ref 37–47)
HEMOGLOBIN: 8.8 GM/DL (ref 12–16)
HYPOCHROMIA: PRESENT
IMMATURE GRANS (ABS): 0.02 THOU/MM3 (ref 0–0.07)
IMMATURE GRANULOCYTES: 0.3 %
LYMPHOCYTES # BLD: 19.2 %
LYMPHOCYTES ABSOLUTE: 1.2 THOU/MM3 (ref 1–4.8)
MCH RBC QN AUTO: 19.6 PG (ref 26–33)
MCHC RBC AUTO-ENTMCNC: 26.8 GM/DL (ref 32.2–35.5)
MCV RBC AUTO: 72.9 FL (ref 81–99)
MICROALBUMIN UR-MCNC: 3.43 MG/DL
MICROALBUMIN/CREAT UR-RTO: 57 MG/G (ref 0–30)
MONOCYTES # BLD: 10.2 %
MONOCYTES ABSOLUTE: 0.7 THOU/MM3 (ref 0.4–1.3)
NUCLEATED RED BLOOD CELLS: 0 /100 WBC
PLATELET # BLD: 130 THOU/MM3 (ref 130–400)
PLATELET ESTIMATE: ADEQUATE
PMV BLD AUTO: 9.1 FL (ref 9.4–12.4)
POIKILOCYTES: ABNORMAL
POTASSIUM SERPL-SCNC: 5 MEQ/L (ref 3.5–5.2)
RBC # BLD: 4.5 MILL/MM3 (ref 4.2–5.4)
SCAN OF BLOOD SMEAR: NORMAL
SEG NEUTROPHILS: 69.2 %
SEGMENTED NEUTROPHILS ABSOLUTE COUNT: 4.5 THOU/MM3 (ref 1.8–7.7)
SODIUM BLD-SCNC: 133 MEQ/L (ref 135–145)
T4 FREE: 1.34 NG/DL (ref 0.93–1.76)
TSH SERPL DL<=0.05 MIU/L-ACNC: 1.61 UIU/ML (ref 0.4–4.2)
WBC # BLD: 6.5 THOU/MM3 (ref 4.8–10.8)

## 2022-11-01 PROCEDURE — 80048 BASIC METABOLIC PNL TOTAL CA: CPT

## 2022-11-01 PROCEDURE — 83036 HEMOGLOBIN GLYCOSYLATED A1C: CPT

## 2022-11-01 PROCEDURE — 36415 COLL VENOUS BLD VENIPUNCTURE: CPT

## 2022-11-01 PROCEDURE — 84439 ASSAY OF FREE THYROXINE: CPT

## 2022-11-01 PROCEDURE — 82043 UR ALBUMIN QUANTITATIVE: CPT

## 2022-11-01 PROCEDURE — 85025 COMPLETE CBC W/AUTO DIFF WBC: CPT

## 2022-11-01 PROCEDURE — 84443 ASSAY THYROID STIM HORMONE: CPT

## 2022-11-02 LAB
AVERAGE GLUCOSE: 168 MG/DL (ref 70–126)
HBA1C MFR BLD: 7.6 % (ref 4.4–6.4)

## 2022-11-03 ENCOUNTER — TELEPHONE (OUTPATIENT)
Dept: FAMILY MEDICINE CLINIC | Age: 72
End: 2022-11-03

## 2022-11-03 NOTE — TELEPHONE ENCOUNTER
----- Message from FABIENNE Warner CNP sent at 11/2/2022  7:38 AM EDT -----  Please let pt know that her labs show that her A1C is elevated at 7.6. She would benefit from adding a medication for her diabetes. I would like to add Jardiance 10 mg daily, #90/1. Follow up in December as planned.  -WS

## 2022-11-10 ENCOUNTER — OFFICE VISIT (OUTPATIENT)
Dept: NEPHROLOGY | Age: 72
End: 2022-11-10
Payer: MEDICARE

## 2022-11-10 ENCOUNTER — IMMUNIZATION (OUTPATIENT)
Dept: FAMILY MEDICINE CLINIC | Age: 72
End: 2022-11-10
Payer: MEDICARE

## 2022-11-10 VITALS
SYSTOLIC BLOOD PRESSURE: 125 MMHG | DIASTOLIC BLOOD PRESSURE: 72 MMHG | OXYGEN SATURATION: 100 % | WEIGHT: 168 LBS | BODY MASS INDEX: 32.81 KG/M2 | HEART RATE: 69 BPM

## 2022-11-10 DIAGNOSIS — E11.21 DIABETIC NEPHROPATHY ASSOCIATED WITH TYPE 2 DIABETES MELLITUS (HCC): ICD-10-CM

## 2022-11-10 DIAGNOSIS — E11.8 TYPE 2 DIABETES MELLITUS WITH COMPLICATION, WITHOUT LONG-TERM CURRENT USE OF INSULIN (HCC): Primary | ICD-10-CM

## 2022-11-10 DIAGNOSIS — Z23 NEED FOR INFLUENZA VACCINATION: Primary | ICD-10-CM

## 2022-11-10 DIAGNOSIS — R80.9 MICROALBUMINURIA: ICD-10-CM

## 2022-11-10 DIAGNOSIS — N18.2 CKD (CHRONIC KIDNEY DISEASE), STAGE II: Primary | ICD-10-CM

## 2022-11-10 PROCEDURE — G8484 FLU IMMUNIZE NO ADMIN: HCPCS | Performed by: INTERNAL MEDICINE

## 2022-11-10 PROCEDURE — G8399 PT W/DXA RESULTS DOCUMENT: HCPCS | Performed by: INTERNAL MEDICINE

## 2022-11-10 PROCEDURE — 3078F DIAST BP <80 MM HG: CPT | Performed by: INTERNAL MEDICINE

## 2022-11-10 PROCEDURE — 1124F ACP DISCUSS-NO DSCNMKR DOCD: CPT | Performed by: INTERNAL MEDICINE

## 2022-11-10 PROCEDURE — 1090F PRES/ABSN URINE INCON ASSESS: CPT | Performed by: INTERNAL MEDICINE

## 2022-11-10 PROCEDURE — 1036F TOBACCO NON-USER: CPT | Performed by: INTERNAL MEDICINE

## 2022-11-10 PROCEDURE — 3074F SYST BP LT 130 MM HG: CPT | Performed by: INTERNAL MEDICINE

## 2022-11-10 PROCEDURE — 2022F DILAT RTA XM EVC RTNOPTHY: CPT | Performed by: INTERNAL MEDICINE

## 2022-11-10 PROCEDURE — 3017F COLORECTAL CA SCREEN DOC REV: CPT | Performed by: INTERNAL MEDICINE

## 2022-11-10 PROCEDURE — G8427 DOCREV CUR MEDS BY ELIG CLIN: HCPCS | Performed by: INTERNAL MEDICINE

## 2022-11-10 PROCEDURE — 99213 OFFICE O/P EST LOW 20 MIN: CPT | Performed by: INTERNAL MEDICINE

## 2022-11-10 PROCEDURE — 3051F HG A1C>EQUAL 7.0%<8.0%: CPT | Performed by: INTERNAL MEDICINE

## 2022-11-10 PROCEDURE — G8417 CALC BMI ABV UP PARAM F/U: HCPCS | Performed by: INTERNAL MEDICINE

## 2022-11-10 PROCEDURE — 90694 VACC AIIV4 NO PRSRV 0.5ML IM: CPT | Performed by: NURSE PRACTITIONER

## 2022-11-10 PROCEDURE — G0008 ADMIN INFLUENZA VIRUS VAC: HCPCS | Performed by: NURSE PRACTITIONER

## 2022-11-10 NOTE — PROGRESS NOTES
Renal Progress Note    Assessment and Plan:      Diagnosis Orders   1. CKD (chronic kidney disease), stage II        2. Diabetic nephropathy associated with type 2 diabetes mellitus ([facility] [address]        3. Microalbuminuria                  PLAN:  Lab results reviewed with the patient today. She understood. I addressed her questions. Serum creatinine is good at 0.8 mg/dL. Urine microalbumin creatinine ratio is increased to 57 mg/g from 45 mg/gm 12 months ago. Medications reviewed  Patient is not willing to make any changes in diet or medications despite rising urine microalbumin creatinine ratio. Return within 12 months with labs          Patient Active Problem List   Diagnosis    Hyperlipidemia    GERD (gastroesophageal reflux disease)    Essential hypertension    Insomnia    Type 2 diabetes mellitus with complication, without long-term current use of insulin (HCC)    CAD (coronary artery disease)    Class 1 obesity due to excess calories with serious comorbidity and body mass index (BMI) of 34.0 to 34.9 in adult    Elevated liver enzymes    NAFLD (nonalcoholic fatty liver disease)--Dr. Tre Tabares    Hypothyroidism    Microalbuminuria    Blood in stool    GI bleeding    Depression    Iron deficiency anemia due to chronic blood loss    Postmenopausal    Senile osteoporosis    Abnormal ECG    Iron deficiency anemia, unspecified    Anemia, unspecified    Thrombocytopenia (HCC)    Chronic systolic (congestive) heart failure    Acute cystitis with hematuria    Fatigue           Subjective:   Chief complaint:  Chief Complaint   Patient presents with    Other     Microalbuminuria      HPI:This is a follow up visit for Ms Randy Antonio who is here today for return appointment. I see her for chronic kidney disease and microalbuminuria. She was last seen about 12 months ago. She is doing well. .  No complaint. She does not see any bubbles in her urine. No chest pain or shortness of breath. .  She has been hospitalized for UTI at [facility] since last time I saw her. ROS:  Pertinent positives stated above in HPI. All other systems were reviewed and were negative. Medications:     Current Outpatient Medications   Medication Sig Dispense Refill    glimepiride (AMARYL) 2 MG tablet TAKE 2 TABLETS (4 MG) IN THE MORNING AND 1 TABLET (2 MG) IN THE EVENING 270 tablet 0    pantoprazole (PROTONIX) 40 MG tablet TAKE 1 TABLET TWICE DAILY 180 tablet 0    metFORMIN (GLUCOPHAGE-XR) 500 MG extended release tablet TAKE 2 TABLETS TWICE DAILY 360 tablet 0    escitalopram (LEXAPRO) 20 MG tablet TAKE 1 TABLET EVERY DAY 90 tablet 0    acetaminophen (TYLENOL) 500 MG tablet Take 1,000 mg by mouth every 6 hours as needed for Pain      lisinopril (PRINIVIL;ZESTRIL) 20 MG tablet TAKE 1 TABLET TWICE DAILY 180 tablet 3    levothyroxine (SYNTHROID) 50 MCG tablet TAKE 1 TABLET EVERY DAY 90 tablet 3    atorvastatin (LIPITOR) 40 MG tablet TAKE 1 TABLET EVERY DAY 90 tablet 3    atenolol (TENORMIN) 50 MG tablet TAKE 1/2 TABLET EVERY DAY 45 tablet 3    blood glucose test strips (ACCU-CHEK ISABELA PLUS) strip USE 1 STRIP TO CHECK GLUCOSE ONCE DAILY 100 each 3    Blood Glucose Monitoring Suppl (ACCU-CHEK ISABELA PLUS) w/Device KIT USE 1 TO CHECK GLUCOSE ONCE DAILY      blood glucose monitor kit and supplies Glucose monitor and supplies, brand per pt preference. Test sugar daily. Dx: E11.9 1 kit 0    Lancets MISC Test sugar daily. Brand per pt preference. Dx: E11.9 100 each 3    aspirin 81 MG tablet Take 81 mg by mouth nightly      FISH OIL Take by mouth nightly Arthur Red      Docusate Calcium (STOOL SOFTENER PO) Take by mouth nightly as needed       empagliflozin (JARDIANCE) 10 MG tablet Take 1 tablet by mouth daily (Patient not taking: Reported on 11/10/2022) 90 tablet 1     No current facility-administered medications for this visit.        Lab Results:    CBC:   Lab Results   Component Value Date    WBC 6.5 11/01/2022    HGB 8.8 (L) 11/01/2022    HCT 32.8 (L) 11/01/2022    MCV 72.9 (L) 11/01/2022     11/01/2022     BMP:    Lab Results   Component Value Date     (L) 11/01/2022     09/03/2022     (L) 09/02/2022    K 5.0 11/01/2022    K 3.6 09/03/2022    K 3.6 09/02/2022    CL 98 11/01/2022     09/03/2022     09/02/2022    CO2 19 (L) 11/01/2022    CO2 17 (L) 09/03/2022    CO2 15 (L) 09/02/2022    BUN 10 11/01/2022    BUN 8 09/03/2022    BUN 13 09/02/2022    CREATININE 0.8 11/01/2022    CREATININE 0.6 09/03/2022    CREATININE 0.6 09/02/2022    GLUCOSE 163 (H) 11/01/2022    GLUCOSE 229 (H) 09/03/2022    GLUCOSE 179 (H) 09/02/2022      Hepatic:   Lab Results   Component Value Date    AST 41 (H) 08/31/2022    AST 44 (H) 08/30/2022    AST 41 (H) 08/30/2022    ALT 26 08/31/2022    ALT 29 08/30/2022    ALT 26 08/30/2022    BILITOT 0.5 08/31/2022    BILITOT 0.5 08/30/2022    BILITOT 0.6 08/30/2022    ALKPHOS 69 08/31/2022    ALKPHOS 65 08/30/2022    ALKPHOS 62 08/30/2022     BNP: No results found for: BNP  Lipids:   Lab Results   Component Value Date    CHOL 122 11/01/2021    HDL 41 11/01/2021     INR:   Lab Results   Component Value Date    INR 1.12 09/03/2022    INR 1.31 (H) 08/30/2022    INR 1.13 12/09/2021     URINE:   Lab Results   Component Value Date/Time    NAUR 20 08/31/2022 11:33 AM    PROTUR Trace 03/28/2022 04:12 PM     Lab Results   Component Value Date/Time    NITRU NEGATIVE 08/30/2022 05:10 PM    COLORU YELLOW 08/30/2022 05:10 PM    PHUR 5.0 08/30/2022 05:10 PM    WBCUA 10-15 08/30/2022 05:10 PM    WBCUA 0-2 11/29/2011 09:10 AM    RBCUA 5-10 08/30/2022 05:10 PM    YEAST NONE SEEN 08/30/2022 05:10 PM    BACTERIA MANY 08/30/2022 05:10 PM    CLARITYU cloudy 11/11/2011 02:11 PM    SPECGRAV 1.020 11/11/2011 02:11 PM    LEUKOCYTESUR SMALL 08/30/2022 05:10 PM    UROBILINOGEN 1.0 08/30/2022 05:10 PM    BILIRUBINUR NEGATIVE 08/30/2022 05:10 PM    BILIRUBINUR NEGATIVE 11/29/2011 09:10 AM    BLOODU LARGE 08/30/2022 05:10 PM    GLUCOSEU NEGATIVE 08/30/2022 05:10 PM    KETUA TRACE 08/30/2022 05:10 PM    AMORPHOUS URATES 10/24/2018 08:00 AM      Microalbumen/Creatinine ratio:  No components found for: RUCREAT    Objective:   Vitals: /72 (Site: Left Upper Arm, Position: Sitting, Cuff Size: Medium Adult)   Pulse 69   Wt 168 lb (76.2 kg)   SpO2 100%   BMI 32.81 kg/m²      Constitutional:  Alert, awake, no apparent distress  Skin:normal with no rash or any significant lesions  HEENT:Pupils are reactive . Throat is clear. Oral mucosa is moist.  Neck:supple with no thyromegaly, JVD, lymphadenopathy or bruit   Cardiovascular: Regular sinus rhythm without murmur, rubs or gallops   Respiratory:  Clear to auscultation with no wheezes or rales  Abdomen: Good bowel sound, soft, non tender and no bruit  Ext: No LE edema  Musculoskeletal:Intact  Neuro:Alert, awake and oriented with no obvious focal deficit. Speech is normal.**    Electronically signed by Cy Garrison MD on 11/10/2022 at 2:46 PM   **This report has been created using voice recognition software. It maycontain minor  errors which are inherent in voice recognition technology. **

## 2022-11-10 NOTE — PROGRESS NOTES
Pt in office with her  today and wanted her labs reviewed as well. Hemoglobin A1C   Date Value Ref Range Status   11/01/2022 7.6 (H) 4.4 - 6.4 % Final     Last A1C  was elevated and reccommended that pt start Jardiance for her DM. Pt agreeable to this. RX sent to pharmacy. Follow up in Dec as planned.  -WS    Orders Placed This Encounter   Medications    empagliflozin (JARDIANCE) 10 MG tablet     Sig: Take 1 tablet by mouth daily     Dispense:  90 tablet     Refill:  1

## 2022-11-10 NOTE — PROGRESS NOTES
Vaccine Information Sheet, \"Influenza - Inactivated\"  given to Telly Haskins and verbalized understanding. Patient responses:    Have you ever had a reaction to a flu vaccine? No  Do you have an allergy to eggs, Latex -induced anaphylaxis, neomycin or polymixin? No  Do you have an allergy to Thimerosal, contact lens solution, or Merthiolate? No  Have you ever had Guillian Walker Syndrome? No  Do you have any current illness? No  Do you have a temperature above 100.4 degrees? No  Are you pregnant? No  Do you have an active neurological disorder? No      Immunizations Administered       Name Date Dose Route    Influenza, FLUAD, (age 72 y+), Adjuvanted, 0.5mL 11/10/2022 0.5 mL Intramuscular    Site: Deltoid- Right    Lot: [id number]    NDC: 22944-895-27            The injection above was given by Tom Matias CMA. Pt tolerated well.

## 2022-12-05 ENCOUNTER — OFFICE VISIT (OUTPATIENT)
Dept: CARDIOLOGY CLINIC | Age: 72
End: 2022-12-05
Payer: MEDICARE

## 2022-12-05 VITALS
HEIGHT: 62 IN | DIASTOLIC BLOOD PRESSURE: 68 MMHG | HEART RATE: 60 BPM | SYSTOLIC BLOOD PRESSURE: 134 MMHG | BODY MASS INDEX: 30.55 KG/M2 | WEIGHT: 166 LBS

## 2022-12-05 DIAGNOSIS — I25.10 CORONARY ARTERY DISEASE INVOLVING NATIVE CORONARY ARTERY OF NATIVE HEART WITHOUT ANGINA PECTORIS: Primary | ICD-10-CM

## 2022-12-05 DIAGNOSIS — I10 PRIMARY HYPERTENSION: ICD-10-CM

## 2022-12-05 DIAGNOSIS — E78.01 FAMILIAL HYPERCHOLESTEROLEMIA: ICD-10-CM

## 2022-12-05 PROCEDURE — 3017F COLORECTAL CA SCREEN DOC REV: CPT | Performed by: NUCLEAR MEDICINE

## 2022-12-05 PROCEDURE — G8417 CALC BMI ABV UP PARAM F/U: HCPCS | Performed by: NUCLEAR MEDICINE

## 2022-12-05 PROCEDURE — 1036F TOBACCO NON-USER: CPT | Performed by: NUCLEAR MEDICINE

## 2022-12-05 PROCEDURE — G8427 DOCREV CUR MEDS BY ELIG CLIN: HCPCS | Performed by: NUCLEAR MEDICINE

## 2022-12-05 PROCEDURE — 99213 OFFICE O/P EST LOW 20 MIN: CPT | Performed by: NUCLEAR MEDICINE

## 2022-12-05 PROCEDURE — 1090F PRES/ABSN URINE INCON ASSESS: CPT | Performed by: NUCLEAR MEDICINE

## 2022-12-05 PROCEDURE — 3078F DIAST BP <80 MM HG: CPT | Performed by: NUCLEAR MEDICINE

## 2022-12-05 PROCEDURE — 3074F SYST BP LT 130 MM HG: CPT | Performed by: NUCLEAR MEDICINE

## 2022-12-05 PROCEDURE — 1124F ACP DISCUSS-NO DSCNMKR DOCD: CPT | Performed by: NUCLEAR MEDICINE

## 2022-12-05 PROCEDURE — G8399 PT W/DXA RESULTS DOCUMENT: HCPCS | Performed by: NUCLEAR MEDICINE

## 2022-12-05 PROCEDURE — G8484 FLU IMMUNIZE NO ADMIN: HCPCS | Performed by: NUCLEAR MEDICINE

## 2022-12-05 NOTE — PROGRESS NOTES
[address] [facility] [address]  [address]  [place] [address]  Dept: [phone]  Dept Fax: [phone]  Loc: [phone]    Visit Date: 12/5/2022    Trinh Garrett is a 71 y.o. female who presents todayfor:  Chief Complaint   Patient presents with    Check-Up    Coronary Artery Disease    Hypertension    Hyperlipidemia     Know MI and LAD stents  a recent stress test   Anterior apical infarct   No obvious ischemia   No chest pain   No changes in breathing  BP is stable   No dizziness  No syncope      HPI:  HPI  Past Medical History:   Diagnosis Date    CAD (coronary artery disease)     CHF (congestive heart failure) (HCC)     Depression 2000    GERD (gastroesophageal reflux disease)     Headache(784.0)     Heart attack ([facility] [address]     Hyperlipidemia     Hypertension     Hypothyroidism 2/11/2015    Liver disease     MI (myocardial infarction) ([facility] [address] 11/99, 10/01    x 2     Obesity     Osteoarthritis     B/L knees--Dr. Sinha Degree.     PONV (postoperative nausea and vomiting)     Type 2 diabetes mellitus without complication (HCC)     Type II or unspecified type diabetes mellitus without mention of complication, not stated as uncontrolled 2014      Past Surgical History:   Procedure Laterality Date    [place]  07/29/2009    Diagnostic Cath EF 40-45% (Dr Todd Weber)    [address] ARTHROSCOPY Right     HI COLON CA SCRN NOT  [address] IND Left 3/1/2018    COLONOSCOPY performed by Tico Ceballos MD at [address] [facility]    PTCA      Shunt 10/01--Stent 11/99    TONSILLECTOMY AND ADENOIDECTOMY      UPPER GASTROINTESTINAL ENDOSCOPY      UPPER GASTROINTESTINAL ENDOSCOPY Left 2/27/2018    EGD BAND LIGATION performed by Tico Ceballos MD at [facility] 1 N/A 4/19/2018    EGD BIOPSY performed by Jesús Treadwell MD at [address] [facility]     Family History Problem Relation Age of Onset    Heart Disease Father     Heart Attack Father     Other Father         LUNG Abdoulaye Laywing    Cancer Mother     High Blood Pressure Mother     Heart Disease Mother     Other Mother         GLAUCOMA    High Blood Pressure Sister     Osteoporosis Sister      Social History     Tobacco Use    Smoking status: Former     Years: 30.00     Types: Cigarettes     Quit date: 1999     Years since quittin.0    Smokeless tobacco: Former   Substance Use Topics    Alcohol use: No     Comment: rarely      Current Outpatient Medications   Medication Sig Dispense Refill    glimepiride (AMARYL) 2 MG tablet TAKE 2 TABLETS (4 MG) IN THE MORNING AND 1 TABLET (2 MG) IN THE EVENING 270 tablet 0    pantoprazole (PROTONIX) 40 MG tablet TAKE 1 TABLET TWICE DAILY 180 tablet 0    metFORMIN (GLUCOPHAGE-XR) 500 MG extended release tablet TAKE 2 TABLETS TWICE DAILY 360 tablet 0    escitalopram (LEXAPRO) 20 MG tablet TAKE 1 TABLET EVERY DAY 90 tablet 0    acetaminophen (TYLENOL) 500 MG tablet Take 1,000 mg by mouth every 6 hours as needed for Pain      lisinopril (PRINIVIL;ZESTRIL) 20 MG tablet TAKE 1 TABLET TWICE DAILY 180 tablet 3    levothyroxine (SYNTHROID) 50 MCG tablet TAKE 1 TABLET EVERY DAY 90 tablet 3    atorvastatin (LIPITOR) 40 MG tablet TAKE 1 TABLET EVERY DAY 90 tablet 3    atenolol (TENORMIN) 50 MG tablet TAKE 1/2 TABLET EVERY DAY 45 tablet 3    blood glucose test strips (ACCU-CHEK ISABELA PLUS) strip USE 1 STRIP TO CHECK GLUCOSE ONCE DAILY 100 each 3    Blood Glucose Monitoring Suppl (ACCU-CHEK ISABELA PLUS) w/Device KIT USE 1 TO CHECK GLUCOSE ONCE DAILY      blood glucose monitor kit and supplies Glucose monitor and supplies, brand per pt preference. Test sugar daily. Dx: E11.9 1 kit 0    Lancets MISC Test sugar daily. Brand per pt preference.  Dx: E11.9 100 each 3    aspirin 81 MG tablet Take 81 mg by mouth nightly      FISH OIL Take by mouth nightly Arthur Red      Docusate Calcium (STOOL SOFTENER PO) Take by mouth nightly as needed       empagliflozin (JARDIANCE) 10 MG tablet Take 1 tablet by mouth daily (Patient not taking: No sig reported) 90 tablet 1     No current facility-administered medications for this visit. No Known Allergies  Health Maintenance   Topic Date Due    Shingles vaccine (2 of 3) 06/21/2012    Breast cancer screen  05/14/2020    Annual Wellness Visit (AWV)  03/18/2022    COVID-19 Vaccine (5 - Booster for Pfizer series) 05/31/2022    Diabetic foot exam  10/12/2022    Lipids  11/01/2022    Diabetic retinal exam  11/29/2022    Depression Monitoring  09/20/2023    A1C test (Diabetic or Prediabetic)  11/01/2023    Diabetic microalbuminuria test  11/01/2023    DTaP/Tdap/Td vaccine (2 - Td or Tdap) 09/19/2024    Colorectal Cancer Screen  09/03/2031    DEXA (modify frequency per FRAX score)  Completed    Flu vaccine  Completed    Pneumococcal 65+ years Vaccine  Completed    Hepatitis C screen  Completed    Hepatitis A vaccine  Aged Out    Hib vaccine  Aged Out    Meningococcal (ACWY) vaccine  Aged Out       Subjective:  Review of Systems  General:   No fever, no chills, No fatigue or weight loss  Pulmonary:    No dyspnea, no wheezing  Cardiac:    Denies recent chest pain,   GI:     No nausea or vomiting, no abdominal pain  Neuro:    No dizziness or light headedness,   Musculoskeletal:  No recent active issues  Extremities:   No edema, no obvious claudication     Objective:  Physical Exam  /68   Pulse 60   Ht 5' 2\" (1.575 m)   Wt 166 lb (75.3 kg)   BMI 30.36 kg/m²   General:   Well developed, well nourished  Lungs:   Clear to auscultation  Heart:    Normal S1 S2, Slight murmur. no rubs, no gallops  Abdomen:   Soft, non tender, no organomegalies, positive bowel sounds  Extremities:   No edema, no cyanosis, good peripheral pulses  Neurological:   Awake, alert, oriented.  No obvious focal deficits  Musculoskelatal:  No obvious deformities    Assessment:      Diagnosis Orders   1. Coronary artery disease involving native coronary artery of native heart without angina pectoris        2. Primary hypertension        3. Familial hypercholesterolemia        Cardiac fair for now   No new issues   Plan:  No follow-ups on file. As above  Continue risk factor modification and medical management  Thank you for allowing me to participate in the care of your patient. Please don't hesitate to contact me regarding any further issues related to the patient care    Orders Placed:  No orders of the defined types were placed in this encounter. Medications Prescribed:  No orders of the defined types were placed in this encounter. Discussed use, benefit, and side effects of prescribed medications. All patient questions answered. Pt voicedunderstanding. Instructed to continue current medications, diet and exercise. Continue risk factor modification and medical management. Patient agreed with treatment plan. Follow up as directed.     Electronically signedby Ricardo Qureshi MD on 12/5/2022 at 12:32 PM

## 2023-01-03 ENCOUNTER — OFFICE VISIT (OUTPATIENT)
Dept: FAMILY MEDICINE CLINIC | Age: 73
End: 2023-01-03
Payer: MEDICARE

## 2023-01-03 VITALS
DIASTOLIC BLOOD PRESSURE: 68 MMHG | WEIGHT: 168 LBS | HEIGHT: 62 IN | BODY MASS INDEX: 30.91 KG/M2 | SYSTOLIC BLOOD PRESSURE: 110 MMHG | HEART RATE: 68 BPM | RESPIRATION RATE: 16 BRPM | TEMPERATURE: 98.4 F

## 2023-01-03 DIAGNOSIS — E11.21 DIABETIC NEPHROPATHY ASSOCIATED WITH TYPE 2 DIABETES MELLITUS (HCC): ICD-10-CM

## 2023-01-03 DIAGNOSIS — I50.22 CHRONIC SYSTOLIC (CONGESTIVE) HEART FAILURE (HCC): ICD-10-CM

## 2023-01-03 DIAGNOSIS — D69.6 THROMBOCYTOPENIA (HCC): ICD-10-CM

## 2023-01-03 DIAGNOSIS — Z12.31 ENCOUNTER FOR SCREENING MAMMOGRAM FOR MALIGNANT NEOPLASM OF BREAST: ICD-10-CM

## 2023-01-03 DIAGNOSIS — I10 ESSENTIAL HYPERTENSION: ICD-10-CM

## 2023-01-03 DIAGNOSIS — E03.9 HYPOTHYROIDISM, UNSPECIFIED TYPE: ICD-10-CM

## 2023-01-03 DIAGNOSIS — Z00.00 MEDICARE ANNUAL WELLNESS VISIT, SUBSEQUENT: Primary | ICD-10-CM

## 2023-01-03 DIAGNOSIS — E11.8 TYPE 2 DIABETES MELLITUS WITH COMPLICATION, WITHOUT LONG-TERM CURRENT USE OF INSULIN (HCC): ICD-10-CM

## 2023-01-03 PROCEDURE — G8484 FLU IMMUNIZE NO ADMIN: HCPCS | Performed by: NURSE PRACTITIONER

## 2023-01-03 PROCEDURE — 3017F COLORECTAL CA SCREEN DOC REV: CPT | Performed by: NURSE PRACTITIONER

## 2023-01-03 PROCEDURE — G0439 PPPS, SUBSEQ VISIT: HCPCS | Performed by: NURSE PRACTITIONER

## 2023-01-03 PROCEDURE — 1124F ACP DISCUSS-NO DSCNMKR DOCD: CPT | Performed by: NURSE PRACTITIONER

## 2023-01-03 PROCEDURE — 3046F HEMOGLOBIN A1C LEVEL >9.0%: CPT | Performed by: NURSE PRACTITIONER

## 2023-01-03 PROCEDURE — 3074F SYST BP LT 130 MM HG: CPT | Performed by: NURSE PRACTITIONER

## 2023-01-03 PROCEDURE — 3078F DIAST BP <80 MM HG: CPT | Performed by: NURSE PRACTITIONER

## 2023-01-03 ASSESSMENT — PATIENT HEALTH QUESTIONNAIRE - PHQ9
SUM OF ALL RESPONSES TO PHQ QUESTIONS 1-9: 0
SUM OF ALL RESPONSES TO PHQ QUESTIONS 1-9: 0
SUM OF ALL RESPONSES TO PHQ9 QUESTIONS 1 & 2: 0
2. FEELING DOWN, DEPRESSED OR HOPELESS: 0
3. TROUBLE FALLING OR STAYING ASLEEP: 0
6. FEELING BAD ABOUT YOURSELF - OR THAT YOU ARE A FAILURE OR HAVE LET YOURSELF OR YOUR FAMILY DOWN: 0
8. MOVING OR SPEAKING SO SLOWLY THAT OTHER PEOPLE COULD HAVE NOTICED. OR THE OPPOSITE, BEING SO FIGETY OR RESTLESS THAT YOU HAVE BEEN MOVING AROUND A LOT MORE THAN USUAL: 0
4. FEELING TIRED OR HAVING LITTLE ENERGY: 0
1. LITTLE INTEREST OR PLEASURE IN DOING THINGS: 0
SUM OF ALL RESPONSES TO PHQ QUESTIONS 1-9: 0
7. TROUBLE CONCENTRATING ON THINGS, SUCH AS READING THE NEWSPAPER OR WATCHING TELEVISION: 0
SUM OF ALL RESPONSES TO PHQ QUESTIONS 1-9: 0
9. THOUGHTS THAT YOU WOULD BE BETTER OFF DEAD, OR OF HURTING YOURSELF: 0
10. IF YOU CHECKED OFF ANY PROBLEMS, HOW DIFFICULT HAVE THESE PROBLEMS MADE IT FOR YOU TO DO YOUR WORK, TAKE CARE OF THINGS AT HOME, OR GET ALONG WITH OTHER PEOPLE: 0
5. POOR APPETITE OR OVEREATING: 0

## 2023-01-03 ASSESSMENT — LIFESTYLE VARIABLES
HOW OFTEN DO YOU HAVE A DRINK CONTAINING ALCOHOL: NEVER
HOW MANY STANDARD DRINKS CONTAINING ALCOHOL DO YOU HAVE ON A TYPICAL DAY: PATIENT DOES NOT DRINK

## 2023-01-03 NOTE — PATIENT INSTRUCTIONS
Learning About Being Active as an Older Adult  Why is being active important as you get older? Being active is one of the best things you can do for your health. And it's never too late to start. Being active--or getting active, if you aren't already--has definite benefits. It can:  Give you more energy,  Keep your mind sharp. Improve balance to reduce your risk of falls. Help you manage chronic illness with fewer medicines. No matter how old you are, how fit you are, or what health problems you have, there is a form of activity that will work for you. And the more physical activity you can do, the better your overall health will be. What kinds of activity can help you stay healthy? Being more active will make your daily activities easier. Physical activity includes planned exercise and things you do in daily life. There are four types of activity:  Aerobic. Doing aerobic activity makes your heart and lungs strong. Includes walking, dancing, and gardening. Aim for at least 2½ hours spread throughout the week. It improves your energy and can help you sleep better. Muscle-strengthening. This type of activity can help maintain muscle and strengthen bones. Includes climbing stairs, using resistance bands, and lifting or carrying heavy loads. Aim for at least twice a week. It can help protect the knees and other joints. Stretching. Stretching gives you better range of motion in joints and muscles. Includes upper arm stretches, calf stretches, and gentle yoga. Aim for at least twice a week, preferably after your muscles are warmed up from other activities. It can help you function better in daily life. Balancing. This helps you stay coordinated and have good posture. Includes heel-to-toe walking, bobo chi, and certain types of yoga. Aim for at least 3 days a week. It can reduce your risk of falling.   Even if you have a hard time meeting the recommendations, it's better to be more active than less active. All activity done in each category counts toward your weekly total. You'd be surprised how daily things like carrying groceries, keeping up with grandchildren, and taking the stairs can add up. What keeps you from being active? If you've had a hard time being more active, you're not alone. Maybe you remember being able to do more. Or maybe you've never thought of yourself as being active. It's frustrating when you can't do the things you want. Being more active can help. What's holding you back? Getting started. Have a goal, but break it into easy tasks. Small steps build into big accomplishments. Staying motivated. If you feel like skipping your activity, remember your goal. Maybe you want to move better and stay independent. Every activity gets you one step closer. Not feeling your best.  Start with 5 minutes of an activity you enjoy. Prove to yourself you can do it. As you get comfortable, increase your time. You may not be where you want to be. But you're in the process of getting there. Everyone starts somewhere. How can you find safe ways to stay active? Talk with your doctor about any physical challenges you're facing. Make a plan with your doctor if you have a health problem or aren't sure how to get started with activity. If you're already active, ask your doctor if there is anything you should change to stay safe as your body and health change. If you tend to feel dizzy after you take medicine, avoid activity at that time. Try being active before you take your medicine. This will reduce your risk of falls. If you plan to be active at home, make sure to clear your space before you get started. Remove things like TV cords, coffee tables, and throw rugs. It's safest to have plenty of space to move freely. The key to getting more active is to take it slow and steady. Try to improve only a little bit at a time.  Pick just one area to improve on at first. And if an activity hurts, stop and talk to your doctor. Where can you learn more? Go to http://www.parker.com/ and enter P600 to learn more about \"Learning About Being Active as an Older Adult. \"  Current as of: October 10, 2022               Content Version: 13.5  © 9281-1856 Healthwise, Incorporated. Care instructions adapted under license by [facility] [place] ([facility]). If you have questions about a medical condition or this instruction, always ask your healthcare professional. [place] [address] any warranty or liability for your use of this information. Learning About Dental Care for Older Adults  Dental care for older adults: Overview  Dental care for older people is much the same as for younger adults. But older adults do have concerns that younger adults do not. Older adults may have problems with gum disease and decay on the roots of their teeth. They may need missing teeth replaced or broken fillings fixed. Or they may have dentures that need to be cared for. Some older adults may have trouble holding a toothbrush. You can help remind the person you are caring for to brush and floss their teeth or to clean their dentures. In some cases, you may need to do the brushing and other dental care tasks. People who have trouble using their hands or who have dementia may need this extra help. How can you help with dental care? Normal dental care  To keep the teeth and gums healthy:  Brush the teeth with fluoride toothpaste twice a day--in the morning and at night--and floss at least once a day. Plaque can quickly build up on the teeth of older adults. Watch for the signs of gum disease. These signs include gums that bleed after brushing or after eating hard foods, such as apples. See a dentist regularly. Many experts recommend checkups every 6 months. Keep the dentist up to date on any new medications the person is taking.   Encourage a balanced diet that includes whole grains, vegetables, and fruits, and that is low in saturated fat and sodium. Encourage the person you're caring for not to use tobacco products. They can affect dental and general health. Many older adults have a fixed income and feel that they can't afford dental care. But most towns and [facility] have programs in which dentists help older adults by lowering fees. Contact your area's public health offices or  for information about dental care in your area. Using a toothbrush  Older adults with arthritis sometimes have trouble brushing their teeth because they can't easily hold the toothbrush. Their hands and fingers may be stiff, painful, or weak. If this is the case, you can: Offer an electric toothbrush. Enlarge the handle of a non-electric toothbrush by wrapping a sponge, an elastic bandage, or adhesive tape around it. Push the toothbrush handle through a ball made of rubber or soft foam.  Make the handle longer and thicker by taping Popsicle sticks or tongue depressors to it. You may also be able to buy special toothbrushes, toothpaste dispensers, and floss holders. Your doctor may recommend a soft-bristle toothbrush if the person you care for bleeds easily. Bleeding can happen because of a health problem or from certain medicines. A toothpaste for sensitive teeth may help if the person you care for has sensitive teeth. How do you brush and floss someone's teeth? If the person you are caring for has a hard time cleaning their teeth on their own, you may need to brush and floss their teeth for them. It may be easiest to have the person sit and face away from you, and to sit or stand behind them. That way you can steady their head against your arm as you reach around to floss and brush their teeth. Choose a place that has good lighting and is comfortable for both of you. Before you begin, gather your supplies. You will need gloves, floss, a toothbrush, and a container to hold water if you are not near a sink.  Wash and dry your hands well and put on gloves. Start by flossing:  Gently work a piece of floss between each of the teeth toward the gums. A plastic flossing tool may make this easier, and they are available at most [facility]. Curve the floss around each tooth into a U-shape and gently slide it under the gum line. Move the floss firmly up and down several times to scrape off the plaque. After you've finished flossing, throw away the used floss and begin brushing:  Wet the brush and apply toothpaste. Place the brush at a 45-degree angle where the teeth meet the gums. Press firmly, and move the brush in small circles over the surface of the teeth. Be careful not to brush too hard. Vigorous brushing can make the gums pull away from the teeth and can scratch the tooth enamel. Brush all surfaces of the teeth, on the tongue side and on the cheek side. Pay special attention to the front teeth and all surfaces of the back teeth. Brush chewing surfaces with short back-and-forth strokes. After you've finished, help the person rinse the remaining toothpaste from their mouth. Where can you learn more? Go to http://www.woods.com/ and enter F944 to learn more about \"Learning About Dental Care for Older Adults. \"  Current as of: June 16, 2022               Content Version: 13.5  © 5869-6001 Healthwise, Incorporated. Care instructions adapted under license by [facility] ([facility]). If you have questions about a medical condition or this instruction, always ask your healthcare professional. [place] [address] any warranty or liability for your use of this information. Advance Directives: Care Instructions  Overview  An advance directive is a legal way to state your wishes at the end of your life. It tells your family and your doctor what to do if you can't say what you want. There are two main types of advance directives. You can change them any time your wishes change. Living will.   This form tells your family and your doctor your wishes about life support and other treatment. The form is also called a declaration. Medical power of . This form lets you name a person to make treatment decisions for you when you can't speak for yourself. This person is called a health care agent (health care proxy, health care surrogate). The form is also called a durable power of  for health care. If you do not have an advance directive, decisions about your medical care may be made by a family member, or by a doctor or a  who doesn't know you. It may help to think of an advance directive as a gift to the people who care for you. If you have one, they won't have to make tough decisions by themselves. For more information, including forms for your state, see the [address] website (www.caringinfo.org/planning/advance-directives/). Follow-up care is a key part of your treatment and safety. Be sure to make and go to all appointments, and call your doctor if you are having problems. It's also a good idea to know your test results and keep a list of the medicines you take. What should you include in an advance directive? Many states have a unique advance directive form. (It may ask you to address specific issues.) Or you might use a universal form that's approved by many states. If your form doesn't tell you what to address, it may be hard to know what to include in your advance directive. Use the questions below to help you get started. Who do you want to make decisions about your medical care if you are not able to? What life-support measures do you want if you have a serious illness that gets worse over time or can't be cured? What are you most afraid of that might happen? (Maybe you're afraid of having pain, losing your independence, or being kept alive by machines.)  Where would you prefer to die? (Your home? A hospital? A nursing home?)  Do you want to donate your organs when you die?   Do you want certain [employer]ism practices performed before you die? When should you call for help? Be sure to contact your doctor if you have any questions. Where can you learn more? Go to http://www.parker.com/ and enter R264 to learn more about \"Advance Directives: Care Instructions. \"  Current as of: June 16, 2022               Content Version: 13.5  © 2006-2022 Healthwise, [email or web address]. Care instructions adapted under license by [facility] [place] ([facility]). If you have questions about a medical condition or this instruction, always ask your healthcare professional. [address] any warranty or liability for your use of this information. Personalized Preventive Plan for [facility] - 1/3/2023  Medicare offers a range of preventive health benefits. Some of the tests and screenings are paid in full while other may be subject to a deductible, co-insurance, and/or copay. Some of these benefits include a comprehensive review of your medical history including lifestyle, illnesses that may run in your family, and various assessments and screenings as appropriate. After reviewing your medical record and screening and assessments performed today your provider may have ordered immunizations, labs, imaging, and/or referrals for you. A list of these orders (if applicable) as well as your Preventive Care list are included within your After Visit Summary for your review. Other Preventive Recommendations:    A preventive eye exam performed by an eye specialist is recommended every 1-2 years to screen for glaucoma; cataracts, macular degeneration, and other eye disorders. A preventive dental visit is recommended every 6 months. Try to get at least 150 minutes of exercise per week or 10,000 steps per day on a pedometer . Order or download the FREE \"Exercise & Physical Activity: Your Everyday Guide\" from The [employer] on Aging.  Call 7-413.130.3529 or search [employer] on Aging online. You need 1041-9183 mg of calcium and 1820-5129 IU of vitamin D per day. It is possible to meet your calcium requirement with diet alone, but a vitamin D supplement is usually necessary to meet this goal.  When exposed to the sun, use a sunscreen that protects against both UVA and UVB radiation with an SPF of 30 or greater. Reapply every 2 to 3 hours or after sweating, drying off with a towel, or swimming. Always wear a seat belt when traveling in a car. Always wear a helmet when riding a bicycle or motorcycle.

## 2023-01-03 NOTE — PROGRESS NOTES
[facility]  [address] [place] [id number]  Dept: [phone]  Dept Fax: [phone]: [phone]      Medicare Annual Wellness Visit    Anson Tinoco is here for Medicare AWV (Here for Medicare AWV.)    Assessment & Plan   Medicare annual wellness visit, subsequent  Encounter for screening mammogram for malignant neoplasm of breast  -     ALEXANDREA DIGITAL SCREEN W OR WO CAD BILATERAL; Future  Chronic systolic (congestive) heart failure (HCC)  Diabetic nephropathy associated with type 2 diabetes mellitus (HCC)  Thrombocytopenia (HCC)  Type 2 diabetes mellitus with complication, without long-term current use of insulin ([facility] [address]  -     Lipid Panel; Future  -     Hemoglobin A1C; Future  Essential hypertension  -     CBC with Auto Differential; Future  -     Comprehensive Metabolic Panel; Future  Hypothyroidism, unspecified type            Recommendations for Preventive Services Due: see orders and patient instructions/AVS.  Recommended screening schedule for the next 5-10 years is provided to the patient in written form: see Patient Instructions/AVS.     Return for Medicare Annual Wellness Visit in 1 year. Subjective         Patient's complete Health Risk Assessment and screening values have been reviewed and are found in Flowsheets. The following problems were reviewed today and where indicated follow up appointments were made and/or referrals ordered.     Positive Risk Factor Screenings with Interventions:                 Weight and Activity:  Physical Activity: Inactive    Days of Exercise per Week: 0 days    Minutes of Exercise per Session: 0 min     On average, how many days per week do you engage in moderate to strenuous exercise (like a brisk walk)?: 0 days  Have you lost any weight without trying in the past 3 months?: No  Body mass index: (!) 30.72    Inactivity Interventions:  Patient declined any further interventions or treatment  Obesity Interventions:  Patient declines any further evaluation or treatment          Dentist Screen:  Have you seen the dentist within the past year?: (!) No    Intervention:  Patient declines any further evaluation or treatment        Advanced Directives:  Do you have a Living Will?: (!) No    Intervention:  has NO advanced directive - not interested in additional information            Objective   Vitals:    01/03/23 1506   BP: 110/68   Site: Left Upper Arm   Pulse: 68   Resp: 16   Temp: 98.4 °F (36.9 °C)   TempSrc: Oral   Weight: 168 lb (76.2 kg)   Height: 5' 2\" (1.575 m)      Body mass index is 30.73 kg/m². No Known Allergies  Prior to Visit Medications    Medication Sig Taking?  Authorizing Provider   glimepiride (AMARYL) 2 MG tablet TAKE 2 TABLETS (4 MG) IN THE MORNING AND 1 TABLET (2 MG) IN THE EVENING Yes FABIENNE Salazar CNP   pantoprazole (PROTONIX) 40 MG tablet TAKE 1 TABLET TWICE DAILY Yes FABIENNE Hurd CNP   metFORMIN (GLUCOPHAGE-XR) 500 MG extended release tablet TAKE 2 TABLETS TWICE DAILY Yes FABIENNE Hurd CNP   escitalopram (LEXAPRO) 20 MG tablet TAKE 1 TABLET EVERY DAY Yes FABIENNE Hurd CNP   acetaminophen (TYLENOL) 500 MG tablet Take 1,000 mg by mouth every 6 hours as needed for Pain Yes Historical Provider, MD   lisinopril (PRINIVIL;ZESTRIL) 20 MG tablet TAKE 1 TABLET TWICE DAILY Yes Mercedes Zavala MD   levothyroxine (SYNTHROID) 50 MCG tablet TAKE 1 TABLET EVERY DAY Yes Mercedes Zavala MD   atorvastatin (LIPITOR) 40 MG tablet TAKE 1 TABLET EVERY DAY Yes Mercedes Zavala MD   atenolol (TENORMIN) 50 MG tablet TAKE 1/2 Salma Grounds Yes Mercedes Zavala MD   blood glucose test strips (ACCU-CHEK ISABELA PLUS) strip USE 1 STRIP  [address] Yes Mercedes Zavala MD   Blood Glucose Monitoring Suppl (ACCU-CHEK ISABELA PLUS) w/Device KIT USE 1 TO CHECK GLUCOSE ONCE DAILY Yes Historical Provider, MD   blood glucose monitor kit and supplies Glucose monitor and supplies, brand per pt preference. Test sugar daily. Dx: E11.9 Yes Edilson Erwin MD   Lancets MISC Test sugar daily. Brand per pt preference.  Dx: E11.9 Yes Edilson Erwin MD   aspirin 81 MG tablet Take 81 mg by mouth nightly Yes Historical Provider, MD   [address] Take by mouth nightly Arthur Red Yes Historical Provider, MD   Docusate Calcium (STOOL SOFTENER PO) Take by mouth nightly as needed  Yes Historical Provider, MD   ibuprofen (ADVIL;MOTRIN) 200 MG tablet Take 400 mg by mouth every 6 hours as needed for Fever  Historical Provider, MD   albuterol sulfate HFA (VENTOLIN HFA) 108 (90 Base) MCG/ACT inhaler Inhale 2 puffs into the lungs 4 times daily as needed for Wheezing  Patient not taking: Reported on 8/30/2022  FABIENNE Billings CNP       CareTeam (Including outside providers/suppliers regularly involved in providing care):   Patient Care Team:  FABIENNE Billings CNP as PCP - General (Family Nurse Practitioner)  FABIENNE Billings CNP as PCP - [facility] Empaneled Provider  Marian Matthew MD as Cardiologist (Cardiology)     Reviewed and updated this visit:  Tobacco  Allergies  Meds  Problems  Med Hx  Surg Hx  Soc Hx  Fam Hx        Electronically signed by FABIENNE Blackwood CNP on 1/4/2023 at 7:55 AM

## 2023-01-04 ASSESSMENT — ENCOUNTER SYMPTOMS
SINUS PAIN: 0
COUGH: 0
TROUBLE SWALLOWING: 0
DIARRHEA: 0
SORE THROAT: 0
EYE PAIN: 0
ABDOMINAL PAIN: 0
SHORTNESS OF BREATH: 0
VOMITING: 0
COLOR CHANGE: 0
BACK PAIN: 0
WHEEZING: 0
NAUSEA: 0
FACIAL SWELLING: 0

## 2023-01-26 DIAGNOSIS — E03.9 HYPOTHYROIDISM, UNSPECIFIED TYPE: ICD-10-CM

## 2023-01-26 DIAGNOSIS — E11.8 TYPE 2 DIABETES MELLITUS WITH COMPLICATION, WITHOUT LONG-TERM CURRENT USE OF INSULIN (HCC): ICD-10-CM

## 2023-01-26 DIAGNOSIS — E78.5 HYPERLIPIDEMIA, UNSPECIFIED HYPERLIPIDEMIA TYPE: ICD-10-CM

## 2023-01-26 DIAGNOSIS — I10 ESSENTIAL HYPERTENSION: ICD-10-CM

## 2023-01-26 NOTE — TELEPHONE ENCOUNTER
This medication refill is regarding a electronic request. Refill requested by  [employer] . Requested Prescriptions     Pending Prescriptions Disp Refills    atenolol (TENORMIN) 50 MG tablet [Pharmacy Med Name: ATENOLOL 50 MG Tablet] 45 tablet 0     Sig: TAKE 1/2 TABLET EVERY DAY    atorvastatin (LIPITOR) 40 MG tablet [Pharmacy Med Name: ATORVASTATIN CALCIUM 40 MG Tablet] 90 tablet 0     Sig: TAKE 1 TABLET EVERY DAY    levothyroxine (SYNTHROID) 50 MCG tablet [Pharmacy Med Name: LEVOTHYROXINE SODIUM 50 MCG Tablet] 90 tablet 3     Sig: TAKE 1 TABLET EVERY DAY    lisinopril (PRINIVIL;ZESTRIL) 20 MG tablet [Pharmacy Med Name: LISINOPRIL 20 MG Tablet] 180 tablet 0     Sig: TAKE 1 TABLET TWICE DAILY     Date of last visit: 1/3/2023   Date of next visit: None  Date of last refill:    -Atenolol 1/27/22 #45/3   -Atorvastatin 1/27/22 #90/3   -Levothyroxine 1/27/22 #90/3   -Lisinopril 1/27/22 #180/3    Last Lipid Panel:    Lab Results   Component Value Date/Time    CHOL 122 11/01/2021 10:43 AM    TRIG 127 11/01/2021 10:43 AM    HDL 41 11/01/2021 10:43 AM    LDLCALC 56 11/01/2021 10:43 AM     Last CMP:   Lab Results   Component Value Date     (L) 11/01/2022    K 5.0 11/01/2022    CL 98 11/01/2022    CO2 19 (L) 11/01/2022    BUN 10 11/01/2022    CREATININE 0.8 11/01/2022    GLUCOSE 163 (H) 11/01/2022    CALCIUM 9.5 11/01/2022    PROT 6.2 08/31/2022    LABALBU 3.1 (L) 08/31/2022    BILITOT 0.5 08/31/2022    ALKPHOS 69 08/31/2022    AST 41 (H) 08/31/2022    ALT 26 08/31/2022    LABGLOM >60 11/01/2022     Last Thyroid:    Lab Results   Component Value Date    TSH 1.610 11/01/2022    T4FREE 1.34 11/01/2022     Rx verified, ordered and set to EP.

## 2023-01-27 RX ORDER — LEVOTHYROXINE SODIUM 0.05 MG/1
TABLET ORAL
Qty: 90 TABLET | Refills: 1 | Status: SHIPPED | OUTPATIENT
Start: 2023-01-27

## 2023-01-27 RX ORDER — ATORVASTATIN CALCIUM 40 MG/1
TABLET, FILM COATED ORAL
Qty: 90 TABLET | Refills: 1 | Status: SHIPPED | OUTPATIENT
Start: 2023-01-27

## 2023-01-27 RX ORDER — ATENOLOL 50 MG/1
TABLET ORAL
Qty: 45 TABLET | Refills: 1 | Status: SHIPPED | OUTPATIENT
Start: 2023-01-27

## 2023-01-27 RX ORDER — LISINOPRIL 20 MG/1
TABLET ORAL
Qty: 180 TABLET | Refills: 1 | Status: SHIPPED | OUTPATIENT
Start: 2023-01-27

## 2023-01-27 NOTE — TELEPHONE ENCOUNTER
Message sent to pt letting her know WS sent prescriptions in and that we would like her to complete her lab work that she was given at her appt on 1/3/23 after a 12 hour fast within the next 90 days.

## 2023-01-30 NOTE — TELEPHONE ENCOUNTER
Spoke with pt and let her know the prescription was sent into the pharmacy #90/NR. Pt let me know that she will get her lab work completed next week.

## 2023-02-08 ENCOUNTER — HOSPITAL ENCOUNTER (OUTPATIENT)
Age: 73
Discharge: HOME | End: 2023-02-08
Payer: MEDICARE

## 2023-02-08 DIAGNOSIS — E11.8 TYPE 2 DIABETES MELLITUS WITH COMPLICATION, WITHOUT LONG-TERM CURRENT USE OF INSULIN (HCC): ICD-10-CM

## 2023-02-08 DIAGNOSIS — I10 ESSENTIAL HYPERTENSION: ICD-10-CM

## 2023-02-08 LAB
ALBUMIN SERPL BCG-MCNC: 4 G/DL (ref 3.5–5.1)
ALP SERPL-CCNC: 75 U/L (ref 38–126)
ALT SERPL W/O P-5'-P-CCNC: 20 U/L (ref 11–66)
ANION GAP SERPL CALC-SCNC: 16 MEQ/L (ref 8–16)
AST SERPL-CCNC: 24 U/L (ref 5–40)
BILIRUB SERPL-MCNC: 0.6 MG/DL (ref 0.3–1.2)
BUN SERPL-MCNC: 12 MG/DL (ref 7–22)
CALCIUM SERPL-MCNC: 9.1 MG/DL (ref 8.5–10.5)
CHLORIDE SERPL-SCNC: 103 MEQ/L (ref 98–111)
CHOLEST SERPL-MCNC: 116 MG/DL (ref 100–199)
CO2 SERPL-SCNC: 19 MEQ/L (ref 23–33)
CREAT SERPL-MCNC: 0.7 MG/DL (ref 0.4–1.2)
GFR SERPL CREATININE-BSD FRML MDRD: > 60 ML/MIN/1.73M2
GLUCOSE SERPL-MCNC: 182 MG/DL (ref 70–108)
HDLC SERPL-MCNC: 35 MG/DL
LDLC SERPL CALC-MCNC: 56 MG/DL
POTASSIUM SERPL-SCNC: 4.1 MEQ/L (ref 3.5–5.2)
PROT SERPL-MCNC: 7.1 G/DL (ref 6.1–8)
SODIUM SERPL-SCNC: 138 MEQ/L (ref 135–145)
TRIGL SERPL-MCNC: 123 MG/DL (ref 0–199)

## 2023-02-08 PROCEDURE — 80061 LIPID PANEL: CPT

## 2023-02-08 PROCEDURE — 85025 COMPLETE CBC W/AUTO DIFF WBC: CPT

## 2023-02-08 PROCEDURE — 80053 COMPREHEN METABOLIC PANEL: CPT

## 2023-02-08 PROCEDURE — 36415 COLL VENOUS BLD VENIPUNCTURE: CPT

## 2023-02-08 PROCEDURE — 83036 HEMOGLOBIN GLYCOSYLATED A1C: CPT

## 2023-02-09 LAB
ACANTHOCYTES: ABNORMAL
ANISOCYTOSIS BLD QL SMEAR: PRESENT
BASOPHILS ABSOLUTE: 0.1 THOU/MM3 (ref 0–0.1)
BASOPHILS NFR BLD AUTO: 1 %
BURR CELLS BLD QL SMEAR: ABNORMAL
DEPRECATED MEAN GLUCOSE BLD GHB EST-ACNC: 162 MG/DL (ref 70–126)
DEPRECATED RDW RBC AUTO: 56.3 FL (ref 35–45)
ELLIPTOCYTES: ABNORMAL
EOSINOPHIL NFR BLD AUTO: 2.9 %
EOSINOPHILS ABSOLUTE: 0.2 THOU/MM3 (ref 0–0.4)
ERYTHROCYTE [DISTWIDTH] IN BLOOD BY AUTOMATED COUNT: 23.2 % (ref 11.5–14.5)
HBA1C MFR BLD HPLC: 7.4 % (ref 4.4–6.4)
HCT VFR BLD AUTO: 30.4 % (ref 37–47)
HGB BLD-MCNC: 8 GM/DL (ref 12–16)
HYPOCHROMIA BLD QL SMEAR: PRESENT
IMM GRANULOCYTES # BLD AUTO: 0.01 THOU/MM3 (ref 0–0.07)
IMM GRANULOCYTES NFR BLD AUTO: 0.2 %
LYMPHOCYTES ABSOLUTE: 2.1 THOU/MM3 (ref 1–4.8)
LYMPHOCYTES NFR BLD AUTO: 41.3 %
MCH RBC QN AUTO: 18.2 PG (ref 26–33)
MCHC RBC AUTO-ENTMCNC: 26.3 GM/DL (ref 32.2–35.5)
MCV RBC AUTO: 69.2 FL (ref 81–99)
MICROCYTES BLD QL SMEAR: PRESENT
MONOCYTES ABSOLUTE: 0.6 THOU/MM3 (ref 0.4–1.3)
MONOCYTES NFR BLD AUTO: 11.8 %
NEUTROPHILS NFR BLD AUTO: 42.8 %
NRBC BLD AUTO-RTO: 0 /100 WBC
PLATELET # BLD AUTO: 113 THOU/MM3 (ref 130–400)
PLATELET BLD QL SMEAR: ABNORMAL
PMV BLD AUTO: 9.5 FL (ref 9.4–12.4)
RBC # BLD AUTO: 4.39 MILL/MM3 (ref 4.2–5.4)
SCAN OF BLOOD SMEAR: NORMAL
SCHISTOCYTES BLD QL SMEAR: ABNORMAL
SEGMENTED NEUTROPHILS ABSOLUTE COUNT: 2.2 THOU/MM3 (ref 1.8–7.7)
TARGETS BLD QL SMEAR: ABNORMAL
WBC # BLD AUTO: 5.2 THOU/MM3 (ref 4.8–10.8)

## 2023-02-16 ENCOUNTER — TELEPHONE (OUTPATIENT)
Dept: FAMILY MEDICINE CLINIC | Age: 73
End: 2023-02-16

## 2023-02-16 DIAGNOSIS — D50.9 IRON DEFICIENCY ANEMIA, UNSPECIFIED IRON DEFICIENCY ANEMIA TYPE: ICD-10-CM

## 2023-02-16 DIAGNOSIS — D69.6 THROMBOCYTOPENIA (HCC): Primary | ICD-10-CM

## 2023-02-16 NOTE — TELEPHONE ENCOUNTER
----- Message from FABIENNE Lainez CNP sent at 2/9/2023  7:51 AM EST -----  Please call pt and see who she is following up with for her iron deficiency anemia? Her iron has been low in the past, I would like to add on an iron level to her current labs if possible. Referral to Hematology with [address] for further evaluation, if pt is not currently following with a hematologist.  I would recommend 6 month follow up  in our office as well. Other labs are all results and are stable.  Thanks -WS

## 2023-02-16 NOTE — TELEPHONE ENCOUNTER
Spoke to pt and notified her of the lab results. She states that she saw Dr. Nicole Enriqeuz once in 2018, otherwise ES managed her labs and ordered iron infusions if necessary. She is agreeable to a hematology referral. Would like to see someone within [facility]. Please advise. Unable to add iron to labs drawn as specimen is too old. Please advise if pt needs to go to have this drawn. Still need to schedule 6 month f/up with WS.

## 2023-02-17 NOTE — TELEPHONE ENCOUNTER
Noted. Referral to [address] [facility] Hematology placed. New iron labs placed as well. Pt to fast for 12 hours prior to lab draw. Thanks -WS    Orders Placed This Encounter   Procedures    Ferritin     Standing Status:   Future     Standing Expiration Date:   2/17/2024    Iron     Standing Status:   Future     Standing Expiration Date:   2/17/2024     Order Specific Question:   Is Patient Fasting? Answer:   yes     Order Specific Question:   No of Hours? Answer:   12    Vitamin B12 & Folate     Standing Status:   Future     Standing Expiration Date:   2/17/2024    Luciano Alston MD, Hematology & Oncology, [facility]     Referral Priority:   Routine     Referral Type:   Eval and Treat     Referral Reason:   Specialty Services Required     Referred to Provider:    No Alvarado MD     Requested Specialty:   Hematology and Oncology     Number of Visits Requested:   1

## 2023-02-23 ENCOUNTER — HOSPITAL ENCOUNTER (OUTPATIENT)
Age: 73
Discharge: HOME | End: 2023-02-23
Payer: MEDICARE

## 2023-02-23 DIAGNOSIS — D69.6 THROMBOCYTOPENIA (HCC): ICD-10-CM

## 2023-02-23 DIAGNOSIS — D50.9 IRON DEFICIENCY ANEMIA, UNSPECIFIED IRON DEFICIENCY ANEMIA TYPE: ICD-10-CM

## 2023-02-23 PROCEDURE — 83540 ASSAY OF IRON: CPT

## 2023-02-23 PROCEDURE — 82746 ASSAY OF FOLIC ACID SERUM: CPT

## 2023-02-23 PROCEDURE — 82607 VITAMIN B-12: CPT

## 2023-02-23 PROCEDURE — 36415 COLL VENOUS BLD VENIPUNCTURE: CPT

## 2023-02-23 PROCEDURE — 82728 ASSAY OF FERRITIN: CPT

## 2023-02-24 LAB
FERRITIN SERPL IA-MCNC: 3 NG/ML (ref 10–291)
FOLATE SERPL-MCNC: 10 NG/ML (ref 4.8–24.2)
IRON SERPL-MCNC: 20 UG/DL (ref 50–170)
VIT B12 SERPL-MCNC: 562 PG/ML (ref 211–911)

## 2023-03-06 ENCOUNTER — OFFICE VISIT (OUTPATIENT)
Dept: ONCOLOGY | Age: 73
End: 2023-03-06
Payer: MEDICARE

## 2023-03-06 ENCOUNTER — HOSPITAL ENCOUNTER (OUTPATIENT)
Dept: INFUSION THERAPY | Age: 73
Discharge: HOME | End: 2023-03-06
Payer: MEDICARE

## 2023-03-06 VITALS
RESPIRATION RATE: 16 BRPM | TEMPERATURE: 98.1 F | HEART RATE: 70 BPM | DIASTOLIC BLOOD PRESSURE: 58 MMHG | SYSTOLIC BLOOD PRESSURE: 124 MMHG

## 2023-03-06 VITALS
TEMPERATURE: 98.1 F | SYSTOLIC BLOOD PRESSURE: 124 MMHG | HEART RATE: 70 BPM | RESPIRATION RATE: 16 BRPM | WEIGHT: 163 LBS | OXYGEN SATURATION: 97 % | DIASTOLIC BLOOD PRESSURE: 58 MMHG | HEIGHT: 62 IN | BODY MASS INDEX: 30 KG/M2

## 2023-03-06 DIAGNOSIS — D50.0 IRON DEFICIENCY ANEMIA DUE TO CHRONIC BLOOD LOSS: Primary | ICD-10-CM

## 2023-03-06 DIAGNOSIS — D50.9 MICROCYTIC ANEMIA: ICD-10-CM

## 2023-03-06 DIAGNOSIS — D69.6 THROMBOCYTOPENIA (HCC): ICD-10-CM

## 2023-03-06 PROCEDURE — 1090F PRES/ABSN URINE INCON ASSESS: CPT | Performed by: PHYSICIAN ASSISTANT

## 2023-03-06 PROCEDURE — 1124F ACP DISCUSS-NO DSCNMKR DOCD: CPT | Performed by: PHYSICIAN ASSISTANT

## 2023-03-06 PROCEDURE — G8399 PT W/DXA RESULTS DOCUMENT: HCPCS | Performed by: PHYSICIAN ASSISTANT

## 2023-03-06 PROCEDURE — 3074F SYST BP LT 130 MM HG: CPT | Performed by: PHYSICIAN ASSISTANT

## 2023-03-06 PROCEDURE — G8417 CALC BMI ABV UP PARAM F/U: HCPCS | Performed by: PHYSICIAN ASSISTANT

## 2023-03-06 PROCEDURE — 1036F TOBACCO NON-USER: CPT | Performed by: PHYSICIAN ASSISTANT

## 2023-03-06 PROCEDURE — G8484 FLU IMMUNIZE NO ADMIN: HCPCS | Performed by: PHYSICIAN ASSISTANT

## 2023-03-06 PROCEDURE — 99204 OFFICE O/P NEW MOD 45 MIN: CPT | Performed by: PHYSICIAN ASSISTANT

## 2023-03-06 PROCEDURE — G8427 DOCREV CUR MEDS BY ELIG CLIN: HCPCS | Performed by: PHYSICIAN ASSISTANT

## 2023-03-06 PROCEDURE — 99211 OFF/OP EST MAY X REQ PHY/QHP: CPT

## 2023-03-06 PROCEDURE — 3017F COLORECTAL CA SCREEN DOC REV: CPT | Performed by: PHYSICIAN ASSISTANT

## 2023-03-06 PROCEDURE — 3078F DIAST BP <80 MM HG: CPT | Performed by: PHYSICIAN ASSISTANT

## 2023-03-06 RX ORDER — EPINEPHRINE 1 MG/ML
0.3 INJECTION, SOLUTION, CONCENTRATE INTRAVENOUS PRN
OUTPATIENT
Start: 2023-03-06

## 2023-03-06 RX ORDER — HEPARIN SODIUM (PORCINE) LOCK FLUSH IV SOLN 100 UNIT/ML 100 UNIT/ML
500 SOLUTION INTRAVENOUS PRN
OUTPATIENT
Start: 2023-03-06

## 2023-03-06 RX ORDER — ALBUTEROL SULFATE 90 UG/1
4 AEROSOL, METERED RESPIRATORY (INHALATION) PRN
OUTPATIENT
Start: 2023-03-06

## 2023-03-06 RX ORDER — SODIUM CHLORIDE 9 MG/ML
5-250 INJECTION, SOLUTION INTRAVENOUS PRN
OUTPATIENT
Start: 2023-03-06

## 2023-03-06 RX ORDER — SODIUM CHLORIDE 9 MG/ML
INJECTION, SOLUTION INTRAVENOUS CONTINUOUS
OUTPATIENT
Start: 2023-03-06

## 2023-03-06 RX ORDER — BUPROPION HYDROCHLORIDE 150 MG/1
150 TABLET, EXTENDED RELEASE ORAL 2 TIMES DAILY
COMMUNITY

## 2023-03-06 RX ORDER — ACETAMINOPHEN 325 MG/1
650 TABLET ORAL
OUTPATIENT
Start: 2023-03-06

## 2023-03-06 RX ORDER — ONDANSETRON 2 MG/ML
8 INJECTION INTRAMUSCULAR; INTRAVENOUS
OUTPATIENT
Start: 2023-03-06

## 2023-03-06 RX ORDER — SODIUM CHLORIDE 0.9 % (FLUSH) 0.9 %
5-40 SYRINGE (ML) INJECTION PRN
OUTPATIENT
Start: 2023-03-06

## 2023-03-06 RX ORDER — DIPHENHYDRAMINE HYDROCHLORIDE 50 MG/ML
50 INJECTION INTRAMUSCULAR; INTRAVENOUS
OUTPATIENT
Start: 2023-03-06

## 2023-03-06 RX ORDER — FAMOTIDINE 10 MG/ML
20 INJECTION, SOLUTION INTRAVENOUS
OUTPATIENT
Start: 2023-03-06

## 2023-03-06 NOTE — PATIENT INSTRUCTIONS
Proceed with IV Injectafer, total of 2 infusions to be given at least one week apart. Return to clinic in 9 weeks. Labs on RTC: CBC, ferritin, iron, TIBC  Please call for questions or concerns.

## 2023-03-06 NOTE — PROGRESS NOTES
[facility] [address], [address]  [facility] [address]  Dept: [phone]  Dept Fax: [phone] Loc: [phone]      Visit Date:3/6/2023     Kim Amaya is a 73 y.o. female who presents today for:   Chief Complaint   Patient presents with    New Patient     Thrombocytopenia/Iron deficiency anemia        HPI:   Kim Amaya is a 73 y.o. female referred to Hematology/Oncology clinic for evaluation of thrombocytopenia, MARCO A per her PCP, MAVERICK Hurd-CNP. She recently had lab work completed as part of routine lab work on 2/8/2023 which showed Hgb 8.0, hct 30.4, MCV 69.2. her platelet count was 113,000. She was referred for further evaluation. Iron studies were completed on 2/23/23 showing ferritin 3, iron 20. Folate and vitamin B12 were within normal limits.   The patient was previously followed in the office in 2018 for microcytic anemia due to iron deficiency anemia. She required IV iron in 2018; source of iron deficiency from chronic blood loss. She has history of esophageal varices which required banding in February 2018.   The patient is here with her  today. She affirms increased fatigue. She denies lightheadedness, dizziness, DAVENPORT, palpitations. She affirms pica symptoms and admits to chewing ice frequently. She denies any abnormal bleeding; no epistaxis, hemoptysis, hematemesis, melena, hematochezia, hematuria or vaginal bleeding.  She denies recent GI follow-up.  Last EGD was in 2018 as above.  The patient denies known history of malabsorptive disorders such as inflammatory bowel disease or celiac disease.  She reports adequate intake of oral iron in her diet.  She is not able to tolerate oral iron supplementation due to nausea, vomiting and constipation.  PMH includes CAD with hx of stenting, HTN, DM, hyperlipidemia, hypothyroidism, CKD, cirrhosis, history of esophageal varices.     Past Medical History:   Diagnosis Date    Anemia     CAD (coronary  artery disease)     CHF (congestive heart failure) (HCC)     Depression     GERD (gastroesophageal reflux disease)     Headache(784.0)     Heart attack ([facility]     Hyperlipidemia     Hypertension     Hypothyroidism 2015    Liver disease     MI (myocardial infarction) ([facility] , 10/01    x 2     Obesity     Osteoarthritis     B/L knees--Dr. Danelle Viramontes.     PONV (postoperative nausea and vomiting)     Type 2 diabetes mellitus without complication (HCC)     Type II or unspecified type diabetes mellitus without mention of complication, not stated as uncontrolled       Past Surgical History:   Procedure Laterality Date    Susan  2009    Diagnostic Cath EF 40-45% (Dr Halina Rucker)    [address] ARTHROSCOPY Right     DC COLON CA SCRN NOT  [address] IND Left 3/1/2018    COLONOSCOPY performed by Jonas Moreland MD at [facility]    PTCA      Shunt 10/01--Stent     TONSILLECTOMY AND ADENOIDECTOMY      UPPER GASTROINTESTINAL ENDOSCOPY      UPPER GASTROINTESTINAL ENDOSCOPY Left 2018    EGD BAND LIGATION performed by Jonas Moreland MD at [facility]    UPPER GASTROINTESTINAL ENDOSCOPY N/A 2018    EGD BIOPSY performed by Apple Blount MD at [facility]      Family History   Problem Relation Age of Onset    Cancer Mother         throat    High Blood Pressure Mother     Heart Disease Mother     Other Mother         GLAUCOMA    Heart Disease Father     Heart Attack Father     Other Father         LUNG Mel Knee    Hypertension Sister     High Blood Pressure Sister     Osteoporosis Sister     Cancer Brother         eye      Social History     Tobacco Use    Smoking status: Former     Years: 30.00     Types: Cigarettes     Quit date: 1999     Years since quittin.2    Smokeless tobacco: Former   Substance Use Topics    Alcohol use: No     Comment: rarely      Current Outpatient Medications   Medication Sig Dispense Refill    buPROPion (WELLBUTRIN SR) 150 MG extended release tablet Take 150 mg by mouth 2 times daily      atenolol (TENORMIN) 50 MG tablet TAKE 1/2 TABLET EVERY DAY 45 tablet 1    atorvastatin (LIPITOR) 40 MG tablet TAKE 1 TABLET EVERY DAY 90 tablet 1    levothyroxine (SYNTHROID) 50 MCG tablet TAKE 1 TABLET EVERY DAY 90 tablet 1    lisinopril (PRINIVIL;ZESTRIL) 20 MG tablet TAKE 1 TABLET TWICE DAILY 180 tablet 1    glimepiride (AMARYL) 2 MG tablet TAKE 2 TABLETS (4 MG) IN THE MORNING AND 1 TABLET (2 MG) IN THE EVENING 270 tablet 0    pantoprazole (PROTONIX) 40 MG tablet TAKE 1 TABLET TWICE DAILY 180 tablet 0    metFORMIN (GLUCOPHAGE-XR) 500 MG extended release tablet TAKE 2 TABLETS TWICE DAILY 360 tablet 0    escitalopram (LEXAPRO) 20 MG tablet TAKE 1 TABLET EVERY DAY 90 tablet 0    acetaminophen (TYLENOL) 500 MG tablet Take 1,000 mg by mouth every 6 hours as needed for Pain      blood glucose test strips (ACCU-CHEK ISABELA PLUS) strip USE 1 STRIP TO CHECK GLUCOSE ONCE DAILY 100 each 3    Blood Glucose Monitoring Suppl (ACCU-CHEK ISABELA PLUS) w/Device KIT USE 1 TO CHECK GLUCOSE ONCE DAILY      blood glucose monitor kit and supplies Glucose monitor and supplies, brand per pt preference. Test sugar daily. Dx: E11.9 1 kit 0    Lancets MISC Test sugar daily. Brand per pt preference. Dx: E11.9 100 each 3    aspirin 81 MG tablet Take 81 mg by mouth nightly      FISH OIL Take by mouth nightly Arthur Red      Docusate Calcium (STOOL SOFTENER PO) Take by mouth nightly as needed        No current facility-administered medications for this visit. No Known Allergies       Review of Systems:   Review of Systems   Pertinent review of systems noted in HPI, all other ROS negative.    Objective:   Physical Exam   BP (!) 124/58 (Site: Right Upper Arm, Position: Sitting, Cuff Size: Medium Adult)   Pulse 70   Temp 98.1 °F (36.7 °C) (Oral)   Resp 16   Ht 5' 2\" (1.575 m)   Wt 163 lb (73.9 kg)   SpO2 97%   BMI 29.81 kg/m²    General appearance: No apparent distress, chronically ill appearing, and cooperative. HEENT: Pupils equal, round, and reactive to light. Conjunctivae/corneas clear. Oral mucosa moist.   Neck: Supple, with full range of motion. Trachea midline. Respiratory:  Normal respiratory effort. Clear to auscultation, bilaterally without Rales/Wheezes/Rhonchi. Cardiovascular: Regular rate and rhythm with normal S1/S2  Abdomen: Soft, active bowel sounds. Musculoskeletal: No clubbing, cyanosis or edema bilaterally. Ambulates in the office. Skin: Skin color, texture, turgor normal.  No visible rashes or lesions. Neurologic:  Neurovascularly intact without any focal sensory/motor deficits. Psychiatric: Alert and oriented      Imaging Studies and Labs:   CBC:   Lab Results   Component Value Date    WBC 5.2 02/08/2023    HGB 8.0 (L) 02/08/2023    HCT 30.4 (L) 02/08/2023    MCV 69.2 (L) 02/08/2023     (L) 02/08/2023     BMP:   Lab Results   Component Value Date/Time     02/08/2023 01:12 PM    K 4.1 02/08/2023 01:12 PM    K 4.0 08/31/2022 06:02 AM     02/08/2023 01:12 PM    CO2 19 02/08/2023 01:12 PM    BUN 12 02/08/2023 01:12 PM    CREATININE 0.7 02/08/2023 01:12 PM    GLUCOSE 182 02/08/2023 01:12 PM    GLUCOSE 132 10/15/2011 08:25 AM    CALCIUM 9.1 02/08/2023 01:12 PM      LFT:   Lab Results   Component Value Date    ALT 20 02/08/2023    AST 24 02/08/2023    ALKPHOS 75 02/08/2023    BILITOT 0.6 02/08/2023         Assessment and Plan:   Microcytic Anemia  Chronic microcytic anemia since at least 2017 per review of EMR. Patient previously received IV iron in our office in 2018. Iron deficiency secondary to chronic blood loss. Patient has history of esophageal varices which required banding in 2018. She last received IV Injectafer per her PCP in 2021. CBC on 2/8/2023 showed Hgb 8.0, Hct 30.4, MCV 69.2.  Iron studies showed severe iron deficiency with ferritin 3, iron 20. Patient unable to tolerate oral iron due to GI upset, vomiting, constipation.   -Will schedule IV Injectafer, total of 2 infusions to be given one week apart   -Will continue to monitor Hgb/Hct and iron levels   -Patient instructed to follow up with her established GI, Dr. Jermaine Gonzalez, to evaluate for GI blood loss. Last EGD in 2018 with hx of esophageal varices. Patient declines.   -Patient instructed to monitor for signs/symptoms of worsening anemia or blood loss   -Return to clinic in 9 weeks    -Labs on RTC    2. Iron Deficiency  Likely secondary to chronic blood loss. Recommend GI follow up. 3. Thrombocytopenia   Chronic since at least 2015 per EMR with baseline count ranging 105,000 to 140,000's. Most recent on 2/8/23 platelet count 719,363. Denies any abnormal bleeding. Likely secondary to history of cirrhosis. Will continue to monitor. RTC in 9 weeks     All patient questions answered. Pt voiced understanding. Patient agreed with treatment plan. Follow up as directed. Patient instructed to call for questions or concerns.       Electronically signed by   Yessy Barnes PA-C

## 2023-03-13 ENCOUNTER — HOSPITAL ENCOUNTER (OUTPATIENT)
Dept: INFUSION THERAPY | Age: 73
Discharge: HOME | End: 2023-03-13
Payer: MEDICARE

## 2023-03-13 VITALS
HEART RATE: 58 BPM | DIASTOLIC BLOOD PRESSURE: 67 MMHG | HEIGHT: 62 IN | SYSTOLIC BLOOD PRESSURE: 145 MMHG | BODY MASS INDEX: 30.62 KG/M2 | RESPIRATION RATE: 16 BRPM | OXYGEN SATURATION: 96 % | TEMPERATURE: 97.8 F | WEIGHT: 166.4 LBS

## 2023-03-13 DIAGNOSIS — D50.9 MICROCYTIC ANEMIA: ICD-10-CM

## 2023-03-13 DIAGNOSIS — D50.0 IRON DEFICIENCY ANEMIA DUE TO CHRONIC BLOOD LOSS: Primary | ICD-10-CM

## 2023-03-13 PROCEDURE — 6360000002 HC RX W HCPCS: Performed by: PHYSICIAN ASSISTANT

## 2023-03-13 PROCEDURE — 2580000003 HC RX 258: Performed by: PHYSICIAN ASSISTANT

## 2023-03-13 PROCEDURE — 96365 THER/PROPH/DIAG IV INF INIT: CPT

## 2023-03-13 RX ORDER — EPINEPHRINE 1 MG/ML
0.3 INJECTION, SOLUTION INTRAMUSCULAR; SUBCUTANEOUS PRN
Status: CANCELLED | OUTPATIENT
Start: 2023-03-20

## 2023-03-13 RX ORDER — SODIUM CHLORIDE 9 MG/ML
INJECTION, SOLUTION INTRAVENOUS CONTINUOUS
Status: CANCELLED | OUTPATIENT
Start: 2023-03-20

## 2023-03-13 RX ORDER — SODIUM CHLORIDE 9 MG/ML
5-250 INJECTION, SOLUTION INTRAVENOUS PRN
Status: CANCELLED | OUTPATIENT
Start: 2023-03-20

## 2023-03-13 RX ORDER — ALBUTEROL SULFATE 90 UG/1
4 AEROSOL, METERED RESPIRATORY (INHALATION) PRN
Status: CANCELLED | OUTPATIENT
Start: 2023-03-20

## 2023-03-13 RX ORDER — ACETAMINOPHEN 325 MG/1
650 TABLET ORAL
Status: CANCELLED | OUTPATIENT
Start: 2023-03-20

## 2023-03-13 RX ORDER — HEPARIN SODIUM (PORCINE) LOCK FLUSH IV SOLN 100 UNIT/ML 100 UNIT/ML
500 SOLUTION INTRAVENOUS PRN
Status: CANCELLED | OUTPATIENT
Start: 2023-03-20

## 2023-03-13 RX ORDER — SODIUM CHLORIDE 0.9 % (FLUSH) 0.9 %
5-40 SYRINGE (ML) INJECTION PRN
Status: CANCELLED | OUTPATIENT
Start: 2023-03-20

## 2023-03-13 RX ORDER — SODIUM CHLORIDE 9 MG/ML
5-250 INJECTION, SOLUTION INTRAVENOUS PRN
Status: DISCONTINUED | OUTPATIENT
Start: 2023-03-13 | End: 2023-03-14 | Stop reason: HOSPADM

## 2023-03-13 RX ORDER — ONDANSETRON 2 MG/ML
8 INJECTION INTRAMUSCULAR; INTRAVENOUS
Status: CANCELLED | OUTPATIENT
Start: 2023-03-20

## 2023-03-13 RX ORDER — DIPHENHYDRAMINE HYDROCHLORIDE 50 MG/ML
50 INJECTION INTRAMUSCULAR; INTRAVENOUS
Status: CANCELLED | OUTPATIENT
Start: 2023-03-20

## 2023-03-13 RX ADMIN — FERRIC CARBOXYMALTOSE INJECTION 750 MG: 50 INJECTION, SOLUTION INTRAVENOUS at 11:33

## 2023-03-13 RX ADMIN — SODIUM CHLORIDE 20 ML/HR: 9 INJECTION, SOLUTION INTRAVENOUS at 11:09

## 2023-03-13 NOTE — PLAN OF CARE
Problem: Chronic Conditions and Co-morbidities  Goal: Patient's chronic conditions and co-morbidity symptoms are monitored and maintained or improved  Outcome: Adequate for Discharge  Flowsheets (Taken 3/13/2023 1413)  Care Plan - Patient's Chronic Conditions and Co-Morbidity Symptoms are Monitored and Maintained or Improved: Monitor and assess patient's chronic conditions and comorbid symptoms for stability, deterioration, or improvement  Note: Patient verbalizes understanding to verbal information given on Injectafer,action and possible side effects. Aware to call MD if develop complications. Problem: Musculor/Skeletal Functional Status  Goal: Absence of falls  Outcome: Adequate for Discharge  Note: Free from falls while in O.P. Oncology. Intervention: Fall precautions  Note: Discussed the need to use the call light for assistance when getting up to ambulate. Problem: Discharge Planning  Goal: Knowledge of discharge instructions  Description: Knowledge of discharge instructions     Outcome: Adequate for Discharge  Note: Verbalize understanding of discharge instructions, follow up appointments, and when to call Physician. Intervention: Interaction with patient/family and care team  Note: Discuss understanding of discharge instructions, follow up appointments and when to call Physician. Care plan reviewed with patient and daughter. Patient and daughter verbalize understanding of the plan of care and contribute to goal setting.

## 2023-03-13 NOTE — DISCHARGE INSTRUCTIONS
Please contact your Oncologist if you have any questions regarding the Injectafer that you received today. You are instructed to call the office or go to the Emergency Dept. If you experience any of the following symptoms:    Dizziness/lightheadedness   Acute nausea or vomiting-not relieved by medications  Headaches-not relieved by medications  New chest pain or pressure  New rash /itching  New shortness of breath  Fever,chills or signs or symptoms of infection    Make sure you are drinking 48 to 64 ounces of water daily-if you are unable to drink fluids let us know right away.

## 2023-03-13 NOTE — PROGRESS NOTES
Patient tolerated Injectafer without any complications.  Patient kept for 20 minuets observation post injectafer. Denies dizziness, lightheadedness, acute nausea or vomiting, headache, heart palpitations, rash/itching or increased SOB.    Last vital signs  BP (!) 145/67   Pulse 58   Temp 97.8 °F (36.6 °C) (Oral)   Resp 16   Ht 5' 2\" (1.575 m)   Wt 166 lb 6.4 oz (75.5 kg)   SpO2 96%   BMI 30.43 kg/m²     Patient instructed if they experience any of the above symptoms following today's visit, he/she is to notify the Physician or go to the Emergency Dept.    Discharge instructions given to patient, Verbalizes understanding. Ambulated off unit per self in stable condition with all belongings.

## 2023-03-20 ENCOUNTER — HOSPITAL ENCOUNTER (OUTPATIENT)
Dept: INFUSION THERAPY | Age: 73
Discharge: HOME | End: 2023-03-20
Payer: MEDICARE

## 2023-03-20 VITALS
BODY MASS INDEX: 30.36 KG/M2 | RESPIRATION RATE: 16 BRPM | OXYGEN SATURATION: 99 % | SYSTOLIC BLOOD PRESSURE: 149 MMHG | HEIGHT: 62 IN | HEART RATE: 59 BPM | TEMPERATURE: 97.5 F | WEIGHT: 165 LBS | DIASTOLIC BLOOD PRESSURE: 65 MMHG

## 2023-03-20 DIAGNOSIS — D50.9 MICROCYTIC ANEMIA: ICD-10-CM

## 2023-03-20 DIAGNOSIS — D50.0 IRON DEFICIENCY ANEMIA DUE TO CHRONIC BLOOD LOSS: Primary | ICD-10-CM

## 2023-03-20 PROCEDURE — 96365 THER/PROPH/DIAG IV INF INIT: CPT

## 2023-03-20 PROCEDURE — 2580000003 HC RX 258: Performed by: PHYSICIAN ASSISTANT

## 2023-03-20 PROCEDURE — 6360000002 HC RX W HCPCS: Performed by: PHYSICIAN ASSISTANT

## 2023-03-20 RX ORDER — EPINEPHRINE 1 MG/ML
0.3 INJECTION, SOLUTION INTRAMUSCULAR; SUBCUTANEOUS PRN
OUTPATIENT
Start: 2023-03-20

## 2023-03-20 RX ORDER — DIPHENHYDRAMINE HYDROCHLORIDE 50 MG/ML
50 INJECTION INTRAMUSCULAR; INTRAVENOUS
OUTPATIENT
Start: 2023-03-20

## 2023-03-20 RX ORDER — SODIUM CHLORIDE 0.9 % (FLUSH) 0.9 %
5-40 SYRINGE (ML) INJECTION PRN
OUTPATIENT
Start: 2023-03-20

## 2023-03-20 RX ORDER — ACETAMINOPHEN 325 MG/1
650 TABLET ORAL
OUTPATIENT
Start: 2023-03-20

## 2023-03-20 RX ORDER — ALBUTEROL SULFATE 90 UG/1
4 AEROSOL, METERED RESPIRATORY (INHALATION) PRN
OUTPATIENT
Start: 2023-03-20

## 2023-03-20 RX ORDER — SODIUM CHLORIDE 9 MG/ML
5-250 INJECTION, SOLUTION INTRAVENOUS PRN
Status: CANCELLED | OUTPATIENT
Start: 2023-03-20

## 2023-03-20 RX ORDER — SODIUM CHLORIDE 9 MG/ML
5-250 INJECTION, SOLUTION INTRAVENOUS PRN
Status: DISCONTINUED | OUTPATIENT
Start: 2023-03-20 | End: 2023-03-21 | Stop reason: HOSPADM

## 2023-03-20 RX ORDER — SODIUM CHLORIDE 9 MG/ML
5-250 INJECTION, SOLUTION INTRAVENOUS PRN
OUTPATIENT
Start: 2023-03-20

## 2023-03-20 RX ORDER — HEPARIN SODIUM (PORCINE) LOCK FLUSH IV SOLN 100 UNIT/ML 100 UNIT/ML
500 SOLUTION INTRAVENOUS PRN
OUTPATIENT
Start: 2023-03-20

## 2023-03-20 RX ORDER — SODIUM CHLORIDE 9 MG/ML
INJECTION, SOLUTION INTRAVENOUS CONTINUOUS
OUTPATIENT
Start: 2023-03-20

## 2023-03-20 RX ORDER — ONDANSETRON 2 MG/ML
8 INJECTION INTRAMUSCULAR; INTRAVENOUS
OUTPATIENT
Start: 2023-03-20

## 2023-03-20 RX ADMIN — FERRIC CARBOXYMALTOSE INJECTION 750 MG: 50 INJECTION, SOLUTION INTRAVENOUS at 12:27

## 2023-03-20 RX ADMIN — SODIUM CHLORIDE 40 ML/HR: 9 INJECTION, SOLUTION INTRAVENOUS at 12:25

## 2023-03-20 NOTE — PLAN OF CARE
Problem: Chronic Conditions and Co-morbidities  Goal: Patient's chronic conditions and co-morbidity symptoms are monitored and maintained or improved  Outcome: Adequate for Discharge  Flowsheets (Taken 3/20/2023 0339)  Care Plan - Patient's Chronic Conditions and Co-Morbidity Symptoms are Monitored and Maintained or Improved: Monitor and assess patient's chronic conditions and comorbid symptoms for stability, deterioration, or improvement  Note: Patient verbalizes understanding to verbal information given on Injectafer,action and possible side effects. Aware to call MD if develop complications. Problem: Musculor/Skeletal Functional Status  Goal: Absence of falls  Outcome: Adequate for Discharge  Note: Free from falls while in O.P. Oncology. Intervention: Fall precautions  Note: Discussed the need to use the call light for assistance when getting up to ambulate. Problem: Discharge Planning  Goal: Knowledge of discharge instructions  Description: Knowledge of discharge instructions     Outcome: Adequate for Discharge  Note: Verbalize understanding of discharge instructions, follow up appointments, and when to call Physician. Intervention: Interaction with patient/family and care team  Note: Discuss understanding of discharge instructions, follow up appointments and when to call Physician. Care plan reviewed with patient. Patient verbalize understanding of the plan of care and contribute to goal setting.

## 2023-03-20 NOTE — PROGRESS NOTES
Patient tolerated Injectafer without any complications. Denies dizziness, lightheadedness, acute nausea or vomiting, headache, heart palpitations, rash/itching or increased SOB. Last vital signs  BP (!) 149/65   Pulse 59   Temp 97.5 °F (36.4 °C) (Oral)   Resp 16   Ht 5' 2\" (1.575 m)   Wt 165 lb (74.8 kg)   SpO2 99%   BMI 30.18 kg/m²     Patient instructed if they experience any of the above symptoms following today's visit, he/she is to notify the Physician or go to the Emergency Dept. Discharge instructions given to patient, Verbalizes understanding. Ambulated off unit per self in stable condition with all belongings.

## 2023-04-26 DIAGNOSIS — F32.A DEPRESSION, UNSPECIFIED DEPRESSION TYPE: ICD-10-CM

## 2023-04-26 DIAGNOSIS — E11.65 TYPE 2 DIABETES MELLITUS WITH HYPERGLYCEMIA, WITHOUT LONG-TERM CURRENT USE OF INSULIN (HCC): ICD-10-CM

## 2023-04-26 DIAGNOSIS — E11.8 TYPE 2 DIABETES MELLITUS WITH COMPLICATION, WITHOUT LONG-TERM CURRENT USE OF INSULIN (HCC): ICD-10-CM

## 2023-04-26 DIAGNOSIS — K21.9 GASTROESOPHAGEAL REFLUX DISEASE: ICD-10-CM

## 2023-04-27 ENCOUNTER — HOSPITAL ENCOUNTER (OUTPATIENT)
Dept: ULTRASOUND IMAGING | Age: 73
Discharge: HOME | End: 2023-04-27
Payer: MEDICARE

## 2023-04-27 DIAGNOSIS — K74.60 HEPATIC CIRRHOSIS, UNSPECIFIED HEPATIC CIRRHOSIS TYPE, UNSPECIFIED WHETHER ASCITES PRESENT (HCC): ICD-10-CM

## 2023-04-27 PROCEDURE — 93975 VASCULAR STUDY: CPT

## 2023-04-27 PROCEDURE — 76705 ECHO EXAM OF ABDOMEN: CPT

## 2023-04-27 NOTE — TELEPHONE ENCOUNTER
This medication refill is regarding a electronic request. Refill requested by  [employer] . Requested Prescriptions     Pending Prescriptions Disp Refills    escitalopram (LEXAPRO) 20 MG tablet [Pharmacy Med Name: ESCITALOPRAM OXALATE 20 MG Tablet] 90 tablet 3     Sig: TAKE 1 TABLET EVERY DAY    metFORMIN (GLUCOPHAGE-XR) 500 MG extended release tablet [Pharmacy Med Name: METFORMIN HYDROCHLORIDE  MG Tablet Extended Release 24 Hour] 360 tablet 3     Sig: TAKE 2 TABLETS TWICE DAILY. pantoprazole (PROTONIX) 40 MG tablet [Pharmacy Med Name: PANTOPRAZOLE SODIUM 40 MG Tablet Delayed Release] 180 tablet 3     Sig: TAKE 1 TABLET TWICE DAILY    glimepiride (AMARYL) 2 MG tablet [Pharmacy Med Name: GLIMEPIRIDE 2 MG Tablet] 270 tablet 3     Sig: TAKE 2 TABLETS (4 MG) IN THE MORNING AND 1 TABLET (2 MG) IN THE EVENING     Date of last visit: 1/3/2023   Date of next visit: None  Date of last refill:    -Escitalopram 10/26/22 #90/0   -Pantoprazole 10/26/22 #180/0   -Metformin 10/26/22 #360/0   -Glimepiride 10/26/22 #270/0    Last Hemoglobin A1C:    Lab Results   Component Value Date/Time    LABA1C 7.4 02/08/2023 01:12 PM    AVGG 162 02/08/2023 01:12 PM     Rx verified, ordered and set to EP.

## 2023-04-28 RX ORDER — ESCITALOPRAM OXALATE 20 MG/1
TABLET ORAL
Qty: 90 TABLET | Refills: 3 | Status: SHIPPED | OUTPATIENT
Start: 2023-04-28

## 2023-04-28 RX ORDER — GLIMEPIRIDE 2 MG/1
TABLET ORAL
Qty: 270 TABLET | Refills: 3 | Status: SHIPPED | OUTPATIENT
Start: 2023-04-28

## 2023-04-28 RX ORDER — METFORMIN HYDROCHLORIDE 500 MG/1
TABLET, EXTENDED RELEASE ORAL
Qty: 360 TABLET | Refills: 3 | Status: SHIPPED | OUTPATIENT
Start: 2023-04-28

## 2023-04-28 RX ORDER — PANTOPRAZOLE SODIUM 40 MG/1
TABLET, DELAYED RELEASE ORAL
Qty: 180 TABLET | Refills: 3 | Status: SHIPPED | OUTPATIENT
Start: 2023-04-28

## 2023-05-05 ENCOUNTER — HOSPITAL ENCOUNTER (OUTPATIENT)
Age: 73
Setting detail: OUTPATIENT SURGERY
Discharge: HOME | End: 2023-05-05
Attending: INTERNAL MEDICINE | Admitting: INTERNAL MEDICINE
Payer: MEDICARE

## 2023-05-05 ENCOUNTER — ANESTHESIA EVENT (OUTPATIENT)
Dept: ENDOSCOPY | Age: 73
End: 2023-05-05
Payer: MEDICARE

## 2023-05-05 ENCOUNTER — ANESTHESIA (OUTPATIENT)
Dept: ENDOSCOPY | Age: 73
End: 2023-05-05
Payer: MEDICARE

## 2023-05-05 VITALS
WEIGHT: 165.8 LBS | RESPIRATION RATE: 16 BRPM | DIASTOLIC BLOOD PRESSURE: 88 MMHG | BODY MASS INDEX: 31.3 KG/M2 | HEART RATE: 61 BPM | HEIGHT: 61 IN | SYSTOLIC BLOOD PRESSURE: 196 MMHG | OXYGEN SATURATION: 100 % | TEMPERATURE: 96.6 F

## 2023-05-05 PROCEDURE — 2720000010 HC SURG SUPPLY STERILE: Performed by: INTERNAL MEDICINE

## 2023-05-05 PROCEDURE — 2580000003 HC RX 258

## 2023-05-05 PROCEDURE — 7100000010 HC PHASE II RECOVERY - FIRST 15 MIN: Performed by: INTERNAL MEDICINE

## 2023-05-05 PROCEDURE — 2580000003 HC RX 258: Performed by: INTERNAL MEDICINE

## 2023-05-05 PROCEDURE — 6370000000 HC RX 637 (ALT 250 FOR IP)

## 2023-05-05 PROCEDURE — 3700000001 HC ADD 15 MINUTES (ANESTHESIA): Performed by: INTERNAL MEDICINE

## 2023-05-05 PROCEDURE — 7100000011 HC PHASE II RECOVERY - ADDTL 15 MIN: Performed by: INTERNAL MEDICINE

## 2023-05-05 PROCEDURE — 2500000003 HC RX 250 WO HCPCS

## 2023-05-05 PROCEDURE — 6360000002 HC RX W HCPCS

## 2023-05-05 PROCEDURE — 3609012300 HC EGD BAND LIGATION ESOPHGEAL/GASTRIC VARICES: Performed by: INTERNAL MEDICINE

## 2023-05-05 PROCEDURE — 3700000000 HC ANESTHESIA ATTENDED CARE: Performed by: INTERNAL MEDICINE

## 2023-05-05 RX ORDER — PROPOFOL 10 MG/ML
INJECTION, EMULSION INTRAVENOUS PRN
Status: DISCONTINUED | OUTPATIENT
Start: 2023-05-05 | End: 2023-05-05 | Stop reason: SDUPTHER

## 2023-05-05 RX ORDER — LIDOCAINE HYDROCHLORIDE 20 MG/ML
INJECTION, SOLUTION INFILTRATION; PERINEURAL PRN
Status: DISCONTINUED | OUTPATIENT
Start: 2023-05-05 | End: 2023-05-05 | Stop reason: SDUPTHER

## 2023-05-05 RX ORDER — SODIUM CHLORIDE 9 MG/ML
INJECTION, SOLUTION INTRAVENOUS CONTINUOUS PRN
Status: DISCONTINUED | OUTPATIENT
Start: 2023-05-05 | End: 2023-05-05 | Stop reason: SDUPTHER

## 2023-05-05 RX ORDER — SODIUM CHLORIDE 9 MG/ML
INJECTION, SOLUTION INTRAVENOUS CONTINUOUS
Status: DISCONTINUED | OUTPATIENT
Start: 2023-05-05 | End: 2023-05-05 | Stop reason: HOSPADM

## 2023-05-05 RX ADMIN — LIDOCAINE HYDROCHLORIDE 100 MG: 20 INJECTION, SOLUTION INFILTRATION; PERINEURAL at 09:28

## 2023-05-05 RX ADMIN — SODIUM CHLORIDE: 9 INJECTION, SOLUTION INTRAVENOUS at 08:09

## 2023-05-05 RX ADMIN — BENZOCAINE 2 EACH: 200 SPRAY DENTAL; ORAL; PERIODONTAL at 09:23

## 2023-05-05 RX ADMIN — PROPOFOL 30 MG: 10 INJECTION, EMULSION INTRAVENOUS at 09:40

## 2023-05-05 RX ADMIN — PROPOFOL 50 MG: 10 INJECTION, EMULSION INTRAVENOUS at 09:28

## 2023-05-05 RX ADMIN — SODIUM CHLORIDE: 9 INJECTION, SOLUTION INTRAVENOUS at 09:25

## 2023-05-05 RX ADMIN — PROPOFOL 20 MG: 10 INJECTION, EMULSION INTRAVENOUS at 09:38

## 2023-05-05 ASSESSMENT — PAIN - FUNCTIONAL ASSESSMENT
PAIN_FUNCTIONAL_ASSESSMENT: NONE - DENIES PAIN
PAIN_FUNCTIONAL_ASSESSMENT: NONE - DENIES PAIN

## 2023-05-05 NOTE — PROGRESS NOTES
Tanvir Marcum admitted to [facility] for egd with Dr. Kobe Verma. Consent form signed and verified.

## 2023-05-05 NOTE — PROGRESS NOTES
EGD complete, esophageal varices banded. 5 bands deployed. photos taken, pt tolerated procedure well. Scope Number  used.

## 2023-05-05 NOTE — ANESTHESIA POSTPROCEDURE EVALUATION
Department of Anesthesiology  Postprocedure Note    Patient: Susanne Jackson  MRN: [id number]  YOB: 1950  Date of evaluation: 5/5/2023      Procedure Summary     Date: 05/05/23 Room / Location: [address] [place] [address]    Anesthesia Start: 3707 Anesthesia Stop: 6560    Procedure: EGD BAND LIGATION Diagnosis:       Other cirrhosis of liver (HCC)      Esophageal varices without bleeding, unspecified esophageal varices type ([address]      (Other cirrhosis of liver ([address] [K74.69])      (Esophageal varices without bleeding, unspecified esophageal varices type ([address] [I85.00])    Surgeons: Yan Rodriguez MD Responsible Provider: Meghna Sanchez DO    Anesthesia Type: MAC ASA Status: 3          Anesthesia Type: MAC    Jt Phase I: Jt Score: 10    Jt Phase II:        Anesthesia Post Evaluation    Patient location during evaluation: bedside  Patient participation: complete - patient participated  Level of consciousness: awake and alert  Pain score: 0  Airway patency: patent  Nausea & Vomiting: no nausea  Complications: no  Cardiovascular status: hemodynamically stable  Respiratory status: acceptable  Hydration status: euvolemic

## 2023-05-05 NOTE — ANESTHESIA PRE PROCEDURE
Department of Anesthesiology  Preprocedure Note       Name:  Orma Saint   Age:  68 y.o.  :  1950                                          MRN:  [id number]         Date:  2023      Surgeon: Theo Ji):  Augusto Corrales MD    Procedure: Procedure(s):  EGD    Medications prior to admission:   Prior to Admission medications    Medication Sig Start Date End Date Taking? Authorizing Provider   escitalopram (LEXAPRO) 20 MG tablet TAKE 1 TABLET EVERY DAY 23   FABIENNE Serrano CNP   metFORMIN (GLUCOPHAGE-XR) 500 MG extended release tablet TAKE 2 TABLETS TWICE DAILY.  23   FABIENNE Serrano CNP   pantoprazole (PROTONIX) 40 MG tablet TAKE 1 TABLET TWICE DAILY 23   FABIENNE Serrano CNP   glimepiride (AMARYL) 2 MG tablet TAKE 2 TABLETS (4 MG) IN THE MORNING AND 1 TABLET (2 MG) IN THE EVENING 23   FABIENNE Serrano CNP   buPROPion [facility]) 150 MG extended release tablet Take 1 tablet by mouth 2 times daily    Historical Provider, MD   atenolol (TENORMIN) 50 MG tablet TAKE 1/2 TABLET EVERY DAY 23   FABIENNE Serrano CNP   atorvastatin (LIPITOR) 40 MG tablet TAKE 1 TABLET EVERY DAY 23   FABIENNE Serrano CNP   levothyroxine (SYNTHROID) 50 MCG tablet TAKE 1 TABLET EVERY DAY 23   FABIENNE Serrano CNP   lisinopril (PRINIVIL;ZESTRIL) 20 MG tablet TAKE 1 TABLET TWICE DAILY 23   FABIENNE Serrano CNP   acetaminophen (TYLENOL) 500 MG tablet Take 1,000 mg by mouth every 6 hours as needed for Pain    Historical Provider, MD   ibuprofen (ADVIL;MOTRIN) 200 MG tablet Take 400 mg by mouth every 6 hours as needed for Fever  9/3/22  Historical Provider, MD   albuterol sulfate HFA (VENTOLIN HFA) 108 (90 Base) MCG/ACT inhaler Inhale 2 puffs into the lungs 4 times daily as needed for Wheezing  Patient not taking: Reported on 2022 6/3/21 9/3/22  FABIENNE Serrano CNP   blood glucose test strips (ACCU-CHEK ISABELA PLUS) strip USE 1 STRIP TO

## 2023-05-05 NOTE — PROGRESS NOTES
ENDOSCOPY POSTPROCEDURE REPORT     PT NAME: Orma Saint  MEDICAL RECORD NUMBER: [id number]  YOB: 1950    PROCEDURE PERFORMED BY : Augusto Corrales MD    PROCEDURE TYPE:   EGD ___x___   COLONOSCOPY _______   ERCP ________  MEDICATIONS USED :  VERSED _____   FENTANYL_____   PROPOFOL __x____  Patient tolerated procedure well. No apparent complications. Estimated blood loss:  Nil  Specimens removed:  Yes_____  No__x____  Disposition of Specimens:  To Lab      BRIEF  FINDINGS:  Two mod columns of varices distal esophagus. Banded x 3 sites. Scarring from remote banding present above this. 2. PHG  3. O/w neg    PRELIMINARY PLAN:  DIET; Liquids today then increase tomorrow as tolerated  2. Repeat EGD 1 year  3. Office visit 3 months    For complete findings and plan, see dictated report.      Augusto Corrales MD, MD  5/5/2023  9:45 AM

## 2023-05-05 NOTE — PROGRESS NOTES
BRIEF H&P//fENDOSCOPY PREPROCEDURE REPORT     Patient: Magnoila Gayle  : 1950  Acct#: [de-identified]    Date: 2023  Indications/Brief History: Cirrhosis. Remote EVB. Anemia  Anticipated Procedure: EGD with possible EVB, possible APC if GAVE    ASA class:  3  Airway Adequate? Yes__x___   No_______    Preprocedure Exam:   Neuro: Alert, oriented. Heart:  Regular rate and rhythm   Lungs: Clear to ausculation bilaterally, no evidence respiratory distress  Abdomen:  soft.   NT    PLAN:  EGD___x__   COLONOSCOPY ______     ERCP________    Theresa Varner MD MD  2023, 9:24 AM

## 2023-05-05 NOTE — DISCHARGE INSTRUCTIONS
Resume usual medications    DIET:  Liquids today (clear and fulls). Increase diet tomorrow as tolerated.     Repeat EGD 1 year    Office visit 3 months

## 2023-05-06 NOTE — PROCEDURES
[address] [place] [address]                                 PROCEDURE NOTE    PATIENT NAME: Elizabeth Pozo                  :        1950  MED REC NO:   [id number]                           ROOM:  ACCOUNT NO:   [de-identified]                           ADMIT DATE: 2023  PROVIDER:     Shawn Horton. Khang Bahena M.D.    Geoff Mortimer:  2023    PROCEDURES:  Upper endoscopy with esophageal variceal banding. SURGEON:  Kev Rodrigues MD    INSTRUMENT:  Olympus video upper endoscope. MEDICATION:  Propofol. See Anesthesia documentation report. BRIEF HISTORY:  The patient is a 80-year-old with a history of  cirrhosis. She is status post remote banding of esophageal varices. She presents today for repeat EGD evaluation for varices, possible  banding and also possible APC if gastric antral vascular ectasias are  identified. Please see my office note for all details. The procedure  was discussed with her. Following discussion, she expressed  understanding and did wish to proceed. DESCRIPTION OF PROCEDURE:  The patient arrived to  [facility] Department as an outpatient. She was placed on the  appropriate monitoring devices. She was placed in the left lateral  decubitus position. Sedation provided by the nurse anesthetist using  propofol. Then the Olympus video upper endoscope passed gently across  the cricopharyngeal musculature into the esophagus under direct  endoscopic visualization. The upper and middle portions of the  esophagus were normal.  The lower esophagus did demonstrate an area of  fibrosis consistent with remote banding. However, just below this,  there were two columns of moderate recurrent varices. These were shown  in the photographs. These were later banded. One column banded at two  sites. The other banded at one site. The first two were double banded.   (Total

## 2023-05-08 ENCOUNTER — HOSPITAL ENCOUNTER (OUTPATIENT)
Dept: INFUSION THERAPY | Age: 73
Discharge: HOME | End: 2023-05-08
Payer: MEDICARE

## 2023-05-08 ENCOUNTER — OFFICE VISIT (OUTPATIENT)
Dept: ONCOLOGY | Age: 73
End: 2023-05-08
Payer: MEDICARE

## 2023-05-08 VITALS
HEART RATE: 54 BPM | HEIGHT: 61 IN | DIASTOLIC BLOOD PRESSURE: 70 MMHG | TEMPERATURE: 97.7 F | WEIGHT: 164.4 LBS | OXYGEN SATURATION: 96 % | SYSTOLIC BLOOD PRESSURE: 146 MMHG | BODY MASS INDEX: 31.04 KG/M2

## 2023-05-08 VITALS
HEART RATE: 54 BPM | DIASTOLIC BLOOD PRESSURE: 70 MMHG | SYSTOLIC BLOOD PRESSURE: 146 MMHG | TEMPERATURE: 97.7 F | OXYGEN SATURATION: 96 %

## 2023-05-08 DIAGNOSIS — D50.0 IRON DEFICIENCY ANEMIA DUE TO CHRONIC BLOOD LOSS: ICD-10-CM

## 2023-05-08 DIAGNOSIS — D50.9 MICROCYTIC ANEMIA: ICD-10-CM

## 2023-05-08 DIAGNOSIS — D50.9 MICROCYTIC ANEMIA: Primary | ICD-10-CM

## 2023-05-08 DIAGNOSIS — D69.6 THROMBOCYTOPENIA (HCC): ICD-10-CM

## 2023-05-08 LAB
ABSOLUTE IMMATURE GRANULOCYTE: 0.01 THOU/MM3 (ref 0–0.07)
BASOPHILS ABSOLUTE: 0 THOU/MM3 (ref 0–0.1)
BASOPHILS NFR BLD AUTO: 0 % (ref 0–3)
EOSINOPHIL NFR BLD AUTO: 3 % (ref 0–4)
EOSINOPHILS ABSOLUTE: 0.1 THOU/MM3 (ref 0–0.4)
ERYTHROCYTE [DISTWIDTH] IN BLOOD BY AUTOMATED COUNT: 22.2 % (ref 11.5–14.5)
FERRITIN SERPL IA-MCNC: 99 NG/ML (ref 10–291)
HCT VFR BLD AUTO: 39.4 % (ref 37–47)
HGB BLD-MCNC: 12 GM/DL (ref 12–16)
IMMATURE GRANULOCYTES: 0 %
IRON SERPL-MCNC: 56 UG/DL (ref 50–170)
LYMPHOCYTES ABSOLUTE: 1.5 THOU/MM3 (ref 1–4.8)
LYMPHOCYTES NFR BLD AUTO: 37 % (ref 15–47)
MCH RBC QN AUTO: 27 PG (ref 26–33)
MCHC RBC AUTO-ENTMCNC: 30.5 GM/DL (ref 32.2–35.5)
MCV RBC AUTO: 89 FL (ref 81–99)
MONOCYTES ABSOLUTE: 0.4 THOU/MM3 (ref 0.4–1.3)
MONOCYTES NFR BLD AUTO: 10 % (ref 0–12)
NEUTROPHILS NFR BLD AUTO: 50 % (ref 43–75)
PLATELET # BLD AUTO: 93 THOU/MM3 (ref 130–400)
PMV BLD AUTO: 8.7 FL (ref 9.4–12.4)
RBC # BLD AUTO: 4.45 MILL/MM3 (ref 4.2–5.4)
SEGMENTED NEUTROPHILS ABSOLUTE COUNT: 2 THOU/MM3 (ref 1.8–7.7)
TIBC SERPL-MCNC: 311 UG/DL (ref 171–450)
WBC # BLD AUTO: 4.1 THOU/MM3 (ref 4.8–10.8)

## 2023-05-08 PROCEDURE — 83540 ASSAY OF IRON: CPT

## 2023-05-08 PROCEDURE — G8427 DOCREV CUR MEDS BY ELIG CLIN: HCPCS | Performed by: PHYSICIAN ASSISTANT

## 2023-05-08 PROCEDURE — 36415 COLL VENOUS BLD VENIPUNCTURE: CPT

## 2023-05-08 PROCEDURE — G8417 CALC BMI ABV UP PARAM F/U: HCPCS | Performed by: PHYSICIAN ASSISTANT

## 2023-05-08 PROCEDURE — 82728 ASSAY OF FERRITIN: CPT

## 2023-05-08 PROCEDURE — 3074F SYST BP LT 130 MM HG: CPT | Performed by: PHYSICIAN ASSISTANT

## 2023-05-08 PROCEDURE — 85025 COMPLETE CBC W/AUTO DIFF WBC: CPT

## 2023-05-08 PROCEDURE — 99211 OFF/OP EST MAY X REQ PHY/QHP: CPT

## 2023-05-08 PROCEDURE — 83550 IRON BINDING TEST: CPT

## 2023-05-08 PROCEDURE — 1090F PRES/ABSN URINE INCON ASSESS: CPT | Performed by: PHYSICIAN ASSISTANT

## 2023-05-08 PROCEDURE — 3017F COLORECTAL CA SCREEN DOC REV: CPT | Performed by: PHYSICIAN ASSISTANT

## 2023-05-08 PROCEDURE — G8399 PT W/DXA RESULTS DOCUMENT: HCPCS | Performed by: PHYSICIAN ASSISTANT

## 2023-05-08 PROCEDURE — 99213 OFFICE O/P EST LOW 20 MIN: CPT | Performed by: PHYSICIAN ASSISTANT

## 2023-05-08 PROCEDURE — 1036F TOBACCO NON-USER: CPT | Performed by: PHYSICIAN ASSISTANT

## 2023-05-08 PROCEDURE — 3078F DIAST BP <80 MM HG: CPT | Performed by: PHYSICIAN ASSISTANT

## 2023-05-08 PROCEDURE — 1124F ACP DISCUSS-NO DSCNMKR DOCD: CPT | Performed by: PHYSICIAN ASSISTANT

## 2023-05-08 NOTE — H&P
BRIEF H&P//fENDOSCOPY PREPROCEDURE REPORT      Patient: Tiera Pérez               : 1950                      Acct#: [de-identified]     Date: 2023  Indications/Brief History: Cirrhosis. Remote EVB. Anemia  Anticipated Procedure: EGD with possible EVB, possible APC if GAVE     ASA class:  3  Airway Adequate? Yes__x___   No_______     Preprocedure Exam:   Neuro: Alert, oriented. Heart:  Regular rate and rhythm   Lungs: Clear to ausculation bilaterally, no evidence respiratory distress  Abdomen:  soft.   NT     PLAN:  EGD___x__   COLONOSCOPY ______     ERCP________     Rj Abel MD MD  2023, 9:24 AM

## 2023-08-28 ENCOUNTER — TELEPHONE (OUTPATIENT)
Dept: ONCOLOGY | Age: 73
End: 2023-08-28

## 2023-09-12 ENCOUNTER — OFFICE VISIT (OUTPATIENT)
Dept: FAMILY MEDICINE CLINIC | Age: 73
End: 2023-09-12
Payer: MEDICARE

## 2023-09-12 VITALS
BODY MASS INDEX: 31.96 KG/M2 | RESPIRATION RATE: 12 BRPM | SYSTOLIC BLOOD PRESSURE: 116 MMHG | DIASTOLIC BLOOD PRESSURE: 62 MMHG | WEIGHT: 162.8 LBS | TEMPERATURE: 97.9 F | HEART RATE: 68 BPM | HEIGHT: 60 IN

## 2023-09-12 DIAGNOSIS — I10 ESSENTIAL HYPERTENSION: ICD-10-CM

## 2023-09-12 DIAGNOSIS — E78.5 HYPERLIPIDEMIA, UNSPECIFIED HYPERLIPIDEMIA TYPE: ICD-10-CM

## 2023-09-12 DIAGNOSIS — E03.9 HYPOTHYROIDISM, UNSPECIFIED TYPE: ICD-10-CM

## 2023-09-12 DIAGNOSIS — F32.A DEPRESSION, UNSPECIFIED DEPRESSION TYPE: ICD-10-CM

## 2023-09-12 DIAGNOSIS — M54.50 CHRONIC MIDLINE LOW BACK PAIN WITHOUT SCIATICA: ICD-10-CM

## 2023-09-12 DIAGNOSIS — R52 BODY ACHES: ICD-10-CM

## 2023-09-12 DIAGNOSIS — E11.8 TYPE 2 DIABETES MELLITUS WITH COMPLICATION, WITHOUT LONG-TERM CURRENT USE OF INSULIN (HCC): Primary | ICD-10-CM

## 2023-09-12 DIAGNOSIS — K21.9 GASTROESOPHAGEAL REFLUX DISEASE, UNSPECIFIED WHETHER ESOPHAGITIS PRESENT: ICD-10-CM

## 2023-09-12 DIAGNOSIS — G89.29 CHRONIC MIDLINE LOW BACK PAIN WITHOUT SCIATICA: ICD-10-CM

## 2023-09-12 LAB
HBA1C MFR BLD: 9.3 %
HBA1C MFR BLD: 9.3 % (ref 4.3–5.7)

## 2023-09-12 PROCEDURE — 83036 HEMOGLOBIN GLYCOSYLATED A1C: CPT | Performed by: NURSE PRACTITIONER

## 2023-09-12 PROCEDURE — 3046F HEMOGLOBIN A1C LEVEL >9.0%: CPT | Performed by: NURSE PRACTITIONER

## 2023-09-12 PROCEDURE — G8427 DOCREV CUR MEDS BY ELIG CLIN: HCPCS | Performed by: NURSE PRACTITIONER

## 2023-09-12 PROCEDURE — 1090F PRES/ABSN URINE INCON ASSESS: CPT | Performed by: NURSE PRACTITIONER

## 2023-09-12 PROCEDURE — G8399 PT W/DXA RESULTS DOCUMENT: HCPCS | Performed by: NURSE PRACTITIONER

## 2023-09-12 PROCEDURE — 3078F DIAST BP <80 MM HG: CPT | Performed by: NURSE PRACTITIONER

## 2023-09-12 PROCEDURE — 3017F COLORECTAL CA SCREEN DOC REV: CPT | Performed by: NURSE PRACTITIONER

## 2023-09-12 PROCEDURE — 1124F ACP DISCUSS-NO DSCNMKR DOCD: CPT | Performed by: NURSE PRACTITIONER

## 2023-09-12 PROCEDURE — G8417 CALC BMI ABV UP PARAM F/U: HCPCS | Performed by: NURSE PRACTITIONER

## 2023-09-12 PROCEDURE — 2022F DILAT RTA XM EVC RTNOPTHY: CPT | Performed by: NURSE PRACTITIONER

## 2023-09-12 PROCEDURE — 1036F TOBACCO NON-USER: CPT | Performed by: NURSE PRACTITIONER

## 2023-09-12 PROCEDURE — 99214 OFFICE O/P EST MOD 30 MIN: CPT | Performed by: NURSE PRACTITIONER

## 2023-09-12 PROCEDURE — 3074F SYST BP LT 130 MM HG: CPT | Performed by: NURSE PRACTITIONER

## 2023-09-12 RX ORDER — PIOGLITAZONEHYDROCHLORIDE 15 MG/1
15 TABLET ORAL DAILY
Qty: 90 TABLET | Refills: 0 | Status: SHIPPED | OUTPATIENT
Start: 2023-09-12

## 2023-09-12 SDOH — ECONOMIC STABILITY: FOOD INSECURITY: WITHIN THE PAST 12 MONTHS, YOU WORRIED THAT YOUR FOOD WOULD RUN OUT BEFORE YOU GOT MONEY TO BUY MORE.: NEVER TRUE

## 2023-09-12 SDOH — ECONOMIC STABILITY: INCOME INSECURITY: HOW HARD IS IT FOR YOU TO PAY FOR THE VERY BASICS LIKE FOOD, HOUSING, MEDICAL CARE, AND HEATING?: NOT HARD AT ALL

## 2023-09-12 SDOH — ECONOMIC STABILITY: HOUSING INSECURITY
IN THE LAST 12 MONTHS, WAS THERE A TIME WHEN YOU DID NOT HAVE A STEADY PLACE TO SLEEP OR SLEPT IN A SHELTER (INCLUDING NOW)?: NO

## 2023-09-12 SDOH — ECONOMIC STABILITY: FOOD INSECURITY: WITHIN THE PAST 12 MONTHS, THE FOOD YOU BOUGHT JUST DIDN'T LAST AND YOU DIDN'T HAVE MONEY TO GET MORE.: NEVER TRUE

## 2023-09-12 ASSESSMENT — ENCOUNTER SYMPTOMS
TROUBLE SWALLOWING: 0
SORE THROAT: 0
VOMITING: 0
WHEEZING: 0
SINUS PAIN: 0
COUGH: 0
NAUSEA: 0
BACK PAIN: 1
SHORTNESS OF BREATH: 0
COLOR CHANGE: 0
EYE PAIN: 0
FACIAL SWELLING: 0
ABDOMINAL PAIN: 0
DIARRHEA: 0

## 2023-09-12 NOTE — PROGRESS NOTES
disease, unspecified whether esophagitis present    4. Essential hypertension  - CBC with Auto Differential; Future    5. Hypothyroidism, unspecified type  - TSH; Future  - T4, Free; Future    6. Hyperlipidemia, unspecified hyperlipidemia type    7. Body aches  - Tens Unit MISC; by Does not apply route  Dispense: 1 each; Refill: 0    8. Chronic midline low back pain without sciatica  - Tens Unit MISC; by Does not apply route  Dispense: 1 each; Refill: 0    - Long discussion with patient about her health and diabetes control. Pt instructed to call the office immediately if there is a problem with mediation. Pt was instructed at her last appt to follow up in 3 months, but stated that she will follow up when she wants. Pt verbalized understanding that her health issues are serious and need close follow up. Labs and follow up in 3 months. Actos added daily.    - Pt can take RX for TENS to medical supply and see if they can help get it covered with insurance. - Work on diet and exercise to help with blood sugar and lower cardiovascular risk. - Call office with any questions or concerns, or if symptoms are getting worse or changing  - Pt care gaps discussed and pt declines any preventative testing or immunizations. - ER for any acute changes. Return in about 3 months (around 12/12/2023), or if symptoms worsen or fail to improve, for Medicare AW, Routine follow up, Medication check. Patient given educational materials - see patient instructions. Discussed use, benefit, and side effects of prescribed medications. All patient questions answered. Pt voiced understanding. Reviewed health maintenance. An electronic signature was used to authenticate this note.     --FABIENNE Dalal - CNP on 9/13/2023 at 7:37 AM

## 2023-09-13 ASSESSMENT — ENCOUNTER SYMPTOMS: BOWEL INCONTINENCE: 0

## 2023-09-26 ENCOUNTER — HOSPITAL ENCOUNTER (OUTPATIENT)
Dept: INFUSION THERAPY | Age: 73
Discharge: HOME | End: 2023-09-26
Payer: MEDICARE

## 2023-09-26 ENCOUNTER — OFFICE VISIT (OUTPATIENT)
Dept: ONCOLOGY | Age: 73
End: 2023-09-26
Payer: MEDICARE

## 2023-09-26 VITALS
SYSTOLIC BLOOD PRESSURE: 130 MMHG | RESPIRATION RATE: 16 BRPM | HEART RATE: 67 BPM | DIASTOLIC BLOOD PRESSURE: 72 MMHG | TEMPERATURE: 98 F

## 2023-09-26 VITALS
BODY MASS INDEX: 32.61 KG/M2 | WEIGHT: 167 LBS | OXYGEN SATURATION: 96 % | TEMPERATURE: 98 F | HEART RATE: 67 BPM | DIASTOLIC BLOOD PRESSURE: 72 MMHG | SYSTOLIC BLOOD PRESSURE: 130 MMHG | RESPIRATION RATE: 16 BRPM

## 2023-09-26 DIAGNOSIS — D50.0 IRON DEFICIENCY ANEMIA DUE TO CHRONIC BLOOD LOSS: ICD-10-CM

## 2023-09-26 DIAGNOSIS — D69.6 THROMBOCYTOPENIA (HCC): ICD-10-CM

## 2023-09-26 DIAGNOSIS — D50.9 MICROCYTIC ANEMIA: ICD-10-CM

## 2023-09-26 DIAGNOSIS — D50.9 MICROCYTIC ANEMIA: Primary | ICD-10-CM

## 2023-09-26 LAB
ABSOLUTE IMMATURE GRANULOCYTE: 0.01 THOU/MM3 (ref 0–0.07)
BASOPHILS ABSOLUTE: 0 THOU/MM3 (ref 0–0.1)
BASOPHILS NFR BLD AUTO: 0 % (ref 0–3)
EOSINOPHIL NFR BLD AUTO: 4 % (ref 0–4)
EOSINOPHILS ABSOLUTE: 0.2 THOU/MM3 (ref 0–0.4)
ERYTHROCYTE [DISTWIDTH] IN BLOOD BY AUTOMATED COUNT: 14.5 % (ref 11.5–14.5)
FERRITIN SERPL IA-MCNC: 10 NG/ML (ref 10–291)
HCT VFR BLD AUTO: 36.4 % (ref 37–47)
HGB BLD-MCNC: 11.2 GM/DL (ref 12–16)
IMMATURE GRANULOCYTES: 0 %
IRON SERPL-MCNC: 45 UG/DL (ref 50–170)
LYMPHOCYTES ABSOLUTE: 1.4 THOU/MM3 (ref 1–4.8)
LYMPHOCYTES NFR BLD AUTO: 32 % (ref 15–47)
MCH RBC QN AUTO: 26.5 PG (ref 26–33)
MCHC RBC AUTO-ENTMCNC: 30.8 GM/DL (ref 32.2–35.5)
MCV RBC AUTO: 86 FL (ref 81–99)
MONOCYTES ABSOLUTE: 0.4 THOU/MM3 (ref 0.4–1.3)
MONOCYTES NFR BLD AUTO: 9 % (ref 0–12)
NEUTROPHILS NFR BLD AUTO: 56 % (ref 43–75)
PLATELET # BLD AUTO: 79 THOU/MM3 (ref 130–400)
PMV BLD AUTO: 9.5 FL (ref 9.4–12.4)
RBC # BLD AUTO: 4.22 MILL/MM3 (ref 4.2–5.4)
SEGMENTED NEUTROPHILS ABSOLUTE COUNT: 2.4 THOU/MM3 (ref 1.8–7.7)
TIBC SERPL-MCNC: 390 UG/DL (ref 171–450)
WBC # BLD AUTO: 4.2 THOU/MM3 (ref 4.8–10.8)

## 2023-09-26 PROCEDURE — 3017F COLORECTAL CA SCREEN DOC REV: CPT | Performed by: PHYSICIAN ASSISTANT

## 2023-09-26 PROCEDURE — 99214 OFFICE O/P EST MOD 30 MIN: CPT | Performed by: PHYSICIAN ASSISTANT

## 2023-09-26 PROCEDURE — 3075F SYST BP GE 130 - 139MM HG: CPT | Performed by: PHYSICIAN ASSISTANT

## 2023-09-26 PROCEDURE — G8427 DOCREV CUR MEDS BY ELIG CLIN: HCPCS | Performed by: PHYSICIAN ASSISTANT

## 2023-09-26 PROCEDURE — 1090F PRES/ABSN URINE INCON ASSESS: CPT | Performed by: PHYSICIAN ASSISTANT

## 2023-09-26 PROCEDURE — 1036F TOBACCO NON-USER: CPT | Performed by: PHYSICIAN ASSISTANT

## 2023-09-26 PROCEDURE — 99211 OFF/OP EST MAY X REQ PHY/QHP: CPT

## 2023-09-26 PROCEDURE — 1124F ACP DISCUSS-NO DSCNMKR DOCD: CPT | Performed by: PHYSICIAN ASSISTANT

## 2023-09-26 PROCEDURE — 83550 IRON BINDING TEST: CPT

## 2023-09-26 PROCEDURE — G8417 CALC BMI ABV UP PARAM F/U: HCPCS | Performed by: PHYSICIAN ASSISTANT

## 2023-09-26 PROCEDURE — 3078F DIAST BP <80 MM HG: CPT | Performed by: PHYSICIAN ASSISTANT

## 2023-09-26 PROCEDURE — 82728 ASSAY OF FERRITIN: CPT

## 2023-09-26 PROCEDURE — 83540 ASSAY OF IRON: CPT

## 2023-09-26 PROCEDURE — 85025 COMPLETE CBC W/AUTO DIFF WBC: CPT

## 2023-09-26 PROCEDURE — 36415 COLL VENOUS BLD VENIPUNCTURE: CPT

## 2023-09-26 PROCEDURE — G8399 PT W/DXA RESULTS DOCUMENT: HCPCS | Performed by: PHYSICIAN ASSISTANT

## 2023-09-26 NOTE — PROGRESS NOTES
[facility] [address] [place] [address] Juan Francisco Posey [phone] [facility] [id number]  Dept: [phone]  Dept Fax: [phone]: [phone]      Visit Date:9/26/2023     Alfred Smith is a 68 y.o. female who presents today for:   Chief Complaint   Patient presents with    Follow-up     Microcytic anemia        HPI:   Alfred Smith is a 68 y.o. female thrombocytopenia, MARCO A per her PCP, Katherine Mcdermott, MAVERICK-CNP. She was seen as a new patient on 3/6/23. She recently had lab work completed as part of routine lab work on 2/8/2023 which showed Hgb 8.0, hct 30.4, MCV 69. 2. her platelet count was 939,804. She was referred for further evaluation. Iron studies were completed on 2/23/23 showing ferritin 3, iron 20. Folate and vitamin B12 were within normal limits. The patient was previously followed in the office in 2018 for microcytic anemia due to iron deficiency anemia. She required IV iron in 2018; source of iron deficiency from chronic blood loss. She has history of esophageal varices which required banding in February 2018. The patient is here with her  today. She affirms increased fatigue. She denies lightheadedness, dizziness, DAVENPORT, palpitations. She affirms pica symptoms and admits to chewing ice frequently. She denies any abnormal bleeding; no epistaxis, hemoptysis, hematemesis, melena, hematochezia, hematuria or vaginal bleeding. She denies recent GI follow-up. Last EGD was in 2018 as above. The patient denies known history of malabsorptive disorders such as inflammatory bowel disease or celiac disease. She reports adequate intake of oral iron in her diet. She is not able to tolerate oral iron supplementation due to nausea, vomiting and constipation. PMH includes CAD with hx of stenting, HTN, DM, hyperlipidemia, hypothyroidism, CKD, cirrhosis, history of esophageal varices. She received IV injectafer x2 in March 2023.    The patient had EGD completed per Dr. Jeff Hathaway on 5/5/2023

## 2023-09-26 NOTE — PATIENT INSTRUCTIONS
Proceed with IV Injectafer, total of 2 infusions to be given at least one week apart. Return to clinic in 8 weeks. Labs on RTC: CBC, ferritin  Please call for questions or concerns.

## 2023-09-28 RX ORDER — DIPHENHYDRAMINE HYDROCHLORIDE 50 MG/ML
50 INJECTION INTRAMUSCULAR; INTRAVENOUS
Status: CANCELLED | OUTPATIENT
Start: 2023-09-28

## 2023-09-28 RX ORDER — SODIUM CHLORIDE 0.9 % (FLUSH) 0.9 %
5-40 SYRINGE (ML) INJECTION PRN
Status: CANCELLED | OUTPATIENT
Start: 2023-09-28

## 2023-09-28 RX ORDER — HEPARIN SODIUM (PORCINE) LOCK FLUSH IV SOLN 100 UNIT/ML 100 UNIT/ML
500 SOLUTION INTRAVENOUS PRN
Status: CANCELLED | OUTPATIENT
Start: 2023-09-28

## 2023-09-28 RX ORDER — EPINEPHRINE 1 MG/ML
0.3 INJECTION, SOLUTION, CONCENTRATE INTRAVENOUS PRN
Status: CANCELLED | OUTPATIENT
Start: 2023-09-28

## 2023-09-28 RX ORDER — SODIUM CHLORIDE 9 MG/ML
5-250 INJECTION, SOLUTION INTRAVENOUS PRN
Status: CANCELLED | OUTPATIENT
Start: 2023-09-28

## 2023-09-28 RX ORDER — FAMOTIDINE 10 MG/ML
20 INJECTION, SOLUTION INTRAVENOUS
Status: CANCELLED | OUTPATIENT
Start: 2023-09-28

## 2023-09-28 RX ORDER — ACETAMINOPHEN 325 MG/1
650 TABLET ORAL
Status: CANCELLED | OUTPATIENT
Start: 2023-09-28

## 2023-09-28 RX ORDER — SODIUM CHLORIDE 9 MG/ML
INJECTION, SOLUTION INTRAVENOUS CONTINUOUS
Status: CANCELLED | OUTPATIENT
Start: 2023-09-28

## 2023-09-28 RX ORDER — ALBUTEROL SULFATE 90 UG/1
4 AEROSOL, METERED RESPIRATORY (INHALATION) PRN
Status: CANCELLED | OUTPATIENT
Start: 2023-09-28

## 2023-09-28 RX ORDER — ONDANSETRON 2 MG/ML
8 INJECTION INTRAMUSCULAR; INTRAVENOUS
Status: CANCELLED | OUTPATIENT
Start: 2023-09-28

## 2023-10-06 ENCOUNTER — HOSPITAL ENCOUNTER (OUTPATIENT)
Dept: INFUSION THERAPY | Age: 73
Discharge: HOME | End: 2023-10-06
Payer: MEDICARE

## 2023-10-06 VITALS
SYSTOLIC BLOOD PRESSURE: 132 MMHG | RESPIRATION RATE: 18 BRPM | DIASTOLIC BLOOD PRESSURE: 62 MMHG | HEART RATE: 60 BPM | TEMPERATURE: 97.5 F | OXYGEN SATURATION: 98 %

## 2023-10-06 DIAGNOSIS — D50.9 MICROCYTIC ANEMIA: ICD-10-CM

## 2023-10-06 DIAGNOSIS — D50.0 IRON DEFICIENCY ANEMIA DUE TO CHRONIC BLOOD LOSS: Primary | ICD-10-CM

## 2023-10-06 PROCEDURE — 96365 THER/PROPH/DIAG IV INF INIT: CPT

## 2023-10-06 PROCEDURE — 6360000002 HC RX W HCPCS: Performed by: PHYSICIAN ASSISTANT

## 2023-10-06 PROCEDURE — 2580000003 HC RX 258: Performed by: PHYSICIAN ASSISTANT

## 2023-10-06 RX ORDER — SODIUM CHLORIDE 9 MG/ML
5-250 INJECTION, SOLUTION INTRAVENOUS PRN
OUTPATIENT
Start: 2023-10-13

## 2023-10-06 RX ORDER — ACETAMINOPHEN 325 MG/1
650 TABLET ORAL
OUTPATIENT
Start: 2023-10-13

## 2023-10-06 RX ORDER — SODIUM CHLORIDE 0.9 % (FLUSH) 0.9 %
5-40 SYRINGE (ML) INJECTION PRN
OUTPATIENT
Start: 2023-10-13

## 2023-10-06 RX ORDER — SODIUM CHLORIDE 9 MG/ML
5-250 INJECTION, SOLUTION INTRAVENOUS PRN
Status: DISCONTINUED | OUTPATIENT
Start: 2023-10-06 | End: 2023-10-07 | Stop reason: HOSPADM

## 2023-10-06 RX ORDER — DIPHENHYDRAMINE HYDROCHLORIDE 50 MG/ML
50 INJECTION INTRAMUSCULAR; INTRAVENOUS
OUTPATIENT
Start: 2023-10-13

## 2023-10-06 RX ORDER — EPINEPHRINE 1 MG/ML
0.3 INJECTION, SOLUTION INTRAMUSCULAR; SUBCUTANEOUS PRN
OUTPATIENT
Start: 2023-10-13

## 2023-10-06 RX ORDER — SODIUM CHLORIDE 9 MG/ML
INJECTION, SOLUTION INTRAVENOUS CONTINUOUS
OUTPATIENT
Start: 2023-10-13

## 2023-10-06 RX ORDER — ONDANSETRON 2 MG/ML
8 INJECTION INTRAMUSCULAR; INTRAVENOUS
OUTPATIENT
Start: 2023-10-13

## 2023-10-06 RX ORDER — SODIUM CHLORIDE 9 MG/ML
5-250 INJECTION, SOLUTION INTRAVENOUS PRN
Status: CANCELLED | OUTPATIENT
Start: 2023-10-13

## 2023-10-06 RX ORDER — ALBUTEROL SULFATE 90 UG/1
4 AEROSOL, METERED RESPIRATORY (INHALATION) PRN
OUTPATIENT
Start: 2023-10-13

## 2023-10-06 RX ORDER — HEPARIN 100 UNIT/ML
500 SYRINGE INTRAVENOUS PRN
OUTPATIENT
Start: 2023-10-13

## 2023-10-06 RX ADMIN — SODIUM CHLORIDE 20 ML/HR: 9 INJECTION, SOLUTION INTRAVENOUS at 13:18

## 2023-10-06 RX ADMIN — FERRIC CARBOXYMALTOSE INJECTION 750 MG: 50 INJECTION, SOLUTION INTRAVENOUS at 13:19

## 2023-10-11 DIAGNOSIS — E11.8 TYPE 2 DIABETES MELLITUS WITH COMPLICATION, WITHOUT LONG-TERM CURRENT USE OF INSULIN (HCC): ICD-10-CM

## 2023-10-11 RX ORDER — BLOOD SUGAR DIAGNOSTIC
STRIP MISCELLANEOUS
Qty: 100 EACH | Refills: 3 | Status: SHIPPED | OUTPATIENT
Start: 2023-10-11

## 2023-10-11 NOTE — TELEPHONE ENCOUNTER
This medication refill is regarding a telephone request. Refill requested by spouse/partner. Requested Prescriptions     Pending Prescriptions Disp Refills    blood glucose test strips (ACCU-CHEK ISABELA PLUS) strip 100 each 3     Sig: USE 1 STRIP TO CHECK GLUCOSE ONCE DAILY       Date of last visit: 9/12/2023   Date of next visit: 12/12/2023  Date of last refill: 3/23/21 #3/100  Pharmacy Name: [facility] [place]     TING  Rx verified, ordered and set to EP.

## 2023-10-13 ENCOUNTER — HOSPITAL ENCOUNTER (OUTPATIENT)
Dept: INFUSION THERAPY | Age: 73
Discharge: HOME | End: 2023-10-13
Payer: MEDICARE

## 2023-10-13 VITALS
HEART RATE: 60 BPM | TEMPERATURE: 98.5 F | DIASTOLIC BLOOD PRESSURE: 68 MMHG | SYSTOLIC BLOOD PRESSURE: 152 MMHG | OXYGEN SATURATION: 98 % | RESPIRATION RATE: 16 BRPM

## 2023-10-13 DIAGNOSIS — D50.0 IRON DEFICIENCY ANEMIA DUE TO CHRONIC BLOOD LOSS: Primary | ICD-10-CM

## 2023-10-13 DIAGNOSIS — D50.9 MICROCYTIC ANEMIA: ICD-10-CM

## 2023-10-13 PROCEDURE — 2580000003 HC RX 258: Performed by: PHYSICIAN ASSISTANT

## 2023-10-13 PROCEDURE — 96365 THER/PROPH/DIAG IV INF INIT: CPT

## 2023-10-13 PROCEDURE — 6360000002 HC RX W HCPCS: Performed by: PHYSICIAN ASSISTANT

## 2023-10-13 RX ORDER — ONDANSETRON 2 MG/ML
8 INJECTION INTRAMUSCULAR; INTRAVENOUS
OUTPATIENT
Start: 2023-10-13

## 2023-10-13 RX ORDER — ACETAMINOPHEN 325 MG/1
650 TABLET ORAL
OUTPATIENT
Start: 2023-10-13

## 2023-10-13 RX ORDER — SODIUM CHLORIDE 9 MG/ML
5-250 INJECTION, SOLUTION INTRAVENOUS PRN
OUTPATIENT
Start: 2023-10-13

## 2023-10-13 RX ORDER — SODIUM CHLORIDE 9 MG/ML
INJECTION, SOLUTION INTRAVENOUS CONTINUOUS
OUTPATIENT
Start: 2023-10-13

## 2023-10-13 RX ORDER — SODIUM CHLORIDE 9 MG/ML
5-250 INJECTION, SOLUTION INTRAVENOUS PRN
Status: DISCONTINUED | OUTPATIENT
Start: 2023-10-13 | End: 2023-10-14 | Stop reason: HOSPADM

## 2023-10-13 RX ORDER — DIPHENHYDRAMINE HYDROCHLORIDE 50 MG/ML
50 INJECTION INTRAMUSCULAR; INTRAVENOUS
OUTPATIENT
Start: 2023-10-13

## 2023-10-13 RX ORDER — ALBUTEROL SULFATE 90 UG/1
4 AEROSOL, METERED RESPIRATORY (INHALATION) PRN
OUTPATIENT
Start: 2023-10-13

## 2023-10-13 RX ORDER — HEPARIN 100 UNIT/ML
500 SYRINGE INTRAVENOUS PRN
OUTPATIENT
Start: 2023-10-13

## 2023-10-13 RX ORDER — SODIUM CHLORIDE 9 MG/ML
5-250 INJECTION, SOLUTION INTRAVENOUS PRN
Status: CANCELLED | OUTPATIENT
Start: 2023-10-13

## 2023-10-13 RX ORDER — EPINEPHRINE 1 MG/ML
0.3 INJECTION, SOLUTION INTRAMUSCULAR; SUBCUTANEOUS PRN
OUTPATIENT
Start: 2023-10-13

## 2023-10-13 RX ORDER — SODIUM CHLORIDE 0.9 % (FLUSH) 0.9 %
5-40 SYRINGE (ML) INJECTION PRN
OUTPATIENT
Start: 2023-10-13

## 2023-10-13 RX ADMIN — SODIUM CHLORIDE 20 ML/HR: 9 INJECTION, SOLUTION INTRAVENOUS at 12:09

## 2023-10-13 RX ADMIN — FERRIC CARBOXYMALTOSE INJECTION 750 MG: 50 INJECTION, SOLUTION INTRAVENOUS at 12:14

## 2023-10-13 NOTE — PLAN OF CARE
Problem: Chronic Conditions and Co-morbidities  Goal: Patient's chronic conditions and co-morbidity symptoms are monitored and maintained or improved  Outcome: Adequate for Discharge  Flowsheets (Taken 10/13/2023 1208)  Care Plan - Patient's Chronic Conditions and Co-Morbidity Symptoms are Monitored and Maintained or Improved:   Monitor and assess patient's chronic conditions and comorbid symptoms for stability, deterioration, or improvement   Collaborate with multidisciplinary team to address chronic and comorbid conditions and prevent exacerbation or deterioration  Note: Patient verbalizes understanding to verbal information given on Injectafer,action and possible side effects. Aware to call MD if develop complications. Problem: Safety - Adult  Goal: Free from fall injury  Outcome: Adequate for Discharge  Flowsheets (Taken 10/13/2023 1208)  Free From Fall Injury:   Instruct family/caregiver on patient safety   Based on caregiver fall risk screen, instruct family/caregiver to ask for assistance with transferring infant if caregiver noted to have fall risk factors  Note: Free from falls while in [facility]. Problem: Discharge Planning  Goal: Discharge to home or other facility with appropriate resources  Outcome: Adequate for Discharge  Flowsheets (Taken 10/13/2023 1208)  Discharge to home or other facility with appropriate resources:   Identify barriers to discharge with patient and caregiver   Identify discharge learning needs (meds, wound care, etc)  Note: Verbalize understanding of discharge instructions, follow up appointments, and when to call Physician. Care plan reviewed with patient and spouse. Patient and spouse verbalize understanding of the plan of care and contribute to goal setting.

## 2023-10-16 ENCOUNTER — IMMUNIZATION (OUTPATIENT)
Dept: FAMILY MEDICINE CLINIC | Age: 73
End: 2023-10-16
Payer: MEDICARE

## 2023-10-16 DIAGNOSIS — Z23 NEEDS FLU SHOT: Primary | ICD-10-CM

## 2023-10-16 PROCEDURE — 90694 VACC AIIV4 NO PRSRV 0.5ML IM: CPT | Performed by: FAMILY MEDICINE

## 2023-10-16 PROCEDURE — G0008 ADMIN INFLUENZA VIRUS VAC: HCPCS | Performed by: FAMILY MEDICINE

## 2023-10-16 NOTE — PROGRESS NOTES
Immunization(s) given during visit:    Immunizations Administered       Name Date Dose Route    Influenza, FLUAD, (age 72 y+), Adjuvanted, 0.5mL 10/16/2023 0.5 mL Intramuscular    Site: Deltoid- Left    Lot: [id number]    [address]: 98996-690-42

## 2023-11-13 ENCOUNTER — HOSPITAL ENCOUNTER (OUTPATIENT)
Age: 73
Discharge: HOME | End: 2023-11-13
Payer: MEDICARE

## 2023-11-13 DIAGNOSIS — N18.2 CKD (CHRONIC KIDNEY DISEASE), STAGE II: ICD-10-CM

## 2023-11-13 DIAGNOSIS — N18.2 CKD (CHRONIC KIDNEY DISEASE), STAGE II: Primary | ICD-10-CM

## 2023-11-13 DIAGNOSIS — D50.9 MICROCYTIC ANEMIA: ICD-10-CM

## 2023-11-13 DIAGNOSIS — R80.9 MICROALBUMINURIA: ICD-10-CM

## 2023-11-13 DIAGNOSIS — K21.9 GASTROESOPHAGEAL REFLUX DISEASE, UNSPECIFIED WHETHER ESOPHAGITIS PRESENT: ICD-10-CM

## 2023-11-13 DIAGNOSIS — R53.83 OTHER FATIGUE: ICD-10-CM

## 2023-11-13 DIAGNOSIS — E11.21 DIABETIC NEPHROPATHY ASSOCIATED WITH TYPE 2 DIABETES MELLITUS (HCC): ICD-10-CM

## 2023-11-13 DIAGNOSIS — D50.0 IRON DEFICIENCY ANEMIA DUE TO CHRONIC BLOOD LOSS: ICD-10-CM

## 2023-11-13 DIAGNOSIS — D69.6 THROMBOCYTOPENIA (HCC): ICD-10-CM

## 2023-11-13 LAB
CREAT UR-MCNC: 46.3 MG/DL
MICROALBUMIN UR-MCNC: < 1.2 MG/DL
MICROALBUMIN/CREAT RATIO PNL UR: 26 MG/G (ref 0–30)

## 2023-11-13 PROCEDURE — 82728 ASSAY OF FERRITIN: CPT

## 2023-11-13 PROCEDURE — 83540 ASSAY OF IRON: CPT

## 2023-11-13 PROCEDURE — 82607 VITAMIN B-12: CPT

## 2023-11-13 PROCEDURE — 80048 BASIC METABOLIC PNL TOTAL CA: CPT

## 2023-11-13 PROCEDURE — 82746 ASSAY OF FOLIC ACID SERUM: CPT

## 2023-11-13 PROCEDURE — 83550 IRON BINDING TEST: CPT

## 2023-11-13 PROCEDURE — 36415 COLL VENOUS BLD VENIPUNCTURE: CPT

## 2023-11-13 PROCEDURE — 85025 COMPLETE CBC W/AUTO DIFF WBC: CPT

## 2023-11-13 PROCEDURE — 82043 UR ALBUMIN QUANTITATIVE: CPT

## 2023-11-14 LAB
ANION GAP SERPL CALC-SCNC: 12 MEQ/L (ref 8–16)
ANISOCYTOSIS BLD QL SMEAR: PRESENT
BASOPHILS ABSOLUTE: 0 THOU/MM3 (ref 0–0.1)
BASOPHILS NFR BLD AUTO: 0.5 %
BUN SERPL-MCNC: 16 MG/DL (ref 7–22)
CALCIUM SERPL-MCNC: 9.6 MG/DL (ref 8.5–10.5)
CHLORIDE SERPL-SCNC: 101 MEQ/L (ref 98–111)
CO2 SERPL-SCNC: 25 MEQ/L (ref 23–33)
CREAT SERPL-MCNC: 0.6 MG/DL (ref 0.4–1.2)
DEPRECATED RDW RBC AUTO: 68.4 FL (ref 35–45)
EOSINOPHIL NFR BLD AUTO: 4.3 %
EOSINOPHILS ABSOLUTE: 0.2 THOU/MM3 (ref 0–0.4)
ERYTHROCYTE [DISTWIDTH] IN BLOOD BY AUTOMATED COUNT: 20.9 % (ref 11.5–14.5)
FERRITIN SERPL IA-MCNC: 455 NG/ML (ref 10–291)
FOLATE SERPL-MCNC: 8.1 NG/ML (ref 4.8–24.2)
GFR SERPL CREATININE-BSD FRML MDRD: > 60 ML/MIN/1.73M2
GLUCOSE SERPL-MCNC: 150 MG/DL (ref 70–108)
HCT VFR BLD AUTO: 42.4 % (ref 37–47)
HGB BLD-MCNC: 12.9 GM/DL (ref 12–16)
IMM GRANULOCYTES # BLD AUTO: 0.02 THOU/MM3 (ref 0–0.07)
IMM GRANULOCYTES NFR BLD AUTO: 0.5 %
IRON SERPL-MCNC: 130 UG/DL (ref 50–170)
LYMPHOCYTES ABSOLUTE: 1.3 THOU/MM3 (ref 1–4.8)
LYMPHOCYTES NFR BLD AUTO: 29.8 %
MCH RBC QN AUTO: 27.7 PG (ref 26–33)
MCHC RBC AUTO-ENTMCNC: 30.4 GM/DL (ref 32.2–35.5)
MCV RBC AUTO: 91 FL (ref 81–99)
MONOCYTES ABSOLUTE: 0.5 THOU/MM3 (ref 0.4–1.3)
MONOCYTES NFR BLD AUTO: 10.9 %
NEUTROPHILS NFR BLD AUTO: 54 %
NRBC BLD AUTO-RTO: 0 /100 WBC
PLATELET # BLD AUTO: 83 THOU/MM3 (ref 130–400)
PLATELET BLD QL SMEAR: ADEQUATE
PMV BLD AUTO: 10.9 FL (ref 9.4–12.4)
POTASSIUM SERPL-SCNC: 4.7 MEQ/L (ref 3.5–5.2)
RBC # BLD AUTO: 4.66 MILL/MM3 (ref 4.2–5.4)
SCAN OF BLOOD SMEAR: NORMAL
SEGMENTED NEUTROPHILS ABSOLUTE COUNT: 2.3 THOU/MM3 (ref 1.8–7.7)
SODIUM SERPL-SCNC: 138 MEQ/L (ref 135–145)
TIBC SERPL-MCNC: 316 UG/DL (ref 171–450)
VIT B12 SERPL-MCNC: 574 PG/ML (ref 211–911)
WBC # BLD AUTO: 4.2 THOU/MM3 (ref 4.8–10.8)

## 2023-11-15 ENCOUNTER — TELEPHONE (OUTPATIENT)
Dept: NEPHROLOGY | Age: 73
End: 2023-11-15

## 2023-11-15 NOTE — TELEPHONE ENCOUNTER
Patient called asking for test results and if it is necessary to come to appointment tomorrow. I did not give her any results and told her we would ask Dr. Omer Majano and call her.

## 2023-11-16 DIAGNOSIS — I10 ESSENTIAL HYPERTENSION: ICD-10-CM

## 2023-11-16 DIAGNOSIS — E78.5 HYPERLIPIDEMIA, UNSPECIFIED HYPERLIPIDEMIA TYPE: ICD-10-CM

## 2023-11-16 DIAGNOSIS — E03.9 HYPOTHYROIDISM, UNSPECIFIED TYPE: ICD-10-CM

## 2023-11-16 DIAGNOSIS — E11.8 TYPE 2 DIABETES MELLITUS WITH COMPLICATION, WITHOUT LONG-TERM CURRENT USE OF INSULIN (HCC): ICD-10-CM

## 2023-11-16 NOTE — TELEPHONE ENCOUNTER
Patient cancelled her follow up appointment. She wants to know her lab results. Does she need to be seen again?

## 2023-11-17 RX ORDER — ATORVASTATIN CALCIUM 40 MG/1
TABLET, FILM COATED ORAL
Qty: 90 TABLET | Refills: 1 | Status: SHIPPED | OUTPATIENT
Start: 2023-11-17

## 2023-11-17 RX ORDER — LEVOTHYROXINE SODIUM 0.05 MG/1
TABLET ORAL
Qty: 90 TABLET | Refills: 3 | Status: SHIPPED | OUTPATIENT
Start: 2023-11-17

## 2023-11-17 RX ORDER — ATENOLOL 50 MG/1
TABLET ORAL
Qty: 45 TABLET | Refills: 3 | Status: SHIPPED | OUTPATIENT
Start: 2023-11-17

## 2023-11-17 RX ORDER — LISINOPRIL 20 MG/1
TABLET ORAL
Qty: 180 TABLET | Refills: 3 | Status: SHIPPED | OUTPATIENT
Start: 2023-11-17

## 2023-11-17 NOTE — TELEPHONE ENCOUNTER
This medication refill     Requested Prescriptions     Pending Prescriptions Disp Refills    levothyroxine (SYNTHROID) 50 MCG tablet [Pharmacy Med Name: LEVOTHYROXINE SODIUM 50 MCG Tablet] 90 tablet 10     Sig: TAKE 1 TABLET EVERY DAY    lisinopril (PRINIVIL;ZESTRIL) 20 MG tablet [Pharmacy Med Name: LISINOPRIL 20 MG Tablet] 180 tablet 10     Sig: TAKE 1 TABLET TWICE DAILY    atenolol (TENORMIN) 50 MG tablet [Pharmacy Med Name: ATENOLOL 50 MG Tablet] 45 tablet 10     Sig: TAKE 1/2 TABLET EVERY DAY    atorvastatin (LIPITOR) 40 MG tablet [Pharmacy Med Name: ATORVASTATIN CALCIUM 40 MG Tablet] 90 tablet 10     Sig: TAKE 1 TABLET EVERY DAY       Date of last visit: 9/12/2023   Date of next visit: 12/12/2023      Last Lipid Panel:    Lab Results   Component Value Date/Time    CHOL 116 02/08/2023 01:12 PM    TRIG 123 02/08/2023 01:12 PM    HDL 35 02/08/2023 01:12 PM    LDLCALC 56 02/08/2023 01:12 PM     Last CMP:   Lab Results   Component Value Date     11/13/2023    K 4.7 11/13/2023     11/13/2023    CO2 25 11/13/2023    BUN 16 11/13/2023    CREATININE 0.6 11/13/2023    GLUCOSE 150 (H) 11/13/2023    CALCIUM 9.6 11/13/2023    PROT 7.1 02/08/2023    LABALBU 4.0 02/08/2023    BILITOT 0.6 02/08/2023    ALKPHOS 75 02/08/2023    AST 24 02/08/2023    ALT 20 02/08/2023    LABGLOM >60 11/13/2023       Last Thyroid:    Lab Results   Component Value Date    TSH 1.610 11/01/2022    T4FREE 1.34 11/01/2022     Last Hemoglobin A1C:    Lab Results   Component Value Date/Time    LABA1C 9.3 09/12/2023 03:18 PM    LABA1C 9.3 09/12/2023 01:37 PM    AVGG 162 02/08/2023 01:12 PM       Rx verified, ordered and set to EP.

## 2023-11-21 ENCOUNTER — OFFICE VISIT (OUTPATIENT)
Dept: ONCOLOGY | Age: 73
End: 2023-11-21
Payer: MEDICARE

## 2023-11-21 ENCOUNTER — HOSPITAL ENCOUNTER (OUTPATIENT)
Dept: INFUSION THERAPY | Age: 73
Discharge: HOME | End: 2023-11-21
Payer: MEDICARE

## 2023-11-21 VITALS
OXYGEN SATURATION: 98 % | HEART RATE: 87 BPM | TEMPERATURE: 98.1 F | RESPIRATION RATE: 18 BRPM | SYSTOLIC BLOOD PRESSURE: 128 MMHG | DIASTOLIC BLOOD PRESSURE: 58 MMHG

## 2023-11-21 VITALS
RESPIRATION RATE: 18 BRPM | DIASTOLIC BLOOD PRESSURE: 58 MMHG | WEIGHT: 169 LBS | OXYGEN SATURATION: 98 % | BODY MASS INDEX: 33.18 KG/M2 | SYSTOLIC BLOOD PRESSURE: 128 MMHG | TEMPERATURE: 98.1 F | HEIGHT: 60 IN | HEART RATE: 87 BPM

## 2023-11-21 DIAGNOSIS — D69.6 THROMBOCYTOPENIA (HCC): ICD-10-CM

## 2023-11-21 DIAGNOSIS — D50.0 IRON DEFICIENCY ANEMIA DUE TO CHRONIC BLOOD LOSS: ICD-10-CM

## 2023-11-21 DIAGNOSIS — D50.9 MICROCYTIC ANEMIA: Primary | ICD-10-CM

## 2023-11-21 PROCEDURE — 1124F ACP DISCUSS-NO DSCNMKR DOCD: CPT | Performed by: PHYSICIAN ASSISTANT

## 2023-11-21 PROCEDURE — 99211 OFF/OP EST MAY X REQ PHY/QHP: CPT

## 2023-11-21 PROCEDURE — 1090F PRES/ABSN URINE INCON ASSESS: CPT | Performed by: PHYSICIAN ASSISTANT

## 2023-11-21 PROCEDURE — G8484 FLU IMMUNIZE NO ADMIN: HCPCS | Performed by: PHYSICIAN ASSISTANT

## 2023-11-21 PROCEDURE — 1036F TOBACCO NON-USER: CPT | Performed by: PHYSICIAN ASSISTANT

## 2023-11-21 PROCEDURE — 99213 OFFICE O/P EST LOW 20 MIN: CPT | Performed by: PHYSICIAN ASSISTANT

## 2023-11-21 PROCEDURE — 3074F SYST BP LT 130 MM HG: CPT | Performed by: PHYSICIAN ASSISTANT

## 2023-11-21 PROCEDURE — G8399 PT W/DXA RESULTS DOCUMENT: HCPCS | Performed by: PHYSICIAN ASSISTANT

## 2023-11-21 PROCEDURE — G8417 CALC BMI ABV UP PARAM F/U: HCPCS | Performed by: PHYSICIAN ASSISTANT

## 2023-11-21 PROCEDURE — G8427 DOCREV CUR MEDS BY ELIG CLIN: HCPCS | Performed by: PHYSICIAN ASSISTANT

## 2023-11-21 PROCEDURE — 3017F COLORECTAL CA SCREEN DOC REV: CPT | Performed by: PHYSICIAN ASSISTANT

## 2023-11-21 PROCEDURE — 3078F DIAST BP <80 MM HG: CPT | Performed by: PHYSICIAN ASSISTANT

## 2023-11-21 NOTE — PATIENT INSTRUCTIONS
Return to clinic in 3 months  Labs on RTC: CBC, ferritin, iron, TIBC    Please call for questions or concerns.

## 2023-11-21 NOTE — PROGRESS NOTES
[facility] [address] [place] [address] Juan Francisco Posey [phone] [facility] [phone]  Dept: [phone]  Dept Fax: [phone]: [phone]      Visit Date:11/21/2023     Jayant Swenson is a 68 y.o. female who presents today for:   Chief Complaint   Patient presents with    Follow-up     Microcytic anemia        HPI:   Jayant Swenson is a 68 y.o. female thrombocytopenia, MARCO A per her PCP, Sherryle Kung, ARPN-CNP. She was seen as a new patient on 3/6/23. She recently had lab work completed as part of routine lab work on 2/8/2023 which showed Hgb 8.0, hct 30.4, MCV 69. 2. her platelet count was 220,586. She was referred for further evaluation. Iron studies were completed on 2/23/23 showing ferritin 3, iron 20. Folate and vitamin B12 were within normal limits. The patient was previously followed in the office in 2018 for microcytic anemia due to iron deficiency anemia. She required IV iron in 2018; source of iron deficiency from chronic blood loss. She has history of esophageal varices which required banding in February 2018. The patient is here with her  today. She affirms increased fatigue. She denies lightheadedness, dizziness, DAVENPORT, palpitations. She affirms pica symptoms and admits to chewing ice frequently. She denies any abnormal bleeding; no epistaxis, hemoptysis, hematemesis, melena, hematochezia, hematuria or vaginal bleeding. She denies recent GI follow-up. Last EGD was in 2018 as above. The patient denies known history of malabsorptive disorders such as inflammatory bowel disease or celiac disease. She reports adequate intake of oral iron in her diet. She is not able to tolerate oral iron supplementation due to nausea, vomiting and constipation.   PMH includes CAD with hx of stenting, HTN, DM, hyperlipidemia, hypothyroidism, CKD, cirrhosis, history of esophageal varices.     -She received IV injectafer x2 in March 2023.   -The patient had EGD completed per Dr. Randolph Vu on 5/5/2023

## 2023-12-04 NOTE — DISCHARGE SUMMARY
Discharge Summary     Patient Identification:  Jc Bragg  : 1950  MRN: [id number]   Account: [de-identified]     Admit date: 2022  Discharge date: 9/3/22   Attending provider: No att. providers found        Primary care provider: FABIENNE Mills - CNP     Discharge Diagnoses:   Principal Problem:    Acute cystitis with hematuria  Active Problems:    Elevated troponin    Fatigue    Type 2 diabetes mellitus with complication, without long-term current use of insulin (HCC)    CAD (coronary artery disease)    Abnormal ECG  Resolved Problems:    * No resolved hospital problems. *       NSTEMI, suspected  Previous history of MI, stenting x2, , . Last cath in 2018 showed stents with nonobstructive disease unchanged from 2015 cath. EKG shows septal inverted T waves. Initial troponin 0.246, downtrending.    -Continuous telemetry  -Cardiology previously following  -Echo showed EF of 40%. -Heparin drip was discontinued 2022 as recommended by cardiology.  -Patient will follow with cardiology in the outpatient setting for ischemic work-up. -Cardiology has signed off     Right rib pain  Patient reports fall . -CXR  shows no acute fracture  -X-ray ribs, right showed no definite acute rib fracture seen.  -Lidocaine patch    Urinary tract infection  Afebrile. UA positive for leukocyte esterase, trace ketones, blood, bacteria. Initial lactic acid 4.9 on admission. Repeat lactic acid WNL. Patient not hypotensive, tachycardic.   -Urine culture, positive for Klebsiella  -Continue Rocephin 1 g  -Blood cultures pending  -Continue normal saline 50 mL/hr  -Patient will be started on Augmentin for 5 days upon discharge. Iron deficiency anemia  Chronic. Hgb 8.7 on admission. Most recent Hgb 7.5.  No signs of bleeding.  -Ferritin 32, iron 17, iron saturation 6, TIBC 287  -Monitor CBC, closely following.  -Transfuse if Hgb < 7 or symptomatic  -Heparin's been stopped per recommendation of Please advise if metformin appropriate.    cardiology  -GI consult been placed for evaluation of anemia, thrombocytopenia with prior EGD and ultrasound suggesting cirrhosis. -Patient follows with [facility].  -Limited abdominal ultrasound performed 9/2/2022 showed normal liver ultrasound  -Stool, blood screen was negative  -GI has signed off  -Follow-up with GI outpatient     HFrEF  -Echo in 2018 EF 45%. -Echo, 8/31/2022, showed EF 40%. -Patient will follow with cardiology outpatient upon discharge.  -Plan to follow-up in 4 weeks with cardiology.    -Patient's Lexiscan stress test was scheduled for 2 weeks out. Hyperlipidemia  -Stable. -Continue statin    Hypothyroidism  -TSH WNL. -Continue Synthroid    Hypertension, controlled  -Stable. -Continue lisinopril  -Continue atenolol    Type II diabetes mellitus:  -Continue low-dose SSI,   -Hypoglycemia protocol in place  -POCT glucose checks     Hypovolemic hyponatremia, resolved  Sodium 127 on admission. Patient was given 500 mL NS bolus. Currently 135 on last BMP. Serum osmolality 266.5. Urine sodium 20. Urine osmolality 423.  -Continuing NS at 50 mL/hr        Hospital Course:   Polly Alfonso is a 71 y.o. female admitted to [facility] on 8/30/2022 for chills and fatigue. Polly Alfonso is a 71 y.o. female with PMHx of coronary artery disease, CHF, previous MIs with stenting, hyperlipidemia, hypertension, hypothyroidism, diabetes mellitus type 2 who presents to [facility] for evaluation of fatigue and chills that been going on for the last week. Patient reports she was gardening and fell 1 week ago. She reports this was a mechanical fall of just tripping in her garden. Patient states she did not hit her head but patient reports she possibly could have lost consciousness. Patient does complain of painful ribs on the right side. She states when she fell in the garden she hit her ribs. This fall occurred on Monday the 22nd 2022.   Patient states she then went to urgent care the following day to get a chest x-ray, x-ray showed no broken ribs chest bruising per patient. On 8/23/2022 patient states that she started to develop chills. She would have periodic episodes of chills but denies any fevers. During these episodes of chills patient reports she was having difficulty breathing and will have to take short shallow breaths. Patient adds she needed to electric blankets on for warmth. This occurred the next 2 days. Patient states over the weekend of 26 through the 28th she had no symptoms. Patient reports she is having some associated fatigue. Patient states starting on Sunday she developed nausea and vomiting. Patient states Sunday was the last day that she ate. Since then she has been unable to due to nausea and dry heaving. Patient reports some additional diarrhea. Patient does complain of new weakness dizziness and headache that started in the last 2 days. Patient's been unable to eat and drink very little over this past 3 days. Patient denies vision changes. Patient denies chest pain, palpitations, pleuritic chest pain, cough, no shortness of breath at this moment. Patient denies any abdominal pain at this time patient does not have any nausea currently. Patient denies burning with urination, increased frequency. Patient does admit to some increased urgency recently. 9/1/22: She states she had an episode of chills and shortness of breath last night. This episode was reported to have lasted approximately 1 hour. Patient's vitals were checked during this time and there was stable at that time. Patient was afebrile. Patient reports she does not remember the entire episode. 9/2/22: No recent chills or episodes of shortness of breath in the last 24 hours. Patient did have an episode of tachypnea that was associated with some nausea last night. Patient reports this episode occurred when she was getting back into bed and was having pain in her ribs. Patient reports this is what was causing her shallow quick breathing. Patient had remained afebrile, with no chest pain, pleuritic chest pain, shortness of breath, palpitations, weakness, dizziness. Discharge Medications:     Medication List        START taking these medications      amoxicillin-clavulanate 500-125 MG per tablet  Commonly known as: Augmentin  Take 1 tablet by mouth every 12 hours for 5 days Take with food or milk            CHANGE how you take these medications      atenolol 50 MG tablet  Commonly known as: TENORMIN  TAKE 1/2 TABLET EVERY DAY  What changed: See the new instructions. pantoprazole 40 MG tablet  Commonly known as: PROTONIX  TAKE 1 TABLET TWICE DAILY  What changed: See the new instructions. CONTINUE taking these medications      Accu-Chek Sanjuanita Plus strip  Generic drug: blood glucose test strips  USE 1 STRIP TO CHECK GLUCOSE ONCE DAILY     acetaminophen 500 MG tablet  Commonly known as: TYLENOL     aspirin 81 MG tablet     atorvastatin 40 MG tablet  Commonly known as: LIPITOR  TAKE 1 TABLET EVERY DAY     * blood glucose monitor kit and supplies  Glucose monitor and supplies, brand per pt preference. Test sugar daily. Dx: E11.9     * Accu-Chek Sanjuanita Plus w/Device Kit     escitalopram 20 MG tablet  Commonly known as: LEXAPRO  TAKE 1 TABLET EVERY DAY     FISH OIL     glimepiride 2 MG tablet  Commonly known as: AMARYL  TAKE 2 TABLETS (4 MG) IN THE MORNING AND 1 TABLET (2 MG) IN THE EVENING     Lancets Misc  Test sugar daily. Brand per pt preference. Dx: E11.9     levothyroxine 50 MCG tablet  Commonly known as: SYNTHROID  TAKE 1 TABLET EVERY DAY     lisinopril 20 MG tablet  Commonly known as: PRINIVIL;ZESTRIL  TAKE 1 TABLET TWICE DAILY     metFORMIN 500 MG extended release tablet  Commonly known as: GLUCOPHAGE-XR  TAKE 2 TABLETS TWICE DAILY     STOOL SOFTENER PO           * This list has 2 medication(s) that are the same as other medications prescribed for you.  Read the directions carefully, and ask your doctor or other care provider to review them with you. STOP taking these medications      albuterol sulfate  (90 Base) MCG/ACT inhaler  Commonly known as: Ventolin HFA     buPROPion 150 MG extended release tablet  Commonly known as: WELLBUTRIN SR     hydrocortisone 2.5 % cream     ibuprofen 200 MG tablet  Commonly known as: ADVIL;MOTRIN               Where to Get Your Medications        These medications were sent to [address], [address] [facility], [address]      Phone: [phone]   amoxicillin-clavulanate 500-125 MG per tablet       Patient Instructions:    Discharge lab work: CBC prior to following up with cardiology. Activity: activity as tolerated  Diet: ADULT DIET; Regular    Code Status: Full Code    Follow-up visits:   Juan Ruiz MD  [address]  [address] [place] [address]    Follow up in 1 month(s)     Consults:   Cardiology in 1 month. Stress test prior    Examination:  Vitals:  Vitals:    09/03/22 0348 09/03/22 0832 09/03/22 0935 09/03/22 1325   BP: (!) 114/55 132/62 126/60 126/66   Pulse: 64 63 66 68   Resp: 20 18 18 18   Temp: 98 °F (36.7 °C) 97.8 °F (36.6 °C) 98.2 °F (36.8 °C) 98.3 °F (36.8 °C)   TempSrc: Oral Oral Oral Oral   SpO2: 99% 96% 97% 96%   Weight:       Height:         Weight: Weight: 178 lb 1.6 oz (80.8 kg)     24 hour intake/output:  Intake/Output Summary (Last 24 hours) at 9/3/2022 1549  Last data filed at 9/3/2022 1325  Gross per 24 hour   Intake 4704.63 ml   Output --   Net 4704.63 ml       General appearance - alert, well appearing, and in no distress and oriented to person, place, and time  Chest - clear to auscultation throughout all lung fields, no wheezes, rales or rhonchi, symmetric air entry, no tachypnea, retractions or cyanosis, aerating well.   Heart - normal rate, regular rhythm, normal S1, S2, no murmurs, rubs, clicks or gallops  Abdomen - soft, nontender, nondistended, no masses or organomegaly  Obese: Yes; Protuberant: No   Neurological - alert, oriented, normal speech, no focal findings or movement disorder noted, cranial nerves II through XII grossly ntact  Extremities - peripheral pulses normal, no pedal edema, no clubbing or cyanosis  Skin - normal coloration and turgor, no rashes, no suspicious skin lesions noted    Significant Diagnostics:   Radiology: ECHO Complete 2D W Doppler W Color    Result Date: 8/31/2022  Transthoracic Echocardiography Report (TTE)  Demographics   Patient Name   Lucy Keating Gender              Female                 YUSEF   MR #           [id number]       Race                                                  Ethnicity   Account #      [de-identified]       Room Number         0006   Accession      [id number]      Date of Study       08/31/2022  Number   Date of Birth  1950      Referring Physician Chay Mireles,                                                        Age            71 year(s)      Ashley Alvarado RDCS                                  Interpreting        Meghan Vilchis MD                                 Physician  Procedure Type of Study   TTE procedure:ECHOCARDIOGRAM COMPLETE 2D W DOPPLER W COLOR. Procedure Date Date: 08/31/2022 Start: 08:14 AM Study Location: Bedside Technical Quality: Limited visualization due to body habitus. Indications:Elevated troponin and Congestive heart failure. Additional Medical History: Former smoker, anemia, hypertension, hyperlipidemia, diabetes, coronary artery disease, hypothyroidism Patient Status: Routine Contrast Medium: Definity.  Amount - 3.5 ml Height: 59.84 inches Weight: 171.96 pounds BSA: 1.75 m^2 BMI: 33.76 kg/m^2 BP: 129/70 mmHg Allergies   - No Known Allergies. Conclusions   Summary  Left Ventricular size is Mildly increased . Normal left ventricular wall thickness but distal septal and apical  thnning noted. Akinetic motion of the apex and distal septal wall noted in the left  ventricle. Severly hypokinetic motion of the anteroseptal wall noted in the left  ventricle. preserved wall motion on the rest of LV. Systolic function was moderately reduced. Ejection fraction is visually estimated at 40%. The left atrium is Mild to moderate dilated. Mildly enlarged right atrium size. Mild to Moderately enlarged right ventricle cavity. Right ventricle global systolic function is normal .  There is mild aortic stenosis with valve area of 1.8 sq cm. The maximum aortic valve gradient is 18 mmHg, the mean gradient is 9 mmHg,  and the peak velocity is 2.1 m/s. Signature   ----------------------------------------------------------------  Electronically signed by Merlinda Jaeger MD (Interpreting  physician) on 08/31/2022 at 07:46 PM  ----------------------------------------------------------------   Findings   Mitral Valve  The mitral valve structure was normal with normal leaflet separation. DOPPLER: The transmitral velocity was within the normal range with no  evidence for mitral stenosis. Mild mitral regurgitation is present. Aortic Valve  Aortic valve appears tricuspid. Aortic valve leaflets are Mildly  calcified. Leaflets exhibited mildly increased thickness and mildly  reduced cuspal separation of the aortic valve. Mild aortic regurgitation  is noted. There is mild aortic stenosis with valve area of 1.8 sq cm. The maximum aortic valve gradient is 18 mmHg, the mean gradient is 9 mmHg,  and the peak velocity is 2.1 m/s. Tricuspid Valve  The tricuspid valve structure was normal with normal leaflet separation. DOPPLER: There was no evidence of tricuspid stenosis. Mild tricuspid  regurgitation visualized.    Pulmonic Valve  The pulmonic valve leaflets exhibited normal thickness, no calcification,  and normal cuspal separation. DOPPLER: The transpulmonic velocity was  within the normal range with no evidence for regurgitation. Left Atrium  The left atrium is Mild to moderate dilated. Left Ventricle  Left Ventricular size is Mildly increased . Normal left ventricular wall thickness but distal septal and apical  thnning noted. Akinetic motion of the apex and distal septal wall noted in the left  ventricle. Severly hypokinetic motion of the anteroseptal wall noted in the left  ventricle. preserved wall motion on the rest of LV. Systolic function was moderately reduced. Ejection fraction is visually estimated at 40%. Right Atrium  Mildly enlarged right atrium size. Right Ventricle  Mild to Moderately enlarged right ventricle cavity. Right ventricle global systolic function is normal .   Pericardial Effusion  The pericardium was normal in appearance with no evidence of a pericardial  effusion. Pleural Effusion  No evidence of pleural effusion. Aorta / Great Vessels  -Aortic root dimension within normal limits.  -The Pulmonary artery is within normal limits. -IVC size is within normal limits with normal respiratory phasic changes.   M-Mode/2D Measurements & Calculations   LV Diastolic   LV Systolic Dimension:    AV Cusp Separation: 1.8 cmLA  Dimension: 4.6 3.5 cm                    Dimension: 3.5 cmAO Root  cm             LV Volume Diastolic:      Dimension: 3.1 cmLA Area: 16.5  LV FS:23.9 %   103.4 ml                  cm^2  LV PW          LV Volume Systolic: 60.2  Diastolic: 1   ml  cm             LV EDV/LV EDV Index:  Septum         103.4 ml/59 m^2LV ESV/LV  RV Diastolic Dimension: 3.4 cm  Diastolic: 1   ESV Index: 76.1 ml/34 m^2  cm             EF Calculated: 41.9 %     LA/Aorta: 1.13                                           Ascending Aorta: 3.1 cm                 LV Length: 7.3 cm         LA volume/Index: 44.9 ml /26m^2   LV Area        LVOT: 1.9 cm  Diastolic:  30.5 cm^2  LV Area  Systolic: 19.8  cm^2  Doppler Measurements & Calculations   MV Peak E-Wave:     AV Peak Velocity: 213    LVOT Peak Velocity: 135 cm/s  85.9 cm/s           cm/s                     LVOT Mean Velocity: 87.7 cm/s  MV Peak A-Wave:     AV Peak Gradient: 18.15  LVOT Peak Gradient: 7  85.3 cm/s           mmHg                     mmHgLVOT Mean Gradient: 4  MV E/A Ratio: 1.01  AV Mean Velocity: 137    mmHg  MV Peak Gradient:   cm/s  2.95 mmHg           AV Mean Gradient: 9 mmHg TV Peak E-Wave: 60.2 cm/s                      AV VTI: 38.9 cm          TV Peak A-Wave: 58.5 cm/s  MV Deceleration     AV Area  Time: 216 msec      (Continuity):1.82 cm^2   TV Peak Gradient: 1.45 mmHg  MV P1/2t: 63 msec                            TR Velocity:224 cm/s  MVA by PHT:3.49     LVOT VTI: 25 cm          TR Gradient:20.07 mmHg  cm^2                AV P1/2t: 745 msec       PV Peak Velocity: 99.5 cm/s                      IVRT: 74 msec            PV Peak Gradient: 3.96 mmHg  MV E' Septal  Velocity: 5.3 cm/s  MV A' Septal        AV DVI (VTI): 0.64AV DVI  Velocity: 7 cm/s    (Vmax):0.63  MV E' Lateral  Velocity: 5.7 cm/s  MV A' Lateral  Velocity: 7 cm/s  E/E' septal: 16.21  E/E' lateral: 15.07  [email or web address] [id number]    US RENAL COMPLETE    Result Date: 8/31/2022  US kidneys and bladder Comparison: None Findings: Right kidney normal size 12.1 cm length. RI: 0.79 No significant abnormality or hydronephrosis. Left kidney normal size 11.3 cm in length. RI: 0.8 No significant abnormality or hydronephrosis. Bilateral renal echogenicity is within normal limits. The urinary bladder is unremarkable. Prevoid volume 251 mL. Post void volume not performed. Bilateral ureteral jets not assessed. Impression: No significant abnormalities.  This document has been electronically signed by: Sadiq Black MD on Anti-Xa, Unfractionated Heparin    Collection Time: 09/01/22  5:57 AM   Result Value Ref Range    Heparin Unfractionated 0.31 0.30 - 0.70 U/ml   CBC    Collection Time: 09/01/22  5:57 AM   Result Value Ref Range    WBC 5.5 4.8 - 10.8 thou/mm3    RBC 3.51 (L) 4.20 - 5.40 mill/mm3    Hemoglobin 7.4 (L) 12.0 - 16.0 gm/dl    Hematocrit 25.3 (L) 37.0 - 47.0 %    MCV 72.1 (L) 81.0 - 99.0 fL    MCH 21.1 (L) 26.0 - 33.0 pg    MCHC 29.2 (L) 32.2 - 35.5 gm/dl    RDW-CV 19.0 (H) 11.5 - 14.5 %    RDW-SD 49.2 (H) 35.0 - 45.0 fL    Platelets 859 (L) 939 - 400 thou/mm3    MPV 10.3 9.4 - 12.4 fL   Troponin    Collection Time: 09/01/22  5:57 AM   Result Value Ref Range    Troponin T 0.131 (A) ng/ml   Anion Gap    Collection Time: 09/01/22  5:57 AM   Result Value Ref Range    Anion Gap 17.0 (H) 8.0 - 16.0 meq/L   Glomerular Filtration Rate, Estimated    Collection Time: 09/01/22  5:57 AM   Result Value Ref Range    Est, Glom Filt Rate 82 (A) ml/min/1.73m2   POCT glucose    Collection Time: 09/01/22 11:49 AM   Result Value Ref Range    POC Glucose 271 (H) 70 - 108 mg/dl   POCT glucose    Collection Time: 09/01/22  4:19 PM   Result Value Ref Range    POC Glucose 220 (H) 70 - 108 mg/dl   Hemoglobin and Hematocrit    Collection Time: 09/01/22  5:34 PM   Result Value Ref Range    Hemoglobin 7.6 (L) 12.0 - 16.0 gm/dl    Hematocrit 25.5 (L) 37.0 - 47.0 %   POCT glucose    Collection Time: 09/01/22  8:30 PM   Result Value Ref Range    POC Glucose 203 (H) 70 - 108 mg/dl   Blood occult stool screen #1    Collection Time: 09/01/22 11:30 PM   Result Value Ref Range    OCCULT BLOOD FECAL Negative    Basic Metabolic Panel    Collection Time: 09/02/22  3:56 AM   Result Value Ref Range    Sodium 132 (L) 135 - 145 meq/L    Potassium 3.6 3.5 - 5.2 meq/L    Chloride 103 98 - 111 meq/L    CO2 15 (L) 23 - 33 meq/L    Glucose 179 (H) 70 - 108 mg/dL    BUN 13 7 - 22 mg/dL    Creatinine 0.6 0.4 - 1.2 mg/dL    Calcium 8.1 (L) 8.5 - 10.5 mg/dL   CBC Collection Time: 09/02/22  3:56 AM   Result Value Ref Range    WBC 7.5 4.8 - 10.8 thou/mm3    RBC 3.56 (L) 4.20 - 5.40 mill/mm3    Hemoglobin 7.5 (L) 12.0 - 16.0 gm/dl    Hematocrit 26.2 (L) 37.0 - 47.0 %    MCV 73.6 (L) 81.0 - 99.0 fL    MCH 21.1 (L) 26.0 - 33.0 pg    MCHC 28.6 (L) 32.2 - 35.5 gm/dl    RDW-CV 19.4 (H) 11.5 - 14.5 %    RDW-SD 51.0 (H) 35.0 - 45.0 fL    Platelets 126 (L) 951 - 400 thou/mm3    MPV 9.8 9.4 - 12.4 fL   Anion Gap    Collection Time: 09/02/22  3:56 AM   Result Value Ref Range    Anion Gap 14.0 8.0 - 16.0 meq/L   Glomerular Filtration Rate, Estimated    Collection Time: 09/02/22  3:56 AM   Result Value Ref Range    Est, Glom Filt Rate >90 ml/min/1.73m2   POCT glucose    Collection Time: 09/02/22  8:11 AM   Result Value Ref Range    POC Glucose 274 (H) 70 - 108 mg/dl   POCT glucose    Collection Time: 09/02/22 11:13 AM   Result Value Ref Range    POC Glucose 290 (H) 70 - 108 mg/dl   POCT glucose    Collection Time: 09/02/22  5:06 PM   Result Value Ref Range    POC Glucose 190 (H) 70 - 108 mg/dl   POCT glucose    Collection Time: 09/02/22  8:00 PM   Result Value Ref Range    POC Glucose 210 (H) 70 - 108 mg/dl   Basic Metabolic Panel    Collection Time: 09/03/22  5:19 AM   Result Value Ref Range    Sodium 135 135 - 145 meq/L    Potassium 3.6 3.5 - 5.2 meq/L    Chloride 106 98 - 111 meq/L    CO2 17 (L) 23 - 33 meq/L    Glucose 229 (H) 70 - 108 mg/dL    BUN 8 7 - 22 mg/dL    Creatinine 0.6 0.4 - 1.2 mg/dL    Calcium 8.3 (L) 8.5 - 10.5 mg/dL   CBC    Collection Time: 09/03/22  5:19 AM   Result Value Ref Range    WBC 6.9 4.8 - 10.8 thou/mm3    RBC 3.55 (L) 4.20 - 5.40 mill/mm3    Hemoglobin 7.5 (L) 12.0 - 16.0 gm/dl    Hematocrit 25.5 (L) 37.0 - 47.0 %    MCV 71.8 (L) 81.0 - 99.0 fL    MCH 21.1 (L) 26.0 - 33.0 pg    MCHC 29.4 (L) 32.2 - 35.5 gm/dl    RDW-CV 19.5 (H) 11.5 - 14.5 %    RDW-SD 50.1 (H) 35.0 - 45.0 fL    Platelets 238 447 - 696 thou/mm3    MPV 9.9 9.4 - 12.4 fL   Protime-INR Collection Time: 09/03/22  5:19 AM   Result Value Ref Range    INR 1.12 0.85 - 1.13   Iron    Collection Time: 09/03/22  5:19 AM   Result Value Ref Range    Iron 17 (L) 50 - 170 ug/dL   IRON SATURATION    Collection Time: 09/03/22  5:19 AM   Result Value Ref Range    Iron Saturation 6 (L) 20 - 50 %   Iron binding capacity    Collection Time: 09/03/22  5:19 AM   Result Value Ref Range    TIBC 287 171 - 450 ug/dL   Ferritin    Collection Time: 09/03/22  5:19 AM   Result Value Ref Range    Ferritin 32 10 - 291 ng/mL   Reticulocytes    Collection Time: 09/03/22  5:19 AM   Result Value Ref Range    Retic Ct Abs 1.6 0.5 - 2.0 %    Absolute Retic # 58.0 20.0 - 115.0 thou/mm3    Immature Retic Fract 34.6 (H) 3.0 - 15.9 %    Retic Hemoglobin 16.0 (L) 28.2 - 35.7 pg   Anion Gap    Collection Time: 09/03/22  5:19 AM   Result Value Ref Range    Anion Gap 12.0 8.0 - 16.0 meq/L   Glomerular Filtration Rate, Estimated    Collection Time: 09/03/22  5:19 AM   Result Value Ref Range    Est, Glom Filt Rate >90 ml/min/1.73m2   POCT glucose    Collection Time: 09/03/22  8:50 AM   Result Value Ref Range    POC Glucose 203 (H) 70 - 108 mg/dl   POCT glucose    Collection Time: 09/03/22 11:24 AM   Result Value Ref Range    POC Glucose 241 (H) 70 - 108 mg/dl       Discharge condition: good  Disposition: Home  Time spent on discharge: Greater than 50 minutes    Electronically signed by Joselin Santo DO on 9/3/2022 at 3:49 PM

## 2023-12-06 ENCOUNTER — OFFICE VISIT (OUTPATIENT)
Dept: CARDIOLOGY CLINIC | Age: 73
End: 2023-12-06
Payer: MEDICARE

## 2023-12-06 VITALS
WEIGHT: 173 LBS | HEIGHT: 60 IN | DIASTOLIC BLOOD PRESSURE: 78 MMHG | SYSTOLIC BLOOD PRESSURE: 152 MMHG | BODY MASS INDEX: 33.96 KG/M2 | HEART RATE: 62 BPM

## 2023-12-06 DIAGNOSIS — E78.00 PURE HYPERCHOLESTEROLEMIA: ICD-10-CM

## 2023-12-06 DIAGNOSIS — I25.10 CORONARY ARTERY DISEASE INVOLVING NATIVE CORONARY ARTERY OF NATIVE HEART WITHOUT ANGINA PECTORIS: Primary | ICD-10-CM

## 2023-12-06 DIAGNOSIS — I10 PRIMARY HYPERTENSION: ICD-10-CM

## 2023-12-06 PROCEDURE — G8399 PT W/DXA RESULTS DOCUMENT: HCPCS | Performed by: NUCLEAR MEDICINE

## 2023-12-06 PROCEDURE — G8427 DOCREV CUR MEDS BY ELIG CLIN: HCPCS | Performed by: NUCLEAR MEDICINE

## 2023-12-06 PROCEDURE — 3017F COLORECTAL CA SCREEN DOC REV: CPT | Performed by: NUCLEAR MEDICINE

## 2023-12-06 PROCEDURE — G8417 CALC BMI ABV UP PARAM F/U: HCPCS | Performed by: NUCLEAR MEDICINE

## 2023-12-06 PROCEDURE — 1090F PRES/ABSN URINE INCON ASSESS: CPT | Performed by: NUCLEAR MEDICINE

## 2023-12-06 PROCEDURE — G8484 FLU IMMUNIZE NO ADMIN: HCPCS | Performed by: NUCLEAR MEDICINE

## 2023-12-06 PROCEDURE — 93000 ELECTROCARDIOGRAM COMPLETE: CPT | Performed by: NUCLEAR MEDICINE

## 2023-12-06 PROCEDURE — 3077F SYST BP >= 140 MM HG: CPT | Performed by: NUCLEAR MEDICINE

## 2023-12-06 PROCEDURE — 1036F TOBACCO NON-USER: CPT | Performed by: NUCLEAR MEDICINE

## 2023-12-06 PROCEDURE — 3078F DIAST BP <80 MM HG: CPT | Performed by: NUCLEAR MEDICINE

## 2023-12-06 PROCEDURE — 99213 OFFICE O/P EST LOW 20 MIN: CPT | Performed by: NUCLEAR MEDICINE

## 2023-12-06 PROCEDURE — 1124F ACP DISCUSS-NO DSCNMKR DOCD: CPT | Performed by: NUCLEAR MEDICINE

## 2023-12-06 NOTE — PROGRESS NOTES
[address]  [place] [address]  Dept: [phone]  Dept Fax: [phone]  Loc: [phone]    Visit Date: 12/6/2023    Sofia Rios is a 68 y.o. female who presents todayfor:  Chief Complaint   Patient presents with    1 Year Follow Up    Hypertension    Hyperlipidemia    Coronary Artery Disease   Know CAD  No chest pain   No changes in breathing  Previous MI and stents  BP is stable  No dizziness  No syncope  On statins       HPI:  HPI  Past Medical History:   Diagnosis Date    Anemia     CAD (coronary artery disease)     CHF (congestive heart failure) (HCC)     COPD (chronic obstructive pulmonary disease) ([address])     Depression 2000    GERD (gastroesophageal reflux disease)     Headache(784.0)     Heart attack ([address])     Hyperlipidemia     Hypertension     Hypothyroidism 02/11/2015    Liver disease     MI (myocardial infarction) ([address]) 11/99, 10/01    x 2     Obesity     Osteoarthritis     B/L knees--Dr. Adam Lugo.     PONV (postoperative nausea and vomiting)     Type 2 diabetes mellitus without complication (HCC)     Type II or unspecified type diabetes mellitus without mention of complication, not stated as uncontrolled 2014      Past Surgical History:   Procedure Laterality Date    [facility]  07/29/2009    Diagnostic Cath EF 40-45% (Dr Bernal Sera)    [address] ARTHROSCOPY Right     LA COLON CA SCRN NOT HI [address] IND Left 3/1/2018    COLONOSCOPY performed by Fantasma Arias MD at [facility]    PTCA      Shunt 10/01--Stent 11/99    TONSILLECTOMY AND ADENOIDECTOMY      UPPER GASTROINTESTINAL ENDOSCOPY      UPPER GASTROINTESTINAL ENDOSCOPY Left 2/27/2018    EGD BAND LIGATION performed by Fantasma Arias MD at [facility] N/A 4/19/2018    EGD BIOPSY performed by Judyth Leyden, MD at [facility]

## 2023-12-09 ENCOUNTER — HOSPITAL ENCOUNTER (OUTPATIENT)
Age: 73
Discharge: HOME | End: 2023-12-09
Payer: MEDICARE

## 2023-12-09 DIAGNOSIS — E03.9 HYPOTHYROIDISM, UNSPECIFIED TYPE: ICD-10-CM

## 2023-12-09 DIAGNOSIS — E11.8 TYPE 2 DIABETES MELLITUS WITH COMPLICATION, WITHOUT LONG-TERM CURRENT USE OF INSULIN (HCC): ICD-10-CM

## 2023-12-09 DIAGNOSIS — I10 ESSENTIAL HYPERTENSION: ICD-10-CM

## 2023-12-09 LAB
ALBUMIN SERPL BCG-MCNC: 3.9 G/DL (ref 3.5–5.1)
ALP SERPL-CCNC: 97 U/L (ref 38–126)
ALT SERPL W/O P-5'-P-CCNC: 46 U/L (ref 11–66)
ANION GAP SERPL CALC-SCNC: 13 MEQ/L (ref 8–16)
ANISOCYTOSIS BLD QL SMEAR: PRESENT
AST SERPL-CCNC: 51 U/L (ref 5–40)
BASOPHILS ABSOLUTE: 0 THOU/MM3 (ref 0–0.1)
BASOPHILS NFR BLD AUTO: 0.5 %
BILIRUB SERPL-MCNC: 0.5 MG/DL (ref 0.3–1.2)
BUN SERPL-MCNC: 15 MG/DL (ref 7–22)
CALCIUM SERPL-MCNC: 9.2 MG/DL (ref 8.5–10.5)
CHLORIDE SERPL-SCNC: 102 MEQ/L (ref 98–111)
CO2 SERPL-SCNC: 24 MEQ/L (ref 23–33)
CREAT SERPL-MCNC: 0.6 MG/DL (ref 0.4–1.2)
DEPRECATED MEAN GLUCOSE BLD GHB EST-ACNC: 156 MG/DL (ref 70–126)
DEPRECATED RDW RBC AUTO: 65.8 FL (ref 35–45)
EOSINOPHIL NFR BLD AUTO: 3 %
EOSINOPHILS ABSOLUTE: 0.1 THOU/MM3 (ref 0–0.4)
ERYTHROCYTE [DISTWIDTH] IN BLOOD BY AUTOMATED COUNT: 19.3 % (ref 11.5–14.5)
GFR SERPL CREATININE-BSD FRML MDRD: > 60 ML/MIN/1.73M2
GLUCOSE SERPL-MCNC: 136 MG/DL (ref 70–108)
HBA1C MFR BLD HPLC: 7.2 % (ref 4.4–6.4)
HCT VFR BLD AUTO: 40.9 % (ref 37–47)
HGB BLD-MCNC: 12.5 GM/DL (ref 12–16)
IMM GRANULOCYTES # BLD AUTO: 0.01 THOU/MM3 (ref 0–0.07)
IMM GRANULOCYTES NFR BLD AUTO: 0.2 %
LYMPHOCYTES ABSOLUTE: 1.2 THOU/MM3 (ref 1–4.8)
LYMPHOCYTES NFR BLD AUTO: 27.4 %
MCH RBC QN AUTO: 28.5 PG (ref 26–33)
MCHC RBC AUTO-ENTMCNC: 30.6 GM/DL (ref 32.2–35.5)
MCV RBC AUTO: 93.2 FL (ref 81–99)
MONOCYTES ABSOLUTE: 0.4 THOU/MM3 (ref 0.4–1.3)
MONOCYTES NFR BLD AUTO: 8.7 %
NEUTROPHILS NFR BLD AUTO: 60.2 %
NRBC BLD AUTO-RTO: 0 /100 WBC
PLATELET # BLD AUTO: 90 THOU/MM3 (ref 130–400)
PMV BLD AUTO: 10.4 FL (ref 9.4–12.4)
POTASSIUM SERPL-SCNC: 4.1 MEQ/L (ref 3.5–5.2)
PROT SERPL-MCNC: 7.1 G/DL (ref 6.1–8)
RBC # BLD AUTO: 4.39 MILL/MM3 (ref 4.2–5.4)
SEGMENTED NEUTROPHILS ABSOLUTE COUNT: 2.6 THOU/MM3 (ref 1.8–7.7)
SODIUM SERPL-SCNC: 139 MEQ/L (ref 135–145)
T4 FREE SERPL-MCNC: 1.33 NG/DL (ref 0.93–1.76)
TSH SERPL DL<=0.005 MIU/L-ACNC: 2.19 UIU/ML (ref 0.4–4.2)
WBC # BLD AUTO: 4.4 THOU/MM3 (ref 4.8–10.8)

## 2023-12-09 PROCEDURE — 83036 HEMOGLOBIN GLYCOSYLATED A1C: CPT

## 2023-12-09 PROCEDURE — 84439 ASSAY OF FREE THYROXINE: CPT

## 2023-12-09 PROCEDURE — 85025 COMPLETE CBC W/AUTO DIFF WBC: CPT

## 2023-12-09 PROCEDURE — 80053 COMPREHEN METABOLIC PANEL: CPT

## 2023-12-09 PROCEDURE — 36415 COLL VENOUS BLD VENIPUNCTURE: CPT

## 2023-12-09 PROCEDURE — 84443 ASSAY THYROID STIM HORMONE: CPT

## 2023-12-12 ENCOUNTER — OFFICE VISIT (OUTPATIENT)
Dept: FAMILY MEDICINE CLINIC | Age: 73
End: 2023-12-12
Payer: MEDICARE

## 2023-12-12 VITALS
SYSTOLIC BLOOD PRESSURE: 124 MMHG | WEIGHT: 171 LBS | HEART RATE: 60 BPM | BODY MASS INDEX: 33.57 KG/M2 | HEIGHT: 60 IN | DIASTOLIC BLOOD PRESSURE: 76 MMHG | OXYGEN SATURATION: 97 %

## 2023-12-12 DIAGNOSIS — E03.9 HYPOTHYROIDISM, UNSPECIFIED TYPE: Primary | ICD-10-CM

## 2023-12-12 DIAGNOSIS — E11.8 TYPE 2 DIABETES MELLITUS WITH COMPLICATION, WITHOUT LONG-TERM CURRENT USE OF INSULIN (HCC): ICD-10-CM

## 2023-12-12 DIAGNOSIS — I10 ESSENTIAL HYPERTENSION: ICD-10-CM

## 2023-12-12 DIAGNOSIS — E78.5 HYPERLIPIDEMIA, UNSPECIFIED HYPERLIPIDEMIA TYPE: ICD-10-CM

## 2023-12-12 PROCEDURE — G8417 CALC BMI ABV UP PARAM F/U: HCPCS | Performed by: NURSE PRACTITIONER

## 2023-12-12 PROCEDURE — G8427 DOCREV CUR MEDS BY ELIG CLIN: HCPCS | Performed by: NURSE PRACTITIONER

## 2023-12-12 PROCEDURE — G8399 PT W/DXA RESULTS DOCUMENT: HCPCS | Performed by: NURSE PRACTITIONER

## 2023-12-12 PROCEDURE — G8484 FLU IMMUNIZE NO ADMIN: HCPCS | Performed by: NURSE PRACTITIONER

## 2023-12-12 PROCEDURE — 2022F DILAT RTA XM EVC RTNOPTHY: CPT | Performed by: NURSE PRACTITIONER

## 2023-12-12 PROCEDURE — 1124F ACP DISCUSS-NO DSCNMKR DOCD: CPT | Performed by: NURSE PRACTITIONER

## 2023-12-12 PROCEDURE — 99214 OFFICE O/P EST MOD 30 MIN: CPT | Performed by: NURSE PRACTITIONER

## 2023-12-12 PROCEDURE — 3074F SYST BP LT 130 MM HG: CPT | Performed by: NURSE PRACTITIONER

## 2023-12-12 PROCEDURE — 3017F COLORECTAL CA SCREEN DOC REV: CPT | Performed by: NURSE PRACTITIONER

## 2023-12-12 PROCEDURE — 1090F PRES/ABSN URINE INCON ASSESS: CPT | Performed by: NURSE PRACTITIONER

## 2023-12-12 PROCEDURE — 3051F HG A1C>EQUAL 7.0%<8.0%: CPT | Performed by: NURSE PRACTITIONER

## 2023-12-12 PROCEDURE — 1036F TOBACCO NON-USER: CPT | Performed by: NURSE PRACTITIONER

## 2023-12-12 PROCEDURE — 3078F DIAST BP <80 MM HG: CPT | Performed by: NURSE PRACTITIONER

## 2023-12-12 RX ORDER — PIOGLITAZONEHYDROCHLORIDE 15 MG/1
15 TABLET ORAL DAILY
Qty: 30 TABLET | Refills: 0 | Status: SHIPPED | OUTPATIENT
Start: 2023-12-12

## 2023-12-12 RX ORDER — PIOGLITAZONEHYDROCHLORIDE 15 MG/1
15 TABLET ORAL DAILY
Qty: 90 TABLET | Refills: 3 | Status: SHIPPED | OUTPATIENT
Start: 2023-12-12 | End: 2023-12-12 | Stop reason: SDUPTHER

## 2023-12-12 ASSESSMENT — ENCOUNTER SYMPTOMS
ABDOMINAL PAIN: 0
COLOR CHANGE: 0
SORE THROAT: 0
BACK PAIN: 0
NAUSEA: 0
WHEEZING: 0
FACIAL SWELLING: 0
COUGH: 0
SINUS PAIN: 0
EYE PAIN: 0
SHORTNESS OF BREATH: 0
DIARRHEA: 0
VOMITING: 0
TROUBLE SWALLOWING: 0

## 2023-12-12 NOTE — PROGRESS NOTES
[address] [facility] [address]  Dept: [phone]  Dept Fax: [phone]  Loc: [phone]     2023     Johnson Villanueva (:  1950) is a 68 y.o. female, here for evaluation of the following medical concerns:    Chief Complaint   Patient presents with    Diabetes     3 month follow up, labs completed 23        Pt presents to the office today for 3 month follow up for DM. Doing much better. A1C has improved. She is currently enrolled in a program that monitors her blood sugar and BP at home and she checks their information everyday. Actos was added at last appt and pt sugars have been much better. Treatment Adherence:   Medication compliance:  compliant all of the time  Diet compliance:  compliant most of the time  Weight trend: stable    Diabetes Mellitus Type 2: Current symptoms/problems include none. Home blood sugar records: trend: stable  Any episodes of hypoglycemia? no  Eye exam current (within one year): yes  Tobacco history: She  reports that she quit smoking about 24 years ago. Her smoking use included cigarettes. She has quit using smokeless tobacco.     Hypertension:  Home blood pressure monitoring: Yes - 120's/70's. She is adherent to a low sodium diet. Patient denies chest pain, headache, and blurred vision. Antihypertensive medication side effects: no medication side effects noted. Use of agents associated with hypertension: none. Hyperlipidemia:  No new myalgias or GI upset on atorvastatin (Lipitor).        Lab Results   Component Value Date    LABA1C 7.2 (H) 2023    LABA1C 9.3 2023    LABA1C 9.3 (H) 2023     Lab Results   Component Value Date    CREATININE 0.6 2023     Lab Results   Component Value Date    ALT 46 2023    AST 51 (H) 2023     Lab Results   Component Value Date    CHOL 116 2023    TRIG 123 2023    HDL 35 2023

## 2024-01-31 DIAGNOSIS — E11.8 TYPE 2 DIABETES MELLITUS WITH COMPLICATION, WITHOUT LONG-TERM CURRENT USE OF INSULIN (HCC): ICD-10-CM

## 2024-01-31 DIAGNOSIS — F32.A DEPRESSION, UNSPECIFIED DEPRESSION TYPE: ICD-10-CM

## 2024-01-31 DIAGNOSIS — K21.9 GASTROESOPHAGEAL REFLUX DISEASE: ICD-10-CM

## 2024-01-31 DIAGNOSIS — E03.9 HYPOTHYROIDISM, UNSPECIFIED TYPE: ICD-10-CM

## 2024-01-31 DIAGNOSIS — E11.65 TYPE 2 DIABETES MELLITUS WITH HYPERGLYCEMIA, WITHOUT LONG-TERM CURRENT USE OF INSULIN (HCC): ICD-10-CM

## 2024-01-31 DIAGNOSIS — E78.5 HYPERLIPIDEMIA, UNSPECIFIED HYPERLIPIDEMIA TYPE: ICD-10-CM

## 2024-01-31 DIAGNOSIS — I10 ESSENTIAL HYPERTENSION: ICD-10-CM

## 2024-01-31 NOTE — TELEPHONE ENCOUNTER
This medication refill is regarding a telephone request. Refill requested by spouse/partner.    Requested Prescriptions     Pending Prescriptions Disp Refills    atenolol (TENORMIN) 50 MG tablet 45 tablet 3     Sig: Take 0.5 tablets by mouth daily    atorvastatin (LIPITOR) 40 MG tablet 90 tablet 1     Sig: Take 1 tablet by mouth daily    escitalopram (LEXAPRO) 20 MG tablet 90 tablet 3     Sig: Take 1 tablet by mouth daily    glimepiride (AMARYL) 2 MG tablet 270 tablet 3     Sig: TAKE 2 TABLETS (4 MG) IN THE MORNING AND 1 TABLET (2 MG) IN THE EVENING    levothyroxine (SYNTHROID) 50 MCG tablet 90 tablet 3     Sig: Take 1 tablet by mouth daily    lisinopril (PRINIVIL;ZESTRIL) 20 MG tablet 180 tablet 3     Sig: Take 1 tablet by mouth 2 times daily    metFORMIN (GLUCOPHAGE-XR) 500 MG extended release tablet 360 tablet 3     Sig: TAKE 2 TABLETS TWICE DAILY.    pantoprazole (PROTONIX) 40 MG tablet 180 tablet 3     Sig: Take 1 tablet by mouth 2 times daily Indications: Takes one daily and 2nd one if needed    pioglitazone (ACTOS) 15 MG tablet 30 tablet 0     Sig: Take 1 tablet by mouth daily     Date of last visit: 12/12/2023   Date of next visit: 6/12/2024  Date of last refill:    -Lisinopril 11/17/23 #180/3   -Atenolol 11/17/23 #45/3   -Atorvastatin 11/17/23 #90/1   -Escitalopram 4/28/23  #90/3   -Glimepiride 4/28/23 #270/3   -Levothyroxine 11/17/23 #90/3   -Metformin 4/28/23 #180/3    -Pantoprazole 4/28/23 #180/3   -Pioglitazone 12/12/23 #30/0  Pharmacy Name: [facility]    Last Lipid Panel:    Lab Results   Component Value Date/Time    CHOL 116 02/08/2023 01:12 PM    TRIG 123 02/08/2023 01:12 PM    HDL 35 02/08/2023 01:12 PM    LDLCALC 56 02/08/2023 01:12 PM     Last CMP:   Lab Results   Component Value Date     12/09/2023    K 4.1 12/09/2023     12/09/2023    CO2 24 12/09/2023    BUN 15 12/09/2023    CREATININE 0.6 12/09/2023    GLUCOSE 136 (H) 12/09/2023    CALCIUM 9.2 12/09/2023    PROT 7.1 12/09/2023

## 2024-02-01 RX ORDER — ATENOLOL 50 MG/1
25 TABLET ORAL DAILY
Qty: 45 TABLET | Refills: 3 | Status: SHIPPED | OUTPATIENT
Start: 2024-02-01

## 2024-02-01 RX ORDER — METFORMIN HYDROCHLORIDE 500 MG/1
TABLET, EXTENDED RELEASE ORAL
Qty: 360 TABLET | Refills: 3 | Status: SHIPPED | OUTPATIENT
Start: 2024-02-01

## 2024-02-01 RX ORDER — LISINOPRIL 20 MG/1
20 TABLET ORAL 2 TIMES DAILY
Qty: 180 TABLET | Refills: 3 | Status: SHIPPED | OUTPATIENT
Start: 2024-02-01

## 2024-02-01 RX ORDER — ATORVASTATIN CALCIUM 40 MG/1
40 TABLET, FILM COATED ORAL DAILY
Qty: 90 TABLET | Refills: 3 | Status: SHIPPED | OUTPATIENT
Start: 2024-02-01

## 2024-02-01 RX ORDER — ESCITALOPRAM OXALATE 20 MG/1
20 TABLET ORAL DAILY
Qty: 90 TABLET | Refills: 3 | Status: SHIPPED | OUTPATIENT
Start: 2024-02-01

## 2024-02-01 RX ORDER — GLIMEPIRIDE 2 MG/1
TABLET ORAL
Qty: 270 TABLET | Refills: 3 | Status: SHIPPED | OUTPATIENT
Start: 2024-02-01

## 2024-02-01 RX ORDER — PANTOPRAZOLE SODIUM 40 MG/1
40 TABLET, DELAYED RELEASE ORAL 2 TIMES DAILY
Qty: 180 TABLET | Refills: 3 | Status: SHIPPED | OUTPATIENT
Start: 2024-02-01

## 2024-02-01 RX ORDER — PIOGLITAZONEHYDROCHLORIDE 15 MG/1
15 TABLET ORAL DAILY
Qty: 90 TABLET | Refills: 3 | Status: SHIPPED | OUTPATIENT
Start: 2024-02-01

## 2024-02-01 RX ORDER — LEVOTHYROXINE SODIUM 0.05 MG/1
50 TABLET ORAL DAILY
Qty: 90 TABLET | Refills: 3 | Status: SHIPPED | OUTPATIENT
Start: 2024-02-01

## 2024-04-12 ENCOUNTER — HOSPITAL ENCOUNTER (OUTPATIENT)
Dept: INFUSION THERAPY | Age: 74
Discharge: HOME | End: 2024-04-12
Payer: MEDICARE

## 2024-04-12 ENCOUNTER — OFFICE VISIT (OUTPATIENT)
Dept: ONCOLOGY | Age: 74
End: 2024-04-12
Payer: MEDICARE

## 2024-04-12 VITALS
OXYGEN SATURATION: 96 % | DIASTOLIC BLOOD PRESSURE: 62 MMHG | HEART RATE: 56 BPM | SYSTOLIC BLOOD PRESSURE: 124 MMHG | RESPIRATION RATE: 16 BRPM | TEMPERATURE: 98.1 F

## 2024-04-12 VITALS
BODY MASS INDEX: 33.92 KG/M2 | OXYGEN SATURATION: 96 % | SYSTOLIC BLOOD PRESSURE: 124 MMHG | RESPIRATION RATE: 16 BRPM | TEMPERATURE: 98.1 F | HEART RATE: 56 BPM | DIASTOLIC BLOOD PRESSURE: 62 MMHG | WEIGHT: 172.8 LBS | HEIGHT: 60 IN

## 2024-04-12 DIAGNOSIS — D50.9 MICROCYTIC ANEMIA: Primary | ICD-10-CM

## 2024-04-12 DIAGNOSIS — D69.6 THROMBOCYTOPENIA (HCC): ICD-10-CM

## 2024-04-12 DIAGNOSIS — D50.0 IRON DEFICIENCY ANEMIA DUE TO CHRONIC BLOOD LOSS: ICD-10-CM

## 2024-04-12 DIAGNOSIS — D50.9 MICROCYTIC ANEMIA: ICD-10-CM

## 2024-04-12 LAB
BASOPHILS ABSOLUTE: 0 THOU/MM3 (ref 0–0.1)
BASOPHILS NFR BLD AUTO: 1 % (ref 0–3)
EOSINOPHIL NFR BLD AUTO: 4 % (ref 0–4)
EOSINOPHILS ABSOLUTE: 0.2 THOU/MM3 (ref 0–0.4)
ERYTHROCYTE [DISTWIDTH] IN BLOOD BY AUTOMATED COUNT: 14.2 % (ref 11.5–14.5)
FERRITIN SERPL IA-MCNC: 125 NG/ML (ref 10–291)
FOLATE SERPL-MCNC: 4.9 NG/ML (ref 4.8–24.2)
HCT VFR BLD AUTO: 39.4 % (ref 37–47)
HGB BLD-MCNC: 12.7 GM/DL (ref 12–16)
IMMATURE GRANULOCYTES %: 1 %
IMMATURE GRANULOCYTES ABSOLUTE: 0.02 THOU/MM3 (ref 0–0.07)
IRON SERPL-MCNC: 77 UG/DL (ref 50–170)
LYMPHOCYTES ABSOLUTE: 1.3 THOU/MM3 (ref 1–4.8)
LYMPHOCYTES NFR BLD AUTO: 30 % (ref 15–47)
MCH RBC QN AUTO: 30.5 PG (ref 26–33)
MCHC RBC AUTO-ENTMCNC: 32.2 GM/DL (ref 32.2–35.5)
MCV RBC AUTO: 95 FL (ref 81–99)
MONOCYTES ABSOLUTE: 0.5 THOU/MM3 (ref 0.4–1.3)
MONOCYTES NFR BLD AUTO: 12 % (ref 0–12)
NEUTROPHILS NFR BLD AUTO: 53 % (ref 43–75)
PLATELET # BLD AUTO: 83 THOU/MM3 (ref 130–400)
PMV BLD AUTO: 10.4 FL (ref 9.4–12.4)
RBC # BLD AUTO: 4.17 MILL/MM3 (ref 4.2–5.4)
SEGMENTED NEUTROPHILS ABSOLUTE COUNT: 2.3 THOU/MM3 (ref 1.8–7.7)
TIBC SERPL-MCNC: 308 UG/DL (ref 171–450)
VIT B12 SERPL-MCNC: 625 PG/ML (ref 211–911)
WBC # BLD AUTO: 4.4 THOU/MM3 (ref 4.8–10.8)

## 2024-04-12 PROCEDURE — 3074F SYST BP LT 130 MM HG: CPT | Performed by: PHYSICIAN ASSISTANT

## 2024-04-12 PROCEDURE — 83550 IRON BINDING TEST: CPT

## 2024-04-12 PROCEDURE — 82746 ASSAY OF FOLIC ACID SERUM: CPT

## 2024-04-12 PROCEDURE — 82728 ASSAY OF FERRITIN: CPT

## 2024-04-12 PROCEDURE — 1036F TOBACCO NON-USER: CPT | Performed by: PHYSICIAN ASSISTANT

## 2024-04-12 PROCEDURE — 1124F ACP DISCUSS-NO DSCNMKR DOCD: CPT | Performed by: PHYSICIAN ASSISTANT

## 2024-04-12 PROCEDURE — G8417 CALC BMI ABV UP PARAM F/U: HCPCS | Performed by: PHYSICIAN ASSISTANT

## 2024-04-12 PROCEDURE — 85025 COMPLETE CBC W/AUTO DIFF WBC: CPT

## 2024-04-12 PROCEDURE — 99213 OFFICE O/P EST LOW 20 MIN: CPT | Performed by: PHYSICIAN ASSISTANT

## 2024-04-12 PROCEDURE — 99211 OFF/OP EST MAY X REQ PHY/QHP: CPT

## 2024-04-12 PROCEDURE — G8399 PT W/DXA RESULTS DOCUMENT: HCPCS | Performed by: PHYSICIAN ASSISTANT

## 2024-04-12 PROCEDURE — 1090F PRES/ABSN URINE INCON ASSESS: CPT | Performed by: PHYSICIAN ASSISTANT

## 2024-04-12 PROCEDURE — 83540 ASSAY OF IRON: CPT

## 2024-04-12 PROCEDURE — 3017F COLORECTAL CA SCREEN DOC REV: CPT | Performed by: PHYSICIAN ASSISTANT

## 2024-04-12 PROCEDURE — G8427 DOCREV CUR MEDS BY ELIG CLIN: HCPCS | Performed by: PHYSICIAN ASSISTANT

## 2024-04-12 PROCEDURE — 36415 COLL VENOUS BLD VENIPUNCTURE: CPT

## 2024-04-12 PROCEDURE — 3078F DIAST BP <80 MM HG: CPT | Performed by: PHYSICIAN ASSISTANT

## 2024-04-12 PROCEDURE — 82607 VITAMIN B-12: CPT

## 2024-04-12 NOTE — PROGRESS NOTES
[facility] [address], [address]  [facility] [address]  Dept: [phone]  Dept Fax: [phone] Loc: [phone]      Visit Date:4/12/2024     Kim Amaya is a 74 y.o. female who presents today for:   Chief Complaint   Patient presents with    Follow-up     Microcytic anemia        HPI:   Kim Amaya is a 74 y.o. female thrombocytopenia, MARCO A per her PCP, SUGEY Hurd. She was seen as a new patient on 3/6/23. She recently had lab work completed as part of routine lab work on 2/8/2023 which showed Hgb 8.0, hct 30.4, MCV 69.2. her platelet count was 113,000. She was referred for further evaluation. Iron studies were completed on 2/23/23 showing ferritin 3, iron 20. Folate and vitamin B12 were within normal limits.   The patient was previously followed in the office in 2018 for microcytic anemia due to iron deficiency anemia. She required IV iron in 2018; source of iron deficiency from chronic blood loss. She has history of esophageal varices which required banding in February 2018.   The patient is here with her  today. She affirms increased fatigue. She denies lightheadedness, dizziness, DAVENPORT, palpitations. She affirms pica symptoms and admits to chewing ice frequently. She denies any abnormal bleeding; no epistaxis, hemoptysis, hematemesis, melena, hematochezia, hematuria or vaginal bleeding.  She denies recent GI follow-up.  Last EGD was in 2018 as above.  The patient denies known history of malabsorptive disorders such as inflammatory bowel disease or celiac disease.  She reports adequate intake of oral iron in her diet.  She is not able to tolerate oral iron supplementation due to nausea, vomiting and constipation.  PMH includes CAD with hx of stenting, HTN, DM, hyperlipidemia, hypothyroidism, CKD, cirrhosis, history of esophageal varices.     -She received IV injectafer x2 in March 2023.   -The patient had EGD completed per Dr. Rodrigues on 5/5/2023

## 2024-06-07 ENCOUNTER — HOSPITAL ENCOUNTER (OUTPATIENT)
Age: 74
Discharge: HOME | End: 2024-06-07
Payer: MEDICARE

## 2024-06-07 DIAGNOSIS — E11.8 TYPE 2 DIABETES MELLITUS WITH COMPLICATION, WITHOUT LONG-TERM CURRENT USE OF INSULIN (HCC): ICD-10-CM

## 2024-06-07 DIAGNOSIS — E78.5 HYPERLIPIDEMIA, UNSPECIFIED HYPERLIPIDEMIA TYPE: ICD-10-CM

## 2024-06-07 DIAGNOSIS — I10 ESSENTIAL HYPERTENSION: ICD-10-CM

## 2024-06-07 LAB
ALBUMIN SERPL BCG-MCNC: 4 G/DL (ref 3.5–5.1)
ALP SERPL-CCNC: 81 U/L (ref 38–126)
ALT SERPL W/O P-5'-P-CCNC: 45 U/L (ref 11–66)
ANION GAP SERPL CALC-SCNC: 11 MEQ/L (ref 8–16)
AST SERPL-CCNC: 30 U/L (ref 5–40)
BILIRUB SERPL-MCNC: 0.5 MG/DL (ref 0.3–1.2)
BUN SERPL-MCNC: 15 MG/DL (ref 7–22)
CALCIUM SERPL-MCNC: 9.4 MG/DL (ref 8.5–10.5)
CHLORIDE SERPL-SCNC: 102 MEQ/L (ref 98–111)
CHOLEST SERPL-MCNC: 133 MG/DL (ref 100–199)
CO2 SERPL-SCNC: 23 MEQ/L (ref 23–33)
CREAT SERPL-MCNC: 0.6 MG/DL (ref 0.4–1.2)
GFR SERPL CREATININE-BSD FRML MDRD: > 90 ML/MIN/1.73M2
GLUCOSE SERPL-MCNC: 205 MG/DL (ref 70–108)
HDLC SERPL-MCNC: 46 MG/DL
LDLC SERPL CALC-MCNC: 63 MG/DL
POTASSIUM SERPL-SCNC: 5.2 MEQ/L (ref 3.5–5.2)
PROT SERPL-MCNC: 7 G/DL (ref 6.1–8)
SODIUM SERPL-SCNC: 136 MEQ/L (ref 135–145)
TRIGL SERPL-MCNC: 122 MG/DL (ref 0–199)

## 2024-06-07 PROCEDURE — 80061 LIPID PANEL: CPT

## 2024-06-07 PROCEDURE — 80053 COMPREHEN METABOLIC PANEL: CPT

## 2024-06-07 PROCEDURE — 83036 HEMOGLOBIN GLYCOSYLATED A1C: CPT

## 2024-06-07 PROCEDURE — 36415 COLL VENOUS BLD VENIPUNCTURE: CPT

## 2024-06-08 LAB
DEPRECATED MEAN GLUCOSE BLD GHB EST-ACNC: 177 MG/DL (ref 70–126)
HBA1C MFR BLD HPLC: 7.9 % (ref 4.4–6.4)

## 2024-06-25 ENCOUNTER — OFFICE VISIT (OUTPATIENT)
Dept: FAMILY MEDICINE CLINIC | Age: 74
End: 2024-06-25
Payer: MEDICARE

## 2024-06-25 VITALS
DIASTOLIC BLOOD PRESSURE: 78 MMHG | OXYGEN SATURATION: 98 % | RESPIRATION RATE: 16 BRPM | HEART RATE: 55 BPM | BODY MASS INDEX: 34.99 KG/M2 | WEIGHT: 178.2 LBS | HEIGHT: 60 IN | SYSTOLIC BLOOD PRESSURE: 110 MMHG

## 2024-06-25 DIAGNOSIS — I85.00 ESOPHAGEAL VARICES WITHOUT BLEEDING, UNSPECIFIED ESOPHAGEAL VARICES TYPE (HCC): ICD-10-CM

## 2024-06-25 DIAGNOSIS — I10 ESSENTIAL HYPERTENSION: ICD-10-CM

## 2024-06-25 DIAGNOSIS — E11.65 TYPE 2 DIABETES MELLITUS WITH HYPERGLYCEMIA, WITHOUT LONG-TERM CURRENT USE OF INSULIN (HCC): ICD-10-CM

## 2024-06-25 DIAGNOSIS — K21.9 GASTROESOPHAGEAL REFLUX DISEASE WITHOUT ESOPHAGITIS: ICD-10-CM

## 2024-06-25 DIAGNOSIS — Z12.31 ENCOUNTER FOR SCREENING MAMMOGRAM FOR MALIGNANT NEOPLASM OF BREAST: ICD-10-CM

## 2024-06-25 DIAGNOSIS — Z71.89 ADVANCE DIRECTIVE DISCUSSED WITH PATIENT: ICD-10-CM

## 2024-06-25 DIAGNOSIS — D69.6 THROMBOCYTOPENIA (HCC): ICD-10-CM

## 2024-06-25 DIAGNOSIS — E78.5 HYPERLIPIDEMIA, UNSPECIFIED HYPERLIPIDEMIA TYPE: ICD-10-CM

## 2024-06-25 DIAGNOSIS — Z00.00 MEDICARE ANNUAL WELLNESS VISIT, SUBSEQUENT: Primary | ICD-10-CM

## 2024-06-25 DIAGNOSIS — E03.9 HYPOTHYROIDISM, UNSPECIFIED TYPE: ICD-10-CM

## 2024-06-25 DIAGNOSIS — E11.21 DIABETIC NEPHROPATHY ASSOCIATED WITH TYPE 2 DIABETES MELLITUS (HCC): ICD-10-CM

## 2024-06-25 DIAGNOSIS — F32.A DEPRESSION, UNSPECIFIED DEPRESSION TYPE: ICD-10-CM

## 2024-06-25 DIAGNOSIS — D50.9 IRON DEFICIENCY ANEMIA, UNSPECIFIED IRON DEFICIENCY ANEMIA TYPE: ICD-10-CM

## 2024-06-25 DIAGNOSIS — I50.22 CHRONIC SYSTOLIC (CONGESTIVE) HEART FAILURE (HCC): ICD-10-CM

## 2024-06-25 PROCEDURE — 3051F HG A1C>EQUAL 7.0%<8.0%: CPT | Performed by: NURSE PRACTITIONER

## 2024-06-25 PROCEDURE — 1124F ACP DISCUSS-NO DSCNMKR DOCD: CPT | Performed by: NURSE PRACTITIONER

## 2024-06-25 PROCEDURE — 3017F COLORECTAL CA SCREEN DOC REV: CPT | Performed by: NURSE PRACTITIONER

## 2024-06-25 PROCEDURE — G0439 PPPS, SUBSEQ VISIT: HCPCS | Performed by: NURSE PRACTITIONER

## 2024-06-25 PROCEDURE — 3074F SYST BP LT 130 MM HG: CPT | Performed by: NURSE PRACTITIONER

## 2024-06-25 PROCEDURE — 3078F DIAST BP <80 MM HG: CPT | Performed by: NURSE PRACTITIONER

## 2024-06-25 RX ORDER — PIOGLITAZONEHYDROCHLORIDE 30 MG/1
30 TABLET ORAL DAILY
Qty: 90 TABLET | Refills: 3 | Status: SHIPPED | OUTPATIENT
Start: 2024-06-25 | End: 2024-06-25 | Stop reason: SDUPTHER

## 2024-06-25 RX ORDER — PIOGLITAZONEHYDROCHLORIDE 30 MG/1
30 TABLET ORAL DAILY
Qty: 90 TABLET | Refills: 3 | Status: SHIPPED | OUTPATIENT
Start: 2024-06-25

## 2024-06-25 ASSESSMENT — LIFESTYLE VARIABLES
HOW MANY STANDARD DRINKS CONTAINING ALCOHOL DO YOU HAVE ON A TYPICAL DAY: PATIENT DOES NOT DRINK
HOW OFTEN DO YOU HAVE A DRINK CONTAINING ALCOHOL: NEVER

## 2024-06-25 ASSESSMENT — ENCOUNTER SYMPTOMS
SORE THROAT: 0
SINUS PAIN: 0
SHORTNESS OF BREATH: 0
EYE PAIN: 0
COUGH: 0
BACK PAIN: 0
NAUSEA: 0
TROUBLE SWALLOWING: 0
FACIAL SWELLING: 0
WHEEZING: 0
VOMITING: 0
ABDOMINAL PAIN: 0
DIARRHEA: 0
COLOR CHANGE: 0

## 2024-06-25 ASSESSMENT — PATIENT HEALTH QUESTIONNAIRE - PHQ9
SUM OF ALL RESPONSES TO PHQ QUESTIONS 1-9: 0
2. FEELING DOWN, DEPRESSED OR HOPELESS: NOT AT ALL
10. IF YOU CHECKED OFF ANY PROBLEMS, HOW DIFFICULT HAVE THESE PROBLEMS MADE IT FOR YOU TO DO YOUR WORK, TAKE CARE OF THINGS AT HOME, OR GET ALONG WITH OTHER PEOPLE: NOT DIFFICULT AT ALL
7. TROUBLE CONCENTRATING ON THINGS, SUCH AS READING THE NEWSPAPER OR WATCHING TELEVISION: NOT AT ALL
1. LITTLE INTEREST OR PLEASURE IN DOING THINGS: NOT AT ALL
5. POOR APPETITE OR OVEREATING: NOT AT ALL
4. FEELING TIRED OR HAVING LITTLE ENERGY: NOT AT ALL
8. MOVING OR SPEAKING SO SLOWLY THAT OTHER PEOPLE COULD HAVE NOTICED. OR THE OPPOSITE, BEING SO FIGETY OR RESTLESS THAT YOU HAVE BEEN MOVING AROUND A LOT MORE THAN USUAL: NOT AT ALL
6. FEELING BAD ABOUT YOURSELF - OR THAT YOU ARE A FAILURE OR HAVE LET YOURSELF OR YOUR FAMILY DOWN: NOT AT ALL
3. TROUBLE FALLING OR STAYING ASLEEP: NOT AT ALL
SUM OF ALL RESPONSES TO PHQ QUESTIONS 1-9: 0
SUM OF ALL RESPONSES TO PHQ9 QUESTIONS 1 & 2: 0
9. THOUGHTS THAT YOU WOULD BE BETTER OFF DEAD, OR OF HURTING YOURSELF: NOT AT ALL
SUM OF ALL RESPONSES TO PHQ QUESTIONS 1-9: 0
SUM OF ALL RESPONSES TO PHQ QUESTIONS 1-9: 0

## 2024-06-25 NOTE — PROGRESS NOTES
heat intolerance. Patient is  taking her medication consistently on an empty stomach.    No components found for: \"TSHREFLEX\"  Lab Results   Component Value Date    TSH 2.190 12/09/2023    TSH 1.610 11/01/2022    TSH 1.790 08/30/2022     Patient Active Problem List   Diagnosis    Hyperlipidemia    GERD (gastroesophageal reflux disease)    Essential hypertension    Insomnia    Type 2 diabetes mellitus with complication, without long-term current use of insulin (HCC)    CAD (coronary artery disease)    Class 1 obesity due to excess calories with serious comorbidity and body mass index (BMI) of 34.0 to 34.9 in adult    Elevated liver enzymes    NAFLD (nonalcoholic fatty liver disease)--Dr. Rodrigues    Hypothyroidism    Microalbuminuria    Blood in stool    GI bleeding    Depression    Iron deficiency anemia due to chronic blood loss    Postmenopausal    Senile osteoporosis    Abnormal ECG    Iron deficiency anemia, unspecified    Anemia, unspecified    Thrombocytopenia (HCC)    Chronic systolic (congestive) heart failure    Acute cystitis with hematuria    Fatigue    Microcytic anemia    Esophageal varices without bleeding, unspecified esophageal varices type (HCC)    Diabetic nephropathy associated with type 2 diabetes mellitus (HCC)         Review of Systems   Constitutional:  Negative for chills, fatigue and fever.   HENT:  Negative for congestion, facial swelling, sinus pain, sore throat and trouble swallowing.    Eyes:  Negative for pain and visual disturbance.   Respiratory:  Negative for cough, shortness of breath and wheezing.    Cardiovascular:  Negative for chest pain and palpitations.   Gastrointestinal:  Negative for abdominal pain, diarrhea, nausea and vomiting.   Genitourinary:  Negative for difficulty urinating, dysuria and urgency.   Musculoskeletal:  Negative for back pain, gait problem and neck pain.   Skin:  Negative for color change and rash.   Neurological:  Negative for dizziness, seizures,

## 2024-06-25 NOTE — PROGRESS NOTES
engage in exercise at this level?: 0 min    Do you eat balanced/healthy meals regularly?: (!) No    Body mass index is 34.8 kg/m². (!) Abnormal      Inactivity Interventions:  Recommendations: patient agrees to increase physical activity as follows: walking as able  Do you eat balanced/healthy meals regularly Interventions:  DASH diet  Obesity Interventions:  DASH diet            Dentist Screen:  Have you seen the dentist within the past year?: (!) No    Intervention:  Advised to schedule with their dentist        Advanced Directives:  Do you have a Living Will?: (!) No    Intervention:  has NO advanced directive  - referred to ACP Coordinator                              Objective   Vitals:    06/25/24 1346   BP: 110/78   Site: Right Upper Arm   Position: Sitting   Cuff Size: Medium Adult   Pulse: 55   Resp: 16   SpO2: 98%   Weight: 80.8 kg (178 lb 3.2 oz)   Height: 1.524 m (5')      Body mass index is 34.8 kg/m².             No Known Allergies  Prior to Visit Medications    Medication Sig Taking? Authorizing Provider   pioglitazone (ACTOS) 30 MG tablet Take 1 tablet by mouth daily Yes Victoria Arnold APRN - CNP   atenolol (TENORMIN) 50 MG tablet Take 0.5 tablets by mouth daily Yes Victoria Arnold APRN - CNP   atorvastatin (LIPITOR) 40 MG tablet Take 1 tablet by mouth daily Yes Victoria Arnold APRN - CNP   escitalopram (LEXAPRO) 20 MG tablet Take 1 tablet by mouth daily Yes Victoria Arnold APRN - CNP   glimepiride (AMARYL) 2 MG tablet TAKE 2 TABLETS (4 MG) IN THE MORNING AND 1 TABLET (2 MG) IN THE EVENING Yes Victoria Arnold APRN - CNP   levothyroxine (SYNTHROID) 50 MCG tablet Take 1 tablet by mouth daily Yes Victoria Arnold APRN - CNP   lisinopril (PRINIVIL;ZESTRIL) 20 MG tablet Take 1 tablet by mouth 2 times daily Yes Victoria Arnold APRN - CNP   metFORMIN (GLUCOPHAGE-XR) 500 MG extended release tablet TAKE 2 TABLETS TWICE DAILY. Yes Victoria Arnold APRN - CNP   pantoprazole (PROTONIX) 40 MG tablet Take 1 tablet

## 2024-06-26 ENCOUNTER — CLINICAL DOCUMENTATION (OUTPATIENT)
Dept: SPIRITUAL SERVICES | Age: 74
End: 2024-06-26

## 2024-06-26 NOTE — ACP (ADVANCE CARE PLANNING)
Advance Care Planning   Ambulatory ACP Specialist Patient Outreach    Date:  6/26/2024    ACP Specialist:  Nani Roca    Outreach call to patient in follow-up to ACP Specialist referral from:Victoria Arnold APRN - CNP    [x] PCP  [] Provider   [] Ambulatory Care Management [] Other     For:                  [x] Advance Directive Assistance              [x] Complete Portable DNR order              [] Complete POST/POLST/MOST              [x] Code Status Discussion             [x] Discuss Goals of Care             [x] Early ACP Decision-Making              [] Other (Specify)    Date Referral Received:6/25/2024    Next Step:   [] ACP scheduled conversation  [x] Outreach again in one week               [x] Email / Mail ACP Info Sheets  [x] Email / Mail Advance Directive   [] Closing referral.  Routing closure to referring provider/staff and to ACP Specialist .    [] Closure letter mailed to patient with invitation to contact ACP Specialist if / when ready.   [] Other (Specify here):         [x] At this time, Healthcare Decision Maker Is:    Advance Care Planning   Healthcare Decision Maker:    Primary Decision Maker: Gigi Amaya - Spouse - [phone]    Secondary Decision Maker: Dyan Henson - Child - [phone]    Secondary Decision Maker: Kaitlyn Richmond - Child - [phone]      [] Primary agent named in scanned advance directive.    [x] Legal Next of Kin.     [] Unable to determine legal decision maker at this time.       Outreaches:       [x] 1st -  Date:  6/26/2024               Intervention:  [] Spoke with Patient   [x] Left Voice mail [x] Email / Mail    [] MyChart  [] Other (Specify) :     Outcomes:  Writer attempted ACP outreach to patient's home ([phone]) and mobile ([phone]) - no answer.  Writer left voicemail on both lines requesting return call including call back number.  ACP outreach sent to patient's email address on file with a copy of [place] [employer] forms and ACP

## 2024-07-12 ENCOUNTER — OFFICE VISIT (OUTPATIENT)
Dept: ONCOLOGY | Age: 74
End: 2024-07-12
Payer: MEDICARE

## 2024-07-12 ENCOUNTER — HOSPITAL ENCOUNTER (OUTPATIENT)
Dept: INFUSION THERAPY | Age: 74
Discharge: HOME | End: 2024-07-12
Payer: MEDICARE

## 2024-07-12 VITALS
TEMPERATURE: 97.9 F | OXYGEN SATURATION: 95 % | HEART RATE: 60 BPM | DIASTOLIC BLOOD PRESSURE: 63 MMHG | RESPIRATION RATE: 16 BRPM | SYSTOLIC BLOOD PRESSURE: 132 MMHG

## 2024-07-12 VITALS
WEIGHT: 178.4 LBS | TEMPERATURE: 97.9 F | SYSTOLIC BLOOD PRESSURE: 132 MMHG | BODY MASS INDEX: 35.02 KG/M2 | HEART RATE: 60 BPM | OXYGEN SATURATION: 95 % | DIASTOLIC BLOOD PRESSURE: 63 MMHG | RESPIRATION RATE: 16 BRPM | HEIGHT: 60 IN

## 2024-07-12 DIAGNOSIS — D69.6 THROMBOCYTOPENIA (HCC): ICD-10-CM

## 2024-07-12 DIAGNOSIS — D50.9 MICROCYTIC ANEMIA: ICD-10-CM

## 2024-07-12 DIAGNOSIS — D50.0 IRON DEFICIENCY ANEMIA DUE TO CHRONIC BLOOD LOSS: ICD-10-CM

## 2024-07-12 DIAGNOSIS — D50.9 MICROCYTIC ANEMIA: Primary | ICD-10-CM

## 2024-07-12 LAB
BASOPHILS ABSOLUTE: 0 THOU/MM3 (ref 0–0.1)
BASOPHILS NFR BLD AUTO: 0 % (ref 0–3)
EOSINOPHIL NFR BLD AUTO: 4 % (ref 0–4)
EOSINOPHILS ABSOLUTE: 0.2 THOU/MM3 (ref 0–0.4)
ERYTHROCYTE [DISTWIDTH] IN BLOOD BY AUTOMATED COUNT: 14 % (ref 11.5–14.5)
FERRITIN SERPL IA-MCNC: 44 NG/ML (ref 10–291)
FOLATE SERPL-MCNC: 5.9 NG/ML (ref 4.8–24.2)
HCT VFR BLD AUTO: 40.8 % (ref 37–47)
HGB BLD-MCNC: 13 GM/DL (ref 12–16)
IMMATURE GRANULOCYTES %: 0 %
IMMATURE GRANULOCYTES ABSOLUTE: 0.01 THOU/MM3 (ref 0–0.07)
IRON SERPL-MCNC: 89 UG/DL (ref 50–170)
LYMPHOCYTES ABSOLUTE: 1.8 THOU/MM3 (ref 1–4.8)
LYMPHOCYTES NFR BLD AUTO: 40 % (ref 15–47)
MCH RBC QN AUTO: 29 PG (ref 26–33)
MCHC RBC AUTO-ENTMCNC: 31.9 GM/DL (ref 32.2–35.5)
MCV RBC AUTO: 91 FL (ref 81–99)
MONOCYTES ABSOLUTE: 0.5 THOU/MM3 (ref 0.4–1.3)
MONOCYTES NFR BLD AUTO: 10 % (ref 0–12)
NEUTROPHILS ABSOLUTE: 2 THOU/MM3 (ref 1.8–7.7)
NEUTROPHILS NFR BLD AUTO: 45 % (ref 43–75)
PLATELET # BLD AUTO: 81 THOU/MM3 (ref 130–400)
PMV BLD AUTO: 9.5 FL (ref 9.4–12.4)
RBC # BLD AUTO: 4.49 MILL/MM3 (ref 4.2–5.4)
TIBC SERPL-MCNC: 365 UG/DL (ref 171–450)
VIT B12 SERPL-MCNC: 719 PG/ML (ref 211–911)
WBC # BLD AUTO: 4.5 THOU/MM3 (ref 4.8–10.8)

## 2024-07-12 PROCEDURE — 82728 ASSAY OF FERRITIN: CPT

## 2024-07-12 PROCEDURE — 82746 ASSAY OF FOLIC ACID SERUM: CPT

## 2024-07-12 PROCEDURE — 83550 IRON BINDING TEST: CPT

## 2024-07-12 PROCEDURE — 36415 COLL VENOUS BLD VENIPUNCTURE: CPT

## 2024-07-12 PROCEDURE — 85025 COMPLETE CBC W/AUTO DIFF WBC: CPT

## 2024-07-12 PROCEDURE — 99211 OFF/OP EST MAY X REQ PHY/QHP: CPT

## 2024-07-12 PROCEDURE — 99213 OFFICE O/P EST LOW 20 MIN: CPT | Performed by: PHYSICIAN ASSISTANT

## 2024-07-12 PROCEDURE — 3078F DIAST BP <80 MM HG: CPT | Performed by: PHYSICIAN ASSISTANT

## 2024-07-12 PROCEDURE — 3075F SYST BP GE 130 - 139MM HG: CPT | Performed by: PHYSICIAN ASSISTANT

## 2024-07-12 PROCEDURE — 83540 ASSAY OF IRON: CPT

## 2024-07-12 PROCEDURE — 82607 VITAMIN B-12: CPT

## 2024-07-12 PROCEDURE — G8417 CALC BMI ABV UP PARAM F/U: HCPCS | Performed by: PHYSICIAN ASSISTANT

## 2024-07-12 PROCEDURE — G8399 PT W/DXA RESULTS DOCUMENT: HCPCS | Performed by: PHYSICIAN ASSISTANT

## 2024-07-12 PROCEDURE — 1036F TOBACCO NON-USER: CPT | Performed by: PHYSICIAN ASSISTANT

## 2024-07-12 PROCEDURE — 1090F PRES/ABSN URINE INCON ASSESS: CPT | Performed by: PHYSICIAN ASSISTANT

## 2024-07-12 PROCEDURE — 1124F ACP DISCUSS-NO DSCNMKR DOCD: CPT | Performed by: PHYSICIAN ASSISTANT

## 2024-07-12 PROCEDURE — G8427 DOCREV CUR MEDS BY ELIG CLIN: HCPCS | Performed by: PHYSICIAN ASSISTANT

## 2024-07-12 PROCEDURE — 3017F COLORECTAL CA SCREEN DOC REV: CPT | Performed by: PHYSICIAN ASSISTANT

## 2024-07-12 NOTE — PROGRESS NOTES
[facility] [address], [address]  [facility] [address]  Dept: [phone]  Dept Fax: [phone] Loc: [phone]      Visit Date:7/12/2024     Kim Amaya is a 74 y.o. female who presents today for:   Chief Complaint   Patient presents with    Follow-up     Microcytic anemia          HPI:   Kim Amaya is a 74 y.o. female thrombocytopenia, MARCO A per her PCP, SUGEY Hurd. She was seen as a new patient on 3/6/23. She recently had lab work completed as part of routine lab work on 2/8/2023 which showed Hgb 8.0, hct 30.4, MCV 69.2. her platelet count was 113,000. She was referred for further evaluation. Iron studies were completed on 2/23/23 showing ferritin 3, iron 20. Folate and vitamin B12 were within normal limits.   The patient was previously followed in the office in 2018 for microcytic anemia due to iron deficiency anemia. She required IV iron in 2018; source of iron deficiency from chronic blood loss. She has history of esophageal varices which required banding in February 2018.   The patient is here with her  today. She affirms increased fatigue. She denies lightheadedness, dizziness, DAVENPORT, palpitations. She affirms pica symptoms and admits to chewing ice frequently. She denies any abnormal bleeding; no epistaxis, hemoptysis, hematemesis, melena, hematochezia, hematuria or vaginal bleeding.  She denies recent GI follow-up.  Last EGD was in 2018 as above.  The patient denies known history of malabsorptive disorders such as inflammatory bowel disease or celiac disease.  She reports adequate intake of oral iron in her diet.  She is not able to tolerate oral iron supplementation due to nausea, vomiting and constipation.  PMH includes CAD with hx of stenting, HTN, DM, hyperlipidemia, hypothyroidism, CKD, cirrhosis, history of esophageal varices.     -She received IV injectafer x2 in March 2023.   -The patient had EGD completed per Dr. Rodrigues on 5/5/2023

## 2024-07-12 NOTE — PATIENT INSTRUCTIONS
Return to clinic in 6 months   Labs on RTC: CBC, ferritin, iron, TIBC    Please call for questions or concerns.

## 2024-12-21 ENCOUNTER — APPOINTMENT (OUTPATIENT)
Dept: GENERAL RADIOLOGY | Age: 74
End: 2024-12-21
Payer: MEDICARE

## 2024-12-21 ENCOUNTER — HOSPITAL ENCOUNTER (EMERGENCY)
Age: 74
Discharge: HOME | End: 2024-12-21
Payer: MEDICARE

## 2024-12-21 VITALS
OXYGEN SATURATION: 97 % | BODY MASS INDEX: 34.36 KG/M2 | WEIGHT: 175 LBS | HEART RATE: 73 BPM | HEIGHT: 60 IN | RESPIRATION RATE: 16 BRPM | SYSTOLIC BLOOD PRESSURE: 123 MMHG | TEMPERATURE: 97.5 F | DIASTOLIC BLOOD PRESSURE: 67 MMHG

## 2024-12-21 DIAGNOSIS — R29.6 MULTIPLE FALLS: ICD-10-CM

## 2024-12-21 DIAGNOSIS — S30.0XXA CONTUSION OF PELVIS, INITIAL ENCOUNTER: ICD-10-CM

## 2024-12-21 DIAGNOSIS — S22.31XA CLOSED FRACTURE OF ONE RIB OF RIGHT SIDE, INITIAL ENCOUNTER: Primary | ICD-10-CM

## 2024-12-21 PROCEDURE — 99213 OFFICE O/P EST LOW 20 MIN: CPT | Performed by: EMERGENCY MEDICINE

## 2024-12-21 PROCEDURE — 99213 OFFICE O/P EST LOW 20 MIN: CPT

## 2024-12-21 PROCEDURE — 71101 X-RAY EXAM UNILAT RIBS/CHEST: CPT

## 2024-12-21 PROCEDURE — 72170 X-RAY EXAM OF PELVIS: CPT

## 2024-12-21 RX ORDER — TRAMADOL HYDROCHLORIDE 50 MG/1
50 TABLET ORAL EVERY 6 HOURS PRN
Qty: 12 TABLET | Refills: 0 | Status: SHIPPED | OUTPATIENT
Start: 2024-12-21 | End: 2024-12-24

## 2024-12-21 ASSESSMENT — PAIN DESCRIPTION - ONSET: ONSET: SUDDEN

## 2024-12-21 ASSESSMENT — PAIN DESCRIPTION - LOCATION: LOCATION: RIB CAGE;HIP

## 2024-12-21 ASSESSMENT — ENCOUNTER SYMPTOMS
SHORTNESS OF BREATH: 0
COUGH: 0

## 2024-12-21 ASSESSMENT — PAIN DESCRIPTION - DESCRIPTORS: DESCRIPTORS: ACHING;BURNING

## 2024-12-21 ASSESSMENT — PAIN DESCRIPTION - PAIN TYPE: TYPE: ACUTE PAIN

## 2024-12-21 ASSESSMENT — PAIN DESCRIPTION - FREQUENCY: FREQUENCY: INTERMITTENT

## 2024-12-21 ASSESSMENT — PAIN - FUNCTIONAL ASSESSMENT: PAIN_FUNCTIONAL_ASSESSMENT: 0-10

## 2024-12-21 ASSESSMENT — PAIN DESCRIPTION - ORIENTATION: ORIENTATION: RIGHT

## 2024-12-21 ASSESSMENT — PAIN SCALES - GENERAL: PAINLEVEL_OUTOF10: 2

## 2024-12-21 NOTE — ED TRIAGE NOTES
Arrives to [facility] for the evaluation of pain to posterior right lower rib discomfort and right hip pain after having a fall on Wed 12/18/24.  Reports being anemic and has been feeling weak.  Has a hard time getting up from a laying position.  Slid off bed the other day and hit side of a hospital like table.  Denies hitting head or LOC.  Has a bruise to right side of back and tenderness in the right hip/buttock region. Has been icing and applying heat.  Also took Tylenol 1500 mg today prior to arrival.  At this time pain rated 2/10 in severity.  Walked from lobby to room 7 holding spouses arm for support.  VSS.  Waiting provider to assess.

## 2024-12-21 NOTE — ED PROVIDER NOTES
(Zostavax) 04/26/2011       FAMILY HISTORY     Patient's family history includes Cancer in her brother and mother; Heart Attack in her father; Heart Disease in her father and mother; High Blood Pressure in her mother and sister; Hypertension in her sister; Osteoporosis in her sister; Other in her father and mother.    SOCIAL HISTORY     Patient  reports that she quit smoking about 55 years ago. Her smoking use included cigarettes. She has quit using smokeless tobacco. She reports that she does not drink alcohol and does not use drugs.    PHYSICAL EXAM     ED TRIAGE VITALS  BP: 123/67, Temp: 97.5 °F (36.4 °C), Pulse: 73, Respirations: 16, SpO2: 97 %,Estimated body mass index is 34.18 kg/m² as calculated from the following:    Height as of this encounter: 1.524 m (5').    Weight as of this encounter: 79.4 kg (175 lb).,No LMP recorded. Patient is postmenopausal.    Physical Exam  Constitutional:       General: She is not in acute distress.     Appearance: She is normal weight. She is not ill-appearing.   HENT:      Head: Normocephalic and atraumatic.   Eyes:      Pupils: Pupils are equal, round, and reactive to light.   Cardiovascular:      Rate and Rhythm: Normal rate.      Pulses: Normal pulses.   Pulmonary:      Effort: Pulmonary effort is normal. No respiratory distress.      Breath sounds: Normal breath sounds. No decreased breath sounds.       Chest:      Chest wall: Tenderness present. No deformity, swelling or crepitus.       Musculoskeletal:      Cervical back: Normal range of motion. No tenderness.        Back:       Comments: Tenderness to the right posterior pelvis.  No midline back pain or tenderness    Pain and tenderness to the lateral and posterior right ribs   Skin:     General: Skin is warm and dry.      Capillary Refill: Capillary refill takes less than 2 seconds.   Neurological:      Mental Status: She is alert.         DIAGNOSTIC RESULTS     Labs:No results found for this visit on  12/21/24.    IMAGING:    XR RIBS RIGHT INCLUDE CHEST (MIN 3 VIEWS)   Final Result   Minimally displaced acute fracture of the ninth right rib.            **This report has been created using voice recognition software. It may contain   minor errors which are inherent in voice recognition technology.**      Electronically signed by Dr Kristian Murrieta      XR PELVIS (1-2 VIEWS)   Final Result    No fracture.               **This report has been created using voice recognition software. It may contain   minor errors which are inherent in voice recognition technology.**      Electronically signed by Dr Kristian Murrieta            EKG:      URGENT CARE COURSE:     Vitals:    12/21/24 1224   BP: 123/67   Pulse: 73   Resp: 16   Temp: 97.5 °F (36.4 °C)   TempSrc: Temporal   SpO2: 97%   Weight: 79.4 kg (175 lb)   Height: 1.524 m (5')       Medications - No data to display         PROCEDURES:  None    FINAL IMPRESSION      1. Closed fracture of one rib of right side, initial encounter    2. Multiple falls    3. Contusion of pelvis, initial encounter          DISPOSITION/ PLAN     Patient presents for closed fracture of 1 rib on the right side.  Contusion of the pelvis.  This is related to several falls.  I discussed the falls with the patient and advised this is a concern.  Patient states she denies any lightheaded or dizziness upon standing.  I did recommend blood work to be performed to assure no significant anemia that is causing her symptoms but patient declines.  She states she gets blood work done next week for primary care visit and states will perform blood work then.  I advised the patient that she should change positions slowly.  She should use her walker when ambulating.  She should sleep in a recliner for the next several days until her symptoms improved.  The patient will take Tylenol for her rib pain and I will prescribe tramadol in addition as needed for severe pain.  She is advised to take several deep

## 2024-12-21 NOTE — DISCHARGE INSTRUCTIONS
Take deep breaths every hour on the hour while awake or use your incentive spirometer to expand the lungs so you do not get complications from your rib fracture    Use 2 Tylenol tablets every 6 hours as needed for pain    Use tramadol every 6-8 hours as needed for pain    You may apply ice to the ribs or lower back to help with your pain    Follow-up with family physician for further pain management if necessary.  Return for any new or worsening symptoms

## 2024-12-26 ENCOUNTER — TELEPHONE (OUTPATIENT)
Dept: FAMILY MEDICINE CLINIC | Age: 74
End: 2024-12-26

## 2024-12-26 DIAGNOSIS — S22.31XA CLOSED FRACTURE OF ONE RIB OF RIGHT SIDE, INITIAL ENCOUNTER: ICD-10-CM

## 2024-12-26 NOTE — TELEPHONE ENCOUNTER
Would prefer WS to address this message. If she is only taking at night she should be okay until tomorrow. Will hold for WS. TS

## 2024-12-26 NOTE — TELEPHONE ENCOUNTER
Pt called in stating that she fell last week and cracked a few ribs. Pt was seen at [facility] and was prescribed tramadol to help with pain. Pt is down to 1 pill left and was wanting to know if she could get a few tablets to make her to the appt with WS on 1/7/24. Pt only takes 1 tablet at night to help sleep. Pt notified WS was out of the office at this time, but I would check with the other providers. Medication pended. Please advise.     If a refill could be sent in, she would like it sent to [facility] in [place].    No need to call pt back unless there are any issues. Pt will plan to check with the pharmacy.

## 2024-12-27 RX ORDER — TRAMADOL HYDROCHLORIDE 50 MG/1
50 TABLET ORAL NIGHTLY PRN
Qty: 10 TABLET | Refills: 0 | Status: SHIPPED | OUTPATIENT
Start: 2024-12-27 | End: 2025-01-06

## 2024-12-27 NOTE — TELEPHONE ENCOUNTER
Noted.  Reviewed chart.  OK for Tramadol 50 mg nightly as needed for pain.  New RX sent in.  -    Controlled Substance Monitoring:    Acute and Chronic Pain Monitoring:   RX Monitoring Periodic Controlled Substance Monitoring   12/27/2024   8:06 AM Possible medication side effects, risk of tolerance/dependence & alternative treatments discussed.;No signs of potential drug abuse or diversion identified.;Assessed functional status (ability to engage in work or other purposeful activities, the pain intensity and its interference with activities of daily living, quality of family life and social activities, and the physical activity);Obtaining appropriate analgesic effect of treatment.       Orders Placed This Encounter   Medications    traMADol (ULTRAM) 50 MG tablet     Sig: Take 1 tablet by mouth nightly as needed for Pain for up to 10 days. Intended supply: 3 days. Take lowest dose possible to manage pain Max Daily Amount: 50 mg     Dispense:  10 tablet     Refill:  0     Reduce doses taken as pain becomes manageable

## 2025-01-06 ENCOUNTER — HOSPITAL ENCOUNTER (OUTPATIENT)
Age: 75
Discharge: HOME | End: 2025-01-06
Payer: MEDICARE

## 2025-01-06 DIAGNOSIS — E11.65 TYPE 2 DIABETES MELLITUS WITH HYPERGLYCEMIA, WITHOUT LONG-TERM CURRENT USE OF INSULIN (HCC): ICD-10-CM

## 2025-01-06 DIAGNOSIS — E03.9 HYPOTHYROIDISM, UNSPECIFIED TYPE: ICD-10-CM

## 2025-01-06 DIAGNOSIS — I10 ESSENTIAL HYPERTENSION: ICD-10-CM

## 2025-01-06 LAB
ALBUMIN SERPL BCG-MCNC: 3.5 G/DL (ref 3.5–5.1)
ALP SERPL-CCNC: 80 U/L (ref 38–126)
ALT SERPL W/O P-5'-P-CCNC: 14 U/L (ref 11–66)
ANION GAP SERPL CALC-SCNC: 18 MEQ/L (ref 8–16)
AST SERPL-CCNC: 22 U/L (ref 5–40)
BILIRUB SERPL-MCNC: 0.5 MG/DL (ref 0.3–1.2)
BUN SERPL-MCNC: 11 MG/DL (ref 7–22)
CALCIUM SERPL-MCNC: 9.1 MG/DL (ref 8.5–10.5)
CHLORIDE SERPL-SCNC: 100 MEQ/L (ref 98–111)
CO2 SERPL-SCNC: 18 MEQ/L (ref 23–33)
CREAT SERPL-MCNC: 0.5 MG/DL (ref 0.4–1.2)
CREAT UR-MCNC: 100.1 MG/DL
GFR SERPL CREATININE-BSD FRML MDRD: > 90 ML/MIN/1.73M2
GLUCOSE SERPL-MCNC: 156 MG/DL (ref 70–108)
MICROALBUMIN UR-MCNC: 2.75 MG/DL
MICROALBUMIN/CREAT RATIO PNL UR: 27 MG/G (ref 0–30)
POTASSIUM SERPL-SCNC: 4.6 MEQ/L (ref 3.5–5.2)
PROT SERPL-MCNC: 6.8 G/DL (ref 6.1–8)
SODIUM SERPL-SCNC: 136 MEQ/L (ref 135–145)
T4 FREE SERPL-MCNC: 1.39 NG/DL (ref 0.93–1.68)
TSH SERPL DL<=0.005 MIU/L-ACNC: 1.43 UIU/ML (ref 0.4–4.2)

## 2025-01-06 PROCEDURE — 82043 UR ALBUMIN QUANTITATIVE: CPT

## 2025-01-06 PROCEDURE — 84443 ASSAY THYROID STIM HORMONE: CPT

## 2025-01-06 PROCEDURE — 84439 ASSAY OF FREE THYROXINE: CPT

## 2025-01-06 PROCEDURE — 83036 HEMOGLOBIN GLYCOSYLATED A1C: CPT

## 2025-01-06 PROCEDURE — 36415 COLL VENOUS BLD VENIPUNCTURE: CPT

## 2025-01-06 PROCEDURE — 80053 COMPREHEN METABOLIC PANEL: CPT

## 2025-01-07 ENCOUNTER — OFFICE VISIT (OUTPATIENT)
Dept: FAMILY MEDICINE CLINIC | Age: 75
End: 2025-01-07
Payer: MEDICARE

## 2025-01-07 VITALS
TEMPERATURE: 97.9 F | RESPIRATION RATE: 16 BRPM | BODY MASS INDEX: 34.84 KG/M2 | SYSTOLIC BLOOD PRESSURE: 110 MMHG | DIASTOLIC BLOOD PRESSURE: 54 MMHG | HEART RATE: 72 BPM | WEIGHT: 178.4 LBS

## 2025-01-07 DIAGNOSIS — F32.A DEPRESSION, UNSPECIFIED DEPRESSION TYPE: ICD-10-CM

## 2025-01-07 DIAGNOSIS — E11.8 TYPE 2 DIABETES MELLITUS WITH COMPLICATION, WITHOUT LONG-TERM CURRENT USE OF INSULIN (HCC): ICD-10-CM

## 2025-01-07 DIAGNOSIS — E78.5 HYPERLIPIDEMIA, UNSPECIFIED HYPERLIPIDEMIA TYPE: Primary | ICD-10-CM

## 2025-01-07 DIAGNOSIS — I50.22 CHRONIC SYSTOLIC (CONGESTIVE) HEART FAILURE (HCC): ICD-10-CM

## 2025-01-07 DIAGNOSIS — S22.31XD CLOSED FRACTURE OF ONE RIB OF RIGHT SIDE WITH ROUTINE HEALING, SUBSEQUENT ENCOUNTER: ICD-10-CM

## 2025-01-07 DIAGNOSIS — K76.0 NAFLD (NONALCOHOLIC FATTY LIVER DISEASE): ICD-10-CM

## 2025-01-07 DIAGNOSIS — K21.9 GASTROESOPHAGEAL REFLUX DISEASE, UNSPECIFIED WHETHER ESOPHAGITIS PRESENT: ICD-10-CM

## 2025-01-07 DIAGNOSIS — E11.21 DIABETIC NEPHROPATHY ASSOCIATED WITH TYPE 2 DIABETES MELLITUS (HCC): ICD-10-CM

## 2025-01-07 DIAGNOSIS — I85.00 ESOPHAGEAL VARICES WITHOUT BLEEDING, UNSPECIFIED ESOPHAGEAL VARICES TYPE (HCC): ICD-10-CM

## 2025-01-07 DIAGNOSIS — E03.9 HYPOTHYROIDISM, UNSPECIFIED TYPE: ICD-10-CM

## 2025-01-07 DIAGNOSIS — E11.65 TYPE 2 DIABETES MELLITUS WITH HYPERGLYCEMIA, WITHOUT LONG-TERM CURRENT USE OF INSULIN (HCC): ICD-10-CM

## 2025-01-07 DIAGNOSIS — I10 ESSENTIAL HYPERTENSION: ICD-10-CM

## 2025-01-07 DIAGNOSIS — I25.10 CORONARY ARTERY DISEASE INVOLVING NATIVE CORONARY ARTERY OF NATIVE HEART WITHOUT ANGINA PECTORIS: ICD-10-CM

## 2025-01-07 LAB
DEPRECATED MEAN GLUCOSE BLD GHB EST-ACNC: 132 MG/DL (ref 70–126)
HBA1C MFR BLD HPLC: 6.4 % (ref 4.4–6.4)

## 2025-01-07 PROCEDURE — 3078F DIAST BP <80 MM HG: CPT | Performed by: NURSE PRACTITIONER

## 2025-01-07 PROCEDURE — 3044F HG A1C LEVEL LT 7.0%: CPT | Performed by: NURSE PRACTITIONER

## 2025-01-07 PROCEDURE — G2211 COMPLEX E/M VISIT ADD ON: HCPCS | Performed by: NURSE PRACTITIONER

## 2025-01-07 PROCEDURE — 3074F SYST BP LT 130 MM HG: CPT | Performed by: NURSE PRACTITIONER

## 2025-01-07 PROCEDURE — M1308 PR FLU IMMUNIZE NO ADMIN: HCPCS | Performed by: NURSE PRACTITIONER

## 2025-01-07 PROCEDURE — 1036F TOBACCO NON-USER: CPT | Performed by: NURSE PRACTITIONER

## 2025-01-07 PROCEDURE — G8417 CALC BMI ABV UP PARAM F/U: HCPCS | Performed by: NURSE PRACTITIONER

## 2025-01-07 PROCEDURE — G8427 DOCREV CUR MEDS BY ELIG CLIN: HCPCS | Performed by: NURSE PRACTITIONER

## 2025-01-07 PROCEDURE — 99214 OFFICE O/P EST MOD 30 MIN: CPT | Performed by: NURSE PRACTITIONER

## 2025-01-07 PROCEDURE — 1159F MED LIST DOCD IN RCRD: CPT | Performed by: NURSE PRACTITIONER

## 2025-01-07 PROCEDURE — G8399 PT W/DXA RESULTS DOCUMENT: HCPCS | Performed by: NURSE PRACTITIONER

## 2025-01-07 PROCEDURE — 1160F RVW MEDS BY RX/DR IN RCRD: CPT | Performed by: NURSE PRACTITIONER

## 2025-01-07 PROCEDURE — 2022F DILAT RTA XM EVC RTNOPTHY: CPT | Performed by: NURSE PRACTITIONER

## 2025-01-07 PROCEDURE — 1124F ACP DISCUSS-NO DSCNMKR DOCD: CPT | Performed by: NURSE PRACTITIONER

## 2025-01-07 PROCEDURE — 3017F COLORECTAL CA SCREEN DOC REV: CPT | Performed by: NURSE PRACTITIONER

## 2025-01-07 PROCEDURE — 1090F PRES/ABSN URINE INCON ASSESS: CPT | Performed by: NURSE PRACTITIONER

## 2025-01-07 RX ORDER — LISINOPRIL 20 MG/1
20 TABLET ORAL 2 TIMES DAILY
Qty: 180 TABLET | Refills: 3 | Status: SHIPPED | OUTPATIENT
Start: 2025-01-07

## 2025-01-07 RX ORDER — LEVOTHYROXINE SODIUM 50 UG/1
50 TABLET ORAL DAILY
Qty: 90 TABLET | Refills: 3 | Status: SHIPPED | OUTPATIENT
Start: 2025-01-07

## 2025-01-07 RX ORDER — LANCETS 30 GAUGE
EACH MISCELLANEOUS
Qty: 100 EACH | Refills: 3 | Status: SHIPPED | OUTPATIENT
Start: 2025-01-07

## 2025-01-07 RX ORDER — ATENOLOL 50 MG/1
25 TABLET ORAL DAILY
Qty: 45 TABLET | Refills: 3 | Status: SHIPPED | OUTPATIENT
Start: 2025-01-07

## 2025-01-07 RX ORDER — BLOOD SUGAR DIAGNOSTIC
STRIP MISCELLANEOUS
Qty: 100 EACH | Refills: 3 | Status: SHIPPED | OUTPATIENT
Start: 2025-01-07

## 2025-01-07 RX ORDER — GLIMEPIRIDE 2 MG/1
TABLET ORAL
Qty: 270 TABLET | Refills: 3 | Status: SHIPPED | OUTPATIENT
Start: 2025-01-07

## 2025-01-07 RX ORDER — ATORVASTATIN CALCIUM 40 MG/1
40 TABLET, FILM COATED ORAL DAILY
Qty: 90 TABLET | Refills: 3 | Status: SHIPPED | OUTPATIENT
Start: 2025-01-07

## 2025-01-07 RX ORDER — ESCITALOPRAM OXALATE 20 MG/1
20 TABLET ORAL DAILY
Qty: 90 TABLET | Refills: 3 | Status: SHIPPED | OUTPATIENT
Start: 2025-01-07

## 2025-01-07 RX ORDER — METFORMIN HYDROCHLORIDE 500 MG/1
TABLET, EXTENDED RELEASE ORAL
Qty: 360 TABLET | Refills: 3 | Status: SHIPPED | OUTPATIENT
Start: 2025-01-07

## 2025-01-07 RX ORDER — PANTOPRAZOLE SODIUM 40 MG/1
40 TABLET, DELAYED RELEASE ORAL 2 TIMES DAILY
Qty: 180 TABLET | Refills: 3 | Status: SHIPPED | OUTPATIENT
Start: 2025-01-07

## 2025-01-07 SDOH — ECONOMIC STABILITY: FOOD INSECURITY: WITHIN THE PAST 12 MONTHS, THE FOOD YOU BOUGHT JUST DIDN'T LAST AND YOU DIDN'T HAVE MONEY TO GET MORE.: NEVER TRUE

## 2025-01-07 SDOH — ECONOMIC STABILITY: FOOD INSECURITY: WITHIN THE PAST 12 MONTHS, YOU WORRIED THAT YOUR FOOD WOULD RUN OUT BEFORE YOU GOT MONEY TO BUY MORE.: NEVER TRUE

## 2025-01-07 SDOH — ECONOMIC STABILITY: INCOME INSECURITY: HOW HARD IS IT FOR YOU TO PAY FOR THE VERY BASICS LIKE FOOD, HOUSING, MEDICAL CARE, AND HEATING?: NOT VERY HARD

## 2025-01-07 ASSESSMENT — PATIENT HEALTH QUESTIONNAIRE - PHQ9
5. POOR APPETITE OR OVEREATING: NOT AT ALL
SUM OF ALL RESPONSES TO PHQ QUESTIONS 1-9: 0
SUM OF ALL RESPONSES TO PHQ QUESTIONS 1-9: 0
8. MOVING OR SPEAKING SO SLOWLY THAT OTHER PEOPLE COULD HAVE NOTICED. OR THE OPPOSITE, BEING SO FIGETY OR RESTLESS THAT YOU HAVE BEEN MOVING AROUND A LOT MORE THAN USUAL: NOT AT ALL
SUM OF ALL RESPONSES TO PHQ QUESTIONS 1-9: 0
6. FEELING BAD ABOUT YOURSELF - OR THAT YOU ARE A FAILURE OR HAVE LET YOURSELF OR YOUR FAMILY DOWN: NOT AT ALL
10. IF YOU CHECKED OFF ANY PROBLEMS, HOW DIFFICULT HAVE THESE PROBLEMS MADE IT FOR YOU TO DO YOUR WORK, TAKE CARE OF THINGS AT HOME, OR GET ALONG WITH OTHER PEOPLE: NOT DIFFICULT AT ALL
1. LITTLE INTEREST OR PLEASURE IN DOING THINGS: NOT AT ALL
2. FEELING DOWN, DEPRESSED OR HOPELESS: NOT AT ALL
4. FEELING TIRED OR HAVING LITTLE ENERGY: NOT AT ALL
9. THOUGHTS THAT YOU WOULD BE BETTER OFF DEAD, OR OF HURTING YOURSELF: NOT AT ALL
SUM OF ALL RESPONSES TO PHQ9 QUESTIONS 1 & 2: 0
3. TROUBLE FALLING OR STAYING ASLEEP: NOT AT ALL
7. TROUBLE CONCENTRATING ON THINGS, SUCH AS READING THE NEWSPAPER OR WATCHING TELEVISION: NOT AT ALL
SUM OF ALL RESPONSES TO PHQ QUESTIONS 1-9: 0

## 2025-01-07 ASSESSMENT — ENCOUNTER SYMPTOMS
EYE PAIN: 0
WHEEZING: 0
SINUS PAIN: 0
SORE THROAT: 0
COLOR CHANGE: 0
TROUBLE SWALLOWING: 0
DIARRHEA: 0
FACIAL SWELLING: 0
VOMITING: 0
COUGH: 0
BACK PAIN: 0
ABDOMINAL PAIN: 0
NAUSEA: 0
SHORTNESS OF BREATH: 0

## 2025-01-07 NOTE — PROGRESS NOTES
Neurological:      General: No focal deficit present.      Mental Status: She is alert and oriented to person, place, and time.      Coordination: Coordination normal.   Psychiatric:         Behavior: Behavior normal.         Thought Content: Thought content normal.         Judgment: Judgment normal.         ASSESSMENT/PLAN:  1. Hyperlipidemia, unspecified hyperlipidemia type  - Lipid Panel; Future  - atorvastatin (LIPITOR) 40 MG tablet; Take 1 tablet by mouth daily  Dispense: 90 tablet; Refill: 3    2. Chronic systolic (congestive) heart failure (HCC)    3. Diabetic nephropathy associated with type 2 diabetes mellitus (HCC)    4. Esophageal varices without bleeding, unspecified esophageal varices type (HCC)    5. Depression, unspecified depression type  - escitalopram (LEXAPRO) 20 MG tablet; Take 1 tablet by mouth daily  Dispense: 90 tablet; Refill: 3    6. Type 2 diabetes mellitus with complication, without long-term current use of insulin (HCC)  - Hemoglobin A1C; Future  - Comprehensive Metabolic Panel; Future  - CBC with Auto Differential; Future  - metFORMIN (GLUCOPHAGE-XR) 500 MG extended release tablet; TAKE 2 TABLETS TWICE DAILY.  Dispense: 360 tablet; Refill: 3  - lisinopril (PRINIVIL;ZESTRIL) 20 MG tablet; Take 1 tablet by mouth 2 times daily  Dispense: 180 tablet; Refill: 3  - blood glucose test strips (ACCU-CHEK ISABELA PLUS) strip; USE 1 STRIP TO CHECK GLUCOSE ONCE DAILY  Dispense: 100 each; Refill: 3  - Lancets MISC; Test sugar daily. Brand per pt preference. Dx: E11.9  Dispense: 100 each; Refill: 3    7. Essential hypertension  - CBC with Auto Differential; Future  - lisinopril (PRINIVIL;ZESTRIL) 20 MG tablet; Take 1 tablet by mouth 2 times daily  Dispense: 180 tablet; Refill: 3  - atenolol (TENORMIN) 50 MG tablet; Take 0.5 tablets by mouth daily  Dispense: 45 tablet; Refill: 3    8. NAFLD (nonalcoholic fatty liver disease)--Dr. Rodrigues    9. Gastroesophageal reflux disease, unspecified whether

## 2025-01-08 ENCOUNTER — OFFICE VISIT (OUTPATIENT)
Dept: CARDIOLOGY CLINIC | Age: 75
End: 2025-01-08
Payer: MEDICARE

## 2025-01-08 VITALS
WEIGHT: 175.8 LBS | DIASTOLIC BLOOD PRESSURE: 70 MMHG | SYSTOLIC BLOOD PRESSURE: 124 MMHG | HEIGHT: 60 IN | BODY MASS INDEX: 34.51 KG/M2 | HEART RATE: 69 BPM

## 2025-01-08 DIAGNOSIS — I25.10 CORONARY ARTERY DISEASE INVOLVING NATIVE CORONARY ARTERY OF NATIVE HEART WITHOUT ANGINA PECTORIS: Primary | ICD-10-CM

## 2025-01-08 DIAGNOSIS — I10 ESSENTIAL HYPERTENSION: ICD-10-CM

## 2025-01-08 DIAGNOSIS — I50.22 CHRONIC SYSTOLIC CHF (CONGESTIVE HEART FAILURE) (HCC): ICD-10-CM

## 2025-01-08 PROCEDURE — 1036F TOBACCO NON-USER: CPT | Performed by: STUDENT IN AN ORGANIZED HEALTH CARE EDUCATION/TRAINING PROGRAM

## 2025-01-08 PROCEDURE — G8427 DOCREV CUR MEDS BY ELIG CLIN: HCPCS | Performed by: STUDENT IN AN ORGANIZED HEALTH CARE EDUCATION/TRAINING PROGRAM

## 2025-01-08 PROCEDURE — 3074F SYST BP LT 130 MM HG: CPT | Performed by: STUDENT IN AN ORGANIZED HEALTH CARE EDUCATION/TRAINING PROGRAM

## 2025-01-08 PROCEDURE — M1308 PR FLU IMMUNIZE NO ADMIN: HCPCS | Performed by: STUDENT IN AN ORGANIZED HEALTH CARE EDUCATION/TRAINING PROGRAM

## 2025-01-08 PROCEDURE — G8399 PT W/DXA RESULTS DOCUMENT: HCPCS | Performed by: STUDENT IN AN ORGANIZED HEALTH CARE EDUCATION/TRAINING PROGRAM

## 2025-01-08 PROCEDURE — 1159F MED LIST DOCD IN RCRD: CPT | Performed by: STUDENT IN AN ORGANIZED HEALTH CARE EDUCATION/TRAINING PROGRAM

## 2025-01-08 PROCEDURE — 3078F DIAST BP <80 MM HG: CPT | Performed by: STUDENT IN AN ORGANIZED HEALTH CARE EDUCATION/TRAINING PROGRAM

## 2025-01-08 PROCEDURE — 99214 OFFICE O/P EST MOD 30 MIN: CPT | Performed by: STUDENT IN AN ORGANIZED HEALTH CARE EDUCATION/TRAINING PROGRAM

## 2025-01-08 PROCEDURE — G8417 CALC BMI ABV UP PARAM F/U: HCPCS | Performed by: STUDENT IN AN ORGANIZED HEALTH CARE EDUCATION/TRAINING PROGRAM

## 2025-01-08 PROCEDURE — 93000 ELECTROCARDIOGRAM COMPLETE: CPT | Performed by: STUDENT IN AN ORGANIZED HEALTH CARE EDUCATION/TRAINING PROGRAM

## 2025-01-08 PROCEDURE — 3017F COLORECTAL CA SCREEN DOC REV: CPT | Performed by: STUDENT IN AN ORGANIZED HEALTH CARE EDUCATION/TRAINING PROGRAM

## 2025-01-08 PROCEDURE — 1090F PRES/ABSN URINE INCON ASSESS: CPT | Performed by: STUDENT IN AN ORGANIZED HEALTH CARE EDUCATION/TRAINING PROGRAM

## 2025-01-08 PROCEDURE — 1124F ACP DISCUSS-NO DSCNMKR DOCD: CPT | Performed by: STUDENT IN AN ORGANIZED HEALTH CARE EDUCATION/TRAINING PROGRAM

## 2025-01-08 NOTE — PATIENT INSTRUCTIONS
You may receive a survey regarding the care you received during your visit. We encourage you to complete and return your survey, as your input is valuable to us. We hope you will choose us in the future for your healthcare needs. Thank you!    Your Medical Assistant today: Emil  Thank you for coming to our office! It was a pleasure to serve you.

## 2025-01-13 ENCOUNTER — OFFICE VISIT (OUTPATIENT)
Dept: ONCOLOGY | Age: 75
End: 2025-01-13
Payer: MEDICARE

## 2025-01-13 ENCOUNTER — HOSPITAL ENCOUNTER (OUTPATIENT)
Dept: INFUSION THERAPY | Age: 75
Discharge: HOME | End: 2025-01-13
Payer: MEDICARE

## 2025-01-13 VITALS
OXYGEN SATURATION: 97 % | TEMPERATURE: 97.9 F | HEART RATE: 73 BPM | DIASTOLIC BLOOD PRESSURE: 53 MMHG | BODY MASS INDEX: 34.36 KG/M2 | WEIGHT: 175 LBS | HEIGHT: 60 IN | SYSTOLIC BLOOD PRESSURE: 108 MMHG | RESPIRATION RATE: 16 BRPM

## 2025-01-13 VITALS
HEART RATE: 73 BPM | TEMPERATURE: 97.9 F | RESPIRATION RATE: 16 BRPM | DIASTOLIC BLOOD PRESSURE: 53 MMHG | BODY MASS INDEX: 34.36 KG/M2 | SYSTOLIC BLOOD PRESSURE: 108 MMHG | WEIGHT: 175 LBS | HEIGHT: 60 IN | OXYGEN SATURATION: 97 %

## 2025-01-13 DIAGNOSIS — D69.6 THROMBOCYTOPENIA (HCC): ICD-10-CM

## 2025-01-13 DIAGNOSIS — D50.9 MICROCYTIC ANEMIA: ICD-10-CM

## 2025-01-13 DIAGNOSIS — D50.0 IRON DEFICIENCY ANEMIA DUE TO CHRONIC BLOOD LOSS: ICD-10-CM

## 2025-01-13 DIAGNOSIS — D50.9 MICROCYTIC ANEMIA: Primary | ICD-10-CM

## 2025-01-13 LAB
BASOPHILS ABSOLUTE: 0 THOU/MM3 (ref 0–0.1)
BASOPHILS NFR BLD AUTO: 0 % (ref 0–3)
EOSINOPHIL NFR BLD AUTO: 4 % (ref 0–4)
EOSINOPHILS ABSOLUTE: 0.2 THOU/MM3 (ref 0–0.4)
ERYTHROCYTE [DISTWIDTH] IN BLOOD BY AUTOMATED COUNT: 18.7 % (ref 11.5–14.5)
HCT VFR BLD AUTO: 30.8 % (ref 37–47)
HGB BLD-MCNC: 9.2 GM/DL (ref 12–16)
IMMATURE GRANULOCYTES %: 0 %
IMMATURE GRANULOCYTES ABSOLUTE: 0.01 THOU/MM3 (ref 0–0.07)
LYMPHOCYTES ABSOLUTE: 1.5 THOU/MM3 (ref 1–4.8)
LYMPHOCYTES NFR BLD AUTO: 27 % (ref 15–47)
MCH RBC QN AUTO: 26.1 PG (ref 26–33)
MCHC RBC AUTO-ENTMCNC: 29.9 GM/DL (ref 32.2–35.5)
MCV RBC AUTO: 87 FL (ref 81–99)
MONOCYTES ABSOLUTE: 0.7 THOU/MM3 (ref 0.4–1.3)
MONOCYTES NFR BLD AUTO: 12 % (ref 0–12)
NEUTROPHILS ABSOLUTE: 3.2 THOU/MM3 (ref 1.8–7.7)
NEUTROPHILS NFR BLD AUTO: 57 % (ref 43–75)
PLATELET # BLD AUTO: 117 THOU/MM3 (ref 130–400)
PMV BLD AUTO: 8.6 FL (ref 9.4–12.4)
RBC # BLD AUTO: 3.53 MILL/MM3 (ref 4.2–5.4)
WBC # BLD AUTO: 5.7 THOU/MM3 (ref 4.8–10.8)

## 2025-01-13 PROCEDURE — 83550 IRON BINDING TEST: CPT

## 2025-01-13 PROCEDURE — 82728 ASSAY OF FERRITIN: CPT

## 2025-01-13 PROCEDURE — G8427 DOCREV CUR MEDS BY ELIG CLIN: HCPCS | Performed by: PHYSICIAN ASSISTANT

## 2025-01-13 PROCEDURE — 36415 COLL VENOUS BLD VENIPUNCTURE: CPT

## 2025-01-13 PROCEDURE — 83540 ASSAY OF IRON: CPT

## 2025-01-13 PROCEDURE — 1160F RVW MEDS BY RX/DR IN RCRD: CPT | Performed by: PHYSICIAN ASSISTANT

## 2025-01-13 PROCEDURE — 1036F TOBACCO NON-USER: CPT | Performed by: PHYSICIAN ASSISTANT

## 2025-01-13 PROCEDURE — G8417 CALC BMI ABV UP PARAM F/U: HCPCS | Performed by: PHYSICIAN ASSISTANT

## 2025-01-13 PROCEDURE — 1124F ACP DISCUSS-NO DSCNMKR DOCD: CPT | Performed by: PHYSICIAN ASSISTANT

## 2025-01-13 PROCEDURE — 1090F PRES/ABSN URINE INCON ASSESS: CPT | Performed by: PHYSICIAN ASSISTANT

## 2025-01-13 PROCEDURE — 1126F AMNT PAIN NOTED NONE PRSNT: CPT | Performed by: PHYSICIAN ASSISTANT

## 2025-01-13 PROCEDURE — 3017F COLORECTAL CA SCREEN DOC REV: CPT | Performed by: PHYSICIAN ASSISTANT

## 2025-01-13 PROCEDURE — 85025 COMPLETE CBC W/AUTO DIFF WBC: CPT

## 2025-01-13 PROCEDURE — 99211 OFF/OP EST MAY X REQ PHY/QHP: CPT

## 2025-01-13 PROCEDURE — 3074F SYST BP LT 130 MM HG: CPT | Performed by: PHYSICIAN ASSISTANT

## 2025-01-13 PROCEDURE — 1159F MED LIST DOCD IN RCRD: CPT | Performed by: PHYSICIAN ASSISTANT

## 2025-01-13 PROCEDURE — 3078F DIAST BP <80 MM HG: CPT | Performed by: PHYSICIAN ASSISTANT

## 2025-01-13 PROCEDURE — 99214 OFFICE O/P EST MOD 30 MIN: CPT | Performed by: PHYSICIAN ASSISTANT

## 2025-01-13 PROCEDURE — G8399 PT W/DXA RESULTS DOCUMENT: HCPCS | Performed by: PHYSICIAN ASSISTANT

## 2025-01-13 RX ORDER — SODIUM CHLORIDE 0.9 % (FLUSH) 0.9 %
5-40 SYRINGE (ML) INJECTION PRN
Status: CANCELLED | OUTPATIENT
Start: 2025-01-13

## 2025-01-13 RX ORDER — EPINEPHRINE 1 MG/ML
0.3 INJECTION, SOLUTION, CONCENTRATE INTRAVENOUS PRN
Status: CANCELLED | OUTPATIENT
Start: 2025-01-13

## 2025-01-13 RX ORDER — ONDANSETRON 2 MG/ML
8 INJECTION INTRAMUSCULAR; INTRAVENOUS
Status: CANCELLED | OUTPATIENT
Start: 2025-01-13

## 2025-01-13 RX ORDER — HYDROCORTISONE SODIUM SUCCINATE 100 MG/2ML
100 INJECTION INTRAMUSCULAR; INTRAVENOUS
Status: CANCELLED | OUTPATIENT
Start: 2025-01-13

## 2025-01-13 RX ORDER — ALBUTEROL SULFATE 90 UG/1
4 INHALANT RESPIRATORY (INHALATION) PRN
Status: CANCELLED | OUTPATIENT
Start: 2025-01-13

## 2025-01-13 RX ORDER — SODIUM CHLORIDE 9 MG/ML
5-250 INJECTION, SOLUTION INTRAVENOUS PRN
Status: CANCELLED | OUTPATIENT
Start: 2025-01-13

## 2025-01-13 RX ORDER — FAMOTIDINE 10 MG/ML
20 INJECTION, SOLUTION INTRAVENOUS
Status: CANCELLED | OUTPATIENT
Start: 2025-01-13

## 2025-01-13 RX ORDER — DIPHENHYDRAMINE HYDROCHLORIDE 50 MG/ML
50 INJECTION INTRAMUSCULAR; INTRAVENOUS
Status: CANCELLED | OUTPATIENT
Start: 2025-01-13

## 2025-01-13 RX ORDER — ACETAMINOPHEN 325 MG/1
650 TABLET ORAL
Status: CANCELLED | OUTPATIENT
Start: 2025-01-13

## 2025-01-13 RX ORDER — SODIUM CHLORIDE 9 MG/ML
INJECTION, SOLUTION INTRAVENOUS CONTINUOUS
Status: CANCELLED | OUTPATIENT
Start: 2025-01-13

## 2025-01-13 RX ORDER — HEPARIN SODIUM (PORCINE) LOCK FLUSH IV SOLN 100 UNIT/ML 100 UNIT/ML
500 SOLUTION INTRAVENOUS PRN
Status: CANCELLED | OUTPATIENT
Start: 2025-01-13

## 2025-01-13 NOTE — PATIENT INSTRUCTIONS
Proceed with IV Injectafer, total of 2 infusions to be given at least one week apart.  Return to clinic in 10 weeks.  Labs on RTC: CBC, ferritin  Please call for questions or concerns.

## 2025-01-13 NOTE — PROGRESS NOTES
[facility] [address], [address]  [facility] [address]  Dept: [phone]  Dept Fax: [phone] Loc: [phone]      Visit Date:1/13/2025     Kim Amaya is a 74 y.o. female who presents today for:   Chief Complaint   Patient presents with    Follow-up     Microcytic anemia        HPI:   Kim Amyaa is a 74 y.o. female thrombocytopenia, MARCO A per her PCP, SUGEY Hurd. She was seen as a new patient on 3/6/23. She recently had lab work completed as part of routine lab work on 2/8/2023 which showed Hgb 8.0, hct 30.4, MCV 69.2. her platelet count was 113,000. She was referred for further evaluation. Iron studies were completed on 2/23/23 showing ferritin 3, iron 20. Folate and vitamin B12 were within normal limits.   The patient was previously followed in the office in 2018 for microcytic anemia due to iron deficiency anemia. She required IV iron in 2018; source of iron deficiency from chronic blood loss. She has history of esophageal varices which required banding in February 2018.   The patient is here with her  today. She affirms increased fatigue. She denies lightheadedness, dizziness, DAVEN[place], palpitations. She affirms pica symptoms and admits to chewing ice frequently. She denies any abnormal bleeding; no epistaxis, hemoptysis, hematemesis, melena, hematochezia, hematuria or vaginal bleeding.  She denies recent GI follow-up.  Last EGD was in 2018 as above.  The patient denies known history of malabsorptive disorders such as inflammatory bowel disease or celiac disease.  She reports adequate intake of oral iron in her diet.  She is not able to tolerate oral iron supplementation due to nausea, vomiting and constipation.  PMH includes CAD with hx of stenting, HTN, DM, hyperlipidemia, hypothyroidism, CKD, cirrhosis, history of esophageal varices.     -She received IV injectafer x2 in March 2023.   -The patient had EGD completed per Dr. Rodrigues on 5/5/2023

## 2025-01-14 LAB
FERRITIN SERPL IA-MCNC: 10 NG/ML (ref 10–291)
IRON SERPL-MCNC: 34 UG/DL (ref 50–170)
TIBC SERPL-MCNC: 421 UG/DL (ref 171–450)

## 2025-01-17 ENCOUNTER — HOSPITAL ENCOUNTER (OUTPATIENT)
Dept: INFUSION THERAPY | Age: 75
Discharge: HOME | End: 2025-01-17
Payer: MEDICARE

## 2025-01-17 VITALS
DIASTOLIC BLOOD PRESSURE: 59 MMHG | TEMPERATURE: 97.5 F | OXYGEN SATURATION: 97 % | WEIGHT: 175 LBS | RESPIRATION RATE: 16 BRPM | HEIGHT: 60 IN | BODY MASS INDEX: 34.36 KG/M2 | HEART RATE: 75 BPM | SYSTOLIC BLOOD PRESSURE: 121 MMHG

## 2025-01-17 DIAGNOSIS — D50.9 MICROCYTIC ANEMIA: ICD-10-CM

## 2025-01-17 DIAGNOSIS — D50.0 IRON DEFICIENCY ANEMIA DUE TO CHRONIC BLOOD LOSS: Primary | ICD-10-CM

## 2025-01-17 PROCEDURE — 2580000003 HC RX 258: Performed by: PHYSICIAN ASSISTANT

## 2025-01-17 PROCEDURE — 6360000002 HC RX W HCPCS: Performed by: PHYSICIAN ASSISTANT

## 2025-01-17 PROCEDURE — 96365 THER/PROPH/DIAG IV INF INIT: CPT

## 2025-01-17 RX ORDER — SODIUM CHLORIDE 9 MG/ML
5-250 INJECTION, SOLUTION INTRAVENOUS PRN
Status: CANCELLED | OUTPATIENT
Start: 2025-01-24

## 2025-01-17 RX ORDER — HYDROCORTISONE SODIUM SUCCINATE 100 MG/2ML
100 INJECTION INTRAMUSCULAR; INTRAVENOUS
Status: CANCELLED | OUTPATIENT
Start: 2025-01-24

## 2025-01-17 RX ORDER — EPINEPHRINE 1 MG/ML
0.3 INJECTION, SOLUTION INTRAMUSCULAR; SUBCUTANEOUS PRN
Status: CANCELLED | OUTPATIENT
Start: 2025-01-24

## 2025-01-17 RX ORDER — SODIUM CHLORIDE 9 MG/ML
5-250 INJECTION, SOLUTION INTRAVENOUS PRN
Status: DISCONTINUED | OUTPATIENT
Start: 2025-01-17 | End: 2025-01-18 | Stop reason: HOSPADM

## 2025-01-17 RX ORDER — ACETAMINOPHEN 325 MG/1
650 TABLET ORAL
Status: CANCELLED | OUTPATIENT
Start: 2025-01-24

## 2025-01-17 RX ORDER — ALBUTEROL SULFATE 90 UG/1
4 INHALANT RESPIRATORY (INHALATION) PRN
Status: CANCELLED | OUTPATIENT
Start: 2025-01-24

## 2025-01-17 RX ORDER — SODIUM CHLORIDE 0.9 % (FLUSH) 0.9 %
5-40 SYRINGE (ML) INJECTION PRN
Status: CANCELLED | OUTPATIENT
Start: 2025-01-24

## 2025-01-17 RX ORDER — ONDANSETRON 2 MG/ML
8 INJECTION INTRAMUSCULAR; INTRAVENOUS
Status: CANCELLED | OUTPATIENT
Start: 2025-01-24

## 2025-01-17 RX ORDER — SODIUM CHLORIDE 9 MG/ML
INJECTION, SOLUTION INTRAVENOUS CONTINUOUS
Status: CANCELLED | OUTPATIENT
Start: 2025-01-24

## 2025-01-17 RX ORDER — HEPARIN 100 UNIT/ML
500 SYRINGE INTRAVENOUS PRN
Status: CANCELLED | OUTPATIENT
Start: 2025-01-24

## 2025-01-17 RX ORDER — DIPHENHYDRAMINE HYDROCHLORIDE 50 MG/ML
50 INJECTION INTRAMUSCULAR; INTRAVENOUS
Status: CANCELLED | OUTPATIENT
Start: 2025-01-24

## 2025-01-17 RX ADMIN — FERRIC CARBOXYMALTOSE INJECTION 750 MG: 50 INJECTION, SOLUTION INTRAVENOUS at 15:07

## 2025-01-17 NOTE — PLAN OF CARE
Problem: Chronic Conditions and Co-morbidities  Goal: Patient's chronic conditions and co-morbidity symptoms are monitored and maintained or improved  Outcome: Progressing  Flowsheets (Taken 1/17/2025 6881)  Care Plan - Patient's Chronic Conditions and Co-Morbidity Symptoms are Monitored and Maintained or Improved: Monitor and assess patient's chronic conditions and comorbid symptoms for stability, deterioration, or improvement  Note: Discussed indications for iron infusion     Problem: Safety - Adult  Goal: Free from fall injury  Outcome: Progressing     Problem: Discharge Planning  Goal: Discharge to home or other facility with appropriate resources  Outcome: Progressing   Care plan reviewed with patient.  Patient verbalizes understanding of the plan of care and contributes to goal setting.

## 2025-01-17 NOTE — PROGRESS NOTES
Patient tolerated injectafer without any complications.  Patient observed for 20 minuets. Denies dizziness, lightheadedness, acute nausea or vomiting, headache, heart palpitations, rash/itching or increased SOB.  Discussed the importance of monitoring and reporting temperature of 100.4 or greater to our office [phone] or going directly to Emergency Dept.    Last vital signs  BP (!) 121/59   Pulse 75   Temp 97.5 °F (36.4 °C) (Oral)   Resp 16   Ht 1.524 m (5')   Wt 79.4 kg (175 lb)   SpO2 97%   BMI 34.18 kg/m²     Patient instructed if they experience any of the above symptoms following today's visit, he/she is to notify the Physician or go to the Emergency Dept.    Discharge instructions given to patient, Verbalizes understanding. Ambulated off unit per self in stable condition with all belongings.

## 2025-01-24 ENCOUNTER — HOSPITAL ENCOUNTER (OUTPATIENT)
Dept: INFUSION THERAPY | Age: 75
Discharge: HOME | End: 2025-01-24
Payer: MEDICARE

## 2025-01-24 VITALS
SYSTOLIC BLOOD PRESSURE: 146 MMHG | DIASTOLIC BLOOD PRESSURE: 65 MMHG | HEART RATE: 74 BPM | HEIGHT: 60 IN | WEIGHT: 180.6 LBS | BODY MASS INDEX: 35.46 KG/M2 | OXYGEN SATURATION: 98 % | RESPIRATION RATE: 16 BRPM | TEMPERATURE: 98.2 F

## 2025-01-24 DIAGNOSIS — D50.0 IRON DEFICIENCY ANEMIA DUE TO CHRONIC BLOOD LOSS: Primary | ICD-10-CM

## 2025-01-24 DIAGNOSIS — D50.9 MICROCYTIC ANEMIA: ICD-10-CM

## 2025-01-24 PROCEDURE — 96365 THER/PROPH/DIAG IV INF INIT: CPT

## 2025-01-24 PROCEDURE — 6360000002 HC RX W HCPCS: Performed by: PHYSICIAN ASSISTANT

## 2025-01-24 PROCEDURE — 2580000003 HC RX 258: Performed by: PHYSICIAN ASSISTANT

## 2025-01-24 PROCEDURE — 2500000003 HC RX 250 WO HCPCS: Performed by: PHYSICIAN ASSISTANT

## 2025-01-24 RX ORDER — SODIUM CHLORIDE 0.9 % (FLUSH) 0.9 %
5-40 SYRINGE (ML) INJECTION PRN
OUTPATIENT
Start: 2025-01-24

## 2025-01-24 RX ORDER — ACETAMINOPHEN 325 MG/1
650 TABLET ORAL
OUTPATIENT
Start: 2025-01-24

## 2025-01-24 RX ORDER — SODIUM CHLORIDE 9 MG/ML
5-250 INJECTION, SOLUTION INTRAVENOUS PRN
OUTPATIENT
Start: 2025-01-24

## 2025-01-24 RX ORDER — HYDROCORTISONE SODIUM SUCCINATE 100 MG/2ML
100 INJECTION INTRAMUSCULAR; INTRAVENOUS
OUTPATIENT
Start: 2025-01-24

## 2025-01-24 RX ORDER — SODIUM CHLORIDE 9 MG/ML
INJECTION, SOLUTION INTRAVENOUS CONTINUOUS
OUTPATIENT
Start: 2025-01-24

## 2025-01-24 RX ORDER — ALBUTEROL SULFATE 90 UG/1
4 INHALANT RESPIRATORY (INHALATION) PRN
OUTPATIENT
Start: 2025-01-24

## 2025-01-24 RX ORDER — SODIUM CHLORIDE 9 MG/ML
5-250 INJECTION, SOLUTION INTRAVENOUS PRN
Status: CANCELLED | OUTPATIENT
Start: 2025-01-24

## 2025-01-24 RX ORDER — SODIUM CHLORIDE 0.9 % (FLUSH) 0.9 %
5-40 SYRINGE (ML) INJECTION PRN
Status: DISCONTINUED | OUTPATIENT
Start: 2025-01-24 | End: 2025-01-25 | Stop reason: HOSPADM

## 2025-01-24 RX ORDER — ONDANSETRON 2 MG/ML
8 INJECTION INTRAMUSCULAR; INTRAVENOUS
OUTPATIENT
Start: 2025-01-24

## 2025-01-24 RX ORDER — EPINEPHRINE 1 MG/ML
0.3 INJECTION, SOLUTION INTRAMUSCULAR; SUBCUTANEOUS PRN
OUTPATIENT
Start: 2025-01-24

## 2025-01-24 RX ORDER — SODIUM CHLORIDE 9 MG/ML
5-250 INJECTION, SOLUTION INTRAVENOUS PRN
Status: DISCONTINUED | OUTPATIENT
Start: 2025-01-24 | End: 2025-01-25 | Stop reason: HOSPADM

## 2025-01-24 RX ORDER — HEPARIN 100 UNIT/ML
500 SYRINGE INTRAVENOUS PRN
OUTPATIENT
Start: 2025-01-24

## 2025-01-24 RX ORDER — DIPHENHYDRAMINE HYDROCHLORIDE 50 MG/ML
50 INJECTION INTRAMUSCULAR; INTRAVENOUS
OUTPATIENT
Start: 2025-01-24

## 2025-01-24 RX ADMIN — SODIUM CHLORIDE 20 ML/HR: 9 INJECTION, SOLUTION INTRAVENOUS at 13:38

## 2025-01-24 RX ADMIN — FERRIC CARBOXYMALTOSE INJECTION 750 MG: 50 INJECTION, SOLUTION INTRAVENOUS at 13:59

## 2025-01-24 RX ADMIN — SODIUM CHLORIDE, PRESERVATIVE FREE 10 ML: 5 INJECTION INTRAVENOUS at 13:37

## 2025-01-24 NOTE — PLAN OF CARE
Problem: Safety - Adult  Goal: Free from fall injury  Outcome: Adequate for Discharge  Flowsheets (Taken 1/24/2025 1605)  Free From Fall Injury: Instruct family/caregiver on patient safety  Note: Free from falls while in [facility] Oncology.       Problem: Discharge Planning  Goal: Discharge to home or other facility with appropriate resources  Outcome: Adequate for Discharge  Flowsheets (Taken 1/24/2025 1605)  Discharge to home or other facility with appropriate resources:   Identify barriers to discharge with patient and caregiver   Identify discharge learning needs (meds, wound care, etc)  Note: Verbalize understanding of discharge instructions, follow up appointments, and when to call Physician.       Problem: Chronic Conditions and Co-morbidities  Goal: Patient's chronic conditions and co-morbidity symptoms are monitored and maintained or improved  Outcome: Adequate for Discharge  Flowsheets (Taken 1/24/2025 1605)  Care Plan - Patient's Chronic Conditions and Co-Morbidity Symptoms are Monitored and Maintained or Improved:   Monitor and assess patient's chronic conditions and comorbid symptoms for stability, deterioration, or improvement   Collaborate with multidisciplinary team to address chronic and comorbid conditions and prevent exacerbation or deterioration  Note: Patient verbalizes understanding to verbal information given on Injectafer. Aware to call MD if develop complications.         Care plan reviewed with patient.  Patient verbalizes understanding of the plan of care and contributes to goal setting.

## 2025-01-24 NOTE — PROGRESS NOTES
Patient tolerated Injectafer without any complications.    Patient kept for 20 minute observation post-infusion. Denies dizziness, lightheadedness, acute nausea or vomiting, headache, heart palpitations, rash/itching or increased SOB.     Last vital signs:   BP (!) 146/65   Pulse 74   Temp 98.2 °F (36.8 °C) (Oral)   Resp 16   Ht 1.524 m (5')   Wt 81.9 kg (180 lb 9.6 oz)   SpO2 98%   BMI 35.27 kg/m²     Patient instructed if they experience any of the above symptoms following today's visit, he/she is to notify the physician immediately or go to the Emergency Department.    Discharge instructions given to patient. Verbalizes understanding. Ambulated off unit per self in stable condition with belongings.

## 2025-02-17 ENCOUNTER — HOSPITAL ENCOUNTER (EMERGENCY)
Age: 75
Discharge: HOME | End: 2025-02-17
Payer: MEDICARE

## 2025-02-17 VITALS
RESPIRATION RATE: 16 BRPM | DIASTOLIC BLOOD PRESSURE: 70 MMHG | SYSTOLIC BLOOD PRESSURE: 165 MMHG | BODY MASS INDEX: 33.57 KG/M2 | WEIGHT: 171 LBS | HEIGHT: 60 IN | HEART RATE: 78 BPM | OXYGEN SATURATION: 98 % | TEMPERATURE: 97.5 F

## 2025-02-17 DIAGNOSIS — S39.012A STRAIN OF LUMBAR REGION, INITIAL ENCOUNTER: Primary | ICD-10-CM

## 2025-02-17 PROCEDURE — 99213 OFFICE O/P EST LOW 20 MIN: CPT

## 2025-02-17 PROCEDURE — 99213 OFFICE O/P EST LOW 20 MIN: CPT | Performed by: NURSE PRACTITIONER

## 2025-02-17 RX ORDER — NAPROXEN 500 MG/1
500 TABLET ORAL 2 TIMES DAILY WITH MEALS
Qty: 8 TABLET | Refills: 0 | Status: SHIPPED | OUTPATIENT
Start: 2025-02-17 | End: 2025-02-23

## 2025-02-17 RX ORDER — CYCLOBENZAPRINE HCL 5 MG
5 TABLET ORAL 3 TIMES DAILY PRN
Qty: 30 TABLET | Refills: 0 | Status: SHIPPED | OUTPATIENT
Start: 2025-02-17 | End: 2025-02-27

## 2025-02-17 RX ORDER — AMPICILLIN TRIHYDRATE 250 MG
1000 CAPSULE ORAL DAILY
Status: ON HOLD | COMMUNITY

## 2025-02-17 ASSESSMENT — PAIN DESCRIPTION - ORIENTATION: ORIENTATION: LOWER

## 2025-02-17 ASSESSMENT — PAIN SCALES - GENERAL: PAINLEVEL_OUTOF10: 5

## 2025-02-17 ASSESSMENT — PAIN - FUNCTIONAL ASSESSMENT
PAIN_FUNCTIONAL_ASSESSMENT: PREVENTS OR INTERFERES SOME ACTIVE ACTIVITIES AND ADLS
PAIN_FUNCTIONAL_ASSESSMENT: 0-10

## 2025-02-17 ASSESSMENT — PAIN DESCRIPTION - LOCATION: LOCATION: BACK

## 2025-02-17 ASSESSMENT — PAIN DESCRIPTION - DESCRIPTORS: DESCRIPTORS: SHARP;SHOOTING

## 2025-02-17 ASSESSMENT — ENCOUNTER SYMPTOMS: BACK PAIN: 1

## 2025-02-17 NOTE — ED PROVIDER NOTES
[facility] EMERGENCY DEPARTMENT  UrgentCare Encounter      CHIEFCOMPLAINT       Chief Complaint   Patient presents with    Lower Back Pain       Nurses Notes reviewed and I agree except as noted in the HPI.  HISTORY OF PRESENT ILLNESS   Kim Amaya is a 75 y.o. female who presents to urgent care with complaints of right lower back pain.  Symptoms started proximal only 4 days ago.  She denies any recent injury.  States that she is having muscle spasms.  Denies any genital numbness.  Denies loss of bowel or bladder control.  She denies any recent falls.    REVIEW OF SYSTEMS     Review of Systems   Musculoskeletal:  Positive for back pain.       PAST MEDICAL HISTORY         Diagnosis Date    Anemia     CAD (coronary artery disease)     CHF (congestive heart failure) (HCC)     COPD (chronic obstructive pulmonary disease) (HCC)     Depression 2000    GERD (gastroesophageal reflux disease)     Headache(784.0)     Heart attack (HCC)     Hyperlipidemia     Hypertension     Hypothyroidism 02/11/2015    Liver disease     MI (myocardial infarction) ([facility]) 11/99, 10/01    x 2     Obesity     Osteoarthritis     B/L knees--Dr. Jordan.    PONV (postoperative nausea and vomiting)     Type 2 diabetes mellitus without complication ([facility])     Type II or unspecified type diabetes mellitus without mention of complication, not stated as uncontrolled 2014       SURGICAL HISTORY     Patient  has a past surgical history that includes Appendectomy (1978); bladder suspension (1978); Tonsillectomy and adenoidectomy; Cholecystectomy (1978); Percutaneous Transluminal Coronary Angio; Upper gastrointestinal endoscopy; Colonoscopy; Knee arthroscopy (Right); Cardiac catheterization (07/29/2009); Upper gastrointestinal endoscopy (Left, 2/27/2018); pr colon ca scrn not hi rsk ind (Left, 3/1/2018); Upper gastrointestinal endoscopy (N/A, 4/19/2018); and Upper gastrointestinal endoscopy (N/A, 5/5/2023).    CURRENT MEDICATIONS

## 2025-02-17 NOTE — ED TRIAGE NOTES
Patient to  for low back pain. States pain started on 2/14. Denies injury to back. States pain is worse with standing and movement. States pain spasms. States she has been taking tylenol for pain.

## 2025-02-17 NOTE — DISCHARGE INSTR - COC
Continuity of Care Form    Patient Name: Kim Amaya   :  1950  MRN:  [id number]    Admit date:  2025  Discharge date:  ***    Code Status Order: Prior   Advance Directives:   Advance Care Flowsheet Documentation             Admitting Physician:  No admitting provider for patient encounter.  PCP: Victoria Arnold APRN - CNP    Discharging Nurse: ***  Discharging Hospital Unit/Room#:   Discharging Unit Phone Number: ***    Emergency Contact:   Extended Emergency Contact Information  Primary Emergency Contact: Gigi Amaya  Address: [address] [place] [address] [facility]  Home Phone: [phone]  Relation: Spouse  Secondary Emergency Contact: Dyan Henson           [facility]  Home Phone: [phone]  Relation: Child    Past Surgical History:  Past Surgical History:   Procedure Laterality Date    APPENDECTOMY      BLADDER SUSPENSION      CARDIAC CATHETERIZATION  2009    Diagnostic Cath EF 40-45% (Dr Humphrey)    CHOLECYSTECTOMY      COLONOSCOPY      KNEE ARTHROSCOPY Right     [place] COLON CA SCRN NOT HI RSK IND Left 3/1/2018    COLONOSCOPY performed by Prince Beckford MD at [facility]    PTCA      Shunt 10/01--Stent     TONSILLECTOMY AND ADENOIDECTOMY      UPPER GASTROINTESTINAL ENDOSCOPY      UPPER GASTROINTESTINAL ENDOSCOPY Left 2018    EGD BAND LIGATION performed by Prince Beckford MD at [facility]    UPPER GASTROINTESTINAL ENDOSCOPY N/A 2018    EGD BIOPSY performed by Kev Rodrigues MD at [facility]    UPPER GASTROINTESTINAL ENDOSCOPY N/A 2023    EGD BAND LIGATION performed by Kev Rodrigues MD at [facility]       Immunization History:   Immunization History   Administered Date(s) Administered    COVID-19, PFIZER Bivalent, DO NOT Dilute, (age 12y+), IM, 30 mcg/0.3 mL 2022    COVID-19, PFIZER PURPLE top, DILUTE for use, (age 12 y+), 30mcg/0.3mL 2021, 2021,

## 2025-02-19 ENCOUNTER — HOSPITAL ENCOUNTER (EMERGENCY)
Age: 75
Discharge: HOME | End: 2025-02-20
Payer: MEDICARE

## 2025-02-19 ENCOUNTER — APPOINTMENT (OUTPATIENT)
Dept: CT IMAGING | Age: 75
End: 2025-02-19
Payer: MEDICARE

## 2025-02-19 DIAGNOSIS — K74.60 CIRRHOSIS OF LIVER WITHOUT ASCITES, UNSPECIFIED HEPATIC CIRRHOSIS TYPE (HCC): ICD-10-CM

## 2025-02-19 DIAGNOSIS — S32.030A COMPRESSION FRACTURE OF L3 LUMBAR VERTEBRA, CLOSED, INITIAL ENCOUNTER (HCC): Primary | ICD-10-CM

## 2025-02-19 DIAGNOSIS — I85.00 ESOPHAGEAL VARICES WITHOUT BLEEDING, UNSPECIFIED ESOPHAGEAL VARICES TYPE (HCC): ICD-10-CM

## 2025-02-19 DIAGNOSIS — R16.1 SPLENOMEGALY: ICD-10-CM

## 2025-02-19 PROCEDURE — 96375 TX/PRO/DX INJ NEW DRUG ADDON: CPT

## 2025-02-19 PROCEDURE — 99285 EMERGENCY DEPT VISIT HI MDM: CPT

## 2025-02-19 PROCEDURE — 80053 COMPREHEN METABOLIC PANEL: CPT

## 2025-02-19 PROCEDURE — 36415 COLL VENOUS BLD VENIPUNCTURE: CPT

## 2025-02-19 PROCEDURE — 85025 COMPLETE CBC W/AUTO DIFF WBC: CPT

## 2025-02-19 PROCEDURE — 82248 BILIRUBIN DIRECT: CPT

## 2025-02-19 PROCEDURE — 6360000002 HC RX W HCPCS: Performed by: NURSE PRACTITIONER

## 2025-02-19 PROCEDURE — 96374 THER/PROPH/DIAG INJ IV PUSH: CPT

## 2025-02-19 RX ORDER — MORPHINE SULFATE 4 MG/ML
4 INJECTION, SOLUTION INTRAMUSCULAR; INTRAVENOUS ONCE
Status: COMPLETED | OUTPATIENT
Start: 2025-02-19 | End: 2025-02-19

## 2025-02-19 RX ORDER — IOPAMIDOL 755 MG/ML
80 INJECTION, SOLUTION INTRAVASCULAR
Status: COMPLETED | OUTPATIENT
Start: 2025-02-19 | End: 2025-02-20

## 2025-02-19 RX ORDER — ONDANSETRON 2 MG/ML
4 INJECTION INTRAMUSCULAR; INTRAVENOUS ONCE
Status: COMPLETED | OUTPATIENT
Start: 2025-02-19 | End: 2025-02-19

## 2025-02-19 RX ADMIN — MORPHINE SULFATE 4 MG: 4 INJECTION, SOLUTION INTRAMUSCULAR; INTRAVENOUS at 23:32

## 2025-02-19 RX ADMIN — ONDANSETRON 4 MG: 2 INJECTION INTRAMUSCULAR; INTRAVENOUS at 23:32

## 2025-02-19 ASSESSMENT — PAIN - FUNCTIONAL ASSESSMENT: PAIN_FUNCTIONAL_ASSESSMENT: 0-10

## 2025-02-19 ASSESSMENT — PAIN SCALES - GENERAL: PAINLEVEL_OUTOF10: 3

## 2025-02-19 ASSESSMENT — PAIN DESCRIPTION - ORIENTATION: ORIENTATION: LOWER

## 2025-02-19 ASSESSMENT — PAIN DESCRIPTION - LOCATION: LOCATION: BACK

## 2025-02-20 ENCOUNTER — APPOINTMENT (OUTPATIENT)
Dept: CT IMAGING | Age: 75
End: 2025-02-20
Payer: MEDICARE

## 2025-02-20 VITALS
HEART RATE: 68 BPM | BODY MASS INDEX: 33.4 KG/M2 | RESPIRATION RATE: 16 BRPM | WEIGHT: 171 LBS | SYSTOLIC BLOOD PRESSURE: 128 MMHG | TEMPERATURE: 97.9 F | OXYGEN SATURATION: 96 % | DIASTOLIC BLOOD PRESSURE: 67 MMHG

## 2025-02-20 LAB
ALBUMIN SERPL BCG-MCNC: 3.6 G/DL (ref 3.5–5.1)
ALP SERPL-CCNC: 101 U/L (ref 38–126)
ALT SERPL W/O P-5'-P-CCNC: 15 U/L (ref 11–66)
ANION GAP SERPL CALC-SCNC: 15 MEQ/L (ref 8–16)
ANISOCYTOSIS BLD QL SMEAR: PRESENT
AST SERPL-CCNC: 22 U/L (ref 5–40)
BASOPHILS ABSOLUTE: 0 THOU/MM3 (ref 0–0.1)
BASOPHILS NFR BLD AUTO: 0.4 %
BILIRUB CONJ SERPL-MCNC: 0.2 MG/DL (ref 0–0.2)
BILIRUB SERPL-MCNC: 0.6 MG/DL (ref 0.3–1.2)
BUN SERPL-MCNC: 33 MG/DL (ref 7–22)
CALCIUM SERPL-MCNC: 9.6 MG/DL (ref 8.2–9.6)
CHLORIDE SERPL-SCNC: 101 MEQ/L (ref 98–111)
CO2 SERPL-SCNC: 19 MEQ/L (ref 23–33)
CREAT SERPL-MCNC: 0.9 MG/DL (ref 0.4–1.2)
DEPRECATED RDW RBC AUTO: 78.2 FL (ref 35–45)
EOSINOPHIL NFR BLD AUTO: 3.2 %
EOSINOPHILS ABSOLUTE: 0.2 THOU/MM3 (ref 0–0.4)
ERYTHROCYTE [DISTWIDTH] IN BLOOD BY AUTOMATED COUNT: 23.1 % (ref 11.5–14.5)
GFR SERPL CREATININE-BSD FRML MDRD: 67 ML/MIN/1.73M2
GLUCOSE SERPL-MCNC: 114 MG/DL (ref 70–108)
HCT VFR BLD AUTO: 38.2 % (ref 37–47)
HGB BLD-MCNC: 11.6 GM/DL (ref 12–16)
IMM GRANULOCYTES # BLD AUTO: 0.03 THOU/MM3 (ref 0–0.07)
IMM GRANULOCYTES NFR BLD AUTO: 0.6 %
LYMPHOCYTES ABSOLUTE: 1.2 THOU/MM3 (ref 1–4.8)
LYMPHOCYTES NFR BLD AUTO: 22.5 %
MCH RBC QN AUTO: 28.4 PG (ref 26–33)
MCHC RBC AUTO-ENTMCNC: 30.4 GM/DL (ref 32.2–35.5)
MCV RBC AUTO: 93.4 FL (ref 81–99)
MONOCYTES ABSOLUTE: 0.6 THOU/MM3 (ref 0.4–1.3)
MONOCYTES NFR BLD AUTO: 11.2 %
NEUTROPHILS ABSOLUTE: 3.3 THOU/MM3 (ref 1.8–7.7)
NEUTROPHILS NFR BLD AUTO: 62.1 %
NRBC BLD AUTO-RTO: 0 /100 WBC
OSMOLALITY SERPL CALC.SUM OF ELEC: 278.2 MOSMOL/KG (ref 275–300)
PLATELET # BLD AUTO: 82 THOU/MM3 (ref 130–400)
PLATELET BLD QL SMEAR: ABNORMAL
PMV BLD AUTO: 9.6 FL (ref 9.4–12.4)
POIKILOCYTES: ABNORMAL
POTASSIUM SERPL-SCNC: 4.8 MEQ/L (ref 3.5–5.2)
PROT SERPL-MCNC: 6.7 G/DL (ref 6.1–8)
RBC # BLD AUTO: 4.09 MILL/MM3 (ref 4.2–5.4)
SCAN OF BLOOD SMEAR: NORMAL
SODIUM SERPL-SCNC: 135 MEQ/L (ref 135–145)
WBC # BLD AUTO: 5.3 THOU/MM3 (ref 4.8–10.8)

## 2025-02-20 PROCEDURE — 6370000000 HC RX 637 (ALT 250 FOR IP): Performed by: NURSE PRACTITIONER

## 2025-02-20 PROCEDURE — 74177 CT ABD & PELVIS W/CONTRAST: CPT

## 2025-02-20 PROCEDURE — 76376 3D RENDER W/INTRP POSTPROCES: CPT

## 2025-02-20 PROCEDURE — 6360000004 HC RX CONTRAST MEDICATION: Performed by: NURSE PRACTITIONER

## 2025-02-20 RX ORDER — HYDROCODONE BITARTRATE AND ACETAMINOPHEN 5; 325 MG/1; MG/1
1 TABLET ORAL ONCE
Status: COMPLETED | OUTPATIENT
Start: 2025-02-20 | End: 2025-02-20

## 2025-02-20 RX ORDER — HYDROCODONE BITARTRATE AND ACETAMINOPHEN 5; 325 MG/1; MG/1
1 TABLET ORAL EVERY 6 HOURS PRN
Qty: 12 TABLET | Refills: 0 | Status: SHIPPED | OUTPATIENT
Start: 2025-02-20 | End: 2025-02-23

## 2025-02-20 RX ADMIN — HYDROCODONE BITARTRATE AND ACETAMINOPHEN 1 TABLET: 5; 325 TABLET ORAL at 02:12

## 2025-02-20 RX ADMIN — IOPAMIDOL 80 ML: 755 INJECTION, SOLUTION INTRAVENOUS at 00:46

## 2025-02-20 ASSESSMENT — PAIN DESCRIPTION - LOCATION: LOCATION: BACK

## 2025-02-20 ASSESSMENT — PAIN DESCRIPTION - ORIENTATION: ORIENTATION: LOWER

## 2025-02-20 NOTE — ED NOTES
Patient up to use restroom. Patient slow at switching position in bed with a lot of pain noted. Once patient was able to stand up, able to walk to bathroom with standby assist.

## 2025-02-20 NOTE — ED TRIAGE NOTES
Pt into the ED by wheelchair with her . Pt complaining of lower back pain. She states it started last Saturday when she got out of bed. Pt denies falling or known injury. Pt states she was seen at a health clinic on Monday and prescribed naproxen and a muscle relaxer. Pt states she last took at 1700. RR easy and unlabored. VSS

## 2025-02-20 NOTE — DISCHARGE INSTRUCTIONS
Use pain medication as directed    Call Dr. Rodrigues for follow up for the CT findings.  Avoid NSAIDs like ibuprofen or naproxen.  Limit tylenol use.      Call Dr. Martin for follow up for the compression fracture.      Return for worsening pain or bleeding.

## 2025-02-20 NOTE — ED PROVIDER NOTES
[facility] EMERGENCY DEPARTMENT      EMERGENCY MEDICINE     Pt Name: Kim Amaya  MRN: [id number]  Birthdate 1950  Date of evaluation: 2/19/2025  Provider: FABIENNE Lyons CNP    CHIEF COMPLAINT       Chief Complaint   Patient presents with    Back Pain     HISTORY OF PRESENT ILLNESS   Kim Amaya is a pleasant 75 y.o. female who presents to the emergency department from home with c/o back pain. The pain is located on her right lower back, with some radiation past the right buttocks. Patient states the pain started this past Saturday morning as she tried to get out of bed. She reports she \"twisted\" when the pain started. States pain is constant and has worsened since onset. She tried tylenol with minimal pain relief, but states using a heating pad improves the pain. Patient was seen at urgent care on Monday, and was prescribed naproxen and flexeril. States those medications do not provide any pain relief. Pt reports pain is minimal when she is laying, but when she moves to sit up or walk, the pain is at its worst. Denies any injury. Pt denies any fevers, abdominal pain, or urinary symptoms. Denies any urinary retention, saddle anesthesia, bowel/bladder incontinence. Denies any CP, SOB.        History is obtained from:  patient  PASTMEDICAL HISTORY     Past Medical History:   Diagnosis Date    Anemia     CAD (coronary artery disease)     CHF (congestive heart failure) (HCC)     COPD (chronic obstructive pulmonary disease) ([facility])     Depression 2000    GERD (gastroesophageal reflux disease)     Headache(784.0)     Heart attack (HCC)     Hyperlipidemia     Hypertension     Hypothyroidism 02/11/2015    Liver disease     MI (myocardial infarction) ([facility]) 11/99, 10/01    x 2     Obesity     Osteoarthritis     B/L knees--Dr. Jordan.    PONV (postoperative nausea and vomiting)     Type 2 diabetes mellitus without complication ([facility])     Type II or unspecified type diabetes mellitus without  Disposition         ED Reassessment/Response to interventions:  ***     {Splintin::\"NA\"}         Case discussed with consulting clinician/attending physician:  {ED attendings:93242}/{consult:69593}         Shared Decision-Making was performed and disposition discussed with the       Patient/Family and questions answered          Social determinants of health impacting treatment or disposition:     4) MIPS  {Melani MIPS:46293::\"N/A\"}                    Medical Decision Making    Vitals Reviewed:    Vitals:    25 21025   BP:  (!) 151/79 (!) 145/65   Pulse: 74  72   Resp: 16  15   Temp:  97.9 °F (36.6 °C)    SpO2: 98%  98%   Weight: 77.6 kg (171 lb)         The patient was seen and examined. Appropriate diagnostic testing was performed and results reviewed with the patient.  ***      The results of pertinent diagnostic studies and exam findings were discussed. The patient’s provisional diagnosis and plan of care were discussed with the patient and present family who expressed understanding and agreement with the POC. Any medications were reviewed and indications and risks of medications were discussed with the patient /family present. Strict verbal and written return precautions, instructions and appropriate follow-up provided to  the patient.  ***Patient was DISCHARGED from the hospital. Based on the reassuring ED workup and patient's stable vital signs, I feel the patient may be safely discharged home. At this point in time, I believe the patient has the mental capacity to make medical decisions.      No notes of EC Admission Criteria type on file.        Patient was seen independently by myself. The patient's final impression and disposition and plan was determined by myself.     Strict return precautions and follow up instructions were discussed with the patient prior to discharge, with which the patient agrees.    Physical assessment findings, diagnostic testing(s) if

## 2025-02-20 NOTE — ED NOTES
Patient resting in bed. Provided with blankets. Respirations easy and unlabored. No distress noted. Call light within reach.

## 2025-02-20 NOTE — ED NOTES
Patient returned from bathroom with no assist and returned to cot. Vitals reassessed.  at bedside.

## 2025-02-20 NOTE — ED NOTES
IV established; patient medicated per MAR. Updated on labs prior to CT.  at bedside. Patient resting in bed. Respirations easy and unlabored. No distress noted. Call light within reach.

## 2025-02-23 ENCOUNTER — APPOINTMENT (OUTPATIENT)
Dept: CT IMAGING | Age: 75
DRG: 543 | End: 2025-02-23
Payer: MEDICARE

## 2025-02-23 ENCOUNTER — HOSPITAL ENCOUNTER (INPATIENT)
Age: 75
LOS: 5 days | Discharge: NURSING, REHAB OR LONG-TERM CARE | DRG: 543 | End: 2025-02-28
Attending: EMERGENCY MEDICINE
Payer: MEDICARE

## 2025-02-23 DIAGNOSIS — S32.030S CLOSED COMPRESSION FRACTURE OF L3 VERTEBRA, SEQUELA: Primary | ICD-10-CM

## 2025-02-23 PROBLEM — S32.030A CLOSED COMPRESSION FRACTURE OF L3 LUMBAR VERTEBRA, INITIAL ENCOUNTER (HCC): Status: ACTIVE | Noted: 2025-02-23

## 2025-02-23 LAB
ALBUMIN SERPL BCG-MCNC: 3.5 G/DL (ref 3.5–5.1)
ALP SERPL-CCNC: 101 U/L (ref 38–126)
ALT SERPL W/O P-5'-P-CCNC: 15 U/L (ref 11–66)
ANION GAP SERPL CALC-SCNC: 22 MEQ/L (ref 8–16)
ANISOCYTOSIS BLD QL SMEAR: PRESENT
AST SERPL-CCNC: 26 U/L (ref 5–40)
BASOPHILS ABSOLUTE: 0 THOU/MM3 (ref 0–0.1)
BASOPHILS NFR BLD AUTO: 0.3 %
BILIRUB SERPL-MCNC: 0.6 MG/DL (ref 0.3–1.2)
BUN SERPL-MCNC: 10 MG/DL (ref 7–22)
CALCIUM SERPL-MCNC: 8.7 MG/DL (ref 8.2–9.6)
CHLORIDE SERPL-SCNC: 95 MEQ/L (ref 98–111)
CO2 SERPL-SCNC: 19 MEQ/L (ref 23–33)
CREAT SERPL-MCNC: 0.5 MG/DL (ref 0.4–1.2)
CRP SERPL-MCNC: 0.88 MG/DL (ref 0–0.5)
DACROCYTES: ABNORMAL
DEPRECATED RDW RBC AUTO: 78.5 FL (ref 35–45)
EOSINOPHIL NFR BLD AUTO: 4.1 %
EOSINOPHILS ABSOLUTE: 0.3 THOU/MM3 (ref 0–0.4)
ERYTHROCYTE [DISTWIDTH] IN BLOOD BY AUTOMATED COUNT: 23.3 % (ref 11.5–14.5)
ERYTHROCYTE [SEDIMENTATION RATE] IN BLOOD BY WESTERGREN METHOD: 25 MM/HR (ref 0–20)
GFR SERPL CREATININE-BSD FRML MDRD: > 90 ML/MIN/1.73M2
GLUCOSE BLD STRIP.AUTO-MCNC: 212 MG/DL (ref 70–108)
GLUCOSE SERPL-MCNC: 113 MG/DL (ref 70–108)
HCT VFR BLD AUTO: 40.4 % (ref 37–47)
HGB BLD-MCNC: 12.3 GM/DL (ref 12–16)
IMM GRANULOCYTES # BLD AUTO: 0.04 THOU/MM3 (ref 0–0.07)
IMM GRANULOCYTES NFR BLD AUTO: 0.7 %
LYMPHOCYTES ABSOLUTE: 1.1 THOU/MM3 (ref 1–4.8)
LYMPHOCYTES NFR BLD AUTO: 18.3 %
MCH RBC QN AUTO: 28.7 PG (ref 26–33)
MCHC RBC AUTO-ENTMCNC: 30.4 GM/DL (ref 32.2–35.5)
MCV RBC AUTO: 94.2 FL (ref 81–99)
MONOCYTES ABSOLUTE: 0.7 THOU/MM3 (ref 0.4–1.3)
MONOCYTES NFR BLD AUTO: 11.4 %
NEUTROPHILS ABSOLUTE: 4 THOU/MM3 (ref 1.8–7.7)
NEUTROPHILS NFR BLD AUTO: 65.2 %
NRBC BLD AUTO-RTO: 0 /100 WBC
OSMOLALITY SERPL CALC.SUM OF ELEC: 271.8 MOSMOL/KG (ref 275–300)
PLATELET # BLD AUTO: 101 THOU/MM3 (ref 130–400)
PMV BLD AUTO: 9.7 FL (ref 9.4–12.4)
POIKILOCYTES: ABNORMAL
POTASSIUM SERPL-SCNC: 4.3 MEQ/L (ref 3.5–5.2)
PROT SERPL-MCNC: 6.3 G/DL (ref 6.1–8)
RBC # BLD AUTO: 4.29 MILL/MM3 (ref 4.2–5.4)
REASON FOR REJECTION: NORMAL
REJECTED TEST: NORMAL
SCAN OF BLOOD SMEAR: NORMAL
SODIUM SERPL-SCNC: 136 MEQ/L (ref 135–145)
WBC # BLD AUTO: 6.1 THOU/MM3 (ref 4.8–10.8)

## 2025-02-23 PROCEDURE — 96375 TX/PRO/DX INJ NEW DRUG ADDON: CPT

## 2025-02-23 PROCEDURE — 80053 COMPREHEN METABOLIC PANEL: CPT

## 2025-02-23 PROCEDURE — 99222 1ST HOSP IP/OBS MODERATE 55: CPT | Performed by: PHYSICIAN ASSISTANT

## 2025-02-23 PROCEDURE — 72131 CT LUMBAR SPINE W/O DYE: CPT

## 2025-02-23 PROCEDURE — 85651 RBC SED RATE NONAUTOMATED: CPT

## 2025-02-23 PROCEDURE — 36415 COLL VENOUS BLD VENIPUNCTURE: CPT

## 2025-02-23 PROCEDURE — 85025 COMPLETE CBC W/AUTO DIFF WBC: CPT

## 2025-02-23 PROCEDURE — 6360000002 HC RX W HCPCS: Performed by: PHYSICIAN ASSISTANT

## 2025-02-23 PROCEDURE — 99285 EMERGENCY DEPT VISIT HI MDM: CPT

## 2025-02-23 PROCEDURE — 6370000000 HC RX 637 (ALT 250 FOR IP): Performed by: PHYSICIAN ASSISTANT

## 2025-02-23 PROCEDURE — 2500000003 HC RX 250 WO HCPCS: Performed by: PHYSICIAN ASSISTANT

## 2025-02-23 PROCEDURE — 1200000000 HC SEMI PRIVATE

## 2025-02-23 PROCEDURE — 82948 REAGENT STRIP/BLOOD GLUCOSE: CPT

## 2025-02-23 PROCEDURE — 96374 THER/PROPH/DIAG INJ IV PUSH: CPT

## 2025-02-23 PROCEDURE — 6360000002 HC RX W HCPCS

## 2025-02-23 PROCEDURE — 86140 C-REACTIVE PROTEIN: CPT

## 2025-02-23 RX ORDER — SODIUM CHLORIDE 0.9 % (FLUSH) 0.9 %
10 SYRINGE (ML) INJECTION PRN
Status: DISCONTINUED | OUTPATIENT
Start: 2025-02-23 | End: 2025-02-28 | Stop reason: HOSPADM

## 2025-02-23 RX ORDER — BUPROPION HYDROCHLORIDE 150 MG/1
150 TABLET, EXTENDED RELEASE ORAL DAILY
Status: DISCONTINUED | OUTPATIENT
Start: 2025-02-23 | End: 2025-02-23

## 2025-02-23 RX ORDER — DOCUSATE SODIUM 100 MG/1
100 CAPSULE, LIQUID FILLED ORAL NIGHTLY PRN
Status: ON HOLD | COMMUNITY

## 2025-02-23 RX ORDER — ONDANSETRON 4 MG/1
4 TABLET, ORALLY DISINTEGRATING ORAL EVERY 8 HOURS PRN
Status: DISCONTINUED | OUTPATIENT
Start: 2025-02-23 | End: 2025-02-28 | Stop reason: HOSPADM

## 2025-02-23 RX ORDER — LEVOTHYROXINE SODIUM 50 UG/1
50 TABLET ORAL DAILY
Status: DISCONTINUED | OUTPATIENT
Start: 2025-02-23 | End: 2025-02-28 | Stop reason: HOSPADM

## 2025-02-23 RX ORDER — CYCLOBENZAPRINE HCL 10 MG
5 TABLET ORAL 3 TIMES DAILY PRN
Status: DISCONTINUED | OUTPATIENT
Start: 2025-02-23 | End: 2025-02-28 | Stop reason: HOSPADM

## 2025-02-23 RX ORDER — DOCUSATE SODIUM 100 MG/1
100 CAPSULE, LIQUID FILLED ORAL NIGHTLY PRN
Status: DISCONTINUED | OUTPATIENT
Start: 2025-02-23 | End: 2025-02-28 | Stop reason: HOSPADM

## 2025-02-23 RX ORDER — DEXTROSE MONOHYDRATE 100 MG/ML
INJECTION, SOLUTION INTRAVENOUS CONTINUOUS PRN
Status: DISCONTINUED | OUTPATIENT
Start: 2025-02-23 | End: 2025-02-28 | Stop reason: HOSPADM

## 2025-02-23 RX ORDER — GLUCAGON 1 MG/ML
1 KIT INJECTION PRN
Status: DISCONTINUED | OUTPATIENT
Start: 2025-02-23 | End: 2025-02-28 | Stop reason: HOSPADM

## 2025-02-23 RX ORDER — ATENOLOL 25 MG/1
25 TABLET ORAL DAILY
Status: DISCONTINUED | OUTPATIENT
Start: 2025-02-23 | End: 2025-02-28 | Stop reason: HOSPADM

## 2025-02-23 RX ORDER — ONDANSETRON 2 MG/ML
4 INJECTION INTRAMUSCULAR; INTRAVENOUS ONCE
Status: COMPLETED | OUTPATIENT
Start: 2025-02-23 | End: 2025-02-23

## 2025-02-23 RX ORDER — SODIUM CHLORIDE 9 MG/ML
INJECTION, SOLUTION INTRAVENOUS PRN
Status: DISCONTINUED | OUTPATIENT
Start: 2025-02-23 | End: 2025-02-28 | Stop reason: HOSPADM

## 2025-02-23 RX ORDER — ACETAMINOPHEN 650 MG/1
650 SUPPOSITORY RECTAL EVERY 6 HOURS PRN
Status: DISCONTINUED | OUTPATIENT
Start: 2025-02-23 | End: 2025-02-28 | Stop reason: HOSPADM

## 2025-02-23 RX ORDER — POTASSIUM CHLORIDE 7.45 MG/ML
10 INJECTION INTRAVENOUS PRN
Status: DISCONTINUED | OUTPATIENT
Start: 2025-02-23 | End: 2025-02-28 | Stop reason: HOSPADM

## 2025-02-23 RX ORDER — MAGNESIUM SULFATE IN WATER 40 MG/ML
2000 INJECTION, SOLUTION INTRAVENOUS PRN
Status: DISCONTINUED | OUTPATIENT
Start: 2025-02-23 | End: 2025-02-28 | Stop reason: HOSPADM

## 2025-02-23 RX ORDER — ONDANSETRON 2 MG/ML
4 INJECTION INTRAMUSCULAR; INTRAVENOUS EVERY 6 HOURS PRN
Status: DISCONTINUED | OUTPATIENT
Start: 2025-02-23 | End: 2025-02-28 | Stop reason: HOSPADM

## 2025-02-23 RX ORDER — ASPIRIN 81 MG/1
81 TABLET ORAL NIGHTLY
Status: DISCONTINUED | OUTPATIENT
Start: 2025-02-23 | End: 2025-02-28 | Stop reason: HOSPADM

## 2025-02-23 RX ORDER — ATORVASTATIN CALCIUM 40 MG/1
40 TABLET, FILM COATED ORAL DAILY
Status: DISCONTINUED | OUTPATIENT
Start: 2025-02-23 | End: 2025-02-28 | Stop reason: HOSPADM

## 2025-02-23 RX ORDER — LISINOPRIL 20 MG/1
20 TABLET ORAL 2 TIMES DAILY
Status: DISCONTINUED | OUTPATIENT
Start: 2025-02-23 | End: 2025-02-28 | Stop reason: HOSPADM

## 2025-02-23 RX ORDER — POTASSIUM CHLORIDE 1500 MG/1
40 TABLET, EXTENDED RELEASE ORAL PRN
Status: DISCONTINUED | OUTPATIENT
Start: 2025-02-23 | End: 2025-02-28 | Stop reason: HOSPADM

## 2025-02-23 RX ORDER — ESCITALOPRAM OXALATE 20 MG/1
20 TABLET ORAL DAILY
Status: DISCONTINUED | OUTPATIENT
Start: 2025-02-23 | End: 2025-02-28 | Stop reason: HOSPADM

## 2025-02-23 RX ORDER — MORPHINE SULFATE 4 MG/ML
4 INJECTION, SOLUTION INTRAMUSCULAR; INTRAVENOUS ONCE
Status: COMPLETED | OUTPATIENT
Start: 2025-02-23 | End: 2025-02-23

## 2025-02-23 RX ORDER — ENOXAPARIN SODIUM 100 MG/ML
40 INJECTION SUBCUTANEOUS DAILY
Status: DISCONTINUED | OUTPATIENT
Start: 2025-02-23 | End: 2025-02-28 | Stop reason: HOSPADM

## 2025-02-23 RX ORDER — ACETAMINOPHEN 325 MG/1
650 TABLET ORAL EVERY 6 HOURS PRN
Status: DISCONTINUED | OUTPATIENT
Start: 2025-02-23 | End: 2025-02-28 | Stop reason: HOSPADM

## 2025-02-23 RX ORDER — PANTOPRAZOLE SODIUM 40 MG/1
40 TABLET, DELAYED RELEASE ORAL 2 TIMES DAILY
Status: DISCONTINUED | OUTPATIENT
Start: 2025-02-23 | End: 2025-02-28 | Stop reason: HOSPADM

## 2025-02-23 RX ORDER — INSULIN LISPRO 100 [IU]/ML
0-4 INJECTION, SOLUTION INTRAVENOUS; SUBCUTANEOUS
Status: DISCONTINUED | OUTPATIENT
Start: 2025-02-23 | End: 2025-02-26

## 2025-02-23 RX ORDER — POLYETHYLENE GLYCOL 3350 17 G/17G
17 POWDER, FOR SOLUTION ORAL DAILY PRN
Status: DISCONTINUED | OUTPATIENT
Start: 2025-02-23 | End: 2025-02-28 | Stop reason: HOSPADM

## 2025-02-23 RX ORDER — SODIUM CHLORIDE 0.9 % (FLUSH) 0.9 %
10 SYRINGE (ML) INJECTION EVERY 12 HOURS SCHEDULED
Status: DISCONTINUED | OUTPATIENT
Start: 2025-02-23 | End: 2025-02-28 | Stop reason: HOSPADM

## 2025-02-23 RX ADMIN — LISINOPRIL 20 MG: 20 TABLET ORAL at 22:55

## 2025-02-23 RX ADMIN — ATENOLOL 25 MG: 25 TABLET ORAL at 22:55

## 2025-02-23 RX ADMIN — ESCITALOPRAM OXALATE 20 MG: 20 TABLET ORAL at 22:55

## 2025-02-23 RX ADMIN — LEVOTHYROXINE SODIUM 50 MCG: 0.05 TABLET ORAL at 22:55

## 2025-02-23 RX ADMIN — ENOXAPARIN SODIUM 40 MG: 100 INJECTION SUBCUTANEOUS at 22:55

## 2025-02-23 RX ADMIN — SODIUM CHLORIDE, PRESERVATIVE FREE 10 ML: 5 INJECTION INTRAVENOUS at 23:17

## 2025-02-23 RX ADMIN — PANTOPRAZOLE SODIUM 40 MG: 40 TABLET, DELAYED RELEASE ORAL at 21:56

## 2025-02-23 RX ADMIN — MORPHINE SULFATE 4 MG: 4 INJECTION, SOLUTION INTRAMUSCULAR; INTRAVENOUS at 17:35

## 2025-02-23 RX ADMIN — ONDANSETRON 4 MG: 2 INJECTION, SOLUTION INTRAMUSCULAR; INTRAVENOUS at 17:35

## 2025-02-23 RX ADMIN — ATORVASTATIN CALCIUM 40 MG: 40 TABLET, FILM COATED ORAL at 22:55

## 2025-02-23 RX ADMIN — ACETAMINOPHEN 650 MG: 325 TABLET ORAL at 21:56

## 2025-02-23 ASSESSMENT — PAIN - FUNCTIONAL ASSESSMENT: PAIN_FUNCTIONAL_ASSESSMENT: 0-10

## 2025-02-23 ASSESSMENT — PAIN SCALES - GENERAL
PAINLEVEL_OUTOF10: 4
PAINLEVEL_OUTOF10: 8

## 2025-02-23 ASSESSMENT — PAIN DESCRIPTION - LOCATION
LOCATION: BACK
LOCATION: BACK

## 2025-02-23 ASSESSMENT — PAIN DESCRIPTION - ORIENTATION: ORIENTATION: LOWER

## 2025-02-23 NOTE — ED NOTES
Pt to ED c/o lower back pain. Pt states she woke up 2/15 with back pain with unknown injury. Pt states she was seen Wednesday and was told she has compression fractures. Family at bedside states she falls frequently. Pt states she is unable to walk and get around the house. VSS

## 2025-02-23 NOTE — ED PROVIDER NOTES
[facility] EMERGENCY DEPARTMENT  EMERGENCY DEPARTMENT ENCOUNTER          Pt Name: Kim Amaya  MRN: [id number]  Birthdate 1950  Date of evaluation: 2/23/2025  Resident Physician: Riley Waldrop MD EM Resident PGY-3  Attending Physician: Ruperto Finn MD      CHIEF COMPLAINT       Chief Complaint   Patient presents with    Back Pain         HISTORY OF PRESENT ILLNESS    HPI  Kim Amaya is a 75 y.o. female who presents to the emergency department from home, by private vehicle for evaluation of low back pain.    Patient has had multiple falls over the past week.  Patient states that last fall was 3 days prior in the bathroom mechanical her leg gave out.  She did not hit her head fell onto her buttocks was able to get off the ground after a few minutes with assistance.  Patient states that since she was last seen in the ED and diagnosed with L3 compression fracture she has had worsening of the pain down her right leg and has had difficulty ambulating secondary to pain.  She denies any numbness or tingling.  Patient denies any fecal or urinary incontinence or retention.  Patient denies any previous back surgeries.  States that she is unclear why she has cirrhosis but follows with Dr. Rodrigues who she missed an appointment on Friday because of pain.      The patient has no other acute complaints at this time.    ROS negative except as stated above.    PAST MEDICAL AND SURGICAL HISTORY     Past Medical History:   Diagnosis Date    Anemia     CAD (coronary artery disease)     CHF (congestive heart failure) (HCC)     COPD (chronic obstructive pulmonary disease) (HCC)     Depression 2000    GERD (gastroesophageal reflux disease)     Headache(784.0)     Heart attack (HCC)     Hyperlipidemia     Hypertension     Hypothyroidism 02/11/2015    Liver disease     MI (myocardial infarction) ([facility]) 11/99, 10/01    x 2     Obesity     Osteoarthritis     B/L knees--Dr. Jordan.    PONV (postoperative nausea and

## 2025-02-23 NOTE — ED PROVIDER NOTES
PATIENT NAME: Kim Amaya  MRN: [id number]  : 1950  [place]: 2025    I performed a history and physical examination of the patient and discussed management with the Resident. I reviewed the Resident's note and agree with the documented findings and plan of care. Any areas of disagreement are noted on the chart. I was personally present for the key portions of any procedures and have documented in the chart those procedures where I was not present during the key portions. I have reviewed the emergency nurses triage note and agree with the chief complaint, past medical history, past surgical history, allergies, medications, social and family history as documented unless otherwise noted below.    MEDICAL DEDISION MAKING (MDM)     Kim Amaya is a 75 y.o. female presents with worsening low back pain.    She was seen at the ED on 2025 for low back pain (no obvious trauma is recalled) and was diagnosed with acute inferior endplate compression fracture of L3.  She was subsequently discharged with PCP and pain medicine follow-up.    Her pain is getting worse.  Repeat lumbar CT reveals a stable L3 endplate fracture.    During ED stay, vital signs stable, neurologically intact.    Patient will be admitted for pain control and orthospine consult.       Vitals:    25 1558 25 1630 25 1658 25 1751   BP: (!) 148/68 (!) 140/79 (!) 140/66 137/68   Pulse: 73 71 71 74   Resp: 22 15 14 17   Temp: 97.8 °F (36.6 °C)      TempSrc: Oral      SpO2: 98% 98% 96% 95%     Labs Reviewed   CBC WITH AUTO DIFFERENTIAL - Abnormal; Notable for the following components:       Result Value    MCHC 30.4 (*)     RDW-CV 23.3 (*)     RDW-SD 78.5 (*)     Platelets 101 (*)     All other components within normal limits   SEDIMENTATION RATE - Abnormal; Notable for the following components:    Sed Rate, Automated 25 (*)     All other components within normal limits   C-REACTIVE PROTEIN - Abnormal; Notable for the  following components:    CRP 0.88 (*)     All other components within normal limits   COMPREHENSIVE METABOLIC PANEL - Abnormal; Notable for the following components:    Glucose 113 (*)     Chloride 95 (*)     CO2 19 (*)     All other components within normal limits   ANION GAP - Abnormal; Notable for the following components:    Anion Gap 22.0 (*)     All other components within normal limits   OSMOLALITY - Abnormal; Notable for the following components:    Osmolality Calc 271.8 (*)     All other components within normal limits   SPECIMEN REJECTION   GLOMERULAR FILTRATION RATE, ESTIMATED   SCAN OF BLOOD SMEAR     CT LUMBAR SPINE WO CONTRAST   Final Result   1. Stable compression fracture of the L3 vertebra.   2. Multilevel degenerative disc disease and posterior facet arthropathy.            **This report has been created using voice recognition software. It may contain   minor errors which are inherent in voice recognition technology.**      Electronically signed by Dr. Olivier Lopez        Medications   morphine injection 4 mg (4 mg IntraVENous Given 2/23/25 1735)   ondansetron (ZOFRAN) injection 4 mg (4 mg IntraVENous Given 2/23/25 1735)     FINAL IMPRESSION AND DISPOSITION      1. Closed compression fracture of L3 vertebra, sequela        DISPOSITION Decision To Admit 02/23/2025 06:18:09 PM   DISPOSITION CONDITION Stable     PATIENT REFERRED TO:  No follow-up provider specified.  DISCHARGE MEDICATIONS:  New Prescriptions    No medications on file       (Please note that portions of this note were completed with a voice recognition program.  Efforts were made to edit the dictations but occasionally words aremis-transcribed.)    MD Aakash Renee Jian, MD  02/23/25 0187

## 2025-02-24 ENCOUNTER — APPOINTMENT (OUTPATIENT)
Dept: MRI IMAGING | Age: 75
DRG: 543 | End: 2025-02-24
Payer: MEDICARE

## 2025-02-24 PROBLEM — E11.9 DIABETES MELLITUS (HCC): Status: ACTIVE | Noted: 2025-02-24

## 2025-02-24 PROBLEM — I50.22 CHRONIC HEART FAILURE WITH MILDLY REDUCED EJECTION FRACTION (HFMREF, 41-49%) (HCC): Status: ACTIVE | Noted: 2025-02-24

## 2025-02-24 PROBLEM — E03.9 HYPOTHYROIDISM (ACQUIRED): Status: ACTIVE | Noted: 2025-02-24

## 2025-02-24 PROBLEM — F32.A ANXIETY AND DEPRESSION: Status: ACTIVE | Noted: 2025-02-24

## 2025-02-24 PROBLEM — F41.9 ANXIETY AND DEPRESSION: Status: ACTIVE | Noted: 2025-02-24

## 2025-02-24 LAB
GLUCOSE BLD STRIP.AUTO-MCNC: 105 MG/DL (ref 70–108)
GLUCOSE BLD STRIP.AUTO-MCNC: 159 MG/DL (ref 70–108)
GLUCOSE BLD STRIP.AUTO-MCNC: 211 MG/DL (ref 70–108)
GLUCOSE BLD STRIP.AUTO-MCNC: 230 MG/DL (ref 70–108)

## 2025-02-24 PROCEDURE — 82948 REAGENT STRIP/BLOOD GLUCOSE: CPT

## 2025-02-24 PROCEDURE — 6360000002 HC RX W HCPCS: Performed by: PHYSICIAN ASSISTANT

## 2025-02-24 PROCEDURE — 99232 SBSQ HOSP IP/OBS MODERATE 35: CPT | Performed by: STUDENT IN AN ORGANIZED HEALTH CARE EDUCATION/TRAINING PROGRAM

## 2025-02-24 PROCEDURE — 6370000000 HC RX 637 (ALT 250 FOR IP): Performed by: PHYSICIAN ASSISTANT

## 2025-02-24 PROCEDURE — 1200000000 HC SEMI PRIVATE

## 2025-02-24 PROCEDURE — 72148 MRI LUMBAR SPINE W/O DYE: CPT

## 2025-02-24 PROCEDURE — 2500000003 HC RX 250 WO HCPCS: Performed by: PHYSICIAN ASSISTANT

## 2025-02-24 RX ORDER — SENNOSIDES A AND B 8.6 MG/1
1 TABLET, FILM COATED ORAL NIGHTLY
Status: DISCONTINUED | OUTPATIENT
Start: 2025-02-24 | End: 2025-02-28 | Stop reason: HOSPADM

## 2025-02-24 RX ORDER — OXYCODONE AND ACETAMINOPHEN 5; 325 MG/1; MG/1
1 TABLET ORAL EVERY 4 HOURS PRN
Status: DISCONTINUED | OUTPATIENT
Start: 2025-02-24 | End: 2025-02-24

## 2025-02-24 RX ORDER — OXYCODONE AND ACETAMINOPHEN 5; 325 MG/1; MG/1
1 TABLET ORAL EVERY 4 HOURS PRN
Status: DISCONTINUED | OUTPATIENT
Start: 2025-02-24 | End: 2025-02-28 | Stop reason: HOSPADM

## 2025-02-24 RX ADMIN — ENOXAPARIN SODIUM 40 MG: 100 INJECTION SUBCUTANEOUS at 16:28

## 2025-02-24 RX ADMIN — ACETAMINOPHEN 650 MG: 325 TABLET ORAL at 08:15

## 2025-02-24 RX ADMIN — ESCITALOPRAM OXALATE 20 MG: 20 TABLET ORAL at 20:55

## 2025-02-24 RX ADMIN — ACETAMINOPHEN 650 MG: 325 TABLET ORAL at 20:46

## 2025-02-24 RX ADMIN — SODIUM CHLORIDE, PRESERVATIVE FREE 10 ML: 5 INJECTION INTRAVENOUS at 12:04

## 2025-02-24 RX ADMIN — PANTOPRAZOLE SODIUM 40 MG: 40 TABLET, DELAYED RELEASE ORAL at 08:15

## 2025-02-24 RX ADMIN — ATORVASTATIN CALCIUM 40 MG: 40 TABLET, FILM COATED ORAL at 20:47

## 2025-02-24 RX ADMIN — LEVOTHYROXINE SODIUM 50 MCG: 0.05 TABLET ORAL at 20:46

## 2025-02-24 RX ADMIN — INSULIN LISPRO 1 UNITS: 100 INJECTION, SOLUTION INTRAVENOUS; SUBCUTANEOUS at 12:02

## 2025-02-24 RX ADMIN — SODIUM CHLORIDE, PRESERVATIVE FREE 10 ML: 5 INJECTION INTRAVENOUS at 20:51

## 2025-02-24 RX ADMIN — INSULIN LISPRO 1 UNITS: 100 INJECTION, SOLUTION INTRAVENOUS; SUBCUTANEOUS at 20:45

## 2025-02-24 RX ADMIN — LISINOPRIL 20 MG: 20 TABLET ORAL at 20:46

## 2025-02-24 RX ADMIN — ATENOLOL 25 MG: 25 TABLET ORAL at 20:46

## 2025-02-24 RX ADMIN — PANTOPRAZOLE SODIUM 40 MG: 40 TABLET, DELAYED RELEASE ORAL at 20:46

## 2025-02-24 ASSESSMENT — PAIN SCALES - GENERAL
PAINLEVEL_OUTOF10: 6
PAINLEVEL_OUTOF10: 0
PAINLEVEL_OUTOF10: 2

## 2025-02-24 ASSESSMENT — ENCOUNTER SYMPTOMS
NAUSEA: 0
SHORTNESS OF BREATH: 0
CONSTIPATION: 0
DIARRHEA: 0
COUGH: 0
BACK PAIN: 1

## 2025-02-24 ASSESSMENT — PAIN DESCRIPTION - LOCATION
LOCATION: HEAD;NECK
LOCATION: HEAD

## 2025-02-24 ASSESSMENT — PAIN DESCRIPTION - DESCRIPTORS: DESCRIPTORS: ACHING

## 2025-02-24 ASSESSMENT — PAIN - FUNCTIONAL ASSESSMENT: PAIN_FUNCTIONAL_ASSESSMENT: ACTIVITIES ARE NOT PREVENTED

## 2025-02-24 NOTE — CONSULTS
Orthopedic Spine Consult  Department of Orthopedic Surgery  Attending: Dr. Andrew Barrientos      Inpatient consult to Orthopedic Surgery  Consult performed by: Jackson Bravo PA-C  Consult ordered by: Riley Waldrop MD          Chief Complaint: L3 compression fx    HPI: Kim is a 75-year-old female who was admitted yesterday for worsening low back pain.  CT lumbar spine was completed yesterday demonstrating L3 compression fracture.  She reports that she fell 2/19/25 reporting that her right leg gave out causing him to fall on her buttock.  She reports that chronically when she has low hemoglobin her legs give out without any indications of when or why.  Initially she was seen in the ED but then discharged home with states that her pain gradually worsened and presented back to the ED yesterday.  She denies any lower extremity radicular symptoms or any changes to her bowel or bladder control.  She denies any pain at rest but rates it 10 out of 10 pain with any type of movement.    Assessment:   Traumatic L3 vertebral body fx s/p fall 2/19/25  Plan:   Recommending an MRI of the lumbar spine for better evaluation of the overall stability of the L3 vertebral body/compression fracture.  This will indicate indications for bracing, procedure or surgical intervention if necessary.  Okay to ambulate with staff with restrictions no lifting over 10 pounds no bending or twisting.  Pain control per primary team.  Discharge pending clinical course.    Thank you for the consult, we will continue to follow.    No Known Allergies  Prior to Visit Medications    Medication Sig Taking? Authorizing Provider   docusate sodium (COLACE) 100 MG capsule Take 1 capsule by mouth nightly as needed for Constipation Yes ProviderMichelle MD   Cinnamon 500 MG CAPS Take 2 capsules by mouth daily Yes ProviderMichelle MD   cyclobenzaprine (FLEXERIL) 5 MG tablet Take 1 tablet by mouth 3 times daily as needed for Muscle spasms Yes Maritza Henry  Additional Contrast? None    Result Date: 2/20/2025  CT abdomen and pelvis with contrast Comparison: CT/SR - CT ABDOMEN PELVIS WO CONTRAST - 10/30/18 15:50 EDT Findings: Stable scattered small right pulmonary nodules. Nodular liver contour with mild abdominal ascites. Distal periesophageal varices. Enlarged spleen at 13.7 cm axial dimension. Gallbladder is absent. No biliary dilatation. The portal and splenic veins appear patent. The pancreas and adrenal glands are unremarkable. Both kidneys enhance symmetrically without hydroureteronephrosis. No bowel obstruction, pneumoperitoneum, or pneumatosis. Colonic diverticula without acute diverticulitis. Appendix is not seen. Calcified plaque in the nonaneurysmal aorta and iliac arteries. No enlarged abdominal or pelvic lymph nodes. The urinary bladder and uterus are unremarkable. Bilateral tubal ligation clips. Trace pelvic ascites. Acute inferior endplate compression deformity at L3.     1. Acute inferior endplate compression deformity at L3. 2. Cirrhotic liver sequela of chronic portal venous hypertension characterized by splenomegaly and distal periesophageal varices. 3. Colonic diverticula. 4. Tiny amount of ascites. This document has been electronically signed by: Jovan Galvez MD on 02/20/2025 01:33 AM All CTs at this facility use dose modulation techniques and iterative reconstructions, and/or weight-based dosing when appropriate to reduce radiation to a low as reasonably achievable.          Electronically signed by Jackson Bravo PA-C on 2/24/25 at 7:04 AM EST

## 2025-02-24 NOTE — CARE COORDINATION
Case Management Assessment Initial Evaluation    Date/Time of Evaluation: 2/24/2025 9:12 AM  Assessment Completed by: Jyotsna Donaldson RN    If patient is discharged prior to next notation, then this note serves as note for discharge by case management.    Patient Name: Kim Amaya                   YOB: 1950  Diagnosis: Closed compression fracture of L3 lumbar vertebra, initial encounter ([facility]) [S32.030A]  Closed compression fracture of L3 vertebra, sequela [S32.030S]                   Date / Time: 2/23/2025  3:52 PM  Location: [facility]18/[facility]     Patient Admission Status: Inpatient   Readmission Risk Low 0-14, Mod 15-19), High > 20: Readmission Risk Score: 18.1    Current PCP: Victoria Arnold APRN - CNP  Health Care Decision Makers:   Primary Decision Maker: Gigi Amaya - Spouse - [phone]    Secondary Decision Maker: Dyan Henson - Child - [phone]    Secondary Decision Maker: Kaitlyn Richmond - Child - [phone]    Additional Case Management Notes: evaluation of low back pain.   States that last fall was 3 days prior in the bathroom mechanical her leg gave out   Procedures: na    Imaging:   CT lumbar spine:  Stable compression fracture of the L3 vertebra.   Multilevel degenerative disc disease and posterior facet arthropathy.   MRI lumbar spine pending    Patient Goals/Plan/Treatment Preferences: Met with Kim and her spouse Gigi.  Pt lives home, has HCDM, some DME (see list), and insurance. Unsure of needs; await ortho spine recommendations.            02/24/25 1022   Service Assessment   Patient Orientation Alert and Oriented   Cognition Alert   History Provided By Patient;Spouse   Primary Caregiver Self   Accompanied By/Relationship Gigi   Support Systems Spouse/Significant Other;Family Members   Patient's Healthcare Decision Maker is: Patient Declined (Legal Next of Kin Remains as Decision Maker)   PCP Verified by CM Yes   Last Visit to PCP Within last 3 months   Prior

## 2025-02-24 NOTE — PLAN OF CARE
Problem: Chronic Conditions and Co-morbidities  Goal: Patient's chronic conditions and co-morbidity symptoms are monitored and maintained or improved  Outcome: Progressing  Flowsheets (Taken 2/24/2025 0742)  Care Plan - Patient's Chronic Conditions and Co-Morbidity Symptoms are Monitored and Maintained or Improved:   Monitor and assess patient's chronic conditions and comorbid symptoms for stability, deterioration, or improvement   Collaborate with multidisciplinary team to address chronic and comorbid conditions and prevent exacerbation or deterioration   Update acute care plan with appropriate goals if chronic or comorbid symptoms are exacerbated and prevent overall improvement and discharge     Problem: Discharge Planning  Goal: Discharge to home or other facility with appropriate resources  Outcome: Progressing  Flowsheets (Taken 2/24/2025 0742)  Discharge to home or other facility with appropriate resources:   Identify barriers to discharge with patient and caregiver   Arrange for needed discharge resources and transportation as appropriate   Identify discharge learning needs (meds, wound care, etc)   Refer to discharge planning if patient needs post-hospital services based on physician order or complex needs related to functional status, cognitive ability or social support system     Problem: Pain  Goal: Verbalizes/displays adequate comfort level or baseline comfort level  Outcome: Progressing  Flowsheets (Taken 2/24/2025 0743)  Verbalizes/displays adequate comfort level or baseline comfort level:   Encourage patient to monitor pain and request assistance   Assess pain using appropriate pain scale   Administer analgesics based on type and severity of pain and evaluate response   Implement non-pharmacological measures as appropriate and evaluate response   Consider cultural and social influences on pain and pain management   Notify Licensed Independent Practitioner if interventions unsuccessful or patient

## 2025-02-24 NOTE — ED NOTES
ED to inpatient nurses report      Chief Complaint:  Chief Complaint   Patient presents with    Back Pain     Present to ED from: home    MOA:     LOC: alert and orientated to name, place, date  Mobility: Requires assistance * 2  Oxygen Baseline: ra    Current needs required: ra     Code Status:   Full Code    What abnormal results were found and what did you give/do to treat them? Compression fracture L3  Any procedures or intervention occur? Pain control    Mental Status:  Level of Consciousness: Alert (0)    Psych Assessment:        Vitals:  Patient Vitals for the past 24 hrs:   BP Temp Temp src Pulse Resp SpO2   02/23/25 1953 127/66 -- -- 87 22 92 %   02/23/25 1852 (!) 126/93 -- -- 86 15 94 %   02/23/25 1751 137/68 -- -- 74 17 95 %   02/23/25 1658 (!) 140/66 -- -- 71 14 96 %   02/23/25 1630 (!) 140/79 -- -- 71 15 98 %   02/23/25 1558 (!) 148/68 97.8 °F (36.6 °C) Oral 73 22 98 %        LDAs:   Peripheral IV 02/23/25 Right;Anterior Forearm (Active)   Site Assessment Clean, dry & intact 02/23/25 1754   Line Status Blood return noted;Flushed 02/23/25 1626       Ambulatory Status:  No data recorded    Diagnosis:  DISPOSITION Admitted 02/23/2025 07:54:11 PM   Final diagnoses:   Closed compression fracture of L3 vertebra, sequela        Consults:  IP CONSULT TO ORTHOPEDIC SURGERY     Pain Score:  Pain Assessment  Pain Assessment: 0-10  Pain Level: 8  Pain Location: Back  Pain Orientation: Lower    C-SSRS:        Sepsis Screening:       [place] Fall Risk:       Swallow Screening        Preferred Language:   English      ALLERGIES     Patient has no known allergies.    SURGICAL HISTORY       Past Surgical History:   Procedure Laterality Date    APPENDECTOMY  1978    BLADDER SUSPENSION  1978    CARDIAC CATHETERIZATION  07/29/2009    Diagnostic Cath EF 40-45% (Dr Humphrey)    CHOLECYSTECTOMY  1978    COLONOSCOPY      KNEE ARTHROSCOPY Right     NV COLON CA SCRN NOT HI RSK IND Left 3/1/2018    COLONOSCOPY performed by Prince

## 2025-02-24 NOTE — PLAN OF CARE
Problem: Chronic Conditions and Co-morbidities  Goal: Patient's chronic conditions and co-morbidity symptoms are monitored and maintained or improved  Outcome: Progressing     Problem: Discharge Planning  Goal: Discharge to home or other facility with appropriate resources  Outcome: Progressing     Problem: Pain  Goal: Verbalizes/displays adequate comfort level or baseline comfort level  Outcome: Progressing     Problem: Skin/Tissue Integrity  Goal: Skin integrity remains intact  Description: 1.  Monitor for areas of redness and/or skin breakdown  2.  Assess vascular access sites hourly  3.  Every 4-6 hours minimum:  Change oxygen saturation probe site  4.  Every 4-6 hours:  If on nasal continuous positive airway pressure, respiratory therapy assess nares and determine need for appliance change or resting period  Outcome: Progressing     Problem: Safety - Adult  Goal: Free from fall injury  Outcome: Progressing     Problem: Skin/Tissue Integrity - Adult  Goal: Skin integrity remains intact  Description: 1.  Monitor for areas of redness and/or skin breakdown  2.  Assess vascular access sites hourly  3.  Every 4-6 hours minimum:  Change oxygen saturation probe site  4.  Every 4-6 hours:  If on nasal continuous positive airway pressure, respiratory therapy assess nares and determine need for appliance change or resting period  Outcome: Progressing     Problem: Genitourinary - Adult  Goal: Absence of urinary retention  Outcome: Progressing

## 2025-02-24 NOTE — ED NOTES
Patient transported to  [facility] by cart in stable condition.   IV line is patent.

## 2025-02-24 NOTE — H&P
Hospitalist History & Physical         Patient: Kim Amaya 75 y.o. female      : 1950  Date of Admission: 2025  Date of Service: Pt seen/examined on 25 and Admitted to Inpatient with expected LOS greater than two midnights due to medical therapy.         ASSESSMENT AND PLAN    Back pain 2/2 L3 compression fracture   Per CT imaging   Dr. Barrientos consulted from ED   Dilaudid pain panel with PRN flexeril     Type II diabetes  Holding home meds  Low sliding scale insulin   Hypoglycemia protocol     Chronic CHFmrEF  EF on echo 40% back in    Appears euvolemic   Continue atenolol and lisinopril       CAD  Statin, atenolol     Hypothyroidism   Synthroid     HLD  Statin     Anxiety and depression  Lexapro     Data reviewed (unless otherwise discussed in assessment/plan)    Imaging: I have reviewed Ct lumbar spine with the following interpretation: L3 compression fracture   Labs: Reviewed, see chart and plan above.       =======================================================================    SUBJECTIVE    Chief Complaint:  back pain     History Of Present Illness:  Kim Amaya is a 75 y.o. female  who presents to [facility] with back pain.  Patient states she has had multiple over the course of the past week. Patient states the msot recent fall occurred about 3-4 days ago when she was in the bathroom and her legs gave out hitting her buttocks.   Patient was seen earlier in the week in the ED where she was diagnosed with her L3 compression fracture however since then her pain has continued to increase and functional capility has become increasingly more limited due to her pain. Patient states she feels sharp shooting pains up and down her leg when she moves around and cannot ambulate on it or just barely so due to her pain.         Review of Systems:   Pertinent positives and negatives as noted in the HPI. Otherwise complete ROS negative.    OBJECTIVE  Physical Exam:  BP  children: 4   Occupational History    Occupation: Retired Dry    Tobacco Use    Smoking status: Former     Current packs/day: 0.00     Types: Cigarettes     Quit date: 1969     Years since quittin.2    Smokeless tobacco: Former   Vaping Use    Vaping status: Never Used   Substance and Sexual Activity    Alcohol use: No     Comment: rarely    Drug use: No     Social Determinants of Health     Financial Resource Strain: Low Risk  (2025)    Overall Financial Resource Strain (CARDIA)     Difficulty of Paying Living Expenses: Not very hard   Food Insecurity: No Food Insecurity (2025)    Hunger Vital Sign     Worried About Running Out of Food in the Last Year: Never true     Ran Out of Food in the Last Year: Never true   Transportation Needs: Unknown (2025)    PRAPARE - Transportation     Lack of Transportation (Non-Medical): No   Physical Activity: Inactive (2024)    Exercise Vital Sign     Days of Exercise per Week: 0 days     Minutes of Exercise per Session: 0 min   Housing Stability: Unknown (2025)    Housing Stability Vital Sign     Homeless in the Last Year: No        Code status: Full Code     Thank you Victoria Arnold, APRN - SARBJIT for the opportunity to be involved in this patient's care.    Electronically signed by JAY Smith on 2025 at 7:54 PM.

## 2025-02-24 NOTE — PROGRESS NOTES
Pharmacy Medication History Note    List of current medications patient is taking is complete.    Source of information: Patient & family; dispense report     Changes made to medication list:  Medications removed:  Albuterol HFA inhaler PRN  Aspirin 81 mg daily   Bupropion  mg daily   Naproxen 50 mg BID for 4 days     Other notes:  Denies use of other OTC or herbal medications.    Allergies reviewed    Electronically signed by Yelitza Shah RPH on 2/23/2025 at 8:25 PM

## 2025-02-25 LAB
GLUCOSE BLD STRIP.AUTO-MCNC: 179 MG/DL (ref 70–108)
GLUCOSE BLD STRIP.AUTO-MCNC: 182 MG/DL (ref 70–108)
GLUCOSE BLD STRIP.AUTO-MCNC: 196 MG/DL (ref 70–108)
GLUCOSE BLD STRIP.AUTO-MCNC: 199 MG/DL (ref 70–108)

## 2025-02-25 PROCEDURE — 97162 PT EVAL MOD COMPLEX 30 MIN: CPT

## 2025-02-25 PROCEDURE — 1200000000 HC SEMI PRIVATE

## 2025-02-25 PROCEDURE — 82948 REAGENT STRIP/BLOOD GLUCOSE: CPT

## 2025-02-25 PROCEDURE — 6370000000 HC RX 637 (ALT 250 FOR IP): Performed by: PHYSICIAN ASSISTANT

## 2025-02-25 PROCEDURE — 97110 THERAPEUTIC EXERCISES: CPT

## 2025-02-25 PROCEDURE — 99232 SBSQ HOSP IP/OBS MODERATE 35: CPT | Performed by: STUDENT IN AN ORGANIZED HEALTH CARE EDUCATION/TRAINING PROGRAM

## 2025-02-25 PROCEDURE — 6360000002 HC RX W HCPCS: Performed by: PHYSICIAN ASSISTANT

## 2025-02-25 RX ADMIN — ATORVASTATIN CALCIUM 40 MG: 40 TABLET, FILM COATED ORAL at 20:30

## 2025-02-25 RX ADMIN — INSULIN LISPRO 1 UNITS: 100 INJECTION, SOLUTION INTRAVENOUS; SUBCUTANEOUS at 17:08

## 2025-02-25 RX ADMIN — ACETAMINOPHEN 650 MG: 325 TABLET ORAL at 14:29

## 2025-02-25 RX ADMIN — ENOXAPARIN SODIUM 40 MG: 100 INJECTION SUBCUTANEOUS at 07:35

## 2025-02-25 RX ADMIN — INSULIN LISPRO 1 UNITS: 100 INJECTION, SOLUTION INTRAVENOUS; SUBCUTANEOUS at 20:30

## 2025-02-25 RX ADMIN — PANTOPRAZOLE SODIUM 40 MG: 40 TABLET, DELAYED RELEASE ORAL at 07:34

## 2025-02-25 RX ADMIN — LISINOPRIL 20 MG: 20 TABLET ORAL at 07:34

## 2025-02-25 RX ADMIN — ATENOLOL 25 MG: 25 TABLET ORAL at 20:29

## 2025-02-25 RX ADMIN — INSULIN LISPRO 1 UNITS: 100 INJECTION, SOLUTION INTRAVENOUS; SUBCUTANEOUS at 12:52

## 2025-02-25 RX ADMIN — ACETAMINOPHEN 650 MG: 325 TABLET ORAL at 20:25

## 2025-02-25 RX ADMIN — PANTOPRAZOLE SODIUM 40 MG: 40 TABLET, DELAYED RELEASE ORAL at 20:25

## 2025-02-25 RX ADMIN — LEVOTHYROXINE SODIUM 50 MCG: 0.05 TABLET ORAL at 20:29

## 2025-02-25 RX ADMIN — LISINOPRIL 20 MG: 20 TABLET ORAL at 20:29

## 2025-02-25 RX ADMIN — ESCITALOPRAM OXALATE 20 MG: 20 TABLET ORAL at 20:29

## 2025-02-25 RX ADMIN — ACETAMINOPHEN 650 MG: 325 TABLET ORAL at 05:06

## 2025-02-25 ASSESSMENT — PAIN SCALES - GENERAL
PAINLEVEL_OUTOF10: 0
PAINLEVEL_OUTOF10: 1
PAINLEVEL_OUTOF10: 9
PAINLEVEL_OUTOF10: 3
PAINLEVEL_OUTOF10: 2

## 2025-02-25 ASSESSMENT — PAIN DESCRIPTION - ORIENTATION
ORIENTATION: LEFT
ORIENTATION: ANTERIOR
ORIENTATION: LOWER

## 2025-02-25 ASSESSMENT — PAIN DESCRIPTION - LOCATION
LOCATION: BACK
LOCATION: HEAD
LOCATION: LEG

## 2025-02-25 ASSESSMENT — PAIN - FUNCTIONAL ASSESSMENT
PAIN_FUNCTIONAL_ASSESSMENT: ACTIVITIES ARE NOT PREVENTED
PAIN_FUNCTIONAL_ASSESSMENT: PREVENTS OR INTERFERES SOME ACTIVE ACTIVITIES AND ADLS
PAIN_FUNCTIONAL_ASSESSMENT: PREVENTS OR INTERFERES SOME ACTIVE ACTIVITIES AND ADLS

## 2025-02-25 ASSESSMENT — PAIN DESCRIPTION - ONSET: ONSET: ON-GOING

## 2025-02-25 ASSESSMENT — PAIN DESCRIPTION - FREQUENCY: FREQUENCY: CONTINUOUS

## 2025-02-25 ASSESSMENT — PAIN DESCRIPTION - DESCRIPTORS
DESCRIPTORS: ACHING
DESCRIPTORS: SPASM
DESCRIPTORS: ACHING

## 2025-02-25 ASSESSMENT — PAIN DESCRIPTION - PAIN TYPE: TYPE: ACUTE PAIN

## 2025-02-25 NOTE — PROGRESS NOTES
[facility]  INPATIENT PHYSICAL THERAPY  EVALUATION  STRZ 6A PEDI/MED SURG - 6A-18/018-A    Discharge Recommendations: Continue to assess pending progress, 24 hour supervision or assist, Therapy recommended at discharge  Equipment Recommendations:    monitor for needs          Time In: 1715  Time Out: 1741  Timed Code Treatment Minutes: 11 Minutes  Minutes: 26          Date: 2025  Patient Name: Kim Amaya,  Gender:  female        MRN: [id number]  : 1950  (75 y.o.)      Referring Practitioner: Jackson Bravo PA-C  Diagnosis: Closed compression fracture of L3 lumbar vertebra, initial encounter ([facility])  Additional Pertinent Hx: Kim Amaya is a 75 y.o. female  who presents to [facility] with back pain.  Patient states she has had multiple over the course of the past week. Patient states the msot recent fall occurred about 3-4 days ago when she was in the bathroom and her legs gave out hitting her buttocks.   Patient was seen earlier in the week in the ED where she was diagnosed with her L3 compression fracture however since then her pain has continued to increase and functional capility has become increasingly more limited due to her pain. Patient states she feels sharp shooting pains up and down her leg when she moves around and cannot ambulate on it or just barely so due to her pain.     Restrictions/Precautions:  Restrictions/Precautions: Fall Risk, General Precautions       Spinal Precautions: No Bending, No Lifting, No Twisting    Required Braces or Orthoses?: Yes  Spinal: Lumbar Corset      Subjective:  Chart Reviewed: Yes  Patient assessed for rehabilitation services?: Yes  Subjective: Pt resting in bed and RN reports pt wants to try getting to the bathroom.    General:     Vision: Impaired  Vision Exceptions: Wears glasses at all times  Hearing: Within functional limits       Pain: 2-3/10: neck, back and L LE. Increased to 8/10 in L thigh with trials to  BASIC MOBILITY  AM-[facility] Inpatient Mobility Raw Score : 11  AM-[facility] Inpatient T-Scale Score : 33.86          Modified Luz:  Premorbid Functional Status: Not Applicable  Current Functional Status:  Not Applicable    ASSESSMENT:  Activity Tolerance:  Patient tolerance of treatment:Good. Fair.    Treatment Initiated: Treatment and education initiated within context of evaluation.  Evaluation time included review of current medical information, gathering information related to past medical, social and functional history, completion of standardized testing, formal and informal observation of tasks, assessment of data and development of plan of care and goals.  Treatment time included skilled education and facilitation of tasks to increase safety and independence with functional mobility for improved independence and quality of life.    Assessment:  Body Structures, Functions, Activity Limitations Requiring Skilled Therapeutic Intervention: Decreased functional mobility , Decreased strength, Decreased endurance, Decreased balance, Increased pain  Assessment: Pt is 76 yo female that is deconditioned and participated well. Pt was generally Mod Ind for mobility in PLOF and is at Min to Mod A today. Pt is not safe for return to home at this time and would benefit from continued skilled PT to address strengthening, balance, bed mobility, endurance building, and functional mobility training.    Therapy Prognosis: Fair, Good    Requires PT Follow-Up: Yes    Patient Education:      .    Patient Education  Education Given To: Patient  Education Provided: Role of Therapy, Plan of Care, Home Exercise Program  Education Method: Demonstration, Verbal  Barriers to Learning: None  Education Outcome: Verbalized understanding, Demonstrated understanding, Continued education needed       Plan:  Current Treatment Recommendations: Strengthening, Balance training, Gait training, Functional mobility training, Transfer training,

## 2025-02-25 NOTE — PROGRESS NOTES
[facility]  OCCUPATIONAL THERAPY MISSED TREATMENT NOTE  STRZ 6A PEDI/MED SURG  6A-18/018-A      Date: 2025  Patient Name: Kim Amaya        CSN: [id number]   : 1950  (75 y.o.)  Gender: female   Referring Practitioner: Jackson Bravo PA-C  Diagnosis: Closed Compression Fx of L3 lumbar vertebra         REASON FOR MISSED TREATMENT: Patient Refused.  Pt stated she had attempted to sit up earlier today and was having sharp pains in her thighs.  She asked to be allowed to rest at this time and agreed to work with therapy when her lumbar brace arrives.

## 2025-02-25 NOTE — PLAN OF CARE
Problem: Chronic Conditions and Co-morbidities  Goal: Patient's chronic conditions and co-morbidity symptoms are monitored and maintained or improved  Outcome: Progressing  Flowsheets (Taken 2/24/2025 2044 by Geneva Calderón, RN)  Care Plan - Patient's Chronic Conditions and Co-Morbidity Symptoms are Monitored and Maintained or Improved:   Monitor and assess patient's chronic conditions and comorbid symptoms for stability, deterioration, or improvement   Collaborate with multidisciplinary team to address chronic and comorbid conditions and prevent exacerbation or deterioration   Update acute care plan with appropriate goals if chronic or comorbid symptoms are exacerbated and prevent overall improvement and discharge     Problem: Discharge Planning  Goal: Discharge to home or other facility with appropriate resources  Outcome: Progressing  Flowsheets (Taken 2/24/2025 2044 by Geneva Calderón, RN)  Discharge to home or other facility with appropriate resources:   Identify barriers to discharge with patient and caregiver   Arrange for needed discharge resources and transportation as appropriate   Identify discharge learning needs (meds, wound care, etc)     Problem: Pain  Goal: Verbalizes/displays adequate comfort level or baseline comfort level  Outcome: Progressing  Flowsheets (Taken 2/25/2025 0720)  Verbalizes/displays adequate comfort level or baseline comfort level:   Encourage patient to monitor pain and request assistance   Assess pain using appropriate pain scale   Administer analgesics based on type and severity of pain and evaluate response   Implement non-pharmacological measures as appropriate and evaluate response   Consider cultural and social influences on pain and pain management   Notify Licensed Independent Practitioner if interventions unsuccessful or patient reports new pain     Problem: Skin/Tissue Integrity  Goal: Skin integrity remains intact  Description: 1.  Monitor for areas of redness

## 2025-02-25 NOTE — PROGRESS NOTES
Department of Orthopedic Surgery  Spine Service  Progress Note        Subjective:   2/25/25  Kim is resting in bed. Reports improvement of LBP as well as LE strength. Discussed results of MRI. Not a good candidate for an augmentation. Will proceed with LSO brace and restrictions of no lifting over 10 lbs, no bending/twisting.       Vitals  VITALS:  BP (!) 127/56   Pulse 73   Temp 98.3 °F (36.8 °C) (Oral)   Resp 16   Ht 1.524 m (5')   Wt 77.6 kg (171 lb)   SpO2 95%   BMI 33.40 kg/m²   24HR INTAKE/OUTPUT:    Intake/Output Summary (Last 24 hours) at 2/25/2025 0722  Last data filed at 2/25/2025 0354  Gross per 24 hour   Intake 1300 ml   Output 1650 ml   Net -350 ml     URINARY CATHETER OUTPUT ([facility]):  External Urinary Catheter-Output (mL): 750 mL  DRAIN/TUBE OUTPUT:     VENT SETTINGS:     Additional Respiratory Assessments  Pulse: 73  Respirations: 16  SpO2: 95 %      PHYSICAL EXAM:    Orientation:  alert and oriented to person, place and time    Lower Extremity Motor :  quadriceps, extensor hallucis longus, dorsiflexion, plantarflexion 5/5 bilaterally  Lower Extremity Sensory:  Intact L1-S1    ABNORMAL EXAM FINDINGS:  none    LABS:  Recent Labs     02/23/25  1600   HGB 12.3   HCT 40.4       ASSESSMENT AND PLAN:    Traumatic L3 acute fracture of inferior endplate    1:  Monitor labs and drain output  2:  Activity Level:  OOB with therapy and staff. LSO brace donned when standing/walking. Ok to use an abdominal binder with therapy until brace arrives  3:  Pain Control:  controlled  4:  Discharge Planning:  Ok to discharge home with OP f/u once ambulating well. Please schedule follow up with Dr. Barrientos in 3-4 weeks.     Orthospine will sign off. No surgery indications at this time. Continue conservative with brace and restrictions of no lifting over 10 lbs, no bending/twisting x 3 months.     Electronically signed by Jackson Bravo PA-C on 2/25/2025 at 7:19 AM

## 2025-02-25 NOTE — PROGRESS NOTES
[facility]   PROGRESS NOTE      Patient: Kim Amaya  Room #: 6A-18/018-A            YOB: 1950  Age: 75 y.o.  Gender: female            Admit Date & Time: 2/23/2025  3:52 PM    Assessment:    The patient declined a visit today.    Interventions:  The patient was provided information about Spiritual Care being available.     Outcomes:  The  wished the patient a positive day.     Plan:  1.Spiritual care will continue to follow the patient according to [facility] spiritual care SOP.       Electronically signed by Chaplain Susy, on 2/25/2025 at 9:57 AM.  Spiritual Care Department  [facility]  [phone]

## 2025-02-25 NOTE — PLAN OF CARE
Problem: Chronic Conditions and Co-morbidities  Goal: Patient's chronic conditions and co-morbidity symptoms are monitored and maintained or improved  2/24/2025 2344 by Geneva Caldreón, RN  Outcome: Progressing  Flowsheets (Taken 2/24/2025 2044)  Care Plan - Patient's Chronic Conditions and Co-Morbidity Symptoms are Monitored and Maintained or Improved:   Monitor and assess patient's chronic conditions and comorbid symptoms for stability, deterioration, or improvement   Collaborate with multidisciplinary team to address chronic and comorbid conditions and prevent exacerbation or deterioration   Update acute care plan with appropriate goals if chronic or comorbid symptoms are exacerbated and prevent overall improvement and discharge     Problem: Discharge Planning  Goal: Discharge to home or other facility with appropriate resources  2/24/2025 2344 by Geneva Calderón, RN  Outcome: Progressing  Flowsheets (Taken 2/24/2025 2044)  Discharge to home or other facility with appropriate resources:   Identify barriers to discharge with patient and caregiver   Arrange for needed discharge resources and transportation as appropriate   Identify discharge learning needs (meds, wound care, etc)     Problem: Pain  Goal: Verbalizes/displays adequate comfort level or baseline comfort level  2/24/2025 2344 by Geneva Calderón, RN  Outcome: Progressing  Flowsheets (Taken 2/24/2025 2044)  Verbalizes/displays adequate comfort level or baseline comfort level:   Encourage patient to monitor pain and request assistance   Assess pain using appropriate pain scale   Administer analgesics based on type and severity of pain and evaluate response   Consider cultural and social influences on pain and pain management   Implement non-pharmacological measures as appropriate and evaluate response     Problem: Skin/Tissue Integrity  Goal: Skin integrity remains intact  Description: 1.  Monitor for areas of redness and/or skin breakdown  2.

## 2025-02-25 NOTE — PROGRESS NOTES
Hospitalist Progress Note      Patient:  Kim Amaya    Unit/Bed:6A-18/018-A  YOB: 1950  MRN: [id number]   Acct: [id number]   PCP: Victoria Arnold APRN - CNP  Date of Admission: 2/23/2025      ASSESSMENT AND PLAN    Back pain 2/2 L3 compression fracture   MRI of the lumbar spine   Dr. Barrientos consulted from ED   Dilaudid pain panel with PRN flexeril      Type II diabetes  Holding home meds  Low sliding scale insulin   Hypoglycemia protocol      Chronic CHFmrEF  EF on echo 40% back in 2022   Appears euvolemic   Continue atenolol and lisinopril      CAD  Statin, atenolol      Hypothyroidism   Synthroid      HLD  Statin      Anxiety and depression  Lexapro     DVT Prophylaxis: [x] Lovenox / [] Heparin / [] SCDs / [] Already on Systemic Anticoagulation / [] None     Expected discharge date:  tbd  Disposition: tbd     Code status: Full Code     ===================================================================    Chief Complaint: back pain     Subjective (past 24 hours):     - NAEO, She denies any pain at rest but rates it 10 out of 10 pain with any type of movement.   - Patient is afebrile, HDS     Medications:    Infusion Medications    sodium chloride      dextrose      Scheduled Medications    sodium chloride flush  10 mL IntraVENous 2 times per day    enoxaparin  40 mg SubCUTAneous Daily    [Held by provider] aspirin  81 mg Oral Nightly    atenolol  25 mg Oral Daily    atorvastatin  40 mg Oral Daily    escitalopram  20 mg Oral Daily    levothyroxine  50 mcg Oral Daily    lisinopril  20 mg Oral BID    pantoprazole  40 mg Oral BID    insulin lispro  0-4 Units SubCUTAneous 4x Daily AC & HS    PRN Meds: sodium chloride flush, sodium chloride, potassium chloride **OR** potassium alternative oral replacement **OR** potassium chloride, magnesium sulfate, ondansetron **OR** ondansetron, polyethylene glycol, acetaminophen **OR**  intravenous contrast. Axial images as well as coronal and sagittal reconstructions were obtained. All CT scans at this facility use dose modulation, iterative reconstruction, and/or weight-based dosing when appropriate to reduce radiation dose to as low as reasonably achievable. COMPARISON: CT lumbar spine reconstruction 2/20/2025 FINDINGS: There is an inferior endplate deformity of the L3 vertebra resulting in a stable grade 1 compression fracture. Slight retropulsion of fracture fragments is stable. Multilevel disc space narrowing is most severe at L5-S1 where there is vacuum phenomenon. Lumbar vertebral body alignment is preserved. There is multilevel posterior facet arthropathy and stable multilevel disc bulges.     1. Stable compression fracture of the L3 vertebra. 2. Multilevel degenerative disc disease and posterior facet arthropathy. **This report has been created using voice recognition software. It may contain minor errors which are inherent in voice recognition technology.** Electronically signed by Dr. Olivier Lopez      Electronically signed by Sabas Suero MD on 2/24/2025 at 10:13 PM

## 2025-02-26 LAB
BACTERIA URNS QL MICRO: ABNORMAL /HPF
BILIRUB UR QL STRIP.AUTO: NEGATIVE
CASTS #/AREA URNS LPF: ABNORMAL /LPF
CASTS 2: ABNORMAL /LPF
CHARACTER UR: ABNORMAL
COLOR, UA: YELLOW
CRYSTALS URNS MICRO: ABNORMAL
EPITHELIAL CELLS, UA: ABNORMAL /HPF
GLUCOSE BLD STRIP.AUTO-MCNC: 167 MG/DL (ref 70–108)
GLUCOSE BLD STRIP.AUTO-MCNC: 194 MG/DL (ref 70–108)
GLUCOSE BLD STRIP.AUTO-MCNC: 199 MG/DL (ref 70–108)
GLUCOSE BLD STRIP.AUTO-MCNC: 262 MG/DL (ref 70–108)
GLUCOSE UR QL STRIP.AUTO: 100 MG/DL
HGB UR QL STRIP.AUTO: ABNORMAL
KETONES UR QL STRIP.AUTO: ABNORMAL
MISCELLANEOUS 2: ABNORMAL
NITRITE UR QL STRIP: POSITIVE
PH UR STRIP.AUTO: 5.5 [PH] (ref 5–9)
PROT UR STRIP.AUTO-MCNC: 30 MG/DL
RBC URINE: ABNORMAL /HPF
RENAL EPI CELLS #/AREA URNS HPF: ABNORMAL /[HPF]
SP GR UR REFRACT.AUTO: 1.02 (ref 1–1.03)
UROBILINOGEN, URINE: 1 EU/DL (ref 0–1)
WBC #/AREA URNS HPF: ABNORMAL /HPF
WBC #/AREA URNS HPF: ABNORMAL /[HPF]
YEAST LIKE FUNGI URNS QL MICRO: ABNORMAL

## 2025-02-26 PROCEDURE — 81001 URINALYSIS AUTO W/SCOPE: CPT

## 2025-02-26 PROCEDURE — 2500000003 HC RX 250 WO HCPCS: Performed by: PHYSICIAN ASSISTANT

## 2025-02-26 PROCEDURE — 87186 SC STD MICRODIL/AGAR DIL: CPT

## 2025-02-26 PROCEDURE — 2500000003 HC RX 250 WO HCPCS: Performed by: STUDENT IN AN ORGANIZED HEALTH CARE EDUCATION/TRAINING PROGRAM

## 2025-02-26 PROCEDURE — 97116 GAIT TRAINING THERAPY: CPT

## 2025-02-26 PROCEDURE — 6370000000 HC RX 637 (ALT 250 FOR IP): Performed by: STUDENT IN AN ORGANIZED HEALTH CARE EDUCATION/TRAINING PROGRAM

## 2025-02-26 PROCEDURE — 6360000002 HC RX W HCPCS: Performed by: PHYSICIAN ASSISTANT

## 2025-02-26 PROCEDURE — 97166 OT EVAL MOD COMPLEX 45 MIN: CPT

## 2025-02-26 PROCEDURE — 1200000000 HC SEMI PRIVATE

## 2025-02-26 PROCEDURE — 99232 SBSQ HOSP IP/OBS MODERATE 35: CPT | Performed by: STUDENT IN AN ORGANIZED HEALTH CARE EDUCATION/TRAINING PROGRAM

## 2025-02-26 PROCEDURE — 6360000002 HC RX W HCPCS: Performed by: STUDENT IN AN ORGANIZED HEALTH CARE EDUCATION/TRAINING PROGRAM

## 2025-02-26 PROCEDURE — 87086 URINE CULTURE/COLONY COUNT: CPT

## 2025-02-26 PROCEDURE — 97530 THERAPEUTIC ACTIVITIES: CPT

## 2025-02-26 PROCEDURE — 87077 CULTURE AEROBIC IDENTIFY: CPT

## 2025-02-26 PROCEDURE — 82948 REAGENT STRIP/BLOOD GLUCOSE: CPT

## 2025-02-26 PROCEDURE — 6370000000 HC RX 637 (ALT 250 FOR IP): Performed by: PHYSICIAN ASSISTANT

## 2025-02-26 PROCEDURE — 97110 THERAPEUTIC EXERCISES: CPT

## 2025-02-26 RX ORDER — INSULIN LISPRO 100 [IU]/ML
0-16 INJECTION, SOLUTION INTRAVENOUS; SUBCUTANEOUS
Status: DISCONTINUED | OUTPATIENT
Start: 2025-02-26 | End: 2025-02-28 | Stop reason: HOSPADM

## 2025-02-26 RX ADMIN — SODIUM CHLORIDE, PRESERVATIVE FREE 10 ML: 5 INJECTION INTRAVENOUS at 22:50

## 2025-02-26 RX ADMIN — ESCITALOPRAM OXALATE 20 MG: 20 TABLET ORAL at 21:52

## 2025-02-26 RX ADMIN — PANTOPRAZOLE SODIUM 40 MG: 40 TABLET, DELAYED RELEASE ORAL at 08:26

## 2025-02-26 RX ADMIN — INSULIN LISPRO 8 UNITS: 100 INJECTION, SOLUTION INTRAVENOUS; SUBCUTANEOUS at 21:56

## 2025-02-26 RX ADMIN — INSULIN LISPRO 1 UNITS: 100 INJECTION, SOLUTION INTRAVENOUS; SUBCUTANEOUS at 08:31

## 2025-02-26 RX ADMIN — CYCLOBENZAPRINE 5 MG: 10 TABLET, FILM COATED ORAL at 16:59

## 2025-02-26 RX ADMIN — LEVOTHYROXINE SODIUM 50 MCG: 0.05 TABLET ORAL at 21:52

## 2025-02-26 RX ADMIN — ACETAMINOPHEN 650 MG: 325 TABLET ORAL at 17:00

## 2025-02-26 RX ADMIN — OXYCODONE HYDROCHLORIDE AND ACETAMINOPHEN 1 TABLET: 5; 325 TABLET ORAL at 08:28

## 2025-02-26 RX ADMIN — CYCLOBENZAPRINE 5 MG: 10 TABLET, FILM COATED ORAL at 08:26

## 2025-02-26 RX ADMIN — PANTOPRAZOLE SODIUM 40 MG: 40 TABLET, DELAYED RELEASE ORAL at 21:56

## 2025-02-26 RX ADMIN — WATER 1000 MG: 1 INJECTION INTRAMUSCULAR; INTRAVENOUS; SUBCUTANEOUS at 22:50

## 2025-02-26 RX ADMIN — LISINOPRIL 20 MG: 20 TABLET ORAL at 21:52

## 2025-02-26 RX ADMIN — ENOXAPARIN SODIUM 40 MG: 100 INJECTION SUBCUTANEOUS at 08:31

## 2025-02-26 RX ADMIN — ATENOLOL 25 MG: 25 TABLET ORAL at 21:52

## 2025-02-26 RX ADMIN — ATORVASTATIN CALCIUM 40 MG: 40 TABLET, FILM COATED ORAL at 21:51

## 2025-02-26 RX ADMIN — INSULIN LISPRO 1 UNITS: 100 INJECTION, SOLUTION INTRAVENOUS; SUBCUTANEOUS at 12:08

## 2025-02-26 RX ADMIN — LISINOPRIL 20 MG: 20 TABLET ORAL at 08:26

## 2025-02-26 ASSESSMENT — PAIN SCALES - GENERAL
PAINLEVEL_OUTOF10: 0
PAINLEVEL_OUTOF10: 2
PAINLEVEL_OUTOF10: 0
PAINLEVEL_OUTOF10: 6
PAINLEVEL_OUTOF10: 8
PAINLEVEL_OUTOF10: 0
PAINLEVEL_OUTOF10: 6
PAINLEVEL_OUTOF10: 3
PAINLEVEL_OUTOF10: 7
PAINLEVEL_OUTOF10: 6
PAINLEVEL_OUTOF10: 2
PAINLEVEL_OUTOF10: 2
PAINLEVEL_OUTOF10: 4
PAINLEVEL_OUTOF10: 0

## 2025-02-26 ASSESSMENT — PAIN DESCRIPTION - ORIENTATION
ORIENTATION: RIGHT;LEFT
ORIENTATION: MID;LOWER

## 2025-02-26 ASSESSMENT — PAIN DESCRIPTION - DESCRIPTORS: DESCRIPTORS: ACHING

## 2025-02-26 ASSESSMENT — PAIN - FUNCTIONAL ASSESSMENT: PAIN_FUNCTIONAL_ASSESSMENT: PREVENTS OR INTERFERES SOME ACTIVE ACTIVITIES AND ADLS

## 2025-02-26 ASSESSMENT — PAIN DESCRIPTION - LOCATION
LOCATION: BACK
LOCATION: LEG

## 2025-02-26 NOTE — PROGRESS NOTES
[facility]  INPATIENT PHYSICAL THERAPY  DAILY NOTE  STRZ 6A PEDI/MED SURG - 6A-18/018-A      Discharge Recommendations: Continue to assess pending progress, 24 hour assistance or supervision, and Patient would benefit from continued PT at discharge  Equipment Recommendations:    monitor for needs          Time In: 1114  Time Out: 1141  Timed Code Treatment Minutes: 27 Minutes  Minutes: 27          Date: 2025  Patient Name: Kim Amaya,  Gender:  female        MRN: [id number]  : 1950  (75 y.o.)     Referring Practitioner: Jackson Bravo PA-C  Diagnosis: Closed compression fracture of L3 lumbar vertebra, initial encounter ([facility])  Additional Pertinent Hx: Kim Amaya is a 75 y.o. female  who presents to [facility] with back pain.  Patient states she has had multiple over the course of the past week. Patient states the msot recent fall occurred about 3-4 days ago when she was in the bathroom and her legs gave out hitting her buttocks.   Patient was seen earlier in the week in the ED where she was diagnosed with her L3 compression fracture however since then her pain has continued to increase and functional capility has become increasingly more limited due to her pain. Patient states she feels sharp shooting pains up and down her leg when she moves around and cannot ambulate on it or just barely so due to her pain.     Prior Level of Function:  Lives With: Spouse  Type of Home: House  Home Layout: One level  Home Access: Stairs to enter with rails  Entrance Stairs - Number of Steps: 2 steps with 1 rail  Home Equipment: Walker - Rolling   Bathroom Shower/Tub: Walk-in shower, Tub/Shower unit  Bathroom Toilet: Handicap height  Bathroom Equipment: Grab bars in shower, Grab bars around toilet  Bathroom Accessibility: Accessible    Receives Help From: Family  Prior Level of Assist for ADLs: Independent  Prior Level of Assist for Homemaking: Independent  Prior Level of  Assist for Transfers: Independent  Active : No  Additional Comments: patient used no AD prior to admit. states she wants to get a purewick external catheter for home for night time use. patient's spouse is able to assist as needed.  Prior Level of Assist for Ambulation: Independent household ambulator, with or without device  Has the patient had two or more falls in the past year or any fall with injury in the past year?: Yes    Restrictions/Precautions:  Restrictions/Precautions: Fall Risk, General Precautions  Required Braces or Orthoses  Spinal: Lumbar Corset  Position Activity Restriction  Spinal Precautions: No Bending, No Lifting, No Twisting     SUBJECTIVE: Pt sitting up in recliner and agrees to therapy.     PAIN: 2-3/10: at rest and increased to 5-6/10 with mobility    Vitals: Vitals not assessed per clinical judgement, see nursing flowsheet    OBJECTIVE:  Bed Mobility:  Not Tested    Transfers:  Sit to Stand: Minimal Assistance, cues for hand placement, with verbal cues, for breathing technique to help with pain control  Stand to Sit:Minimal Assistance, poor eccentric control    Ambulation:  Contact Guard Assistance  Distance: 4 ft fwd/back and 1-2 step fwd/back  Surface: Level Tile  Device: Rolling Walker  Gait Deviations: Decreased Step Length Bilaterally, Decreased Gait Speed, Decreased Foot Clearance Right, Decreased Foot Clearance Left, and Unsteady Gait     Stairs:  Not Tested    Balance:  Static Sitting Balance:  Stand By Assistance  Dynamic Sitting Balance: Stand By Assistance  Static Standing Balance: Contact Guard Assistance, with RW  Dynamic Standing Balance: Contact Guard Assistance, Minimal Assistance, with RW    Exercise:  Patient was guided in 1 set(s) 10 reps of exercises to both lower extremities: Ankle pumps, Glut sets, Quad sets, Hip abduction/adduction, Seated marches, and Long arc quads.  Exercises were completed for increased independence with functional mobility.    Functional

## 2025-02-26 NOTE — PLAN OF CARE
Problem: Chronic Conditions and Co-morbidities  Goal: Patient's chronic conditions and co-morbidity symptoms are monitored and maintained or improved  Outcome: Progressing  Flowsheets (Taken 2/26/2025 1336)  Care Plan - Patient's Chronic Conditions and Co-Morbidity Symptoms are Monitored and Maintained or Improved:   Monitor and assess patient's chronic conditions and comorbid symptoms for stability, deterioration, or improvement   Collaborate with multidisciplinary team to address chronic and comorbid conditions and prevent exacerbation or deterioration   Update acute care plan with appropriate goals if chronic or comorbid symptoms are exacerbated and prevent overall improvement and discharge     Problem: Discharge Planning  Goal: Discharge to home or other facility with appropriate resources  Outcome: Progressing  Flowsheets (Taken 2/26/2025 1336)  Discharge to home or other facility with appropriate resources:   Identify barriers to discharge with patient and caregiver   Identify discharge learning needs (meds, wound care, etc)   Arrange for needed discharge resources and transportation as appropriate   Refer to discharge planning if patient needs post-hospital services based on physician order or complex needs related to functional status, cognitive ability or social support system     Problem: Pain  Goal: Verbalizes/displays adequate comfort level or baseline comfort level  Outcome: Progressing  Flowsheets (Taken 2/26/2025 1336)  Verbalizes/displays adequate comfort level or baseline comfort level:   Encourage patient to monitor pain and request assistance   Assess pain using appropriate pain scale   Administer analgesics based on type and severity of pain and evaluate response   Implement non-pharmacological measures as appropriate and evaluate response   Notify Licensed Independent Practitioner if interventions unsuccessful or patient reports new pain     Problem: Skin/Tissue Integrity  Goal: Skin integrity  remains intact  Description: 1.  Monitor for areas of redness and/or skin breakdown  2.  Assess vascular access sites hourly  3.  Every 4-6 hours minimum:  Change oxygen saturation probe site  4.  Every 4-6 hours:  If on nasal continuous positive airway pressure, respiratory therapy assess nares and determine need for appliance change or resting period  Outcome: Progressing  Flowsheets (Taken 2/26/2025 1336)  Skin Integrity Remains Intact: Monitor for areas of redness and/or skin breakdown     Problem: Safety - Adult  Goal: Free from fall injury  Outcome: Progressing  Flowsheets (Taken 2/26/2025 1336)  Free From Fall Injury: Instruct family/caregiver on patient safety     Problem: Skin/Tissue Integrity - Adult  Goal: Skin integrity remains intact  Description: 1.  Monitor for areas of redness and/or skin breakdown  2.  Assess vascular access sites hourly  3.  Every 4-6 hours minimum:  Change oxygen saturation probe site  4.  Every 4-6 hours:  If on nasal continuous positive airway pressure, respiratory therapy assess nares and determine need for appliance change or resting period  Outcome: Progressing  Flowsheets (Taken 2/26/2025 1336)  Skin Integrity Remains Intact: Monitor for areas of redness and/or skin breakdown     Problem: Genitourinary - Adult  Goal: Absence of urinary retention  Outcome: Progressing   Care plan reviewed with patient.  Patient verbalizes understanding of the care plan and contributed to goal setting.

## 2025-02-26 NOTE — PLAN OF CARE
(Taken 2/25/2025 2023)  Absence of urinary retention:   Place urinary catheter per Licensed Independent Practitioner order if needed   Monitor intake/output and perform bladder scan as needed   Assess patient’s ability to void and empty bladder     Care plan reviewed with patient. Patient verbalizes understanding of plan of care and contributes to goal setting.

## 2025-02-26 NOTE — PROGRESS NOTES
Hospitalist Progress Note      Patient:  Kim Amaya    Unit/Bed:6A-18/018-A  YOB: 1950  MRN: [id number]   Acct: [id number]   PCP: Victoria Arnold APRN - CNP  Date of Admission: 2/23/2025      ASSESSMENT AND PLAN    Back pain 2/2 L3 compression fracture   MRI of the lumbar spine: Acute fractures of the inferior endplate of L3. There is some associated fluid in the facet joints at the L3-4 level.   Dr. Barrientos consulted from ED      Type II diabetes  Holding home meds  Low sliding scale insulin   Hypoglycemia protocol      Chronic CHFmrEF  EF on echo 40% back in 2022   Continue atenolol and lisinopril      CAD  Statin, atenolol      Hypothyroidism   Synthroid      HLD  Statin      Anxiety and depression: Will continue Lexapro     DVT Prophylaxis: [x] Lovenox / [] Heparin / [] SCDs / [] Already on Systemic Anticoagulation / [] None     Expected discharge date:  tbd  Disposition: tbd     Code status: Full Code     ===================================================================    Chief Complaint: back pain     Subjective (past 24 hours):     - NAEO.  Today he is able to move out of the bed with appropriate pain control.  Plan is to continue working with physical therapy and Occupational Therapy.  - Patient is afebrile, HDS.      Medications:    Infusion Medications    sodium chloride      dextrose      Scheduled Medications    senna  1 tablet Oral Nightly    sodium chloride flush  10 mL IntraVENous 2 times per day    enoxaparin  40 mg SubCUTAneous Daily    [Held by provider] aspirin  81 mg Oral Nightly    atenolol  25 mg Oral Daily    atorvastatin  40 mg Oral Daily    escitalopram  20 mg Oral Daily    levothyroxine  50 mcg Oral Daily    lisinopril  20 mg Oral BID    pantoprazole  40 mg Oral BID    insulin lispro  0-4 Units SubCUTAneous 4x Daily AC & HS    PRN Meds: oxyCODONE-acetaminophen, HYDROmorphone, sodium chloride

## 2025-02-26 NOTE — PROGRESS NOTES
[facility]  INPATIENT OCCUPATIONAL THERAPY  STRZ 6A PEDI/MED SURG  EVALUATION      Discharge Recommendations: Patient would benefit from continued therapy after discharge  Equipment Recommendations: Yes        Time In: 1030  Time Out: 1055  Timed Code Treatment Minutes: 16 Minutes  Minutes: 25          Date: 2025  Patient Name: Kim Amaya,   Gender: female      MRN: [id number]  : 1950  (75 y.o.)  Referring Practitioner: Jackson Bravo PA-C  Diagnosis: Closed compression fracture of L3 lumbar vertebra, initial encounter ([facility])  Additional Pertinent Hx: per chart review; \"Kim Amaya is a 75 y.o. female who presents to the emergency department from home, by private vehicle for evaluation of low back pain.     Patient has had multiple falls over the past week.  Patient states that last fall was 3 days prior in the bathroom mechanical her leg gave out.  She did not hit her head fell onto her buttocks was able to get off the ground after a few minutes with assistance.  Patient states that since she was last seen in the ED and diagnosed with L3 compression fracture she has had worsening of the pain down her right leg and has had difficulty ambulating secondary to pain.  She denies any numbness or tingling.  Patient denies any fecal or urinary incontinence or retention.  Patient denies any previous back surgeries.  States that she is unclear why she has cirrhosis but follows with Dr. Rodrigues who she missed an appointment on Friday because of pain.  \"    Restrictions/Precautions:  Restrictions/Precautions: Fall Risk, General Precautions  Required Braces or Orthoses  Spinal: Lumbar Corset  Position Activity Restriction  Spinal Precautions: No Bending, No Lifting, No Twisting    Subjective  Chart Reviewed: Yes, Orders, Progress Notes, History and Physical, Imaging  Patient assessed for rehabilitation services?: Yes  Family / Caregiver Present: Yes (spouse)    Subjective: RN approved  SBA , use of AE PRN and 0-1 cues for precautions.    AM-PAC Inpatient Daily Activity Raw Score: 16  AM-PAC Inpatient ADL T-Scale Score : 35.96    Following session, patient left in safe position with all fall risk precautions in place.

## 2025-02-27 LAB
ANION GAP SERPL CALC-SCNC: 10 MEQ/L (ref 8–16)
ANISOCYTOSIS BLD QL SMEAR: PRESENT
BASOPHILS ABSOLUTE: 0 THOU/MM3 (ref 0–0.1)
BASOPHILS NFR BLD AUTO: 0.5 %
BUN SERPL-MCNC: 9 MG/DL (ref 8–23)
CALCIUM SERPL-MCNC: 8.4 MG/DL (ref 8.2–9.6)
CHLORIDE SERPL-SCNC: 103 MEQ/L (ref 98–111)
CO2 SERPL-SCNC: 23 MEQ/L (ref 22–29)
CREAT SERPL-MCNC: 0.6 MG/DL (ref 0.5–0.9)
DEPRECATED RDW RBC AUTO: 71.7 FL (ref 35–45)
EOSINOPHIL NFR BLD AUTO: 6.7 %
EOSINOPHILS ABSOLUTE: 0.4 THOU/MM3 (ref 0–0.4)
ERYTHROCYTE [DISTWIDTH] IN BLOOD BY AUTOMATED COUNT: 22.1 % (ref 11.5–14.5)
GFR SERPL CREATININE-BSD FRML MDRD: > 90 ML/MIN/1.73M2
GLUCOSE BLD STRIP.AUTO-MCNC: 161 MG/DL (ref 70–108)
GLUCOSE BLD STRIP.AUTO-MCNC: 219 MG/DL (ref 70–108)
GLUCOSE BLD STRIP.AUTO-MCNC: 223 MG/DL (ref 70–108)
GLUCOSE BLD STRIP.AUTO-MCNC: 278 MG/DL (ref 70–108)
GLUCOSE SERPL-MCNC: 181 MG/DL (ref 74–109)
HCT VFR BLD AUTO: 37.2 % (ref 37–47)
HGB BLD-MCNC: 11.7 GM/DL (ref 12–16)
IMM GRANULOCYTES # BLD AUTO: 0.03 THOU/MM3 (ref 0–0.07)
IMM GRANULOCYTES NFR BLD AUTO: 0.5 %
LYMPHOCYTES ABSOLUTE: 1.4 THOU/MM3 (ref 1–4.8)
LYMPHOCYTES NFR BLD AUTO: 22 %
MAGNESIUM SERPL-MCNC: 1.5 MG/DL (ref 1.6–2.6)
MCH RBC QN AUTO: 28.3 PG (ref 26–33)
MCHC RBC AUTO-ENTMCNC: 31.5 GM/DL (ref 32.2–35.5)
MCV RBC AUTO: 90.1 FL (ref 81–99)
MONOCYTES ABSOLUTE: 0.9 THOU/MM3 (ref 0.4–1.3)
MONOCYTES NFR BLD AUTO: 14.7 %
NEUTROPHILS ABSOLUTE: 3.5 THOU/MM3 (ref 1.8–7.7)
NEUTROPHILS NFR BLD AUTO: 55.6 %
NRBC BLD AUTO-RTO: 0 /100 WBC
PLATELET # BLD AUTO: 107 THOU/MM3 (ref 130–400)
PMV BLD AUTO: 8.9 FL (ref 9.4–12.4)
POTASSIUM SERPL-SCNC: 4.1 MEQ/L (ref 3.5–5.2)
RBC # BLD AUTO: 4.13 MILL/MM3 (ref 4.2–5.4)
SODIUM SERPL-SCNC: 136 MEQ/L (ref 135–145)
WBC # BLD AUTO: 6.3 THOU/MM3 (ref 4.8–10.8)

## 2025-02-27 PROCEDURE — 97116 GAIT TRAINING THERAPY: CPT

## 2025-02-27 PROCEDURE — 97535 SELF CARE MNGMENT TRAINING: CPT

## 2025-02-27 PROCEDURE — 6370000000 HC RX 637 (ALT 250 FOR IP): Performed by: STUDENT IN AN ORGANIZED HEALTH CARE EDUCATION/TRAINING PROGRAM

## 2025-02-27 PROCEDURE — 6370000000 HC RX 637 (ALT 250 FOR IP): Performed by: PHYSICIAN ASSISTANT

## 2025-02-27 PROCEDURE — 97110 THERAPEUTIC EXERCISES: CPT

## 2025-02-27 PROCEDURE — 36415 COLL VENOUS BLD VENIPUNCTURE: CPT

## 2025-02-27 PROCEDURE — 85025 COMPLETE CBC W/AUTO DIFF WBC: CPT

## 2025-02-27 PROCEDURE — 2500000003 HC RX 250 WO HCPCS: Performed by: STUDENT IN AN ORGANIZED HEALTH CARE EDUCATION/TRAINING PROGRAM

## 2025-02-27 PROCEDURE — 80048 BASIC METABOLIC PNL TOTAL CA: CPT

## 2025-02-27 PROCEDURE — 82948 REAGENT STRIP/BLOOD GLUCOSE: CPT

## 2025-02-27 PROCEDURE — 6360000002 HC RX W HCPCS: Performed by: PHYSICIAN ASSISTANT

## 2025-02-27 PROCEDURE — 99232 SBSQ HOSP IP/OBS MODERATE 35: CPT | Performed by: STUDENT IN AN ORGANIZED HEALTH CARE EDUCATION/TRAINING PROGRAM

## 2025-02-27 PROCEDURE — 1200000000 HC SEMI PRIVATE

## 2025-02-27 PROCEDURE — 6360000002 HC RX W HCPCS: Performed by: STUDENT IN AN ORGANIZED HEALTH CARE EDUCATION/TRAINING PROGRAM

## 2025-02-27 PROCEDURE — 2500000003 HC RX 250 WO HCPCS: Performed by: PHYSICIAN ASSISTANT

## 2025-02-27 PROCEDURE — 83735 ASSAY OF MAGNESIUM: CPT

## 2025-02-27 PROCEDURE — 97530 THERAPEUTIC ACTIVITIES: CPT

## 2025-02-27 RX ORDER — ACETAMINOPHEN 500 MG
1000 TABLET ORAL EVERY 12 HOURS
Status: DISCONTINUED | OUTPATIENT
Start: 2025-02-27 | End: 2025-02-28 | Stop reason: HOSPADM

## 2025-02-27 RX ADMIN — LEVOTHYROXINE SODIUM 50 MCG: 0.05 TABLET ORAL at 20:59

## 2025-02-27 RX ADMIN — ATORVASTATIN CALCIUM 40 MG: 40 TABLET, FILM COATED ORAL at 20:59

## 2025-02-27 RX ADMIN — PANTOPRAZOLE SODIUM 40 MG: 40 TABLET, DELAYED RELEASE ORAL at 20:59

## 2025-02-27 RX ADMIN — LISINOPRIL 20 MG: 20 TABLET ORAL at 20:59

## 2025-02-27 RX ADMIN — ESCITALOPRAM OXALATE 20 MG: 20 TABLET ORAL at 20:59

## 2025-02-27 RX ADMIN — OXYCODONE HYDROCHLORIDE AND ACETAMINOPHEN 1 TABLET: 5; 325 TABLET ORAL at 08:28

## 2025-02-27 RX ADMIN — CYCLOBENZAPRINE 5 MG: 10 TABLET, FILM COATED ORAL at 08:28

## 2025-02-27 RX ADMIN — INSULIN LISPRO 4 UNITS: 100 INJECTION, SOLUTION INTRAVENOUS; SUBCUTANEOUS at 11:46

## 2025-02-27 RX ADMIN — ATENOLOL 25 MG: 25 TABLET ORAL at 20:59

## 2025-02-27 RX ADMIN — SODIUM CHLORIDE, PRESERVATIVE FREE 10 ML: 5 INJECTION INTRAVENOUS at 20:59

## 2025-02-27 RX ADMIN — OXYCODONE HYDROCHLORIDE AND ACETAMINOPHEN 1 TABLET: 5; 325 TABLET ORAL at 15:07

## 2025-02-27 RX ADMIN — CYCLOBENZAPRINE 5 MG: 10 TABLET, FILM COATED ORAL at 15:07

## 2025-02-27 RX ADMIN — ENOXAPARIN SODIUM 40 MG: 100 INJECTION SUBCUTANEOUS at 08:28

## 2025-02-27 RX ADMIN — WATER 1000 MG: 1 INJECTION INTRAMUSCULAR; INTRAVENOUS; SUBCUTANEOUS at 20:59

## 2025-02-27 RX ADMIN — LISINOPRIL 20 MG: 20 TABLET ORAL at 08:39

## 2025-02-27 RX ADMIN — PANTOPRAZOLE SODIUM 40 MG: 40 TABLET, DELAYED RELEASE ORAL at 08:28

## 2025-02-27 RX ADMIN — INSULIN LISPRO 4 UNITS: 100 INJECTION, SOLUTION INTRAVENOUS; SUBCUTANEOUS at 16:29

## 2025-02-27 RX ADMIN — ACETAMINOPHEN 650 MG: 325 TABLET ORAL at 03:47

## 2025-02-27 RX ADMIN — INSULIN LISPRO 8 UNITS: 100 INJECTION, SOLUTION INTRAVENOUS; SUBCUTANEOUS at 20:59

## 2025-02-27 ASSESSMENT — PAIN SCALES - GENERAL
PAINLEVEL_OUTOF10: 3
PAINLEVEL_OUTOF10: 5
PAINLEVEL_OUTOF10: 5
PAINLEVEL_OUTOF10: 0

## 2025-02-27 NOTE — PLAN OF CARE
Problem: Chronic Conditions and Co-morbidities  Goal: Patient's chronic conditions and co-morbidity symptoms are monitored and maintained or improved  2/27/2025 0141 by Geneva Calderón, RN  Outcome: Progressing  Flowsheets (Taken 2/26/2025 2148)  Care Plan - Patient's Chronic Conditions and Co-Morbidity Symptoms are Monitored and Maintained or Improved:   Monitor and assess patient's chronic conditions and comorbid symptoms for stability, deterioration, or improvement   Collaborate with multidisciplinary team to address chronic and comorbid conditions and prevent exacerbation or deterioration   Update acute care plan with appropriate goals if chronic or comorbid symptoms are exacerbated and prevent overall improvement and discharge     Problem: Discharge Planning  Goal: Discharge to home or other facility with appropriate resources  2/27/2025 0141 by Geneva Calderón, RN  Outcome: Progressing  Flowsheets (Taken 2/26/2025 2148)  Discharge to home or other facility with appropriate resources:   Identify barriers to discharge with patient and caregiver   Arrange for needed discharge resources and transportation as appropriate   Identify discharge learning needs (meds, wound care, etc)     Problem: Pain  Goal: Verbalizes/displays adequate comfort level or baseline comfort level  2/27/2025 0141 by Geneva Calderón, RN  Outcome: Progressing  Flowsheets (Taken 2/26/2025 1336 by Aileen Zhou RN)  Verbalizes/displays adequate comfort level or baseline comfort level:   Encourage patient to monitor pain and request assistance   Assess pain using appropriate pain scale   Administer analgesics based on type and severity of pain and evaluate response   Implement non-pharmacological measures as appropriate and evaluate response   Notify Licensed Independent Practitioner if interventions unsuccessful or patient reports new pain     Problem: Skin/Tissue Integrity  Goal: Skin integrity remains intact  Description: 1.   hourly   Monitor for areas of redness and/or skin breakdown  Taken 2/26/2025 2148  Skin Integrity Remains Intact:   Monitor for areas of redness and/or skin breakdown   Assess vascular access sites hourly     Problem: Genitourinary - Adult  Goal: Absence of urinary retention  2/27/2025 0141 by Geneva Calderón RN  Outcome: Progressing  Flowsheets (Taken 2/26/2025 2148)  Absence of urinary retention:   Assess patient’s ability to void and empty bladder   Monitor intake/output and perform bladder scan as needed       Care plan reviewed with patient. Patient verbalizes understanding of plan of care and contributes to goal setting.

## 2025-02-27 NOTE — PROGRESS NOTES
Hospitalist Progress Note      Patient:  Kim Amaya    Unit/Bed:6A-18/018-A  YOB: 1950  MRN: [id number]   Acct: [id number]   PCP: Victoria Arnold APRN - CNP  Date of Admission: 2/23/2025    ASSESSMENT AND PLAN  Traumatic L3 compression fracture -MRI of the lumbar spine: Acute fractures of the inferior endplate of L3. There is some associated fluid in the facet joints at the L3-4 level. Dr. Barrientos consulted from ED. Per ortho not a good candidate for aumentation. Will proceed with LSO brace and restrictions of no lifting over 10 lbs, no bending/twisting. Ok to discharge home with OP f/u once ambulating well. Please schedule follow up with Dr. Barrientos in 3-4 weeks. Ortho signed off. For now continue to work with PT/OT and monitor progress. Pain control.     Acute cystitis-urinalysis positive for bacteria and nitrites. Started rocephin for 3 days, 2/26-2/28.     Type II diabetes  Holding home meds. Dc low ISS and start high ISS. Hypoglycemia protocol      Chronic CHFmrEF  EF on echo 40% back in 2022   Continue atenolol and lisinopril      CAD  Statin, atenolol      Hypothyroidism   Synthroid      HLD  Statin      Anxiety and depression: Will continue Lexapro     DVT Prophylaxis: [x] Lovenox / [] Heparin / [] SCDs / [] Already on Systemic Anticoagulation / [] None     Expected discharge date:  tbd  Disposition: tbd     Code status: Full Code     ===================================================================    Chief Complaint: back pain     Subjective (past 24 hours): NAEO.  Today she is able to move out of the bed with appropriate pain control.      Medications:    Infusion Medications    sodium chloride      dextrose      Scheduled Medications    senna  1 tablet Oral Nightly    sodium chloride flush  10 mL IntraVENous 2 times per day    enoxaparin  40 mg SubCUTAneous Daily    [Held by provider] aspirin  81 mg Oral Nightly  There is mild spinal canal stenosis at the disc level. There is mild bilateral foraminal stenosis. **This report has been created using voice recognition software. It may contain minor errors which are inherent in voice recognition technology.** Electronically signed by Dr. Liza Dyer    CT LUMBAR SPINE WO CONTRAST    Result Date: 2/23/2025  PROCEDURE: CT LUMBAR SPINE WO CONTRAST CLINICAL INFORMATION: Fall, history of L3 compression fracture TECHNIQUE: CT of the lumbar spine was performed without the use of intravenous contrast. Axial images as well as coronal and sagittal reconstructions were obtained. All CT scans at this facility use dose modulation, iterative reconstruction, and/or weight-based dosing when appropriate to reduce radiation dose to as low as reasonably achievable. COMPARISON: CT lumbar spine reconstruction 2/20/2025 FINDINGS: There is an inferior endplate deformity of the L3 vertebra resulting in a stable grade 1 compression fracture. Slight retropulsion of fracture fragments is stable. Multilevel disc space narrowing is most severe at L5-S1 where there is vacuum phenomenon. Lumbar vertebral body alignment is preserved. There is multilevel posterior facet arthropathy and stable multilevel disc bulges.     1. Stable compression fracture of the L3 vertebra. 2. Multilevel degenerative disc disease and posterior facet arthropathy. **This report has been created using voice recognition software. It may contain minor errors which are inherent in voice recognition technology.** Electronically signed by Dr. Olivier Lopez      Electronically signed by Mikayla Qiu MD on 2/26/2025 at 8:26 PM

## 2025-02-27 NOTE — CARE COORDINATION
2/27/25, 9:16 AM EST    DISCHARGE ON GOING EVALUATION    [facility] day: 4  Location: 6A-18/018-A Reason for admit: Closed compression fracture of L3 lumbar vertebra, initial encounter ([facility]) [S32.030A]  Closed compression fracture of L3 vertebra, sequela [S32.030S]     Procedures: none    Imaging since last note:   2/24 MRI Lumbar Spine WO Contrast Acute fractures of the inferior endplate of L3. There is some associated fluid   in the facet joints at the L3-4 level. There is mild spinal canal stenosis at   the disc level. There is mild bilateral foraminal stenosis.     Barriers to Discharge: PT/OT, pain and nausea control, urine (+) gram (-) bacilli, diabetes management, LS Corset, IV Rocephin, Lovenox,     PCP: Victoria Arnold APRN - CNP  Readmission Risk Score: 12.9    Patient Goals/Plan/Treatment Preferences: From home with spouse. Will follow for potential needs/services.

## 2025-02-27 NOTE — PROGRESS NOTES
[facility]  STRZ 6A PEDI/MED SURG  Occupational Therapy  Daily Note    Discharge Recommendations: Continue to assess pending progress, Inpatient Rehabilitation, 24 hour assistance or supervision, and Patient would benefit from continued OT at discharge  Equipment Recommendations: Yes        Time In: 0730  Time Out: 0754  Timed Code Treatment Minutes: 24 Minutes  Minutes: 24          Date: 2025  Patient Name: Kim Amaya,   Gender: female      Room: [facility]/018-A  MRN: [id number]  : 1950  (75 y.o.)  Referring Practitioner: Jackson Bravo PA-C  Diagnosis: Closed compression fracture of L3 lumbar vertebra, initial encounter ([facility])  Additional Pertinent Hx: per chart review; \"Kim Amaya is a 75 y.o. female who presents to the emergency department from home, by private vehicle for evaluation of low back pain.     Patient has had multiple falls over the past week.  Patient states that last fall was 3 days prior in the bathroom mechanical her leg gave out.  She did not hit her head fell onto her buttocks was able to get off the ground after a few minutes with assistance.  Patient states that since she was last seen in the ED and diagnosed with L3 compression fracture she has had worsening of the pain down her right leg and has had difficulty ambulating secondary to pain.  She denies any numbness or tingling.  Patient denies any fecal or urinary incontinence or retention.  Patient denies any previous back surgeries.  States that she is unclear why she has cirrhosis but follows with Dr. Rodrigues who she missed an appointment on Friday because of pain.  \"    Restrictions/Precautions:  Restrictions/Precautions: Fall Risk, General Precautions  Required Braces or Orthoses  Spinal: Lumbar Corset  Position Activity Restriction  Spinal Precautions: No Bending, No Lifting, No Twisting     Social/Functional History:  Lives With: Spouse  Type of Home: House  Home Layout: One level  Home Access:

## 2025-02-27 NOTE — PLAN OF CARE
Problem: Discharge Planning  Goal: Discharge to home or other facility with appropriate resources  2/27/2025 1459 by Elizabet Delgado RN  Outcome: Progressing  Flowsheets (Taken 2/27/2025 1459)  Discharge to home or other facility with appropriate resources: Identify barriers to discharge with patient and caregiver     Problem: Pain  Goal: Verbalizes/displays adequate comfort level or baseline comfort level  2/27/2025 1459 by Elizabet Delgado RN  Outcome: Progressing  Flowsheets  Taken 2/27/2025 1459 by Elizabet Delgado RN  Verbalizes/displays adequate comfort level or baseline comfort level:   Encourage patient to monitor pain and request assistance   Administer analgesics based on type and severity of pain and evaluate response   Assess pain using appropriate pain scale   Implement non-pharmacological measures as appropriate and evaluate response  Taken 2/27/2025 0343 by Geneva Calderón RN  Verbalizes/displays adequate comfort level or baseline comfort level:   Encourage patient to monitor pain and request assistance   Assess pain using appropriate pain scale   Administer analgesics based on type and severity of pain and evaluate response   Implement non-pharmacological measures as appropriate and evaluate response   Consider cultural and social influences on pain and pain management

## 2025-02-28 ENCOUNTER — HOSPITAL ENCOUNTER (INPATIENT)
Age: 75
LOS: 12 days | Discharge: HOME | DRG: 560 | End: 2025-03-12
Attending: PHYSICAL MEDICINE & REHABILITATION | Admitting: PHYSICAL MEDICINE & REHABILITATION
Payer: MEDICARE

## 2025-02-28 VITALS
SYSTOLIC BLOOD PRESSURE: 140 MMHG | OXYGEN SATURATION: 97 % | DIASTOLIC BLOOD PRESSURE: 66 MMHG | HEIGHT: 60 IN | WEIGHT: 171 LBS | HEART RATE: 66 BPM | TEMPERATURE: 98.2 F | BODY MASS INDEX: 33.57 KG/M2 | RESPIRATION RATE: 16 BRPM

## 2025-02-28 DIAGNOSIS — F32.A ANXIETY AND DEPRESSION: ICD-10-CM

## 2025-02-28 DIAGNOSIS — M48.56XA PATHOLOGICAL COMPRESSION FRACTURE OF LUMBAR VERTEBRA, INITIAL ENCOUNTER (HCC): Primary | ICD-10-CM

## 2025-02-28 DIAGNOSIS — E66.09 CLASS 1 OBESITY DUE TO EXCESS CALORIES WITH SERIOUS COMORBIDITY AND BODY MASS INDEX (BMI) OF 34.0 TO 34.9 IN ADULT: ICD-10-CM

## 2025-02-28 DIAGNOSIS — D50.9 IRON DEFICIENCY ANEMIA, UNSPECIFIED IRON DEFICIENCY ANEMIA TYPE: ICD-10-CM

## 2025-02-28 DIAGNOSIS — F32.A DEPRESSION, UNSPECIFIED DEPRESSION TYPE: ICD-10-CM

## 2025-02-28 DIAGNOSIS — E11.8 TYPE 2 DIABETES MELLITUS WITH COMPLICATION, WITHOUT LONG-TERM CURRENT USE OF INSULIN (HCC): ICD-10-CM

## 2025-02-28 DIAGNOSIS — E66.811 CLASS 1 OBESITY DUE TO EXCESS CALORIES WITH SERIOUS COMORBIDITY AND BODY MASS INDEX (BMI) OF 34.0 TO 34.9 IN ADULT: ICD-10-CM

## 2025-02-28 DIAGNOSIS — E78.5 HYPERLIPIDEMIA, UNSPECIFIED HYPERLIPIDEMIA TYPE: ICD-10-CM

## 2025-02-28 DIAGNOSIS — M81.0 SENILE OSTEOPOROSIS: ICD-10-CM

## 2025-02-28 DIAGNOSIS — I50.22 CHRONIC SYSTOLIC (CONGESTIVE) HEART FAILURE (HCC): ICD-10-CM

## 2025-02-28 DIAGNOSIS — I10 ESSENTIAL HYPERTENSION: ICD-10-CM

## 2025-02-28 DIAGNOSIS — F41.9 ANXIETY AND DEPRESSION: ICD-10-CM

## 2025-02-28 DIAGNOSIS — E11.65 TYPE 2 DIABETES MELLITUS WITH HYPERGLYCEMIA, WITHOUT LONG-TERM CURRENT USE OF INSULIN (HCC): ICD-10-CM

## 2025-02-28 DIAGNOSIS — S32.030A CLOSED COMPRESSION FRACTURE OF L3 LUMBAR VERTEBRA, INITIAL ENCOUNTER (HCC): ICD-10-CM

## 2025-02-28 DIAGNOSIS — I50.22 CHRONIC HEART FAILURE WITH MILDLY REDUCED EJECTION FRACTION (HFMREF, 41-49%) (HCC): ICD-10-CM

## 2025-02-28 DIAGNOSIS — E03.9 HYPOTHYROIDISM (ACQUIRED): ICD-10-CM

## 2025-02-28 DIAGNOSIS — E03.9 HYPOTHYROIDISM, UNSPECIFIED TYPE: ICD-10-CM

## 2025-02-28 DIAGNOSIS — D50.9 MICROCYTIC ANEMIA: ICD-10-CM

## 2025-02-28 DIAGNOSIS — E11.21 DIABETIC NEPHROPATHY ASSOCIATED WITH TYPE 2 DIABETES MELLITUS (HCC): ICD-10-CM

## 2025-02-28 DIAGNOSIS — K21.9 GASTROESOPHAGEAL REFLUX DISEASE, UNSPECIFIED WHETHER ESOPHAGITIS PRESENT: ICD-10-CM

## 2025-02-28 DIAGNOSIS — I25.10 CORONARY ARTERY DISEASE INVOLVING NATIVE CORONARY ARTERY OF NATIVE HEART WITHOUT ANGINA PECTORIS: ICD-10-CM

## 2025-02-28 DIAGNOSIS — G44.209 TENSION HEADACHE: ICD-10-CM

## 2025-02-28 LAB
ANION GAP SERPL CALC-SCNC: 8 MEQ/L (ref 8–16)
ANISOCYTOSIS BLD QL SMEAR: PRESENT
BACTERIA UR CULT: ABNORMAL
BASOPHILS ABSOLUTE: 0 THOU/MM3 (ref 0–0.1)
BASOPHILS NFR BLD AUTO: 0.9 %
BUN SERPL-MCNC: 13 MG/DL (ref 8–23)
CALCIUM SERPL-MCNC: 8.2 MG/DL (ref 8.8–10.2)
CHLORIDE SERPL-SCNC: 103 MEQ/L (ref 98–111)
CO2 SERPL-SCNC: 23 MEQ/L (ref 22–29)
CREAT SERPL-MCNC: 0.6 MG/DL (ref 0.5–0.9)
DACROCYTES: ABNORMAL
DEPRECATED RDW RBC AUTO: 71 FL (ref 35–45)
EOSINOPHIL NFR BLD AUTO: 7.2 %
EOSINOPHILS ABSOLUTE: 0.4 THOU/MM3 (ref 0–0.4)
ERYTHROCYTE [DISTWIDTH] IN BLOOD BY AUTOMATED COUNT: 21.6 % (ref 11.5–14.5)
GFR SERPL CREATININE-BSD FRML MDRD: > 90 ML/MIN/1.73M2
GLUCOSE BLD STRIP.AUTO-MCNC: 194 MG/DL (ref 70–108)
GLUCOSE BLD STRIP.AUTO-MCNC: 194 MG/DL (ref 70–108)
GLUCOSE BLD STRIP.AUTO-MCNC: 251 MG/DL (ref 70–108)
GLUCOSE BLD STRIP.AUTO-MCNC: 270 MG/DL (ref 70–108)
GLUCOSE BLD STRIP.AUTO-MCNC: 276 MG/DL (ref 70–108)
GLUCOSE SERPL-MCNC: 213 MG/DL (ref 74–109)
HCT VFR BLD AUTO: 34.9 % (ref 37–47)
HGB BLD-MCNC: 10.9 GM/DL (ref 12–16)
IMM GRANULOCYTES # BLD AUTO: 0.03 THOU/MM3 (ref 0–0.07)
IMM GRANULOCYTES NFR BLD AUTO: 0.6 %
LYMPHOCYTES ABSOLUTE: 1.3 THOU/MM3 (ref 1–4.8)
LYMPHOCYTES NFR BLD AUTO: 24.4 %
MCH RBC QN AUTO: 28.5 PG (ref 26–33)
MCHC RBC AUTO-ENTMCNC: 31.2 GM/DL (ref 32.2–35.5)
MCV RBC AUTO: 91.4 FL (ref 81–99)
MONOCYTES ABSOLUTE: 0.9 THOU/MM3 (ref 0.4–1.3)
MONOCYTES NFR BLD AUTO: 16.1 %
NEUTROPHILS ABSOLUTE: 2.7 THOU/MM3 (ref 1.8–7.7)
NEUTROPHILS NFR BLD AUTO: 50.8 %
NRBC BLD AUTO-RTO: 0 /100 WBC
ORGANISM: ABNORMAL
PLATELET # BLD AUTO: 108 THOU/MM3 (ref 130–400)
PMV BLD AUTO: 9.1 FL (ref 9.4–12.4)
POIKILOCYTES: ABNORMAL
POTASSIUM SERPL-SCNC: 3.8 MEQ/L (ref 3.5–5.2)
RBC # BLD AUTO: 3.82 MILL/MM3 (ref 4.2–5.4)
SCAN OF BLOOD SMEAR: NORMAL
SODIUM SERPL-SCNC: 134 MEQ/L (ref 135–145)
WBC # BLD AUTO: 5.3 THOU/MM3 (ref 4.8–10.8)

## 2025-02-28 PROCEDURE — 6370000000 HC RX 637 (ALT 250 FOR IP): Performed by: STUDENT IN AN ORGANIZED HEALTH CARE EDUCATION/TRAINING PROGRAM

## 2025-02-28 PROCEDURE — 97535 SELF CARE MNGMENT TRAINING: CPT

## 2025-02-28 PROCEDURE — 6360000002 HC RX W HCPCS: Performed by: PHYSICIAN ASSISTANT

## 2025-02-28 PROCEDURE — 97110 THERAPEUTIC EXERCISES: CPT

## 2025-02-28 PROCEDURE — 2500000003 HC RX 250 WO HCPCS: Performed by: STUDENT IN AN ORGANIZED HEALTH CARE EDUCATION/TRAINING PROGRAM

## 2025-02-28 PROCEDURE — 1180000000 HC REHAB R&B

## 2025-02-28 PROCEDURE — 36415 COLL VENOUS BLD VENIPUNCTURE: CPT

## 2025-02-28 PROCEDURE — 99222 1ST HOSP IP/OBS MODERATE 55: CPT | Performed by: PHYSICAL MEDICINE & REHABILITATION

## 2025-02-28 PROCEDURE — 6370000000 HC RX 637 (ALT 250 FOR IP): Performed by: PHYSICIAN ASSISTANT

## 2025-02-28 PROCEDURE — 6360000002 HC RX W HCPCS: Performed by: STUDENT IN AN ORGANIZED HEALTH CARE EDUCATION/TRAINING PROGRAM

## 2025-02-28 PROCEDURE — 80048 BASIC METABOLIC PNL TOTAL CA: CPT

## 2025-02-28 PROCEDURE — 6370000000 HC RX 637 (ALT 250 FOR IP): Performed by: FAMILY MEDICINE

## 2025-02-28 PROCEDURE — 82948 REAGENT STRIP/BLOOD GLUCOSE: CPT

## 2025-02-28 PROCEDURE — 85025 COMPLETE CBC W/AUTO DIFF WBC: CPT

## 2025-02-28 PROCEDURE — 6370000000 HC RX 637 (ALT 250 FOR IP): Performed by: PHYSICAL MEDICINE & REHABILITATION

## 2025-02-28 PROCEDURE — 97116 GAIT TRAINING THERAPY: CPT

## 2025-02-28 RX ORDER — LISINOPRIL 20 MG/1
20 TABLET ORAL 2 TIMES DAILY
Status: DISCONTINUED | OUTPATIENT
Start: 2025-02-28 | End: 2025-03-12 | Stop reason: HOSPADM

## 2025-02-28 RX ORDER — ATORVASTATIN CALCIUM 40 MG/1
40 TABLET, FILM COATED ORAL DAILY
Status: CANCELLED | OUTPATIENT
Start: 2025-02-28

## 2025-02-28 RX ORDER — CYCLOBENZAPRINE HCL 10 MG
5 TABLET ORAL 3 TIMES DAILY PRN
Status: CANCELLED | OUTPATIENT
Start: 2025-02-28

## 2025-02-28 RX ORDER — SODIUM CHLORIDE 0.9 % (FLUSH) 0.9 %
10 SYRINGE (ML) INJECTION EVERY 12 HOURS SCHEDULED
Status: CANCELLED | OUTPATIENT
Start: 2025-02-28

## 2025-02-28 RX ORDER — LOPERAMIDE HYDROCHLORIDE 2 MG/1
2 CAPSULE ORAL 4 TIMES DAILY PRN
Status: DISCONTINUED | OUTPATIENT
Start: 2025-02-28 | End: 2025-03-12 | Stop reason: HOSPADM

## 2025-02-28 RX ORDER — SODIUM CHLORIDE 0.9 % (FLUSH) 0.9 %
10 SYRINGE (ML) INJECTION PRN
Status: CANCELLED | OUTPATIENT
Start: 2025-02-28

## 2025-02-28 RX ORDER — OXYCODONE HYDROCHLORIDE 5 MG/1
5 TABLET ORAL EVERY 4 HOURS PRN
Status: CANCELLED | OUTPATIENT
Start: 2025-02-28

## 2025-02-28 RX ORDER — ACETAMINOPHEN 500 MG
1000 TABLET ORAL EVERY 12 HOURS
Status: DISCONTINUED | OUTPATIENT
Start: 2025-03-01 | End: 2025-03-01

## 2025-02-28 RX ORDER — ATORVASTATIN CALCIUM 40 MG/1
40 TABLET, FILM COATED ORAL DAILY
Status: DISCONTINUED | OUTPATIENT
Start: 2025-02-28 | End: 2025-03-12 | Stop reason: HOSPADM

## 2025-02-28 RX ORDER — GLUCAGON 1 MG/ML
1 KIT INJECTION PRN
Status: CANCELLED | OUTPATIENT
Start: 2025-02-28

## 2025-02-28 RX ORDER — OXYCODONE AND ACETAMINOPHEN 5; 325 MG/1; MG/1
1 TABLET ORAL EVERY 4 HOURS PRN
Status: CANCELLED | OUTPATIENT
Start: 2025-02-28

## 2025-02-28 RX ORDER — SENNOSIDES A AND B 8.6 MG/1
1 TABLET, FILM COATED ORAL NIGHTLY
Status: CANCELLED | OUTPATIENT
Start: 2025-02-28

## 2025-02-28 RX ORDER — DOCUSATE SODIUM 100 MG/1
100 CAPSULE, LIQUID FILLED ORAL NIGHTLY PRN
Status: CANCELLED | OUTPATIENT
Start: 2025-02-28

## 2025-02-28 RX ORDER — ACETAMINOPHEN 325 MG/1
650 TABLET ORAL EVERY 6 HOURS PRN
Status: DISCONTINUED | OUTPATIENT
Start: 2025-02-28 | End: 2025-03-12 | Stop reason: HOSPADM

## 2025-02-28 RX ORDER — DOCUSATE SODIUM 100 MG/1
100 CAPSULE, LIQUID FILLED ORAL 2 TIMES DAILY
Status: DISCONTINUED | OUTPATIENT
Start: 2025-02-28 | End: 2025-02-28

## 2025-02-28 RX ORDER — ACETAMINOPHEN 325 MG/1
650 TABLET ORAL EVERY 6 HOURS PRN
Status: CANCELLED | OUTPATIENT
Start: 2025-02-28

## 2025-02-28 RX ORDER — SENNOSIDES A AND B 8.6 MG/1
1 TABLET, FILM COATED ORAL NIGHTLY PRN
Status: DISCONTINUED | OUTPATIENT
Start: 2025-02-28 | End: 2025-03-12 | Stop reason: HOSPADM

## 2025-02-28 RX ORDER — INSULIN LISPRO 100 [IU]/ML
0-16 INJECTION, SOLUTION INTRAVENOUS; SUBCUTANEOUS
Status: DISCONTINUED | OUTPATIENT
Start: 2025-02-28 | End: 2025-03-07

## 2025-02-28 RX ORDER — LEVOTHYROXINE SODIUM 50 UG/1
50 TABLET ORAL DAILY
Status: CANCELLED | OUTPATIENT
Start: 2025-02-28

## 2025-02-28 RX ORDER — SODIUM CHLORIDE 0.9 % (FLUSH) 0.9 %
10 SYRINGE (ML) INJECTION PRN
Status: DISCONTINUED | OUTPATIENT
Start: 2025-02-28 | End: 2025-03-12 | Stop reason: HOSPADM

## 2025-02-28 RX ORDER — PANTOPRAZOLE SODIUM 40 MG/1
40 TABLET, DELAYED RELEASE ORAL 2 TIMES DAILY
Status: DISCONTINUED | OUTPATIENT
Start: 2025-02-28 | End: 2025-03-12 | Stop reason: HOSPADM

## 2025-02-28 RX ORDER — POLYETHYLENE GLYCOL 3350 17 G/17G
17 POWDER, FOR SOLUTION ORAL DAILY PRN
Status: CANCELLED | OUTPATIENT
Start: 2025-02-28

## 2025-02-28 RX ORDER — SODIUM CHLORIDE 9 MG/ML
INJECTION, SOLUTION INTRAVENOUS PRN
Status: DISCONTINUED | OUTPATIENT
Start: 2025-02-28 | End: 2025-03-12 | Stop reason: HOSPADM

## 2025-02-28 RX ORDER — ENOXAPARIN SODIUM 100 MG/ML
40 INJECTION SUBCUTANEOUS DAILY
Status: DISCONTINUED | OUTPATIENT
Start: 2025-03-01 | End: 2025-03-12 | Stop reason: HOSPADM

## 2025-02-28 RX ORDER — ACETAMINOPHEN 500 MG
1000 TABLET ORAL EVERY 12 HOURS
Status: CANCELLED | OUTPATIENT
Start: 2025-02-28

## 2025-02-28 RX ORDER — DOCUSATE SODIUM 100 MG/1
100 CAPSULE, LIQUID FILLED ORAL 2 TIMES DAILY PRN
Status: DISCONTINUED | OUTPATIENT
Start: 2025-02-28 | End: 2025-03-12 | Stop reason: HOSPADM

## 2025-02-28 RX ORDER — SODIUM CHLORIDE 9 MG/ML
INJECTION, SOLUTION INTRAVENOUS PRN
Status: CANCELLED | OUTPATIENT
Start: 2025-02-28

## 2025-02-28 RX ORDER — ONDANSETRON 4 MG/1
4 TABLET, ORALLY DISINTEGRATING ORAL EVERY 8 HOURS PRN
Status: CANCELLED | OUTPATIENT
Start: 2025-02-28

## 2025-02-28 RX ORDER — CYCLOBENZAPRINE HCL 10 MG
5 TABLET ORAL 3 TIMES DAILY PRN
Status: DISCONTINUED | OUTPATIENT
Start: 2025-02-28 | End: 2025-03-12 | Stop reason: HOSPADM

## 2025-02-28 RX ORDER — PANTOPRAZOLE SODIUM 40 MG/1
40 TABLET, DELAYED RELEASE ORAL 2 TIMES DAILY
Status: CANCELLED | OUTPATIENT
Start: 2025-02-28

## 2025-02-28 RX ORDER — ENOXAPARIN SODIUM 100 MG/ML
40 INJECTION SUBCUTANEOUS DAILY
Status: CANCELLED | OUTPATIENT
Start: 2025-03-01

## 2025-02-28 RX ORDER — ONDANSETRON 4 MG/1
4 TABLET, ORALLY DISINTEGRATING ORAL EVERY 8 HOURS PRN
Status: DISCONTINUED | OUTPATIENT
Start: 2025-02-28 | End: 2025-03-12 | Stop reason: HOSPADM

## 2025-02-28 RX ORDER — PIOGLITAZONE 30 MG/1
30 TABLET ORAL DAILY
Status: DISCONTINUED | OUTPATIENT
Start: 2025-02-28 | End: 2025-03-03

## 2025-02-28 RX ORDER — DOCUSATE SODIUM 100 MG/1
100 CAPSULE, LIQUID FILLED ORAL 2 TIMES DAILY
Status: CANCELLED | OUTPATIENT
Start: 2025-02-28

## 2025-02-28 RX ORDER — ESCITALOPRAM OXALATE 20 MG/1
20 TABLET ORAL DAILY
Status: DISCONTINUED | OUTPATIENT
Start: 2025-02-28 | End: 2025-03-12 | Stop reason: HOSPADM

## 2025-02-28 RX ORDER — OXYCODONE HYDROCHLORIDE 5 MG/1
5 TABLET ORAL EVERY 4 HOURS PRN
Status: DISCONTINUED | OUTPATIENT
Start: 2025-02-28 | End: 2025-03-12 | Stop reason: HOSPADM

## 2025-02-28 RX ORDER — GLUCAGON 1 MG/ML
1 KIT INJECTION PRN
Status: DISCONTINUED | OUTPATIENT
Start: 2025-02-28 | End: 2025-03-12 | Stop reason: HOSPADM

## 2025-02-28 RX ORDER — INSULIN LISPRO 100 [IU]/ML
0-16 INJECTION, SOLUTION INTRAVENOUS; SUBCUTANEOUS
Status: CANCELLED | OUTPATIENT
Start: 2025-02-28

## 2025-02-28 RX ORDER — LISINOPRIL 20 MG/1
20 TABLET ORAL 2 TIMES DAILY
Status: CANCELLED | OUTPATIENT
Start: 2025-02-28

## 2025-02-28 RX ORDER — DEXTROSE MONOHYDRATE 100 MG/ML
INJECTION, SOLUTION INTRAVENOUS CONTINUOUS PRN
Status: DISCONTINUED | OUTPATIENT
Start: 2025-02-28 | End: 2025-03-12 | Stop reason: HOSPADM

## 2025-02-28 RX ORDER — ESCITALOPRAM OXALATE 20 MG/1
20 TABLET ORAL DAILY
Status: CANCELLED | OUTPATIENT
Start: 2025-02-28

## 2025-02-28 RX ORDER — POLYETHYLENE GLYCOL 3350 17 G/17G
17 POWDER, FOR SOLUTION ORAL DAILY PRN
Status: DISCONTINUED | OUTPATIENT
Start: 2025-02-28 | End: 2025-03-12 | Stop reason: HOSPADM

## 2025-02-28 RX ORDER — METFORMIN HYDROCHLORIDE 500 MG/1
1000 TABLET, EXTENDED RELEASE ORAL 2 TIMES DAILY WITH MEALS
Status: CANCELLED | OUTPATIENT
Start: 2025-02-28

## 2025-02-28 RX ORDER — METFORMIN HYDROCHLORIDE 500 MG/1
1000 TABLET, EXTENDED RELEASE ORAL 2 TIMES DAILY WITH MEALS
Status: DISCONTINUED | OUTPATIENT
Start: 2025-02-28 | End: 2025-03-12 | Stop reason: HOSPADM

## 2025-02-28 RX ORDER — LEVOTHYROXINE SODIUM 50 UG/1
50 TABLET ORAL DAILY
Status: DISCONTINUED | OUTPATIENT
Start: 2025-02-28 | End: 2025-03-03

## 2025-02-28 RX ORDER — SODIUM CHLORIDE 0.9 % (FLUSH) 0.9 %
10 SYRINGE (ML) INJECTION EVERY 12 HOURS SCHEDULED
Status: DISCONTINUED | OUTPATIENT
Start: 2025-02-28 | End: 2025-03-02 | Stop reason: ALTCHOICE

## 2025-02-28 RX ORDER — ASPIRIN 81 MG/1
81 TABLET ORAL NIGHTLY
Status: CANCELLED | OUTPATIENT
Start: 2025-02-28

## 2025-02-28 RX ORDER — SENNOSIDES A AND B 8.6 MG/1
1 TABLET, FILM COATED ORAL NIGHTLY
Status: DISCONTINUED | OUTPATIENT
Start: 2025-02-28 | End: 2025-02-28

## 2025-02-28 RX ORDER — OXYCODONE HYDROCHLORIDE 5 MG/1
2.5 TABLET ORAL EVERY 4 HOURS PRN
Status: CANCELLED | OUTPATIENT
Start: 2025-02-28

## 2025-02-28 RX ORDER — ACETAMINOPHEN 650 MG/1
650 SUPPOSITORY RECTAL EVERY 6 HOURS PRN
Status: CANCELLED | OUTPATIENT
Start: 2025-02-28

## 2025-02-28 RX ORDER — LOPERAMIDE HYDROCHLORIDE 2 MG/1
2 CAPSULE ORAL 4 TIMES DAILY PRN
Status: CANCELLED | OUTPATIENT
Start: 2025-02-28

## 2025-02-28 RX ORDER — OXYCODONE HYDROCHLORIDE 5 MG/1
2.5 TABLET ORAL EVERY 4 HOURS PRN
Status: DISCONTINUED | OUTPATIENT
Start: 2025-02-28 | End: 2025-03-12 | Stop reason: HOSPADM

## 2025-02-28 RX ORDER — ATENOLOL 25 MG/1
25 TABLET ORAL DAILY
Status: DISCONTINUED | OUTPATIENT
Start: 2025-02-28 | End: 2025-03-12 | Stop reason: HOSPADM

## 2025-02-28 RX ORDER — PIOGLITAZONE 30 MG/1
30 TABLET ORAL DAILY
Status: CANCELLED | OUTPATIENT
Start: 2025-02-28

## 2025-02-28 RX ORDER — CALCIUM CARBONATE 500(1250)
500 TABLET ORAL 2 TIMES DAILY WITH MEALS
Status: DISCONTINUED | OUTPATIENT
Start: 2025-02-28 | End: 2025-03-12 | Stop reason: HOSPADM

## 2025-02-28 RX ORDER — ATENOLOL 25 MG/1
25 TABLET ORAL DAILY
Status: CANCELLED | OUTPATIENT
Start: 2025-02-28

## 2025-02-28 RX ORDER — DEXTROSE MONOHYDRATE 100 MG/ML
INJECTION, SOLUTION INTRAVENOUS CONTINUOUS PRN
Status: CANCELLED | OUTPATIENT
Start: 2025-02-28

## 2025-02-28 RX ORDER — ASPIRIN 81 MG/1
81 TABLET ORAL NIGHTLY
Status: DISCONTINUED | OUTPATIENT
Start: 2025-02-28 | End: 2025-03-12 | Stop reason: HOSPADM

## 2025-02-28 RX ORDER — TRAZODONE HYDROCHLORIDE 50 MG/1
50 TABLET ORAL NIGHTLY PRN
Status: DISCONTINUED | OUTPATIENT
Start: 2025-02-28 | End: 2025-03-12 | Stop reason: HOSPADM

## 2025-02-28 RX ORDER — BISACODYL 10 MG
10 SUPPOSITORY, RECTAL RECTAL DAILY PRN
Status: DISCONTINUED | OUTPATIENT
Start: 2025-02-28 | End: 2025-03-12 | Stop reason: HOSPADM

## 2025-02-28 RX ORDER — TRAZODONE HYDROCHLORIDE 50 MG/1
50 TABLET ORAL NIGHTLY PRN
Status: CANCELLED | OUTPATIENT
Start: 2025-02-28

## 2025-02-28 RX ORDER — ONDANSETRON 2 MG/ML
4 INJECTION INTRAMUSCULAR; INTRAVENOUS EVERY 6 HOURS PRN
Status: CANCELLED | OUTPATIENT
Start: 2025-02-28

## 2025-02-28 RX ORDER — ACETAMINOPHEN 650 MG/1
650 SUPPOSITORY RECTAL EVERY 6 HOURS PRN
Status: DISCONTINUED | OUTPATIENT
Start: 2025-02-28 | End: 2025-03-12 | Stop reason: HOSPADM

## 2025-02-28 RX ORDER — BISACODYL 10 MG
10 SUPPOSITORY, RECTAL RECTAL DAILY PRN
Status: CANCELLED | OUTPATIENT
Start: 2025-02-28

## 2025-02-28 RX ADMIN — PIOGLITAZONE 30 MG: 30 TABLET ORAL at 18:07

## 2025-02-28 RX ADMIN — ATENOLOL 25 MG: 25 TABLET ORAL at 22:41

## 2025-02-28 RX ADMIN — LISINOPRIL 20 MG: 20 TABLET ORAL at 08:20

## 2025-02-28 RX ADMIN — LISINOPRIL 20 MG: 20 TABLET ORAL at 22:38

## 2025-02-28 RX ADMIN — PANTOPRAZOLE SODIUM 40 MG: 40 TABLET, DELAYED RELEASE ORAL at 22:37

## 2025-02-28 RX ADMIN — ACETAMINOPHEN 650 MG: 325 TABLET ORAL at 20:27

## 2025-02-28 RX ADMIN — CALCIUM 500 MG: 500 TABLET ORAL at 22:37

## 2025-02-28 RX ADMIN — INSULIN LISPRO 4 UNITS: 100 INJECTION, SOLUTION INTRAVENOUS; SUBCUTANEOUS at 12:03

## 2025-02-28 RX ADMIN — INSULIN LISPRO 8 UNITS: 100 INJECTION, SOLUTION INTRAVENOUS; SUBCUTANEOUS at 18:12

## 2025-02-28 RX ADMIN — ATORVASTATIN CALCIUM 40 MG: 40 TABLET, FILM COATED ORAL at 22:41

## 2025-02-28 RX ADMIN — ENOXAPARIN SODIUM 40 MG: 100 INJECTION SUBCUTANEOUS at 08:21

## 2025-02-28 RX ADMIN — LEVOTHYROXINE SODIUM 50 MCG: 0.05 TABLET ORAL at 22:38

## 2025-02-28 RX ADMIN — ACETAMINOPHEN 1000 MG: 500 TABLET ORAL at 14:59

## 2025-02-28 RX ADMIN — SODIUM CHLORIDE, PRESERVATIVE FREE 10 ML: 5 INJECTION INTRAVENOUS at 22:58

## 2025-02-28 RX ADMIN — METFORMIN HYDROCHLORIDE 1000 MG: 500 TABLET, EXTENDED RELEASE ORAL at 18:07

## 2025-02-28 RX ADMIN — OXYCODONE HYDROCHLORIDE AND ACETAMINOPHEN 1 TABLET: 5; 325 TABLET ORAL at 08:34

## 2025-02-28 RX ADMIN — PANTOPRAZOLE SODIUM 40 MG: 40 TABLET, DELAYED RELEASE ORAL at 08:20

## 2025-02-28 RX ADMIN — ACETAMINOPHEN 1000 MG: 500 TABLET ORAL at 01:52

## 2025-02-28 RX ADMIN — ESCITALOPRAM OXALATE 20 MG: 20 TABLET ORAL at 22:38

## 2025-02-28 RX ADMIN — INSULIN LISPRO 4 UNITS: 100 INJECTION, SOLUTION INTRAVENOUS; SUBCUTANEOUS at 22:36

## 2025-02-28 RX ADMIN — WATER 1000 MG: 1 INJECTION INTRAMUSCULAR; INTRAVENOUS; SUBCUTANEOUS at 22:57

## 2025-02-28 RX ADMIN — CYCLOBENZAPRINE 5 MG: 10 TABLET, FILM COATED ORAL at 08:34

## 2025-02-28 RX ADMIN — Medication 6 MG: at 22:37

## 2025-02-28 RX ADMIN — INSULIN LISPRO 4 UNITS: 100 INJECTION, SOLUTION INTRAVENOUS; SUBCUTANEOUS at 08:20

## 2025-02-28 RX ADMIN — ASPIRIN 81 MG: 81 TABLET, COATED ORAL at 22:37

## 2025-02-28 ASSESSMENT — ENCOUNTER SYMPTOMS
CONSTIPATION: 0
WHEEZING: 0
TROUBLE SWALLOWING: 0
DIARRHEA: 0
VOICE CHANGE: 0
COUGH: 0
VOMITING: 0
BACK PAIN: 1
RHINORRHEA: 0
SORE THROAT: 0
EYE PAIN: 0
EYE DISCHARGE: 0
NAUSEA: 0
ABDOMINAL PAIN: 0
SHORTNESS OF BREATH: 0

## 2025-02-28 ASSESSMENT — PAIN SCALES - GENERAL
PAINLEVEL_OUTOF10: 2
PAINLEVEL_OUTOF10: 3
PAINLEVEL_OUTOF10: 2
PAINLEVEL_OUTOF10: 1
PAINLEVEL_OUTOF10: 3
PAINLEVEL_OUTOF10: 4

## 2025-02-28 ASSESSMENT — PAIN DESCRIPTION - DESCRIPTORS
DESCRIPTORS: ACHING;DULL
DESCRIPTORS: ACHING;DISCOMFORT;BURNING

## 2025-02-28 ASSESSMENT — PAIN DESCRIPTION - ORIENTATION
ORIENTATION: LEFT
ORIENTATION: LOWER

## 2025-02-28 ASSESSMENT — PAIN DESCRIPTION - LOCATION
LOCATION: BACK
LOCATION: LEG

## 2025-02-28 ASSESSMENT — LIFESTYLE VARIABLES
HOW OFTEN DO YOU HAVE A DRINK CONTAINING ALCOHOL: MONTHLY OR LESS
HOW MANY STANDARD DRINKS CONTAINING ALCOHOL DO YOU HAVE ON A TYPICAL DAY: 1 OR 2

## 2025-02-28 ASSESSMENT — PAIN - FUNCTIONAL ASSESSMENT: PAIN_FUNCTIONAL_ASSESSMENT: PREVENTS OR INTERFERES SOME ACTIVE ACTIVITIES AND ADLS

## 2025-02-28 ASSESSMENT — PAIN SCALES - WONG BAKER: WONGBAKER_NUMERICALRESPONSE: HURTS A LITTLE BIT

## 2025-02-28 NOTE — PROGRESS NOTES
[facility]  Acute Inpatient Rehab Preadmission Assessment    Patient Name: Kim Amaya        Ethnicity:Not of , /a, or Danish origin  Race:White  MRN: [id number]    : 1950  (75 y.o.)  Gender: female     Admitted from:[facility]  Initial Assessment    Date of admission to the hospital: 2025  3:52 PM  Date patient eligible for admission:2025    Primary Diagnosis: L3 compression fracture      Did patient have surgery?  no    Physicians: Dr Lugo, Dr Real, Dr Barrientos    Risk for clinical complications/co-morbidities:   Past Medical History:   Diagnosis Date    Anemia     CAD (coronary artery disease)     CHF (congestive heart failure) ([facility])     COPD (chronic obstructive pulmonary disease) (HCC)     Depression     GERD (gastroesophageal reflux disease)     Headache(784.0)     Heart attack (HCC)     Hyperlipidemia     Hypertension     Hypothyroidism 2015    Liver disease     MI (myocardial infarction) ([facility]) , 10/01    x 2     Obesity     Osteoarthritis     B/L knees--Dr. Jordan.    PONV (postoperative nausea and vomiting)     Type 2 diabetes mellitus without complication (HCC)     Type II or unspecified type diabetes mellitus without mention of complication, not stated as uncontrolled        Financial Information  Primary insurance: Medicare    Secondary Insurance:   [facility] [place]    Has the patient had two or more falls in the past year or any fall with injury in the past year?   yes    Did the patient have major surgery during the 100 days prior to admission?  no    Precautions:   falls  Restrictions/Precautions: Fall Risk, General Precautions  Required Braces or Orthoses  Spinal: Lumbar Corset    Isolation Precautions: None       Physiatrist: Dr. Lugo    Patients Occupation: Retired  Reviewed Lab and Diagnostic reports from Current Admission: Yes    Patients Prior Functional  Level: Prior Function  Receives Help From:  Disclosure Statement provided to patient.  Patient verbalized understanding.     Patient requires an intensive and coordinate interdisciplinary team approach to the delivery of rehabilitation care including PT/OT/ST and 24 hour nursing care and physician supervision to safely return to home setting with family.      I have reviewed and concur with the findings and results of the pre-admission screening assessment completed by the Inpatient Rehabilitation Admissions Coordinator.    OG KRISHNA MD

## 2025-02-28 NOTE — H&P
accident.  The patient could not tell me the precise date of the fall.  She says she was in the bathroom when suddenly her legs gave up causing her to fall backward.  She says she did not hit her head nor loss consciousness.  She says her back pain is particularly worse when she standing or walking.  She says the pain at the back also radiating down to her left lower extremity ankle level when she was walking and standing.  She also experienced intermittent left leg numbness and intermittent bilateral lower extremities weakness.  CT of lumbar spine was repeated on 2/23/2025 and shows stable L3 vertebral compression fracture, and multilevel degenerative disc disease and posterior facet arthropathy.  She was admitted.  Orthopedic was consulted on 2/24/2025 for the L3 compression fracture and MRI of lumbar spine was recommended and ordered.  Patient was allowed to ambulate with restriction no lifting more than 10 pounds, no bending and no twisting.  MRI of the lumbar spine was done on 2/24/2025 and revealed acute L3 inferior endplate fracture with associated fluid in L3-4 facet joints with mild spinal canal and bilateral foraminal stenosis.Orthopedic service followed up with the patient and determined the patient was not a candidate for augmentation.  Orthopedic service recommended applying lumbosacral orthosis brace when standing and walking.  The patient says she received the lumbar sacral orthosis about 3 to 4 days ago.  She also had urinalysis done on 2/26/2025 and revealed presence of nitrite, moderate leukocyte esterase, trace ketone, and many bacteria.  Reflex urine culture done on 2/26/2026 revealed presence of Klebsiella pneumoniae.  Therefore IV Rocephin was started on 2/26/2025 to be ended on 3/1/2025.     At the present time the patient still complains of persistent aching pain at her lower back.  She says the pain is at 3/10 level with sitting but increases with standing and walking.  Her left leg has

## 2025-02-28 NOTE — PROGRESS NOTES
[facility]  STRZ 6A PEDI/MED SURG  Occupational Therapy  Daily Note    Discharge Recommendations: Continue to assess pending progress and Inpatient Rehabilitation  Equipment Recommendations: Yes        Time In: 0915  Time Out: 1000  Timed Code Treatment Minutes: 45 Minutes  Minutes: 45          Date: 2025  Patient Name: Kim Amaya,   Gender: female      Room: [facility]/018  MRN: [id number]  : 1950  (75 y.o.)  Referring Practitioner: Jackson Bravo PA-C  Diagnosis: Closed compression fracture of L3 lumbar vertebra, initial encounter ([facility])  Additional Pertinent Hx: per chart review; \"Kim Amaya is a 75 y.o. female who presents to the emergency department from home, by private vehicle for evaluation of low back pain.     Patient has had multiple falls over the past week.  Patient states that last fall was 3 days prior in the bathroom mechanical her leg gave out.  She did not hit her head fell onto her buttocks was able to get off the ground after a few minutes with assistance.  Patient states that since she was last seen in the ED and diagnosed with L3 compression fracture she has had worsening of the pain down her right leg and has had difficulty ambulating secondary to pain.  She denies any numbness or tingling.  Patient denies any fecal or urinary incontinence or retention.  Patient denies any previous back surgeries.  States that she is unclear why she has cirrhosis but follows with Dr. Rodrigues who she missed an appointment on Friday because of pain.  \"    Restrictions/Precautions:  Restrictions/Precautions: Fall Risk, General Precautions  Required Braces or Orthoses  Spinal: Lumbar Corset  Position Activity Restriction  Spinal Precautions: No Bending, No Lifting, No Twisting     Social/Functional History:  Lives With: Spouse  Type of Home: House  Home Layout: One level  Home Access: Stairs to enter with rails  Entrance Stairs - Number of Steps: 2 steps with 1 rail  Home

## 2025-02-28 NOTE — PLAN OF CARE
Problem: Discharge Planning  Goal: Discharge to home or other facility with appropriate resources  Outcome: Progressing  Flowsheets (Taken 2/28/2025 1400)  Discharge to home or other facility with appropriate resources: Identify barriers to discharge with patient and caregiver     Problem: Pain  Goal: Verbalizes/displays adequate comfort level or baseline comfort level  Outcome: Progressing  Flowsheets (Taken 2/28/2025 1400)  Verbalizes/displays adequate comfort level or baseline comfort level:   Encourage patient to monitor pain and request assistance   Administer analgesics based on type and severity of pain and evaluate response   Assess pain using appropriate pain scale   Implement non-pharmacological measures as appropriate and evaluate response     Problem: Safety - Adult  Goal: Free from fall injury  Outcome: Progressing  Flowsheets (Taken 2/28/2025 1400)  Free From Fall Injury: Instruct family/caregiver on patient safety

## 2025-02-28 NOTE — PROGRESS NOTES
[facility]  INPATIENT REHABILITATION  ADMISSIONS COORDINATOR CONSULT    Referral Type: internal    Patient Name: Kim Amaya      MRN: [id number]    : 1950  (75 y.o.)  Gender: female   Race:White (non-)     Payor Source: Payor: MEDICARE / Plan: MEDICARE PART A AND B / Product Type: *No Product type* /   Secondary Payor Source:  [employer]    Isolation Status: No active isolations    Lives With: Spouse  Type of Home: House  Home Layout: One level  Home Access: Stairs to enter with rails  Entrance Stairs - Number of Steps: 2 steps with 1 rail  Receives Help From: Family  Occupation: Retired  Additional Comments: patient used no AD prior to admit. states she wants to get a purewick external catheter for home for night time use. patient's spouse is able to assist as needed.    What is treatment plan?  Disciplines Required upon Admission to Inpatient Rehabilitation: Physical Therapy and Occupational Therapy  Post operative: No  Fall: Yes  Dialysis: No  Diet: ADULT DIET; Regular  Discussed patient with  and PM&R provider: Patient is appropriate for IPR, spoke with patient who is agreeable to rehab, discussed with Dr Lugo who accepted patient, updated CASTRO Ware

## 2025-02-28 NOTE — CONSULTS
(PRINIVIL;ZESTRIL) tablet 20 mg  20 mg Oral BID Dale Rose PA   20 mg at 25 0820    pantoprazole (PROTONIX) tablet 40 mg  40 mg Oral BID Dale Rose PA   40 mg at 25 0820    glucose chewable tablet 16 g  4 tablet Oral PRN Dale Rose PA        dextrose bolus 10% 125 mL  125 mL IntraVENous PRN Dale Rose PA        Or    dextrose bolus 10% 250 mL  250 mL IntraVENous PRN Dale Rose PA        glucagon injection 1 mg  1 mg SubCUTAneous PRN Dale Rose PA        dextrose 10 % infusion   IntraVENous Continuous PRN Dale Rose PA           Social History:  Social History     Socioeconomic History    Marital status:      Spouse name: Gigi     Number of children: 4    Years of education: Not on file    Highest education level: Not on file   Occupational History    Occupation: Retired Dry    Tobacco Use    Smoking status: Former     Current packs/day: 0.00     Types: Cigarettes     Quit date: 1969     Years since quittin.2    Smokeless tobacco: Former   Vaping Use    Vaping status: Never Used   Substance and Sexual Activity    Alcohol use: No     Comment: rarely    Drug use: No    Sexual activity: Not on file   Other Topics Concern    Not on file   Social History Narrative    Not on file     Social Determinants of Health     Financial Resource Strain: Low Risk  (2025)    Overall Financial Resource Strain (CARDIA)     Difficulty of Paying Living Expenses: Not very hard   Food Insecurity: No Food Insecurity (2025)    Hunger Vital Sign     Worried About Running Out of Food in the Last Year: Never true     Ran Out of Food in the Last Year: Never true   Transportation Needs: No Transportation Needs (2025)    PRAPARE - Transportation     Lack of Transportation (Medical): No     Lack of Transportation (Non-Medical): No   Physical Activity: Inactive (2024)    Exercise Vital Sign     Days of Exercise per Week: 0 days     Minutes of Exercise per Session: 0 min   Stress: Not  extremities; no palpable mass at limbs ; no joints laxity or crepitation ; shoulder flexion and abduction passive ROM reaching 160 degrees bilaterally; hip flexion passive ROM reaching 120 degrees bilaterally; ankle dorsiflexion passive ROM reaching 15 degrees bilaterally; normal functional joints ROM at the rest of bilateral upper & lower extremities  Cerebral :  alert ; awake ; oriented to place, person and time; follow 2-3 step verbal command; able to recall 3/3 items given immediately and 3/3 adamant about 3 minutes later; able to repeat series of 5 single digit numbers in correct order forward and backward; intact abstract thinking; perform serial 7 subtraction test for only 1 step but correctly (100-93-)  Cerebellum : no dysmetria with bilateral finger-to-nose test ; has difficulty performing heel-to-shin test on both sides; no dysdiadochokinesia with the bilateral forearm rapid supination/pronation  Cranial Nerves :  grossly intact CN II to XII function  Sensory : intact and symmetrical light touch and pin prick sensation at bilateral upper & lower extremities  Motor : normal tone at bilateral upper & lower extremities ; 3+/5 to 4-/5 muscle strength at bilateral hip flexion; 3-/5 to 3+/5 muscle strength at bilateral hip adduction; 3+/5 to 4-/5 muscle strength at the bilateral hip abduction; 4+/5 muscle strength at the bilateral big toe extension; normal 5/5 muscle strength at the rest of bilateral upper & lower extremities  Reflex : 0 bilateral bicep, bilateral triceps, bilateral brachioradialis, bilateral knees and bilateral ankles reflexes   Pathological Reflex :  No Pal's sign ; no Babinski sign ; no ankle clonus  Gait : Not assessed      Diagnostics:  Recent Results (from the past 24 hour(s))   POCT Glucose    Collection Time: 02/27/25 11:36 AM   Result Value Ref Range    POC Glucose 219 (H) 70 - 108 mg/dl   POCT Glucose    Collection Time: 02/27/25  4:25 PM   Result Value Ref Range    POC Glucose

## 2025-02-28 NOTE — PLAN OF CARE
Problem: Chronic Conditions and Co-morbidities  Goal: Patient's chronic conditions and co-morbidity symptoms are monitored and maintained or improved  Outcome: Progressing  Flowsheets (Taken 2/27/2025 2347)  Care Plan - Patient's Chronic Conditions and Co-Morbidity Symptoms are Monitored and Maintained or Improved:   Collaborate with multidisciplinary team to address chronic and comorbid conditions and prevent exacerbation or deterioration   Monitor and assess patient's chronic conditions and comorbid symptoms for stability, deterioration, or improvement   Update acute care plan with appropriate goals if chronic or comorbid symptoms are exacerbated and prevent overall improvement and discharge     Problem: Discharge Planning  Goal: Discharge to home or other facility with appropriate resources  2/27/2025 2347 by Niki Garcia, RN  Outcome: Progressing  Flowsheets (Taken 2/27/2025 2347)  Discharge to home or other facility with appropriate resources:   Identify barriers to discharge with patient and caregiver   Identify discharge learning needs (meds, wound care, etc)   Arrange for needed discharge resources and transportation as appropriate     Problem: Pain  Goal: Verbalizes/displays adequate comfort level or baseline comfort level  2/27/2025 2347 by Niki Garcia, RN  Outcome: Progressing  Flowsheets (Taken 2/27/2025 2347)  Verbalizes/displays adequate comfort level or baseline comfort level:   Encourage patient to monitor pain and request assistance   Administer analgesics based on type and severity of pain and evaluate response   Assess pain using appropriate pain scale   Implement non-pharmacological measures as appropriate and evaluate response     Problem: Skin/Tissue Integrity  Goal: Skin integrity remains intact  Description: 1.  Monitor for areas of redness and/or skin breakdown  2.  Assess vascular access sites hourly  3.  Every 4-6 hours minimum:  Change oxygen saturation probe site  4.  Every 4-6 hours:   If on nasal continuous positive airway pressure, respiratory therapy assess nares and determine need for appliance change or resting period  Outcome: Progressing  Flowsheets (Taken 2/27/2025 2347)  Skin Integrity Remains Intact:   Monitor for areas of redness and/or skin breakdown   Assess vascular access sites hourly     Problem: Safety - Adult  Goal: Free from fall injury  Outcome: Progressing  Flowsheets (Taken 2/27/2025 2347)  Free From Fall Injury: Instruct family/caregiver on patient safety     Problem: Skin/Tissue Integrity - Adult  Goal: Skin integrity remains intact  Description: 1.  Monitor for areas of redness and/or skin breakdown  2.  Assess vascular access sites hourly  3.  Every 4-6 hours minimum:  Change oxygen saturation probe site  4.  Every 4-6 hours:  If on nasal continuous positive airway pressure, respiratory therapy assess nares and determine need for appliance change or resting period  Outcome: Progressing  Flowsheets (Taken 2/27/2025 2347)  Skin Integrity Remains Intact:   Monitor for areas of redness and/or skin breakdown   Assess vascular access sites hourly     Problem: Genitourinary - Adult  Goal: Absence of urinary retention  Outcome: Progressing  Flowsheets (Taken 2/27/2025 2347)  Absence of urinary retention:   Assess patient’s ability to void and empty bladder   Monitor intake/output and perform bladder scan as needed   Care plan reviewed with patient. patient verbalize understanding of plan of care and contribute to goal setting.

## 2025-02-28 NOTE — PROGRESS NOTES
[facility]  INPATIENT PHYSICAL THERAPY  DAILY NOTE  STRZ 6A PEDI/MED SURG - 6A-18/018-A      Discharge Recommendations: Continue to assess pending progress, Inpatient Rehabilitation, Patient would benefit from continued PT at discharge, and Inpatient Therapy Stay  Equipment Recommendations:    monitor for needs             Time In: 1637  Time Out: 1705  Timed Code Treatment Minutes: 28 Minutes  Minutes: 28          Date: 2025  Patient Name: Kim Amaya,  Gender:  female        MRN: [id number]  : 1950  (75 y.o.)     Referring Practitioner: Jackson Bravo PA-C  Diagnosis: Closed compression fracture of L3 lumbar vertebra, initial encounter ([facility])  Additional Pertinent Hx: Kim Amaya is a 75 y.o. female  who presents to [facility] with back pain.  Patient states she has had multiple over the course of the past week. Patient states the msot recent fall occurred about 3-4 days ago when she was in the bathroom and her legs gave out hitting her buttocks.   Patient was seen earlier in the week in the ED where she was diagnosed with her L3 compression fracture however since then her pain has continued to increase and functional capility has become increasingly more limited due to her pain. Patient states she feels sharp shooting pains up and down her leg when she moves around and cannot ambulate on it or just barely so due to her pain.     Prior Level of Function:  Lives With: Spouse  Type of Home: House  Home Layout: One level  Home Access: Stairs to enter with rails  Entrance Stairs - Number of Steps: 2 steps with 1 rail  Home Equipment: Walker - Rolling   Bathroom Shower/Tub: Walk-in shower, Tub/Shower unit  Bathroom Toilet: Handicap height  Bathroom Equipment: Grab bars in shower, Grab bars around toilet  Bathroom Accessibility: Accessible    Receives Help From: Family  Prior Level of Assist for ADLs: Independent  Prior Level of Assist for Homemaking:  Independent  Prior Level of Assist for Transfers: Independent  Active : No  Additional Comments: patient used no AD prior to admit. states she wants to get a purewick external catheter for home for night time use. patient's spouse is able to assist as needed.  Prior Level of Assist for Ambulation: Independent household ambulator, with or without device  Has the patient had two or more falls in the past year or any fall with injury in the past year?: Yes    Restrictions/Precautions:  Restrictions/Precautions: Fall Risk, General Precautions  Required Braces or Orthoses  Spinal: Lumbar Corset  Position Activity Restriction  Spinal Precautions: No Bending, No Lifting, No Twisting     SUBJECTIVE: Pt resting in bed and agrees to therapy.     PAIN: at rest denied, with mobility 8/10: L thigh and low back    Vitals: Vitals not assessed per clinical judgement, see nursing flowsheet    OBJECTIVE:  Bed Mobility:  Rolling to Left: Contact Guard Assistance, with head of bed flat, with rail, with verbal cues    Supine to Sit: Minimal Assistance, with head of bed flat, with verbal cues , for logroll technique    Transfers:  Sit to Stand: Minimal Assistance, with increased time for completion  Stand to Sit:Minimal Assistance    Ambulation:  Minimal Assistance, with verbal cues , with increased time for completion  Distance: 4 ft  Surface: Level Tile  Device: Rolling Walker  Gait Deviations: Decreased Step Length Bilaterally, Decreased Weight Shift Left, Decreased Gait Speed, Decreased Heel Strike Bilaterally, Decreased Foot Clearance Right, Narrow Base of Support, and Unsteady Gait     Stairs:  Not Tested    Balance:  Static Sitting Balance:  Supervision, Stand By Assistance  Dynamic Sitting Balance: Stand By Assistance  Static Standing Balance: Minimal Assistance  Dynamic Standing Balance: Minimal Assistance    Exercise:  Patient was guided in 1 set(s) 10 reps of exercises to both lower extremities: Ankle pumps, Glut sets,

## 2025-02-28 NOTE — PROGRESS NOTES
Hospitalist Progress Note      Patient:  Kim Amaya    Unit/Bed:6A-18/018-A  YOB: 1950  MRN: [id number]   Acct: [id number]   PCP: Victoria Arnold APRN - CNP  Date of Admission: 2/23/2025    ASSESSMENT AND PLAN  Traumatic L3 compression fracture -MRI of the lumbar spine: Acute fractures of the inferior endplate of L3. There is some associated fluid in the facet joints at the L3-4 level. Dr. Barrientos consulted from ED. Per ortho not a good candidate for aumentation. Will proceed with LSO brace and restrictions of no lifting over 10 lbs, no bending/twisting. Ok to discharge home with OP f/u once ambulating well. Please schedule follow up with Dr. Barrientos in 3-4 weeks. Ortho signed off. For now continue to work with PT/OT and monitor progress. Pain control.     Acute cystitis-urinalysis positive for bacteria and nitrites. Started rocephin for 3 days, 2/26-2/28.     Type II diabetes  Holding home meds. Dc low ISS and start high ISS. Hypoglycemia protocol      Chronic CHFmrEF  EF on echo 40% back in 2022   Continue atenolol and lisinopril      CAD  Statin, atenolol      Hypothyroidism   Synthroid      HLD  Statin      Anxiety and depression: Will continue Lexapro     DVT Prophylaxis: [x] Lovenox / [] Heparin / [] SCDs / [] Already on Systemic Anticoagulation / [] None     Expected discharge date:  tbd  Disposition: tbd     Code status: Full Code     ===================================================================    Chief Complaint: back pain     Subjective (past 24 hours): NAEO.  Today she is able to move out of the bed with appropriate pain control.     Medications:    Infusion Medications    sodium chloride      dextrose      Scheduled Medications    acetaminophen  1,000 mg Oral Q12H    cefTRIAXone (ROCEPHIN) IV  1,000 mg IntraVENous Q24H    insulin lispro  0-16 Units SubCUTAneous 4x Daily AC & HS    senna  1 tablet Oral  has been created using voice recognition software. It may contain   minor errors which are inherent in voice recognition technology.**      Electronically signed by Dr. Olivier Lopez        MRI LUMBAR SPINE WO CONTRAST    Result Date: 2/24/2025  PROCEDURE: MRI LUMBAR SPINE WO CONTRAST CLINICAL INFORMATION: evaluate L3 vertebral body/compression fx acuity. Severe lower back pain. Bilateral leg pain. Unable to ambulate. COMPARISON: Lumbar spine CT 2/23/2025. TECHNIQUE: Sagittal and axial T1 and T2-weighted images were obtained through the lumbar spine. FINDINGS: The lumbar vertebral bodies are normally aligned. There are acute fractures of the inferior endplate of L3. There is edema on the STIR sequence. Fracture lines are visible. Height loss is 15%. There are no other acute fractures. There are no other sites of bone marrow edema. There is some fluid in the facet joints bilaterally at the L3-4 level. No pars defects are noted. There is mild disc desiccation throughout. The distal spinal cord, conus medullaris and cauda equina are normal. There are no gross abnormalities in the visualized aspects of the distal thoracic spine. On the axial images, at T12-L1 through L2-L3, there are no focal disc abnormalities. There are no significant facet degenerative changes. There is no spinal canal or foraminal stenosis. At L3-L4, the posterior inferior corner of L3 extends slightly into the spinal canal. There is some fluid in the facet joints. There are mild facet degenerative changes. There is mild spinal canal stenosis. There is mild bilateral foraminal stenosis. At L4-L5, there is no focal disc abnormality. There is no spinal canal stenosis. There is no foraminal stenosis. At L5-S1, there is loss of disc height. There is no spinal canal stenosis. There is mild bilateral foraminal stenosis. There are no suspicious findings in the visualized aspects of the retroperitoneum and paraspinal soft tissues.     Acute fractures of

## 2025-02-28 NOTE — CARE COORDINATION
2/28/25, 1:41 PM EST    Patient goals/plan/ treatment preferences discussed by  and .  Patient goals/plan/ treatment preferences reviewed with patient/ family.  Patient/ family verbalize understanding of discharge plan and are in agreement with goal/plan/treatment preferences.  Understanding was demonstrated using the teach back method.  AVS provided by RN at time of discharge, which includes all necessary medical information pertaining to the patients current course of illness, treatment, post-discharge goals of care, and treatment preferences.     Services At/After Discharge: Inpatient rehab

## 2025-03-01 LAB
25(OH)D3 SERPL-MCNC: 9 NG/ML (ref 30–100)
ALBUMIN SERPL BCG-MCNC: 3.1 G/DL (ref 3.4–4.9)
ALP SERPL-CCNC: 127 U/L (ref 35–104)
ALT SERPL W/O P-5'-P-CCNC: 53 U/L (ref 10–35)
ANION GAP SERPL CALC-SCNC: 10 MEQ/L (ref 8–16)
ANISOCYTOSIS BLD QL SMEAR: PRESENT
AST SERPL-CCNC: 89 U/L (ref 10–35)
BASOPHILS ABSOLUTE: 0 THOU/MM3 (ref 0–0.1)
BASOPHILS NFR BLD AUTO: 0.8 %
BILIRUB SERPL-MCNC: 0.4 MG/DL (ref 0.3–1.2)
BUN SERPL-MCNC: 10 MG/DL (ref 8–23)
CALCIUM SERPL-MCNC: 8.7 MG/DL (ref 8.8–10.2)
CHLORIDE SERPL-SCNC: 102 MEQ/L (ref 98–111)
CHOLEST SERPL-MCNC: 107 MG/DL (ref 100–199)
CO2 SERPL-SCNC: 24 MEQ/L (ref 22–29)
CREAT SERPL-MCNC: 0.5 MG/DL (ref 0.5–0.9)
DEPRECATED MEAN GLUCOSE BLD GHB EST-ACNC: 117 MG/DL (ref 70–126)
DEPRECATED RDW RBC AUTO: 73 FL (ref 35–45)
EOSINOPHIL NFR BLD AUTO: 7 %
EOSINOPHILS ABSOLUTE: 0.4 THOU/MM3 (ref 0–0.4)
ERYTHROCYTE [DISTWIDTH] IN BLOOD BY AUTOMATED COUNT: 21.6 % (ref 11.5–14.5)
GFR SERPL CREATININE-BSD FRML MDRD: > 90 ML/MIN/1.73M2
GLUCOSE BLD STRIP.AUTO-MCNC: 147 MG/DL (ref 70–108)
GLUCOSE BLD STRIP.AUTO-MCNC: 165 MG/DL (ref 70–108)
GLUCOSE BLD STRIP.AUTO-MCNC: 167 MG/DL (ref 70–108)
GLUCOSE BLD STRIP.AUTO-MCNC: 280 MG/DL (ref 70–108)
GLUCOSE SERPL-MCNC: 193 MG/DL (ref 74–109)
HBA1C MFR BLD HPLC: 5.9 % (ref 4–6)
HCT VFR BLD AUTO: 36.1 % (ref 37–47)
HDLC SERPL-MCNC: 41 MG/DL
HGB BLD-MCNC: 11.2 GM/DL (ref 12–16)
IMM GRANULOCYTES # BLD AUTO: 0.02 THOU/MM3 (ref 0–0.07)
IMM GRANULOCYTES NFR BLD AUTO: 0.4 %
LDLC SERPL CALC-MCNC: 51 MG/DL
LYMPHOCYTES ABSOLUTE: 1.2 THOU/MM3 (ref 1–4.8)
LYMPHOCYTES NFR BLD AUTO: 23.7 %
MCH RBC QN AUTO: 28.9 PG (ref 26–33)
MCHC RBC AUTO-ENTMCNC: 31 GM/DL (ref 32.2–35.5)
MCV RBC AUTO: 93.3 FL (ref 81–99)
MONOCYTES ABSOLUTE: 0.6 THOU/MM3 (ref 0.4–1.3)
MONOCYTES NFR BLD AUTO: 12.1 %
NEUTROPHILS ABSOLUTE: 2.9 THOU/MM3 (ref 1.8–7.7)
NEUTROPHILS NFR BLD AUTO: 56 %
NRBC BLD AUTO-RTO: 0 /100 WBC
PLATELET # BLD AUTO: 102 THOU/MM3 (ref 130–400)
PMV BLD AUTO: 9.9 FL (ref 9.4–12.4)
POTASSIUM SERPL-SCNC: 4.1 MEQ/L (ref 3.5–5.2)
PREALB SERPL-MCNC: 9.8 MG/DL (ref 20–40)
PROT SERPL-MCNC: 6 G/DL (ref 6.4–8.3)
RBC # BLD AUTO: 3.87 MILL/MM3 (ref 4.2–5.4)
SODIUM SERPL-SCNC: 136 MEQ/L (ref 135–145)
TRIGL SERPL-MCNC: 76 MG/DL (ref 0–199)
WBC # BLD AUTO: 5.1 THOU/MM3 (ref 4.8–10.8)

## 2025-03-01 PROCEDURE — 83036 HEMOGLOBIN GLYCOSYLATED A1C: CPT

## 2025-03-01 PROCEDURE — 97165 OT EVAL LOW COMPLEX 30 MIN: CPT

## 2025-03-01 PROCEDURE — 6360000002 HC RX W HCPCS: Performed by: STUDENT IN AN ORGANIZED HEALTH CARE EDUCATION/TRAINING PROGRAM

## 2025-03-01 PROCEDURE — 99232 SBSQ HOSP IP/OBS MODERATE 35: CPT | Performed by: PHYSICAL MEDICINE & REHABILITATION

## 2025-03-01 PROCEDURE — 80061 LIPID PANEL: CPT

## 2025-03-01 PROCEDURE — 97535 SELF CARE MNGMENT TRAINING: CPT

## 2025-03-01 PROCEDURE — 97530 THERAPEUTIC ACTIVITIES: CPT

## 2025-03-01 PROCEDURE — 97162 PT EVAL MOD COMPLEX 30 MIN: CPT

## 2025-03-01 PROCEDURE — 84134 ASSAY OF PREALBUMIN: CPT

## 2025-03-01 PROCEDURE — 85025 COMPLETE CBC W/AUTO DIFF WBC: CPT

## 2025-03-01 PROCEDURE — 97110 THERAPEUTIC EXERCISES: CPT

## 2025-03-01 PROCEDURE — 36415 COLL VENOUS BLD VENIPUNCTURE: CPT

## 2025-03-01 PROCEDURE — 6370000000 HC RX 637 (ALT 250 FOR IP): Performed by: FAMILY MEDICINE

## 2025-03-01 PROCEDURE — 1180000000 HC REHAB R&B

## 2025-03-01 PROCEDURE — 82306 VITAMIN D 25 HYDROXY: CPT

## 2025-03-01 PROCEDURE — 6370000000 HC RX 637 (ALT 250 FOR IP): Performed by: PHYSICAL MEDICINE & REHABILITATION

## 2025-03-01 PROCEDURE — 97116 GAIT TRAINING THERAPY: CPT

## 2025-03-01 PROCEDURE — 80053 COMPREHEN METABOLIC PANEL: CPT

## 2025-03-01 PROCEDURE — 6370000000 HC RX 637 (ALT 250 FOR IP): Performed by: STUDENT IN AN ORGANIZED HEALTH CARE EDUCATION/TRAINING PROGRAM

## 2025-03-01 PROCEDURE — 82948 REAGENT STRIP/BLOOD GLUCOSE: CPT

## 2025-03-01 RX ADMIN — ATENOLOL 25 MG: 25 TABLET ORAL at 22:33

## 2025-03-01 RX ADMIN — LEVOTHYROXINE SODIUM 50 MCG: 0.05 TABLET ORAL at 22:33

## 2025-03-01 RX ADMIN — LISINOPRIL 20 MG: 20 TABLET ORAL at 09:03

## 2025-03-01 RX ADMIN — ENOXAPARIN SODIUM 40 MG: 100 INJECTION SUBCUTANEOUS at 09:02

## 2025-03-01 RX ADMIN — ATORVASTATIN CALCIUM 40 MG: 40 TABLET, FILM COATED ORAL at 22:32

## 2025-03-01 RX ADMIN — METFORMIN HYDROCHLORIDE 1000 MG: 500 TABLET, EXTENDED RELEASE ORAL at 09:03

## 2025-03-01 RX ADMIN — OXYCODONE HYDROCHLORIDE 5 MG: 5 TABLET ORAL at 15:02

## 2025-03-01 RX ADMIN — INSULIN LISPRO 8 UNITS: 100 INJECTION, SOLUTION INTRAVENOUS; SUBCUTANEOUS at 12:13

## 2025-03-01 RX ADMIN — CALCIUM 500 MG: 500 TABLET ORAL at 09:02

## 2025-03-01 RX ADMIN — PIOGLITAZONE 30 MG: 30 TABLET ORAL at 09:05

## 2025-03-01 RX ADMIN — PANTOPRAZOLE SODIUM 40 MG: 40 TABLET, DELAYED RELEASE ORAL at 09:02

## 2025-03-01 RX ADMIN — DOCUSATE SODIUM 100 MG: 100 CAPSULE, LIQUID FILLED ORAL at 22:37

## 2025-03-01 RX ADMIN — ESCITALOPRAM OXALATE 20 MG: 20 TABLET ORAL at 22:32

## 2025-03-01 RX ADMIN — ONDANSETRON 4 MG: 4 TABLET, ORALLY DISINTEGRATING ORAL at 17:20

## 2025-03-01 RX ADMIN — CALCIUM 500 MG: 500 TABLET ORAL at 17:22

## 2025-03-01 RX ADMIN — LISINOPRIL 20 MG: 20 TABLET ORAL at 22:32

## 2025-03-01 RX ADMIN — METFORMIN HYDROCHLORIDE 1000 MG: 500 TABLET, EXTENDED RELEASE ORAL at 17:22

## 2025-03-01 RX ADMIN — ACETAMINOPHEN 1000 MG: 500 TABLET ORAL at 06:45

## 2025-03-01 RX ADMIN — Medication 6 MG: at 22:32

## 2025-03-01 RX ADMIN — ASPIRIN 81 MG: 81 TABLET, COATED ORAL at 22:32

## 2025-03-01 RX ADMIN — PANTOPRAZOLE SODIUM 40 MG: 40 TABLET, DELAYED RELEASE ORAL at 22:32

## 2025-03-01 ASSESSMENT — PAIN DESCRIPTION - DESCRIPTORS: DESCRIPTORS: ACHING

## 2025-03-01 ASSESSMENT — PAIN SCALES - GENERAL
PAINLEVEL_OUTOF10: 0
PAINLEVEL_OUTOF10: 0
PAINLEVEL_OUTOF10: 8
PAINLEVEL_OUTOF10: 0

## 2025-03-01 ASSESSMENT — PAIN DESCRIPTION - LOCATION: LOCATION: BACK

## 2025-03-01 NOTE — CARE COORDINATION
Patient has worked well with PT and OT today. Using a brittany steady and gait belt with ambulation. Patient will use a gait belt and walker with standing, or standing and pivoting. Patient has been cheerful and polite this shift. No complaints of pain or discomfort. Call light and bedside table in reach.

## 2025-03-01 NOTE — PROGRESS NOTES
[facility]  INPATIENT PHYSICAL THERAPY  DAILY NOTE  STRZ 6A PEDI/MED SURG - 6A-18/018-A      Discharge Recommendations: Continue to assess pending progress, Patient would benefit from continued PT at discharge, and Inpatient Therapy Stay  Equipment Recommendations:    monitor for needs          Time In: 1157  Time Out: 1229  Timed Code Treatment Minutes: 32 Minutes  Minutes: 32          Date: 2025  Patient Name: Kim Amaya,  Gender:  female        MRN: [id number]  : 1950  (75 y.o.)     Referring Practitioner: Jackson Bravo PA-C  Diagnosis: Closed compression fracture of L3 lumbar vertebra, initial encounter ([facility])  Additional Pertinent Hx: Kim Amaya is a 75 y.o. female  who presents to [facility] with back pain.  Patient states she has had multiple over the course of the past week. Patient states the msot recent fall occurred about 3-4 days ago when she was in the bathroom and her legs gave out hitting her buttocks.   Patient was seen earlier in the week in the ED where she was diagnosed with her L3 compression fracture however since then her pain has continued to increase and functional capility has become increasingly more limited due to her pain. Patient states she feels sharp shooting pains up and down her leg when she moves around and cannot ambulate on it or just barely so due to her pain.     Prior Level of Function:  Lives With: Spouse  Type of Home: House  Home Layout: One level  Home Access: Stairs to enter with rails  Entrance Stairs - Number of Steps: 2 steps with 1 rail  Home Equipment: Walker - Rolling   Bathroom Shower/Tub: Walk-in shower, Tub/Shower unit  Bathroom Toilet: Handicap height  Bathroom Equipment: Grab bars in shower, Grab bars around toilet  Bathroom Accessibility: Accessible    Receives Help From: Family  Prior Level of Assist for ADLs: Independent  Prior Level of Assist for Homemaking: Independent  Prior Level of Assist for

## 2025-03-01 NOTE — CARE COORDINATION
Pt pleasant this evening.Alert and oriented,took meds well.refuses incentive spirometer,but will take deep breaths occasionally, and refuses scd pumps.Encouraged to watch carb and sugar intake due to diabetes.Call light in reach.

## 2025-03-01 NOTE — CONSULTS
Patient denied on 2/28/2025    Depression 2000    GERD (gastroesophageal reflux disease)     Since 1990s    Headache(784.0)     Heart attack (HCC)     1999, 2001    Hyperlipidemia     In 2000's    Hypertension 1999    Hypothyroidism 02/11/2015    Liver disease     MI (myocardial infarction) (HCC) 11/99, 10/01    x 2 (1999, 2001)    Obesity     Osteoarthritis     B/L knees--Dr. Jordan.    Osteoporosis     PONV (postoperative nausea and vomiting)     Type 2 diabetes mellitus without complication (HCC)     Diagnosed in 2000's    Type II or unspecified type diabetes mellitus without mention of complication, not stated as uncontrolled 2014     Past Surgical History:        Procedure Laterality Date    APPENDECTOMY  1978    BLADDER SUSPENSION  1978    CARDIAC CATHETERIZATION  07/29/2009    Diagnostic Cath EF 40-45% (Dr Humphrey)    CHOLECYSTECTOMY  1978    COLONOSCOPY      CORONARY STENT PLACEMENT      1 stent in 1999; 1 stent in 2001    KNEE ARTHROSCOPY Right     In 1980s    DE COLON CA SCRN NOT HI RSK IND Left 03/01/2018    COLONOSCOPY performed by Prince Beckford MD at [facility]    PTCA      Shunt 10/01--Stent 11/99    TONSILLECTOMY AND ADENOIDECTOMY      UPPER GASTROINTESTINAL ENDOSCOPY      UPPER GASTROINTESTINAL ENDOSCOPY Left 02/27/2018    EGD BAND LIGATION performed by Prince Beckford MD at [facility]    UPPER GASTROINTESTINAL ENDOSCOPY N/A 04/19/2018    EGD BIOPSY performed by Kev Rodrigues MD at [facility]    UPPER GASTROINTESTINAL ENDOSCOPY N/A 05/05/2023    EGD BAND LIGATION performed by Kev Rodrigues MD at [facility]     Current Medications:   Current Facility-Administered Medications: 0.9 % sodium chloride infusion, , IntraVENous, PRN  aspirin EC tablet 81 mg, 81 mg, Oral, Nightly  acetaminophen (TYLENOL) tablet 650 mg, 650 mg, Oral, Q6H PRN **OR** acetaminophen (TYLENOL) suppository 650 mg, 650 mg, Rectal, Q6H PRN  [START ON 3/1/2025] acetaminophen (TYLENOL) tablet 1,000 mg, 1,000 mg,

## 2025-03-02 LAB
GLUCOSE BLD STRIP.AUTO-MCNC: 153 MG/DL (ref 70–108)
GLUCOSE BLD STRIP.AUTO-MCNC: 168 MG/DL (ref 70–108)
GLUCOSE BLD STRIP.AUTO-MCNC: 195 MG/DL (ref 70–108)
GLUCOSE BLD STRIP.AUTO-MCNC: 212 MG/DL (ref 70–108)

## 2025-03-02 PROCEDURE — 6370000000 HC RX 637 (ALT 250 FOR IP): Performed by: FAMILY MEDICINE

## 2025-03-02 PROCEDURE — 6360000002 HC RX W HCPCS: Performed by: STUDENT IN AN ORGANIZED HEALTH CARE EDUCATION/TRAINING PROGRAM

## 2025-03-02 PROCEDURE — 1180000000 HC REHAB R&B

## 2025-03-02 PROCEDURE — 6370000000 HC RX 637 (ALT 250 FOR IP): Performed by: STUDENT IN AN ORGANIZED HEALTH CARE EDUCATION/TRAINING PROGRAM

## 2025-03-02 PROCEDURE — 6370000000 HC RX 637 (ALT 250 FOR IP): Performed by: PHYSICAL MEDICINE & REHABILITATION

## 2025-03-02 PROCEDURE — 82948 REAGENT STRIP/BLOOD GLUCOSE: CPT

## 2025-03-02 RX ADMIN — METFORMIN HYDROCHLORIDE 1000 MG: 500 TABLET, EXTENDED RELEASE ORAL at 18:37

## 2025-03-02 RX ADMIN — OXYCODONE HYDROCHLORIDE 5 MG: 5 TABLET ORAL at 09:26

## 2025-03-02 RX ADMIN — CYCLOBENZAPRINE 5 MG: 10 TABLET, FILM COATED ORAL at 15:40

## 2025-03-02 RX ADMIN — CYCLOBENZAPRINE 5 MG: 10 TABLET, FILM COATED ORAL at 23:43

## 2025-03-02 RX ADMIN — LISINOPRIL 20 MG: 20 TABLET ORAL at 09:26

## 2025-03-02 RX ADMIN — Medication 6 MG: at 20:14

## 2025-03-02 RX ADMIN — PANTOPRAZOLE SODIUM 40 MG: 40 TABLET, DELAYED RELEASE ORAL at 09:26

## 2025-03-02 RX ADMIN — PANTOPRAZOLE SODIUM 40 MG: 40 TABLET, DELAYED RELEASE ORAL at 20:14

## 2025-03-02 RX ADMIN — ATENOLOL 25 MG: 25 TABLET ORAL at 20:13

## 2025-03-02 RX ADMIN — INSULIN LISPRO 4 UNITS: 100 INJECTION, SOLUTION INTRAVENOUS; SUBCUTANEOUS at 20:17

## 2025-03-02 RX ADMIN — ENOXAPARIN SODIUM 40 MG: 100 INJECTION SUBCUTANEOUS at 09:26

## 2025-03-02 RX ADMIN — PIOGLITAZONE 30 MG: 30 TABLET ORAL at 09:26

## 2025-03-02 RX ADMIN — CALCIUM 500 MG: 500 TABLET ORAL at 18:37

## 2025-03-02 RX ADMIN — ASPIRIN 81 MG: 81 TABLET, COATED ORAL at 20:13

## 2025-03-02 RX ADMIN — ESCITALOPRAM OXALATE 20 MG: 20 TABLET ORAL at 20:13

## 2025-03-02 RX ADMIN — METFORMIN HYDROCHLORIDE 1000 MG: 500 TABLET, EXTENDED RELEASE ORAL at 09:26

## 2025-03-02 RX ADMIN — ACETAMINOPHEN 650 MG: 325 TABLET ORAL at 21:29

## 2025-03-02 RX ADMIN — ATORVASTATIN CALCIUM 40 MG: 40 TABLET, FILM COATED ORAL at 20:13

## 2025-03-02 RX ADMIN — INSULIN LISPRO 4 UNITS: 100 INJECTION, SOLUTION INTRAVENOUS; SUBCUTANEOUS at 12:43

## 2025-03-02 RX ADMIN — CALCIUM 500 MG: 500 TABLET ORAL at 09:26

## 2025-03-02 RX ADMIN — LISINOPRIL 20 MG: 20 TABLET ORAL at 20:13

## 2025-03-02 RX ADMIN — CYCLOBENZAPRINE 5 MG: 10 TABLET, FILM COATED ORAL at 03:51

## 2025-03-02 ASSESSMENT — PAIN SCALES - GENERAL
PAINLEVEL_OUTOF10: 7
PAINLEVEL_OUTOF10: 7
PAINLEVEL_OUTOF10: 2
PAINLEVEL_OUTOF10: 5

## 2025-03-02 ASSESSMENT — PAIN SCALES - WONG BAKER
WONGBAKER_NUMERICALRESPONSE: NO HURT
WONGBAKER_NUMERICALRESPONSE: HURTS A LITTLE BIT
WONGBAKER_NUMERICALRESPONSE: NO HURT

## 2025-03-02 ASSESSMENT — PAIN DESCRIPTION - LOCATION
LOCATION: BACK

## 2025-03-02 ASSESSMENT — PAIN DESCRIPTION - ORIENTATION
ORIENTATION: MID;LOWER
ORIENTATION: LOWER
ORIENTATION: LOWER

## 2025-03-02 ASSESSMENT — PAIN - FUNCTIONAL ASSESSMENT: PAIN_FUNCTIONAL_ASSESSMENT: ACTIVITIES ARE NOT PREVENTED

## 2025-03-02 ASSESSMENT — PAIN DESCRIPTION - DESCRIPTORS
DESCRIPTORS: ACHING;DISCOMFORT;SPASM
DESCRIPTORS: ACHING
DESCRIPTORS: ACHING;DISCOMFORT

## 2025-03-02 NOTE — CARE COORDINATION
Pt stated she enjoyed therapy today.Voiced the muscle relaxer helped her left hip and leg from hurting too.She requested to try and use her walker and gait belt to walk ,will relay that message to therapy for eval to do that as tolerated.Call light in reach.

## 2025-03-02 NOTE — CARE COORDINATION
Patient calm and cooperative this shift.  Transferring using brittany stedy d/t leg weakness and pain with positional changes.   at bedside for majority of shift.  No questions/concerns voiced at this time.

## 2025-03-03 LAB
GLUCOSE BLD STRIP.AUTO-MCNC: 148 MG/DL (ref 70–108)
GLUCOSE BLD STRIP.AUTO-MCNC: 167 MG/DL (ref 70–108)
GLUCOSE BLD STRIP.AUTO-MCNC: 175 MG/DL (ref 70–108)
GLUCOSE BLD STRIP.AUTO-MCNC: 229 MG/DL (ref 70–108)

## 2025-03-03 PROCEDURE — 6370000000 HC RX 637 (ALT 250 FOR IP): Performed by: STUDENT IN AN ORGANIZED HEALTH CARE EDUCATION/TRAINING PROGRAM

## 2025-03-03 PROCEDURE — 82948 REAGENT STRIP/BLOOD GLUCOSE: CPT

## 2025-03-03 PROCEDURE — 1180000000 HC REHAB R&B

## 2025-03-03 PROCEDURE — 97530 THERAPEUTIC ACTIVITIES: CPT

## 2025-03-03 PROCEDURE — 6360000002 HC RX W HCPCS: Performed by: STUDENT IN AN ORGANIZED HEALTH CARE EDUCATION/TRAINING PROGRAM

## 2025-03-03 PROCEDURE — 97535 SELF CARE MNGMENT TRAINING: CPT

## 2025-03-03 PROCEDURE — 99232 SBSQ HOSP IP/OBS MODERATE 35: CPT | Performed by: PHYSICAL MEDICINE & REHABILITATION

## 2025-03-03 PROCEDURE — 97110 THERAPEUTIC EXERCISES: CPT

## 2025-03-03 PROCEDURE — 6370000000 HC RX 637 (ALT 250 FOR IP): Performed by: FAMILY MEDICINE

## 2025-03-03 PROCEDURE — 6370000000 HC RX 637 (ALT 250 FOR IP): Performed by: PHYSICAL MEDICINE & REHABILITATION

## 2025-03-03 PROCEDURE — 97112 NEUROMUSCULAR REEDUCATION: CPT

## 2025-03-03 RX ORDER — GLIPIZIDE 10 MG/1
10 TABLET ORAL
Status: DISCONTINUED | OUTPATIENT
Start: 2025-03-04 | End: 2025-03-12 | Stop reason: HOSPADM

## 2025-03-03 RX ORDER — LEVOTHYROXINE SODIUM 50 UG/1
50 TABLET ORAL DAILY
Status: DISCONTINUED | OUTPATIENT
Start: 2025-03-03 | End: 2025-03-12 | Stop reason: HOSPADM

## 2025-03-03 RX ORDER — GLIPIZIDE 5 MG/1
5 TABLET ORAL
Status: DISCONTINUED | OUTPATIENT
Start: 2025-03-04 | End: 2025-03-12 | Stop reason: HOSPADM

## 2025-03-03 RX ADMIN — CALCIUM 500 MG: 500 TABLET ORAL at 16:48

## 2025-03-03 RX ADMIN — LISINOPRIL 20 MG: 20 TABLET ORAL at 09:11

## 2025-03-03 RX ADMIN — PIOGLITAZONE 30 MG: 30 TABLET ORAL at 09:11

## 2025-03-03 RX ADMIN — ESCITALOPRAM OXALATE 20 MG: 20 TABLET ORAL at 20:22

## 2025-03-03 RX ADMIN — INSULIN LISPRO 4 UNITS: 100 INJECTION, SOLUTION INTRAVENOUS; SUBCUTANEOUS at 13:16

## 2025-03-03 RX ADMIN — METFORMIN HYDROCHLORIDE 1000 MG: 500 TABLET, EXTENDED RELEASE ORAL at 09:11

## 2025-03-03 RX ADMIN — Medication 6 MG: at 20:22

## 2025-03-03 RX ADMIN — METFORMIN HYDROCHLORIDE 1000 MG: 500 TABLET, EXTENDED RELEASE ORAL at 16:48

## 2025-03-03 RX ADMIN — CALCIUM 500 MG: 500 TABLET ORAL at 09:11

## 2025-03-03 RX ADMIN — LEVOTHYROXINE SODIUM 50 MCG: 0.05 TABLET ORAL at 05:43

## 2025-03-03 RX ADMIN — ATENOLOL 25 MG: 25 TABLET ORAL at 20:22

## 2025-03-03 RX ADMIN — OXYCODONE HYDROCHLORIDE 5 MG: 5 TABLET ORAL at 11:04

## 2025-03-03 RX ADMIN — LISINOPRIL 20 MG: 20 TABLET ORAL at 20:22

## 2025-03-03 RX ADMIN — PANTOPRAZOLE SODIUM 40 MG: 40 TABLET, DELAYED RELEASE ORAL at 09:11

## 2025-03-03 RX ADMIN — PANTOPRAZOLE SODIUM 40 MG: 40 TABLET, DELAYED RELEASE ORAL at 20:22

## 2025-03-03 RX ADMIN — ASPIRIN 81 MG: 81 TABLET, COATED ORAL at 20:22

## 2025-03-03 RX ADMIN — ATORVASTATIN CALCIUM 40 MG: 40 TABLET, FILM COATED ORAL at 20:22

## 2025-03-03 RX ADMIN — ENOXAPARIN SODIUM 40 MG: 100 INJECTION SUBCUTANEOUS at 09:10

## 2025-03-03 ASSESSMENT — PAIN DESCRIPTION - DESCRIPTORS: DESCRIPTORS: ACHING;SPASM

## 2025-03-03 ASSESSMENT — PAIN SCALES - GENERAL
PAINLEVEL_OUTOF10: 3
PAINLEVEL_OUTOF10: 3
PAINLEVEL_OUTOF10: 8

## 2025-03-03 ASSESSMENT — PAIN DESCRIPTION - ORIENTATION: ORIENTATION: LOWER

## 2025-03-03 ASSESSMENT — PAIN DESCRIPTION - LOCATION: LOCATION: BACK

## 2025-03-03 NOTE — CARE COORDINATION
Up to the bathroom with one assist and the Marguerite Steady. Alert and oriented. States she has no pain until she gets up. Prn Flexeril given for Back pain/spasms

## 2025-03-03 NOTE — CARE COORDINATION
Patient up early with therapy. 1 assist with brittany stecipriano and back brace when up. Left hip painful Dr padilla wanted to try heat to area and that has helped her hip to feel loose she says. Oxy q4 prn has helped some also. Patient is resting in bed with  at bedside. Call light in reach.    Patient educated on how to use incentive spirometer. Patient verbalized understanding and demonstrated proper use. Emphasized importance and usage of device, with coughing and deep breathing every 2 hours while awake.

## 2025-03-04 LAB
ALBUMIN SERPL BCG-MCNC: 3.1 G/DL (ref 3.4–4.9)
ALP SERPL-CCNC: 124 U/L (ref 35–104)
ALT SERPL W/O P-5'-P-CCNC: 40 U/L (ref 10–35)
AST SERPL-CCNC: 40 U/L (ref 10–35)
BILIRUB CONJ SERPL-MCNC: 0.2 MG/DL (ref 0–0.2)
BILIRUB SERPL-MCNC: 0.4 MG/DL (ref 0.3–1.2)
GLUCOSE BLD STRIP.AUTO-MCNC: 121 MG/DL (ref 70–108)
GLUCOSE BLD STRIP.AUTO-MCNC: 153 MG/DL (ref 70–108)
GLUCOSE BLD STRIP.AUTO-MCNC: 154 MG/DL (ref 70–108)
GLUCOSE BLD STRIP.AUTO-MCNC: 168 MG/DL (ref 70–108)
PROT SERPL-MCNC: 6.1 G/DL (ref 6.4–8.3)

## 2025-03-04 PROCEDURE — 97116 GAIT TRAINING THERAPY: CPT

## 2025-03-04 PROCEDURE — 97530 THERAPEUTIC ACTIVITIES: CPT

## 2025-03-04 PROCEDURE — 97535 SELF CARE MNGMENT TRAINING: CPT

## 2025-03-04 PROCEDURE — 6370000000 HC RX 637 (ALT 250 FOR IP): Performed by: STUDENT IN AN ORGANIZED HEALTH CARE EDUCATION/TRAINING PROGRAM

## 2025-03-04 PROCEDURE — 80076 HEPATIC FUNCTION PANEL: CPT

## 2025-03-04 PROCEDURE — 82948 REAGENT STRIP/BLOOD GLUCOSE: CPT

## 2025-03-04 PROCEDURE — 97110 THERAPEUTIC EXERCISES: CPT

## 2025-03-04 PROCEDURE — 6360000002 HC RX W HCPCS: Performed by: STUDENT IN AN ORGANIZED HEALTH CARE EDUCATION/TRAINING PROGRAM

## 2025-03-04 PROCEDURE — 6370000000 HC RX 637 (ALT 250 FOR IP): Performed by: FAMILY MEDICINE

## 2025-03-04 PROCEDURE — 99232 SBSQ HOSP IP/OBS MODERATE 35: CPT | Performed by: PHYSICAL MEDICINE & REHABILITATION

## 2025-03-04 PROCEDURE — 1180000000 HC REHAB R&B

## 2025-03-04 PROCEDURE — 36415 COLL VENOUS BLD VENIPUNCTURE: CPT

## 2025-03-04 PROCEDURE — 6370000000 HC RX 637 (ALT 250 FOR IP): Performed by: PHYSICAL MEDICINE & REHABILITATION

## 2025-03-04 RX ADMIN — TRAZODONE HYDROCHLORIDE 50 MG: 50 TABLET ORAL at 22:51

## 2025-03-04 RX ADMIN — CALCIUM 500 MG: 500 TABLET ORAL at 17:24

## 2025-03-04 RX ADMIN — ATENOLOL 25 MG: 25 TABLET ORAL at 22:47

## 2025-03-04 RX ADMIN — METFORMIN HYDROCHLORIDE 1000 MG: 500 TABLET, EXTENDED RELEASE ORAL at 17:24

## 2025-03-04 RX ADMIN — ENOXAPARIN SODIUM 40 MG: 100 INJECTION SUBCUTANEOUS at 10:08

## 2025-03-04 RX ADMIN — CALCIUM 500 MG: 500 TABLET ORAL at 10:11

## 2025-03-04 RX ADMIN — PANTOPRAZOLE SODIUM 40 MG: 40 TABLET, DELAYED RELEASE ORAL at 22:46

## 2025-03-04 RX ADMIN — ACETAMINOPHEN 650 MG: 325 TABLET ORAL at 01:27

## 2025-03-04 RX ADMIN — ACETAMINOPHEN 650 MG: 325 TABLET ORAL at 16:38

## 2025-03-04 RX ADMIN — GLIPIZIDE 5 MG: 5 TABLET ORAL at 17:24

## 2025-03-04 RX ADMIN — LISINOPRIL 20 MG: 20 TABLET ORAL at 10:10

## 2025-03-04 RX ADMIN — LEVOTHYROXINE SODIUM 50 MCG: 0.05 TABLET ORAL at 06:27

## 2025-03-04 RX ADMIN — ATORVASTATIN CALCIUM 40 MG: 40 TABLET, FILM COATED ORAL at 22:47

## 2025-03-04 RX ADMIN — ASPIRIN 81 MG: 81 TABLET, COATED ORAL at 22:46

## 2025-03-04 RX ADMIN — PANTOPRAZOLE SODIUM 40 MG: 40 TABLET, DELAYED RELEASE ORAL at 10:10

## 2025-03-04 RX ADMIN — Medication 6 MG: at 22:46

## 2025-03-04 RX ADMIN — GLIPIZIDE 10 MG: 10 TABLET ORAL at 06:25

## 2025-03-04 RX ADMIN — OXYCODONE HYDROCHLORIDE 5 MG: 5 TABLET ORAL at 06:25

## 2025-03-04 RX ADMIN — ESCITALOPRAM OXALATE 20 MG: 20 TABLET ORAL at 22:47

## 2025-03-04 RX ADMIN — LISINOPRIL 20 MG: 20 TABLET ORAL at 22:46

## 2025-03-04 RX ADMIN — CYCLOBENZAPRINE 5 MG: 10 TABLET, FILM COATED ORAL at 10:20

## 2025-03-04 RX ADMIN — METFORMIN HYDROCHLORIDE 1000 MG: 500 TABLET, EXTENDED RELEASE ORAL at 10:10

## 2025-03-04 ASSESSMENT — PAIN DESCRIPTION - ORIENTATION
ORIENTATION: LEFT
ORIENTATION: MID

## 2025-03-04 ASSESSMENT — PAIN - FUNCTIONAL ASSESSMENT: PAIN_FUNCTIONAL_ASSESSMENT: PREVENTS OR INTERFERES SOME ACTIVE ACTIVITIES AND ADLS

## 2025-03-04 ASSESSMENT — PAIN DESCRIPTION - DESCRIPTORS
DESCRIPTORS: ACHING;THROBBING
DESCRIPTORS: SHOOTING;THROBBING

## 2025-03-04 ASSESSMENT — PAIN SCALES - GENERAL
PAINLEVEL_OUTOF10: 0
PAINLEVEL_OUTOF10: 4
PAINLEVEL_OUTOF10: 1
PAINLEVEL_OUTOF10: 8
PAINLEVEL_OUTOF10: 3

## 2025-03-04 ASSESSMENT — PAIN DESCRIPTION - LOCATION
LOCATION: LEG
LOCATION: HEAD

## 2025-03-04 NOTE — CARE COORDINATION
Patient would like Oxy given before PT she states it helps her pain so she can participate without pain. Also c/o of a headache this afternoon, tylenol given with effect. Patient participated well with therapies today. Good day. She is now resting in bed with  at bedside. Call light in reach.    Patient educated on how to use incentive spirometer. Patient verbalized understanding and demonstrated proper use. Emphasized importance and usage of device, with coughing and deep breathing every 2 hours while awake.

## 2025-03-05 LAB
GLUCOSE BLD STRIP.AUTO-MCNC: 103 MG/DL (ref 70–108)
GLUCOSE BLD STRIP.AUTO-MCNC: 145 MG/DL (ref 70–108)
GLUCOSE BLD STRIP.AUTO-MCNC: 166 MG/DL (ref 70–108)
GLUCOSE BLD STRIP.AUTO-MCNC: 170 MG/DL (ref 70–108)

## 2025-03-05 PROCEDURE — 99232 SBSQ HOSP IP/OBS MODERATE 35: CPT | Performed by: PHYSICAL MEDICINE & REHABILITATION

## 2025-03-05 PROCEDURE — 6360000002 HC RX W HCPCS: Performed by: STUDENT IN AN ORGANIZED HEALTH CARE EDUCATION/TRAINING PROGRAM

## 2025-03-05 PROCEDURE — 82948 REAGENT STRIP/BLOOD GLUCOSE: CPT

## 2025-03-05 PROCEDURE — 97535 SELF CARE MNGMENT TRAINING: CPT

## 2025-03-05 PROCEDURE — 97110 THERAPEUTIC EXERCISES: CPT

## 2025-03-05 PROCEDURE — 6370000000 HC RX 637 (ALT 250 FOR IP): Performed by: PHYSICAL MEDICINE & REHABILITATION

## 2025-03-05 PROCEDURE — 1180000000 HC REHAB R&B

## 2025-03-05 PROCEDURE — 97530 THERAPEUTIC ACTIVITIES: CPT

## 2025-03-05 PROCEDURE — 97116 GAIT TRAINING THERAPY: CPT

## 2025-03-05 PROCEDURE — 6370000000 HC RX 637 (ALT 250 FOR IP): Performed by: FAMILY MEDICINE

## 2025-03-05 PROCEDURE — 97112 NEUROMUSCULAR REEDUCATION: CPT

## 2025-03-05 PROCEDURE — 6370000000 HC RX 637 (ALT 250 FOR IP): Performed by: STUDENT IN AN ORGANIZED HEALTH CARE EDUCATION/TRAINING PROGRAM

## 2025-03-05 RX ADMIN — METFORMIN HYDROCHLORIDE 1000 MG: 500 TABLET, EXTENDED RELEASE ORAL at 09:01

## 2025-03-05 RX ADMIN — ESCITALOPRAM OXALATE 20 MG: 20 TABLET ORAL at 20:55

## 2025-03-05 RX ADMIN — Medication 6 MG: at 20:55

## 2025-03-05 RX ADMIN — ACETAMINOPHEN 650 MG: 325 TABLET ORAL at 21:00

## 2025-03-05 RX ADMIN — CYCLOBENZAPRINE 5 MG: 10 TABLET, FILM COATED ORAL at 06:38

## 2025-03-05 RX ADMIN — ACETAMINOPHEN 650 MG: 325 TABLET ORAL at 06:40

## 2025-03-05 RX ADMIN — GLIPIZIDE 10 MG: 10 TABLET ORAL at 07:02

## 2025-03-05 RX ADMIN — LISINOPRIL 20 MG: 20 TABLET ORAL at 09:01

## 2025-03-05 RX ADMIN — CYCLOBENZAPRINE 5 MG: 10 TABLET, FILM COATED ORAL at 16:13

## 2025-03-05 RX ADMIN — CALCIUM 500 MG: 500 TABLET ORAL at 09:01

## 2025-03-05 RX ADMIN — OXYCODONE HYDROCHLORIDE 5 MG: 5 TABLET ORAL at 10:14

## 2025-03-05 RX ADMIN — ATENOLOL 25 MG: 25 TABLET ORAL at 20:55

## 2025-03-05 RX ADMIN — LEVOTHYROXINE SODIUM 50 MCG: 0.05 TABLET ORAL at 07:02

## 2025-03-05 RX ADMIN — ATORVASTATIN CALCIUM 40 MG: 40 TABLET, FILM COATED ORAL at 20:56

## 2025-03-05 RX ADMIN — LISINOPRIL 20 MG: 20 TABLET ORAL at 20:56

## 2025-03-05 RX ADMIN — PANTOPRAZOLE SODIUM 40 MG: 40 TABLET, DELAYED RELEASE ORAL at 09:01

## 2025-03-05 RX ADMIN — CALCIUM 500 MG: 500 TABLET ORAL at 16:13

## 2025-03-05 RX ADMIN — PANTOPRAZOLE SODIUM 40 MG: 40 TABLET, DELAYED RELEASE ORAL at 20:55

## 2025-03-05 RX ADMIN — ENOXAPARIN SODIUM 40 MG: 100 INJECTION SUBCUTANEOUS at 09:01

## 2025-03-05 RX ADMIN — GLIPIZIDE 5 MG: 5 TABLET ORAL at 16:13

## 2025-03-05 RX ADMIN — ASPIRIN 81 MG: 81 TABLET, COATED ORAL at 20:55

## 2025-03-05 RX ADMIN — METFORMIN HYDROCHLORIDE 1000 MG: 500 TABLET, EXTENDED RELEASE ORAL at 16:13

## 2025-03-05 ASSESSMENT — PAIN DESCRIPTION - ORIENTATION
ORIENTATION: LOWER
ORIENTATION: LOWER
ORIENTATION: RIGHT;LEFT

## 2025-03-05 ASSESSMENT — PAIN SCALES - GENERAL
PAINLEVEL_OUTOF10: 2
PAINLEVEL_OUTOF10: 0
PAINLEVEL_OUTOF10: 3
PAINLEVEL_OUTOF10: 2

## 2025-03-05 ASSESSMENT — PAIN DESCRIPTION - DESCRIPTORS
DESCRIPTORS: ACHING
DESCRIPTORS: ACHING;DISCOMFORT
DESCRIPTORS: ACHING

## 2025-03-05 ASSESSMENT — PAIN - FUNCTIONAL ASSESSMENT: PAIN_FUNCTIONAL_ASSESSMENT: PREVENTS OR INTERFERES SOME ACTIVE ACTIVITIES AND ADLS

## 2025-03-05 ASSESSMENT — PAIN DESCRIPTION - LOCATION
LOCATION: BACK
LOCATION: BACK
LOCATION: LEG

## 2025-03-05 ASSESSMENT — PAIN DESCRIPTION - PAIN TYPE: TYPE: ACUTE PAIN

## 2025-03-05 ASSESSMENT — PAIN DESCRIPTION - FREQUENCY: FREQUENCY: INTERMITTENT

## 2025-03-05 ASSESSMENT — PAIN DESCRIPTION - ONSET: ONSET: ON-GOING

## 2025-03-05 NOTE — CARE COORDINATION
Pt alert and oriented,call light in reach.She feels a little better as in getting stronger in her therapies.She refuses incentive spirometer.Will monitor.

## 2025-03-06 LAB
GLUCOSE BLD STRIP.AUTO-MCNC: 136 MG/DL (ref 70–108)
GLUCOSE BLD STRIP.AUTO-MCNC: 152 MG/DL (ref 70–108)
GLUCOSE BLD STRIP.AUTO-MCNC: 155 MG/DL (ref 70–108)

## 2025-03-06 PROCEDURE — 97116 GAIT TRAINING THERAPY: CPT

## 2025-03-06 PROCEDURE — 97535 SELF CARE MNGMENT TRAINING: CPT

## 2025-03-06 PROCEDURE — 6370000000 HC RX 637 (ALT 250 FOR IP): Performed by: PHYSICAL MEDICINE & REHABILITATION

## 2025-03-06 PROCEDURE — 6370000000 HC RX 637 (ALT 250 FOR IP): Performed by: FAMILY MEDICINE

## 2025-03-06 PROCEDURE — 82948 REAGENT STRIP/BLOOD GLUCOSE: CPT

## 2025-03-06 PROCEDURE — 97530 THERAPEUTIC ACTIVITIES: CPT

## 2025-03-06 PROCEDURE — 1180000000 HC REHAB R&B

## 2025-03-06 PROCEDURE — 97110 THERAPEUTIC EXERCISES: CPT

## 2025-03-06 PROCEDURE — 99232 SBSQ HOSP IP/OBS MODERATE 35: CPT | Performed by: PHYSICAL MEDICINE & REHABILITATION

## 2025-03-06 PROCEDURE — 6370000000 HC RX 637 (ALT 250 FOR IP): Performed by: STUDENT IN AN ORGANIZED HEALTH CARE EDUCATION/TRAINING PROGRAM

## 2025-03-06 PROCEDURE — 6360000002 HC RX W HCPCS: Performed by: STUDENT IN AN ORGANIZED HEALTH CARE EDUCATION/TRAINING PROGRAM

## 2025-03-06 RX ORDER — BUTALBITAL, ACETAMINOPHEN AND CAFFEINE 50; 325; 40 MG/1; MG/1; MG/1
1 TABLET ORAL ONCE
Status: COMPLETED | OUTPATIENT
Start: 2025-03-06 | End: 2025-03-06

## 2025-03-06 RX ORDER — BUTALBITAL, ACETAMINOPHEN AND CAFFEINE 50; 325; 40 MG/1; MG/1; MG/1
1 TABLET ORAL EVERY 4 HOURS PRN
Status: DISCONTINUED | OUTPATIENT
Start: 2025-03-06 | End: 2025-03-12 | Stop reason: HOSPADM

## 2025-03-06 RX ADMIN — CALCIUM 500 MG: 500 TABLET ORAL at 09:28

## 2025-03-06 RX ADMIN — LEVOTHYROXINE SODIUM 50 MCG: 0.05 TABLET ORAL at 06:11

## 2025-03-06 RX ADMIN — BUTALBITAL, ACETAMINOPHEN AND CAFFEINE 1 TABLET: 325; 50; 40 TABLET ORAL at 11:07

## 2025-03-06 RX ADMIN — GLIPIZIDE 10 MG: 10 TABLET ORAL at 09:28

## 2025-03-06 RX ADMIN — Medication 6 MG: at 21:35

## 2025-03-06 RX ADMIN — ESCITALOPRAM OXALATE 20 MG: 20 TABLET ORAL at 21:35

## 2025-03-06 RX ADMIN — LISINOPRIL 20 MG: 20 TABLET ORAL at 09:28

## 2025-03-06 RX ADMIN — PANTOPRAZOLE SODIUM 40 MG: 40 TABLET, DELAYED RELEASE ORAL at 09:28

## 2025-03-06 RX ADMIN — METFORMIN HYDROCHLORIDE 1000 MG: 500 TABLET, EXTENDED RELEASE ORAL at 09:28

## 2025-03-06 RX ADMIN — ATORVASTATIN CALCIUM 40 MG: 40 TABLET, FILM COATED ORAL at 21:35

## 2025-03-06 RX ADMIN — METFORMIN HYDROCHLORIDE 1000 MG: 500 TABLET, EXTENDED RELEASE ORAL at 17:45

## 2025-03-06 RX ADMIN — LISINOPRIL 20 MG: 20 TABLET ORAL at 21:35

## 2025-03-06 RX ADMIN — PANTOPRAZOLE SODIUM 40 MG: 40 TABLET, DELAYED RELEASE ORAL at 21:35

## 2025-03-06 RX ADMIN — ENOXAPARIN SODIUM 40 MG: 100 INJECTION SUBCUTANEOUS at 09:28

## 2025-03-06 RX ADMIN — ASPIRIN 81 MG: 81 TABLET, COATED ORAL at 21:35

## 2025-03-06 RX ADMIN — OXYCODONE HYDROCHLORIDE 5 MG: 5 TABLET ORAL at 06:11

## 2025-03-06 RX ADMIN — ACETAMINOPHEN 650 MG: 325 TABLET ORAL at 09:28

## 2025-03-06 RX ADMIN — TRAZODONE HYDROCHLORIDE 50 MG: 50 TABLET ORAL at 21:35

## 2025-03-06 RX ADMIN — GLIPIZIDE 5 MG: 5 TABLET ORAL at 17:45

## 2025-03-06 RX ADMIN — ATENOLOL 25 MG: 25 TABLET ORAL at 21:35

## 2025-03-06 RX ADMIN — CALCIUM 500 MG: 500 TABLET ORAL at 17:45

## 2025-03-06 ASSESSMENT — PAIN DESCRIPTION - LOCATION
LOCATION: HIP
LOCATION: HEAD
LOCATION: HEAD

## 2025-03-06 ASSESSMENT — PAIN DESCRIPTION - ORIENTATION
ORIENTATION: ANTERIOR
ORIENTATION: RIGHT

## 2025-03-06 ASSESSMENT — PAIN SCALES - GENERAL
PAINLEVEL_OUTOF10: 5
PAINLEVEL_OUTOF10: 7
PAINLEVEL_OUTOF10: 5

## 2025-03-06 ASSESSMENT — PAIN DESCRIPTION - DESCRIPTORS
DESCRIPTORS: ACHING
DESCRIPTORS: ACHING;DISCOMFORT
DESCRIPTORS: ACHING

## 2025-03-06 ASSESSMENT — PAIN - FUNCTIONAL ASSESSMENT: PAIN_FUNCTIONAL_ASSESSMENT: ACTIVITIES ARE NOT PREVENTED

## 2025-03-06 NOTE — CARE COORDINATION
Up to the bathroom with 1 assist and the walker. Prn Tylenol given for pain level 2 as patient states she has very little pain until she gets up on her feet. Watching her favorite shows on TV. Has been continent so far this shift.

## 2025-03-07 LAB
GLUCOSE BLD STRIP.AUTO-MCNC: 133 MG/DL (ref 70–108)
GLUCOSE BLD STRIP.AUTO-MCNC: 152 MG/DL (ref 70–108)

## 2025-03-07 PROCEDURE — 6370000000 HC RX 637 (ALT 250 FOR IP): Performed by: STUDENT IN AN ORGANIZED HEALTH CARE EDUCATION/TRAINING PROGRAM

## 2025-03-07 PROCEDURE — 82948 REAGENT STRIP/BLOOD GLUCOSE: CPT

## 2025-03-07 PROCEDURE — 97110 THERAPEUTIC EXERCISES: CPT

## 2025-03-07 PROCEDURE — 6360000002 HC RX W HCPCS: Performed by: STUDENT IN AN ORGANIZED HEALTH CARE EDUCATION/TRAINING PROGRAM

## 2025-03-07 PROCEDURE — 97530 THERAPEUTIC ACTIVITIES: CPT

## 2025-03-07 PROCEDURE — 97535 SELF CARE MNGMENT TRAINING: CPT

## 2025-03-07 PROCEDURE — 6370000000 HC RX 637 (ALT 250 FOR IP): Performed by: PHYSICAL MEDICINE & REHABILITATION

## 2025-03-07 PROCEDURE — 6370000000 HC RX 637 (ALT 250 FOR IP): Performed by: FAMILY MEDICINE

## 2025-03-07 PROCEDURE — 1180000000 HC REHAB R&B

## 2025-03-07 PROCEDURE — 97116 GAIT TRAINING THERAPY: CPT

## 2025-03-07 PROCEDURE — 99232 SBSQ HOSP IP/OBS MODERATE 35: CPT | Performed by: PHYSICAL MEDICINE & REHABILITATION

## 2025-03-07 RX ADMIN — GLIPIZIDE 5 MG: 5 TABLET ORAL at 16:33

## 2025-03-07 RX ADMIN — ATORVASTATIN CALCIUM 40 MG: 40 TABLET, FILM COATED ORAL at 21:13

## 2025-03-07 RX ADMIN — ASPIRIN 81 MG: 81 TABLET, COATED ORAL at 20:21

## 2025-03-07 RX ADMIN — PANTOPRAZOLE SODIUM 40 MG: 40 TABLET, DELAYED RELEASE ORAL at 20:22

## 2025-03-07 RX ADMIN — ACETAMINOPHEN 650 MG: 325 TABLET ORAL at 10:25

## 2025-03-07 RX ADMIN — METFORMIN HYDROCHLORIDE 1000 MG: 500 TABLET, EXTENDED RELEASE ORAL at 16:33

## 2025-03-07 RX ADMIN — LISINOPRIL 20 MG: 20 TABLET ORAL at 20:22

## 2025-03-07 RX ADMIN — PANTOPRAZOLE SODIUM 40 MG: 40 TABLET, DELAYED RELEASE ORAL at 08:55

## 2025-03-07 RX ADMIN — ENOXAPARIN SODIUM 40 MG: 100 INJECTION SUBCUTANEOUS at 08:54

## 2025-03-07 RX ADMIN — ESCITALOPRAM OXALATE 20 MG: 20 TABLET ORAL at 20:22

## 2025-03-07 RX ADMIN — METFORMIN HYDROCHLORIDE 1000 MG: 500 TABLET, EXTENDED RELEASE ORAL at 08:54

## 2025-03-07 RX ADMIN — ATENOLOL 25 MG: 25 TABLET ORAL at 20:21

## 2025-03-07 RX ADMIN — OXYCODONE HYDROCHLORIDE 5 MG: 5 TABLET ORAL at 06:28

## 2025-03-07 RX ADMIN — CALCIUM 500 MG: 500 TABLET ORAL at 08:55

## 2025-03-07 RX ADMIN — ACETAMINOPHEN 650 MG: 325 TABLET ORAL at 23:27

## 2025-03-07 RX ADMIN — LEVOTHYROXINE SODIUM 50 MCG: 0.05 TABLET ORAL at 06:28

## 2025-03-07 RX ADMIN — GLIPIZIDE 10 MG: 10 TABLET ORAL at 06:28

## 2025-03-07 RX ADMIN — Medication 6 MG: at 20:22

## 2025-03-07 RX ADMIN — CALCIUM 500 MG: 500 TABLET ORAL at 18:02

## 2025-03-07 RX ADMIN — LISINOPRIL 20 MG: 20 TABLET ORAL at 08:54

## 2025-03-07 ASSESSMENT — PAIN DESCRIPTION - ORIENTATION
ORIENTATION: LEFT;RIGHT
ORIENTATION: LEFT

## 2025-03-07 ASSESSMENT — PAIN SCALES - GENERAL
PAINLEVEL_OUTOF10: 2
PAINLEVEL_OUTOF10: 7
PAINLEVEL_OUTOF10: 3
PAINLEVEL_OUTOF10: 1

## 2025-03-07 ASSESSMENT — PAIN DESCRIPTION - DESCRIPTORS
DESCRIPTORS: SQUEEZING
DESCRIPTORS: ACHING

## 2025-03-07 ASSESSMENT — PAIN DESCRIPTION - LOCATION
LOCATION: LEG
LOCATION: LEG

## 2025-03-07 ASSESSMENT — PAIN - FUNCTIONAL ASSESSMENT: PAIN_FUNCTIONAL_ASSESSMENT: ACTIVITIES ARE NOT PREVENTED

## 2025-03-07 NOTE — CARE COORDINATION
Patient was lowered to the floor during therapy today with Chelsea. No Injuries. Otherwise a good day. Dr Real discontinues humalog sliding scale. Patient is 1 assist with a walker. CBC & BMP on 03/10/25    Patient educated on how to use incentive spirometer. Patient verbalized understanding and demonstrated proper use. Emphasized importance and usage of device, with coughing and deep breathing every 2 hours while awake.

## 2025-03-08 LAB — GLUCOSE BLD STRIP.AUTO-MCNC: 129 MG/DL (ref 70–108)

## 2025-03-08 PROCEDURE — 82948 REAGENT STRIP/BLOOD GLUCOSE: CPT

## 2025-03-08 PROCEDURE — 6370000000 HC RX 637 (ALT 250 FOR IP): Performed by: PHYSICAL MEDICINE & REHABILITATION

## 2025-03-08 PROCEDURE — 1180000000 HC REHAB R&B

## 2025-03-08 PROCEDURE — 6370000000 HC RX 637 (ALT 250 FOR IP): Performed by: STUDENT IN AN ORGANIZED HEALTH CARE EDUCATION/TRAINING PROGRAM

## 2025-03-08 PROCEDURE — 6370000000 HC RX 637 (ALT 250 FOR IP): Performed by: FAMILY MEDICINE

## 2025-03-08 PROCEDURE — 6360000002 HC RX W HCPCS: Performed by: STUDENT IN AN ORGANIZED HEALTH CARE EDUCATION/TRAINING PROGRAM

## 2025-03-08 RX ADMIN — LOPERAMIDE HYDROCHLORIDE 2 MG: 2 CAPSULE ORAL at 11:32

## 2025-03-08 RX ADMIN — METFORMIN HYDROCHLORIDE 1000 MG: 500 TABLET, EXTENDED RELEASE ORAL at 07:59

## 2025-03-08 RX ADMIN — Medication 6 MG: at 20:01

## 2025-03-08 RX ADMIN — CALCIUM 500 MG: 500 TABLET ORAL at 07:59

## 2025-03-08 RX ADMIN — ATENOLOL 25 MG: 25 TABLET ORAL at 20:01

## 2025-03-08 RX ADMIN — ESCITALOPRAM OXALATE 20 MG: 20 TABLET ORAL at 20:01

## 2025-03-08 RX ADMIN — LISINOPRIL 20 MG: 20 TABLET ORAL at 07:59

## 2025-03-08 RX ADMIN — ATORVASTATIN CALCIUM 40 MG: 40 TABLET, FILM COATED ORAL at 20:01

## 2025-03-08 RX ADMIN — ACETAMINOPHEN 650 MG: 325 TABLET ORAL at 23:07

## 2025-03-08 RX ADMIN — CALCIUM 500 MG: 500 TABLET ORAL at 16:57

## 2025-03-08 RX ADMIN — PANTOPRAZOLE SODIUM 40 MG: 40 TABLET, DELAYED RELEASE ORAL at 07:59

## 2025-03-08 RX ADMIN — ONDANSETRON 4 MG: 4 TABLET, ORALLY DISINTEGRATING ORAL at 11:33

## 2025-03-08 RX ADMIN — GLIPIZIDE 10 MG: 10 TABLET ORAL at 06:14

## 2025-03-08 RX ADMIN — PANTOPRAZOLE SODIUM 40 MG: 40 TABLET, DELAYED RELEASE ORAL at 20:01

## 2025-03-08 RX ADMIN — OXYCODONE HYDROCHLORIDE 5 MG: 5 TABLET ORAL at 09:46

## 2025-03-08 RX ADMIN — ENOXAPARIN SODIUM 40 MG: 100 INJECTION SUBCUTANEOUS at 07:59

## 2025-03-08 RX ADMIN — LEVOTHYROXINE SODIUM 50 MCG: 0.05 TABLET ORAL at 06:14

## 2025-03-08 RX ADMIN — ASPIRIN 81 MG: 81 TABLET, COATED ORAL at 20:01

## 2025-03-08 RX ADMIN — LISINOPRIL 20 MG: 20 TABLET ORAL at 20:01

## 2025-03-08 RX ADMIN — GLIPIZIDE 5 MG: 5 TABLET ORAL at 16:57

## 2025-03-08 RX ADMIN — METFORMIN HYDROCHLORIDE 1000 MG: 500 TABLET, EXTENDED RELEASE ORAL at 16:57

## 2025-03-08 ASSESSMENT — PAIN SCALES - GENERAL
PAINLEVEL_OUTOF10: 0
PAINLEVEL_OUTOF10: 2
PAINLEVEL_OUTOF10: 5
PAINLEVEL_OUTOF10: 3
PAINLEVEL_OUTOF10: 3

## 2025-03-08 ASSESSMENT — PAIN DESCRIPTION - DESCRIPTORS
DESCRIPTORS: ACHING;DULL;DISCOMFORT
DESCRIPTORS: ACHING

## 2025-03-08 ASSESSMENT — PAIN DESCRIPTION - ORIENTATION
ORIENTATION: MID
ORIENTATION: LOWER

## 2025-03-08 ASSESSMENT — PAIN DESCRIPTION - LOCATION: LOCATION: BACK

## 2025-03-08 NOTE — CARE COORDINATION
Pt is alert and oriented. Goes to the bathroom with 1 assist via walker plus brace. She had multiple bowel movement during the shift. Continent of bowel and bladder. No other concern voiced out by the patient. Electronically signed by Silas Fuentes RN on 3/8/2025 at 3:29 AM

## 2025-03-09 LAB — GLUCOSE BLD STRIP.AUTO-MCNC: 117 MG/DL (ref 70–108)

## 2025-03-09 PROCEDURE — 97530 THERAPEUTIC ACTIVITIES: CPT

## 2025-03-09 PROCEDURE — 6370000000 HC RX 637 (ALT 250 FOR IP): Performed by: PHYSICAL MEDICINE & REHABILITATION

## 2025-03-09 PROCEDURE — 97116 GAIT TRAINING THERAPY: CPT

## 2025-03-09 PROCEDURE — 82948 REAGENT STRIP/BLOOD GLUCOSE: CPT

## 2025-03-09 PROCEDURE — 6370000000 HC RX 637 (ALT 250 FOR IP): Performed by: STUDENT IN AN ORGANIZED HEALTH CARE EDUCATION/TRAINING PROGRAM

## 2025-03-09 PROCEDURE — 6370000000 HC RX 637 (ALT 250 FOR IP): Performed by: FAMILY MEDICINE

## 2025-03-09 PROCEDURE — 1180000000 HC REHAB R&B

## 2025-03-09 PROCEDURE — 97535 SELF CARE MNGMENT TRAINING: CPT

## 2025-03-09 PROCEDURE — 6360000002 HC RX W HCPCS: Performed by: STUDENT IN AN ORGANIZED HEALTH CARE EDUCATION/TRAINING PROGRAM

## 2025-03-09 RX ADMIN — PANTOPRAZOLE SODIUM 40 MG: 40 TABLET, DELAYED RELEASE ORAL at 07:39

## 2025-03-09 RX ADMIN — ATORVASTATIN CALCIUM 40 MG: 40 TABLET, FILM COATED ORAL at 19:41

## 2025-03-09 RX ADMIN — CALCIUM 500 MG: 500 TABLET ORAL at 16:40

## 2025-03-09 RX ADMIN — METFORMIN HYDROCHLORIDE 1000 MG: 500 TABLET, EXTENDED RELEASE ORAL at 16:39

## 2025-03-09 RX ADMIN — GLIPIZIDE 5 MG: 5 TABLET ORAL at 16:39

## 2025-03-09 RX ADMIN — GLIPIZIDE 10 MG: 10 TABLET ORAL at 06:20

## 2025-03-09 RX ADMIN — OXYCODONE HYDROCHLORIDE 5 MG: 5 TABLET ORAL at 09:00

## 2025-03-09 RX ADMIN — LISINOPRIL 20 MG: 20 TABLET ORAL at 07:39

## 2025-03-09 RX ADMIN — ESCITALOPRAM OXALATE 20 MG: 20 TABLET ORAL at 19:40

## 2025-03-09 RX ADMIN — PANTOPRAZOLE SODIUM 40 MG: 40 TABLET, DELAYED RELEASE ORAL at 19:40

## 2025-03-09 RX ADMIN — ASPIRIN 81 MG: 81 TABLET, COATED ORAL at 19:41

## 2025-03-09 RX ADMIN — Medication 6 MG: at 19:42

## 2025-03-09 RX ADMIN — LISINOPRIL 20 MG: 20 TABLET ORAL at 19:41

## 2025-03-09 RX ADMIN — ENOXAPARIN SODIUM 40 MG: 100 INJECTION SUBCUTANEOUS at 07:39

## 2025-03-09 RX ADMIN — ACETAMINOPHEN 650 MG: 325 TABLET ORAL at 16:40

## 2025-03-09 RX ADMIN — CALCIUM 500 MG: 500 TABLET ORAL at 07:39

## 2025-03-09 RX ADMIN — LEVOTHYROXINE SODIUM 50 MCG: 0.05 TABLET ORAL at 06:20

## 2025-03-09 RX ADMIN — ATENOLOL 25 MG: 25 TABLET ORAL at 19:41

## 2025-03-09 RX ADMIN — METFORMIN HYDROCHLORIDE 1000 MG: 500 TABLET, EXTENDED RELEASE ORAL at 07:39

## 2025-03-09 ASSESSMENT — PAIN DESCRIPTION - ORIENTATION
ORIENTATION: LOWER
ORIENTATION: LOWER

## 2025-03-09 ASSESSMENT — PAIN SCALES - GENERAL
PAINLEVEL_OUTOF10: 0
PAINLEVEL_OUTOF10: 5
PAINLEVEL_OUTOF10: 3
PAINLEVEL_OUTOF10: 2
PAINLEVEL_OUTOF10: 3

## 2025-03-09 ASSESSMENT — PAIN DESCRIPTION - LOCATION
LOCATION: BACK
LOCATION: BACK

## 2025-03-09 ASSESSMENT — PAIN DESCRIPTION - DESCRIPTORS
DESCRIPTORS: DISCOMFORT
DESCRIPTORS: ACHING;SORE

## 2025-03-09 NOTE — CARE COORDINATION
Kim Amaya was evaluated today and a DME order was entered for a wheeled walker because she requires this to successfully complete daily living tasks of eating, bathing, toileting, personal cares, ambulating, grooming, hygiene, dressing upper body, dressing lower body, meal preparation, and taking own medications.  A wheeled walker is necessary due to the patient's unsteady gait, upper body weakness, and inability to  an ambulation device; and she can ambulate only by pushing a walker instead of a lesser assistive device such as a cane, crutch, or standard walker.  The need for this equipment was discussed with the patient and she understands and is in agreement.     Madeline Manrique PT, DPT

## 2025-03-09 NOTE — CARE COORDINATION
Patient coordinated well with NOD. Up with 1 assist via walker with brace. Take her pills whole. Uneventful this shift. No other concern voiced by the patient. VS within normal limits. Electronically signed by Silas Fuentes RN on 3/9/2025 at 3:38 AM

## 2025-03-10 LAB
ALBUMIN SERPL BCG-MCNC: 3.1 G/DL (ref 3.4–4.9)
ALP SERPL-CCNC: 125 U/L (ref 35–104)
ALT SERPL W/O P-5'-P-CCNC: 27 U/L (ref 10–35)
ANION GAP SERPL CALC-SCNC: 13 MEQ/L (ref 8–16)
ANISOCYTOSIS BLD QL SMEAR: PRESENT
AST SERPL-CCNC: 37 U/L (ref 10–35)
BASOPHILS ABSOLUTE: 0 THOU/MM3 (ref 0–0.1)
BASOPHILS NFR BLD AUTO: 0.4 %
BILIRUB SERPL-MCNC: 0.6 MG/DL (ref 0.3–1.2)
BUN SERPL-MCNC: 9 MG/DL (ref 8–23)
CALCIUM SERPL-MCNC: 8.6 MG/DL (ref 8.8–10.2)
CHLORIDE SERPL-SCNC: 99 MEQ/L (ref 98–111)
CO2 SERPL-SCNC: 25 MEQ/L (ref 22–29)
CREAT SERPL-MCNC: 0.5 MG/DL (ref 0.5–0.9)
DACROCYTES: ABNORMAL
DEPRECATED RDW RBC AUTO: 67.3 FL (ref 35–45)
ELLIPTOCYTES: ABNORMAL
EOSINOPHIL NFR BLD AUTO: 6.2 %
EOSINOPHILS ABSOLUTE: 0.3 THOU/MM3 (ref 0–0.4)
ERYTHROCYTE [DISTWIDTH] IN BLOOD BY AUTOMATED COUNT: 19.8 % (ref 11.5–14.5)
GFR SERPL CREATININE-BSD FRML MDRD: > 90 ML/MIN/1.73M2
GLUCOSE BLD STRIP.AUTO-MCNC: 115 MG/DL (ref 70–108)
GLUCOSE SERPL-MCNC: 137 MG/DL (ref 74–109)
HCT VFR BLD AUTO: 36 % (ref 37–47)
HGB BLD-MCNC: 11.1 GM/DL (ref 12–16)
IMM GRANULOCYTES # BLD AUTO: 0.02 THOU/MM3 (ref 0–0.07)
IMM GRANULOCYTES NFR BLD AUTO: 0.4 %
LYMPHOCYTES ABSOLUTE: 1.2 THOU/MM3 (ref 1–4.8)
LYMPHOCYTES NFR BLD AUTO: 24.6 %
MCH RBC QN AUTO: 28.7 PG (ref 26–33)
MCHC RBC AUTO-ENTMCNC: 30.8 GM/DL (ref 32.2–35.5)
MCV RBC AUTO: 93 FL (ref 81–99)
MONOCYTES ABSOLUTE: 0.6 THOU/MM3 (ref 0.4–1.3)
MONOCYTES NFR BLD AUTO: 12 %
NEUTROPHILS ABSOLUTE: 2.7 THOU/MM3 (ref 1.8–7.7)
NEUTROPHILS NFR BLD AUTO: 56.4 %
NRBC BLD AUTO-RTO: 0 /100 WBC
PLATELET # BLD AUTO: 90 THOU/MM3 (ref 130–400)
PLATELET BLD QL SMEAR: ABNORMAL
PMV BLD AUTO: 9.8 FL (ref 9.4–12.4)
POIKILOCYTES: ABNORMAL
POTASSIUM SERPL-SCNC: 3.5 MEQ/L (ref 3.5–5.2)
PROT SERPL-MCNC: 6.1 G/DL (ref 6.4–8.3)
RBC # BLD AUTO: 3.87 MILL/MM3 (ref 4.2–5.4)
SCAN OF BLOOD SMEAR: NORMAL
SODIUM SERPL-SCNC: 137 MEQ/L (ref 135–145)
WBC # BLD AUTO: 4.7 THOU/MM3 (ref 4.8–10.8)

## 2025-03-10 PROCEDURE — 97530 THERAPEUTIC ACTIVITIES: CPT

## 2025-03-10 PROCEDURE — 36415 COLL VENOUS BLD VENIPUNCTURE: CPT

## 2025-03-10 PROCEDURE — 97535 SELF CARE MNGMENT TRAINING: CPT

## 2025-03-10 PROCEDURE — 97112 NEUROMUSCULAR REEDUCATION: CPT

## 2025-03-10 PROCEDURE — 6370000000 HC RX 637 (ALT 250 FOR IP): Performed by: PHYSICAL MEDICINE & REHABILITATION

## 2025-03-10 PROCEDURE — 6370000000 HC RX 637 (ALT 250 FOR IP): Performed by: STUDENT IN AN ORGANIZED HEALTH CARE EDUCATION/TRAINING PROGRAM

## 2025-03-10 PROCEDURE — 99232 SBSQ HOSP IP/OBS MODERATE 35: CPT | Performed by: PHYSICAL MEDICINE & REHABILITATION

## 2025-03-10 PROCEDURE — 97110 THERAPEUTIC EXERCISES: CPT

## 2025-03-10 PROCEDURE — 82948 REAGENT STRIP/BLOOD GLUCOSE: CPT

## 2025-03-10 PROCEDURE — 1180000000 HC REHAB R&B

## 2025-03-10 PROCEDURE — 85025 COMPLETE CBC W/AUTO DIFF WBC: CPT

## 2025-03-10 PROCEDURE — 6360000002 HC RX W HCPCS: Performed by: STUDENT IN AN ORGANIZED HEALTH CARE EDUCATION/TRAINING PROGRAM

## 2025-03-10 PROCEDURE — 6370000000 HC RX 637 (ALT 250 FOR IP): Performed by: FAMILY MEDICINE

## 2025-03-10 PROCEDURE — 80053 COMPREHEN METABOLIC PANEL: CPT

## 2025-03-10 PROCEDURE — 97116 GAIT TRAINING THERAPY: CPT

## 2025-03-10 RX ADMIN — PANTOPRAZOLE SODIUM 40 MG: 40 TABLET, DELAYED RELEASE ORAL at 08:13

## 2025-03-10 RX ADMIN — ACETAMINOPHEN 650 MG: 325 TABLET ORAL at 13:17

## 2025-03-10 RX ADMIN — CALCIUM 500 MG: 500 TABLET ORAL at 17:32

## 2025-03-10 RX ADMIN — ASPIRIN 81 MG: 81 TABLET, COATED ORAL at 21:12

## 2025-03-10 RX ADMIN — METFORMIN HYDROCHLORIDE 1000 MG: 500 TABLET, EXTENDED RELEASE ORAL at 08:13

## 2025-03-10 RX ADMIN — ESCITALOPRAM OXALATE 20 MG: 20 TABLET ORAL at 21:12

## 2025-03-10 RX ADMIN — PANTOPRAZOLE SODIUM 40 MG: 40 TABLET, DELAYED RELEASE ORAL at 21:12

## 2025-03-10 RX ADMIN — CALCIUM 500 MG: 500 TABLET ORAL at 08:13

## 2025-03-10 RX ADMIN — LEVOTHYROXINE SODIUM 50 MCG: 0.05 TABLET ORAL at 05:54

## 2025-03-10 RX ADMIN — ENOXAPARIN SODIUM 40 MG: 100 INJECTION SUBCUTANEOUS at 08:12

## 2025-03-10 RX ADMIN — LISINOPRIL 20 MG: 20 TABLET ORAL at 21:12

## 2025-03-10 RX ADMIN — Medication 6 MG: at 21:12

## 2025-03-10 RX ADMIN — ATORVASTATIN CALCIUM 40 MG: 40 TABLET, FILM COATED ORAL at 21:12

## 2025-03-10 RX ADMIN — ATENOLOL 25 MG: 25 TABLET ORAL at 21:12

## 2025-03-10 RX ADMIN — GLIPIZIDE 5 MG: 5 TABLET ORAL at 17:32

## 2025-03-10 RX ADMIN — METFORMIN HYDROCHLORIDE 1000 MG: 500 TABLET, EXTENDED RELEASE ORAL at 17:32

## 2025-03-10 RX ADMIN — GLIPIZIDE 10 MG: 10 TABLET ORAL at 05:54

## 2025-03-10 RX ADMIN — OXYCODONE HYDROCHLORIDE 5 MG: 5 TABLET ORAL at 08:11

## 2025-03-10 RX ADMIN — LISINOPRIL 20 MG: 20 TABLET ORAL at 08:13

## 2025-03-10 ASSESSMENT — ENCOUNTER SYMPTOMS
ABDOMINAL PAIN: 0
DIARRHEA: 0
CONSTIPATION: 0
WHEEZING: 0
SHORTNESS OF BREATH: 0
RHINORRHEA: 0
NAUSEA: 0
TROUBLE SWALLOWING: 0
VOMITING: 0
COUGH: 0
SORE THROAT: 0

## 2025-03-10 ASSESSMENT — PAIN SCALES - GENERAL
PAINLEVEL_OUTOF10: 2
PAINLEVEL_OUTOF10: 7
PAINLEVEL_OUTOF10: 3
PAINLEVEL_OUTOF10: 0

## 2025-03-10 ASSESSMENT — PAIN DESCRIPTION - ORIENTATION
ORIENTATION: LOWER
ORIENTATION: LOWER

## 2025-03-10 ASSESSMENT — PAIN DESCRIPTION - LOCATION
LOCATION: BACK
LOCATION: BACK

## 2025-03-10 ASSESSMENT — PAIN DESCRIPTION - DESCRIPTORS: DESCRIPTORS: ACHING;DISCOMFORT

## 2025-03-10 NOTE — CARE COORDINATION
Alert and oriented, coordinated well with the NOD. Up with 1 assist via walker and back brace. Continent of bowel and bladder. No pain noted. No other concern voiced out by the patient. Electronically signed by Silas Fuentes RN on 3/10/2025 at 1:54 AM

## 2025-03-10 NOTE — CARE COORDINATION
Patient had family education today and is now Micheline in room with . Planned discharge on 03/12 follow up appointment made with dr corrales. Utilizing Oxy before therapy with effect. Patient in room resting with  at bedside and call light in reach.     Patient educated on how to use incentive spirometer. Patient verbalized understanding and demonstrated proper use. Emphasized importance and usage of device, with coughing and deep breathing every 2 hours while awake.

## 2025-03-11 LAB — GLUCOSE BLD STRIP.AUTO-MCNC: 136 MG/DL (ref 70–108)

## 2025-03-11 PROCEDURE — 97110 THERAPEUTIC EXERCISES: CPT

## 2025-03-11 PROCEDURE — 97535 SELF CARE MNGMENT TRAINING: CPT

## 2025-03-11 PROCEDURE — 6370000000 HC RX 637 (ALT 250 FOR IP): Performed by: STUDENT IN AN ORGANIZED HEALTH CARE EDUCATION/TRAINING PROGRAM

## 2025-03-11 PROCEDURE — 6370000000 HC RX 637 (ALT 250 FOR IP): Performed by: FAMILY MEDICINE

## 2025-03-11 PROCEDURE — 97116 GAIT TRAINING THERAPY: CPT

## 2025-03-11 PROCEDURE — 97530 THERAPEUTIC ACTIVITIES: CPT

## 2025-03-11 PROCEDURE — 99232 SBSQ HOSP IP/OBS MODERATE 35: CPT | Performed by: PHYSICAL MEDICINE & REHABILITATION

## 2025-03-11 PROCEDURE — 6360000002 HC RX W HCPCS: Performed by: STUDENT IN AN ORGANIZED HEALTH CARE EDUCATION/TRAINING PROGRAM

## 2025-03-11 PROCEDURE — 82948 REAGENT STRIP/BLOOD GLUCOSE: CPT

## 2025-03-11 PROCEDURE — 1180000000 HC REHAB R&B

## 2025-03-11 PROCEDURE — 6370000000 HC RX 637 (ALT 250 FOR IP): Performed by: PHYSICAL MEDICINE & REHABILITATION

## 2025-03-11 RX ORDER — BUTALBITAL, ACETAMINOPHEN AND CAFFEINE 50; 325; 40 MG/1; MG/1; MG/1
1 TABLET ORAL EVERY 6 HOURS PRN
Qty: 10 TABLET | Refills: 0 | Status: SHIPPED | OUTPATIENT
Start: 2025-03-11

## 2025-03-11 RX ORDER — OXYCODONE HYDROCHLORIDE 5 MG/1
2.5-5 TABLET ORAL EVERY 6 HOURS PRN
Qty: 28 TABLET | Refills: 0 | Status: SHIPPED | OUTPATIENT
Start: 2025-03-11 | End: 2025-03-18

## 2025-03-11 RX ORDER — TRAZODONE HYDROCHLORIDE 50 MG/1
50 TABLET ORAL NIGHTLY PRN
Qty: 30 TABLET | Refills: 3 | Status: SHIPPED | OUTPATIENT
Start: 2025-03-11

## 2025-03-11 RX ORDER — ASPIRIN 81 MG/1
81 TABLET ORAL NIGHTLY
Qty: 30 TABLET | Refills: 3 | Status: SHIPPED | OUTPATIENT
Start: 2025-03-11

## 2025-03-11 RX ORDER — SENNOSIDES A AND B 8.6 MG/1
1 TABLET, FILM COATED ORAL NIGHTLY PRN
Qty: 30 TABLET | Refills: 1 | Status: SHIPPED | OUTPATIENT
Start: 2025-03-11

## 2025-03-11 RX ORDER — CALCIUM CARBONATE 500(1250)
500 TABLET ORAL 2 TIMES DAILY WITH MEALS
Qty: 60 TABLET | Refills: 3 | Status: SHIPPED | OUTPATIENT
Start: 2025-03-11

## 2025-03-11 RX ADMIN — ENOXAPARIN SODIUM 40 MG: 100 INJECTION SUBCUTANEOUS at 08:51

## 2025-03-11 RX ADMIN — PANTOPRAZOLE SODIUM 40 MG: 40 TABLET, DELAYED RELEASE ORAL at 08:52

## 2025-03-11 RX ADMIN — ACETAMINOPHEN 650 MG: 325 TABLET ORAL at 08:51

## 2025-03-11 RX ADMIN — ESCITALOPRAM OXALATE 20 MG: 20 TABLET ORAL at 21:30

## 2025-03-11 RX ADMIN — CALCIUM 500 MG: 500 TABLET ORAL at 17:50

## 2025-03-11 RX ADMIN — TRAZODONE HYDROCHLORIDE 50 MG: 50 TABLET ORAL at 23:28

## 2025-03-11 RX ADMIN — ACETAMINOPHEN 650 MG: 325 TABLET ORAL at 17:50

## 2025-03-11 RX ADMIN — ATENOLOL 25 MG: 25 TABLET ORAL at 21:30

## 2025-03-11 RX ADMIN — ASPIRIN 81 MG: 81 TABLET, COATED ORAL at 21:30

## 2025-03-11 RX ADMIN — LISINOPRIL 20 MG: 20 TABLET ORAL at 08:51

## 2025-03-11 RX ADMIN — CALCIUM 500 MG: 500 TABLET ORAL at 08:51

## 2025-03-11 RX ADMIN — Medication 6 MG: at 21:30

## 2025-03-11 RX ADMIN — GLIPIZIDE 5 MG: 5 TABLET ORAL at 17:50

## 2025-03-11 RX ADMIN — OXYCODONE HYDROCHLORIDE 2.5 MG: 5 TABLET ORAL at 13:27

## 2025-03-11 RX ADMIN — GLIPIZIDE 10 MG: 10 TABLET ORAL at 05:37

## 2025-03-11 RX ADMIN — LISINOPRIL 20 MG: 20 TABLET ORAL at 21:30

## 2025-03-11 RX ADMIN — METFORMIN HYDROCHLORIDE 1000 MG: 500 TABLET, EXTENDED RELEASE ORAL at 17:50

## 2025-03-11 RX ADMIN — ACETAMINOPHEN 650 MG: 325 TABLET ORAL at 02:17

## 2025-03-11 RX ADMIN — ATORVASTATIN CALCIUM 40 MG: 40 TABLET, FILM COATED ORAL at 21:30

## 2025-03-11 RX ADMIN — LEVOTHYROXINE SODIUM 50 MCG: 0.05 TABLET ORAL at 05:37

## 2025-03-11 RX ADMIN — PANTOPRAZOLE SODIUM 40 MG: 40 TABLET, DELAYED RELEASE ORAL at 21:30

## 2025-03-11 RX ADMIN — METFORMIN HYDROCHLORIDE 1000 MG: 500 TABLET, EXTENDED RELEASE ORAL at 08:51

## 2025-03-11 ASSESSMENT — PAIN DESCRIPTION - ORIENTATION
ORIENTATION: LEFT
ORIENTATION: LEFT
ORIENTATION: RIGHT;LEFT
ORIENTATION: RIGHT;LEFT

## 2025-03-11 ASSESSMENT — ENCOUNTER SYMPTOMS: BACK PAIN: 0

## 2025-03-11 ASSESSMENT — PAIN SCALES - GENERAL
PAINLEVEL_OUTOF10: 2
PAINLEVEL_OUTOF10: 4
PAINLEVEL_OUTOF10: 5
PAINLEVEL_OUTOF10: 0
PAINLEVEL_OUTOF10: 0
PAINLEVEL_OUTOF10: 5
PAINLEVEL_OUTOF10: 0
PAINLEVEL_OUTOF10: 3

## 2025-03-11 ASSESSMENT — PAIN DESCRIPTION - DESCRIPTORS
DESCRIPTORS: ACHING
DESCRIPTORS: ACHING;DISCOMFORT
DESCRIPTORS: ACHING
DESCRIPTORS: ACHING

## 2025-03-11 ASSESSMENT — PAIN - FUNCTIONAL ASSESSMENT
PAIN_FUNCTIONAL_ASSESSMENT: ACTIVITIES ARE NOT PREVENTED

## 2025-03-11 ASSESSMENT — PAIN DESCRIPTION - LOCATION
LOCATION: KNEE
LOCATION: HIP;LEG
LOCATION: KNEE
LOCATION: LEG

## 2025-03-11 NOTE — DISCHARGE SUMMARY
depression, osteoarthritis, anemia, questionable COPD and CHF, status post right knee arthroscopic surgery, tonsillectomy, cholecystectomy, urinary bladder suspension surgery, appendectomy, was admitted to [facility] on 2/23/2025 due to lower back pain and fall accident.      The patient says she had history of 2 falls at the end of 2024 without causing any injury.  She patient presented to [facility] on 2/17/2025 with the complaints of right lower back pain which started on 2/15/2025 morning when she tried to get out of bed.  She denies having any recent injury.  The patient was evaluated and was discharged with prescription for naproxen and cyclobenzaprine, and recommendation of follow-up with primary physician.     Because of worsening back pain not relieved with naproxen and Flexeril usage, she visited [facility] the ER on 2/19/2025 night.  CT of abdomen and pelvis was performed on 2/20/2025 and revealed acute L3 inferior endplate compression deformity, and liver cirrhosis sequela of chronic portal venous hypertension.  CT of lumbar spine also was carried out on 2/20/2025 and also demonstrated acute L3 inferior endplate compression fracture with approximate 3 mm retropulsion of posterior inferior endplate without significant central canal stenosis, and multilevel disc bulge and osteophyte complex formation and facet hypertrophy causing up to mild left neural foraminal stenosis at L3-4 and L5-S1.  The patient previously had bone density study done on 5/21/2018 and was diagnosed of osteoporosis.  She was treated with pain medication which reduce the pain intensity.  She was discharged from ER on 2/20/2025 with referral to see pain management Dr. Martin for possible kyphoplasty.     The patient was brought back to [facility] ER again on 2/23/2025 because of persistent lower back pain and for accident.  The patient could not tell me the precise

## 2025-03-11 NOTE — CARE COORDINATION
Patient rested well during the shift. Up with 1 assist via walker and back brace. Mod-I with  in room. BM x1 this shift. No concern voiced out by the patient. Continent of bowel and bladder. Electronically signed by Silas Fuentes RN on 3/11/2025 at 4:28 AM

## 2025-03-11 NOTE — CARE COORDINATION
Patient alert and oriented, patient up with assistance x 1 with walker and gait belt, patient SILVIO in room with . Patient refuses to use incentive spirometer. Patient continue of bowel and bladder this shift.

## 2025-03-12 ENCOUNTER — TELEPHONE (OUTPATIENT)
Dept: FAMILY MEDICINE CLINIC | Age: 75
End: 2025-03-12

## 2025-03-12 VITALS
BODY MASS INDEX: 33.67 KG/M2 | DIASTOLIC BLOOD PRESSURE: 61 MMHG | RESPIRATION RATE: 16 BRPM | TEMPERATURE: 97.8 F | SYSTOLIC BLOOD PRESSURE: 141 MMHG | WEIGHT: 171.52 LBS | HEIGHT: 60 IN | OXYGEN SATURATION: 97 % | HEART RATE: 71 BPM

## 2025-03-12 DIAGNOSIS — S32.030A COMPRESSION FRACTURE OF L3 VERTEBRA, INITIAL ENCOUNTER (HCC): ICD-10-CM

## 2025-03-12 DIAGNOSIS — S32.030A CLOSED WEDGE COMPRESSION FRACTURE OF L3 VERTEBRA, INITIAL ENCOUNTER (HCC): Primary | ICD-10-CM

## 2025-03-12 LAB — GLUCOSE BLD STRIP.AUTO-MCNC: 127 MG/DL (ref 70–108)

## 2025-03-12 PROCEDURE — 6370000000 HC RX 637 (ALT 250 FOR IP): Performed by: STUDENT IN AN ORGANIZED HEALTH CARE EDUCATION/TRAINING PROGRAM

## 2025-03-12 PROCEDURE — 6370000000 HC RX 637 (ALT 250 FOR IP): Performed by: PHYSICAL MEDICINE & REHABILITATION

## 2025-03-12 PROCEDURE — 97110 THERAPEUTIC EXERCISES: CPT

## 2025-03-12 PROCEDURE — 97530 THERAPEUTIC ACTIVITIES: CPT

## 2025-03-12 PROCEDURE — 82948 REAGENT STRIP/BLOOD GLUCOSE: CPT

## 2025-03-12 PROCEDURE — 99239 HOSP IP/OBS DSCHRG MGMT >30: CPT | Performed by: PHYSICAL MEDICINE & REHABILITATION

## 2025-03-12 PROCEDURE — 97535 SELF CARE MNGMENT TRAINING: CPT

## 2025-03-12 PROCEDURE — 6370000000 HC RX 637 (ALT 250 FOR IP): Performed by: FAMILY MEDICINE

## 2025-03-12 PROCEDURE — 6360000002 HC RX W HCPCS: Performed by: STUDENT IN AN ORGANIZED HEALTH CARE EDUCATION/TRAINING PROGRAM

## 2025-03-12 RX ADMIN — LEVOTHYROXINE SODIUM 50 MCG: 0.05 TABLET ORAL at 06:36

## 2025-03-12 RX ADMIN — METFORMIN HYDROCHLORIDE 1000 MG: 500 TABLET, EXTENDED RELEASE ORAL at 08:20

## 2025-03-12 RX ADMIN — ACETAMINOPHEN 650 MG: 325 TABLET ORAL at 02:54

## 2025-03-12 RX ADMIN — GLIPIZIDE 10 MG: 10 TABLET ORAL at 06:36

## 2025-03-12 RX ADMIN — PANTOPRAZOLE SODIUM 40 MG: 40 TABLET, DELAYED RELEASE ORAL at 08:20

## 2025-03-12 RX ADMIN — CYCLOBENZAPRINE 5 MG: 10 TABLET, FILM COATED ORAL at 02:54

## 2025-03-12 RX ADMIN — CALCIUM 500 MG: 500 TABLET ORAL at 08:20

## 2025-03-12 RX ADMIN — LISINOPRIL 20 MG: 20 TABLET ORAL at 08:20

## 2025-03-12 RX ADMIN — ENOXAPARIN SODIUM 40 MG: 100 INJECTION SUBCUTANEOUS at 08:20

## 2025-03-12 ASSESSMENT — PAIN SCALES - GENERAL
PAINLEVEL_OUTOF10: 0
PAINLEVEL_OUTOF10: 4

## 2025-03-12 ASSESSMENT — PAIN SCALES - WONG BAKER: WONGBAKER_NUMERICALRESPONSE: HURTS A LITTLE BIT

## 2025-03-12 NOTE — CARE COORDINATION
Pt alert and oriented.She stated she is prepared to go home and has everything to accommodate her.She refuses scd pumps to lower legs and refuses incentive spirometer.Call light in reach.

## 2025-03-12 NOTE — PLAN OF CARE
Problem: Chronic Conditions and Co-morbidities  Goal: Patient's chronic conditions and co-morbidity symptoms are monitored and maintained or improved  3/11/2025 1243 by Jeannie Vogel RN  Outcome: Progressing  Flowsheets (Taken 3/11/2025 0845)  Care Plan - Patient's Chronic Conditions and Co-Morbidity Symptoms are Monitored and Maintained or Improved: Monitor and assess patient's chronic conditions and comorbid symptoms for stability, deterioration, or improvement     Problem: Discharge Planning  Goal: Discharge to home or other facility with appropriate resources  3/11/2025 1243 by Jeannie Vogel RN  Outcome: Progressing  Flowsheets (Taken 3/11/2025 0845)  Discharge to home or other facility with appropriate resources: Identify barriers to discharge with patient and caregiver     Problem: Safety - Adult  Goal: Free from fall injury  3/11/2025 1243 by Jeannie Vogel RN  Outcome: Progressing  Flowsheets (Taken 3/11/2025 1243)  Free From Fall Injury:   Instruct family/caregiver on patient safety   Based on caregiver fall risk screen, instruct family/caregiver to ask for assistance with transferring infant if caregiver noted to have fall risk factors     Problem: Pain  Goal: Verbalizes/displays adequate comfort level or baseline comfort level  3/11/2025 1243 by Jeannie Vogel RN  Outcome: Progressing  Flowsheets (Taken 3/11/2025 0845)  Verbalizes/displays adequate comfort level or baseline comfort level: Encourage patient to monitor pain and request assistance     Problem: Skin/Tissue Integrity  Goal: Skin integrity remains intact  Description: 1.  Monitor for areas of redness and/or skin breakdown  2.  Assess vascular access sites hourly  3.  Every 4-6 hours minimum:  Change oxygen saturation probe site  4.  Every 4-6 hours:  If on nasal continuous positive airway pressure, respiratory therapy assess nares and determine need for appliance change or resting period  3/11/2025 1243 by Jeannie Vogel 
  Problem: Chronic Conditions and Co-morbidities  Goal: Patient's chronic conditions and co-morbidity symptoms are monitored and maintained or improved  3/3/2025 0109 by Jonna Brooke, RN  Outcome: Progressing  Flowsheets (Taken 3/2/2025 2004)  Care Plan - Patient's Chronic Conditions and Co-Morbidity Symptoms are Monitored and Maintained or Improved: Monitor and assess patient's chronic conditions and comorbid symptoms for stability, deterioration, or improvement     Problem: Discharge Planning  Goal: Discharge to home or other facility with appropriate resources  3/3/2025 0109 by Jonna Brooke, RN  Outcome: Progressing  Flowsheets (Taken 3/2/2025 2004)  Discharge to home or other facility with appropriate resources: Identify barriers to discharge with patient and caregiver     Problem: Safety - Adult  Goal: Free from fall injury  3/3/2025 0109 by Jonna Brooke RN  Outcome: Progressing     Problem: Pain  Goal: Verbalizes/displays adequate comfort level or baseline comfort level  3/3/2025 0109 by Jonna Brooke, RN  Outcome: Progressing  Flowsheets (Taken 3/2/2025 2000)  Verbalizes/displays adequate comfort level or baseline comfort level: Encourage patient to monitor pain and request assistance     
  Problem: Chronic Conditions and Co-morbidities  Goal: Patient's chronic conditions and co-morbidity symptoms are monitored and maintained or improved  3/3/2025 1022 by Ashu Chau LPN  Outcome: Progressing  Flowsheets (Taken 3/3/2025 0900)  Care Plan - Patient's Chronic Conditions and Co-Morbidity Symptoms are Monitored and Maintained or Improved: Monitor and assess patient's chronic conditions and comorbid symptoms for stability, deterioration, or improvement     Problem: Discharge Planning  Goal: Discharge to home or other facility with appropriate resources  3/3/2025 1022 by Ashu Chau LPN  Outcome: Progressing  Flowsheets (Taken 3/3/2025 0900)  Discharge to home or other facility with appropriate resources: Identify barriers to discharge with patient and caregiver     Problem: Safety - Adult  Goal: Free from fall injury  3/3/2025 1022 by Ashu Chau LPN  Outcome: Progressing     Problem: Pain  Goal: Verbalizes/displays adequate comfort level or baseline comfort level  3/3/2025 1022 by Ashu Chau LPN  Outcome: Progressing  Flowsheets (Taken 3/3/2025 0900)  Verbalizes/displays adequate comfort level or baseline comfort level:   Encourage patient to monitor pain and request assistance   Assess pain using appropriate pain scale     Problem: Skin/Tissue Integrity  Goal: Skin integrity remains intact  Description: 1.  Monitor for areas of redness and/or skin breakdown  2.  Assess vascular access sites hourly  3.  Every 4-6 hours minimum:  Change oxygen saturation probe site  4.  Every 4-6 hours:  If on nasal continuous positive airway pressure, respiratory therapy assess nares and determine need for appliance change or resting period  3/3/2025 1022 by Ashu Chau LPN  Outcome: Progressing  Flowsheets (Taken 3/3/2025 0900)  Skin Integrity Remains Intact: Monitor for areas of redness and/or skin breakdown     Problem: Musculoskeletal - Adult  Goal: Return mobility to safest level of function  Outcome: 
  Problem: Chronic Conditions and Co-morbidities  Goal: Patient's chronic conditions and co-morbidity symptoms are monitored and maintained or improved  3/3/2025 2134 by Vero Orellana RN  Outcome: Progressing     Problem: Discharge Planning  Goal: Discharge to home or other facility with appropriate resources  3/3/2025 2134 by Vero Orellana RN  Outcome: Progressing  Flowsheets (Taken 3/3/2025 2134)  Discharge to home or other facility with appropriate resources: Identify barriers to discharge with patient and caregiver     Problem: Safety - Adult  Goal: Free from fall injury  3/3/2025 2134 by Vero Orellana RN  Outcome: Progressing  Flowsheets (Taken 3/3/2025 2134)  Free From Fall Injury: Instruct family/caregiver on patient safety     Problem: Pain  Goal: Verbalizes/displays adequate comfort level or baseline comfort level  3/3/2025 2134 by Vero Orellana RN  Outcome: Progressing  Flowsheets (Taken 3/3/2025 2134)  Verbalizes/displays adequate comfort level or baseline comfort level:   Encourage patient to monitor pain and request assistance   Assess pain using appropriate pain scale     Problem: Skin/Tissue Integrity  Goal: Skin integrity remains intact  Description: 1.  Monitor for areas of redness and/or skin breakdown  2.  Assess vascular access sites hourly  3.  Every 4-6 hours minimum:  Change oxygen saturation probe site  4.  Every 4-6 hours:  If on nasal continuous positive airway pressure, respiratory therapy assess nares and determine need for appliance change or resting period  3/3/2025 2134 by Vero Orellana RN  Outcome: Progressing  Flowsheets (Taken 3/3/2025 2133)  Skin Integrity Remains Intact: Monitor for areas of redness and/or skin breakdown     Problem: Musculoskeletal - Adult  Goal: Return mobility to safest level of function  3/3/2025 2134 by Vero Orellana RN  Outcome: Progressing  Flowsheets (Taken 3/3/2025 2134)  Return Mobility to Safest Level of Function: Assist 
  Problem: Chronic Conditions and Co-morbidities  Goal: Patient's chronic conditions and co-morbidity symptoms are monitored and maintained or improved  3/6/2025 1025 by Ngoc Anderson RN  Outcome: Progressing  Flowsheets (Taken 3/6/2025 0845)  Care Plan - Patient's Chronic Conditions and Co-Morbidity Symptoms are Monitored and Maintained or Improved:   Monitor and assess patient's chronic conditions and comorbid symptoms for stability, deterioration, or improvement   Collaborate with multidisciplinary team to address chronic and comorbid conditions and prevent exacerbation or deterioration   Update acute care plan with appropriate goals if chronic or comorbid symptoms are exacerbated and prevent overall improvement and discharge  3/5/2025 2252 by Jonna Brooke, RN  Outcome: Progressing  3/5/2025 2249 by Jonna Brooke RN  Outcome: Progressing  Flowsheets (Taken 3/5/2025 2045)  Care Plan - Patient's Chronic Conditions and Co-Morbidity Symptoms are Monitored and Maintained or Improved: Monitor and assess patient's chronic conditions and comorbid symptoms for stability, deterioration, or improvement     Problem: Discharge Planning  Goal: Discharge to home or other facility with appropriate resources  3/6/2025 1025 by Ngoc Anderson RN  Outcome: Progressing  Flowsheets (Taken 3/6/2025 0845)  Discharge to home or other facility with appropriate resources:   Identify barriers to discharge with patient and caregiver   Arrange for needed discharge resources and transportation as appropriate   Identify discharge learning needs (meds, wound care, etc)   Refer to discharge planning if patient needs post-hospital services based on physician order or complex needs related to functional status, cognitive ability or social support system  3/5/2025 2252 by Jonna Brooke, RN  Outcome: Progressing  3/5/2025 2249 by Jonna Brooke, RN  Outcome: Progressing  Flowsheets (Taken 3/5/2025 2045)  Discharge to home or other 
  Problem: Chronic Conditions and Co-morbidities  Goal: Patient's chronic conditions and co-morbidity symptoms are monitored and maintained or improved  3/7/2025 0910 by Ashu Chau LPN  Outcome: Progressing  Flowsheets (Taken 3/7/2025 0845)  Care Plan - Patient's Chronic Conditions and Co-Morbidity Symptoms are Monitored and Maintained or Improved: Monitor and assess patient's chronic conditions and comorbid symptoms for stability, deterioration, or improvement     Problem: Discharge Planning  Goal: Discharge to home or other facility with appropriate resources  3/7/2025 0910 by Ashu Chau LPN  Outcome: Progressing  Flowsheets (Taken 3/7/2025 0845)  Discharge to home or other facility with appropriate resources: Identify barriers to discharge with patient and caregiver     Problem: Safety - Adult  Goal: Free from fall injury  3/7/2025 0910 by Ashu Chau LPN  Outcome: Progressing    Problem: Pain  Goal: Verbalizes/displays adequate comfort level or baseline comfort level  3/7/2025 0910 by Ashu Chau LPN  Outcome: Progressing  Flowsheets (Taken 3/7/2025 0845)  Verbalizes/displays adequate comfort level or baseline comfort level:   Encourage patient to monitor pain and request assistance   Assess pain using appropriate pain scale     Problem: Skin/Tissue Integrity  Goal: Skin integrity remains intact  Description: 1.  Monitor for areas of redness and/or skin breakdown  2.  Assess vascular access sites hourly  3.  Every 4-6 hours minimum:  Change oxygen saturation probe site  4.  Every 4-6 hours:  If on nasal continuous positive airway pressure, respiratory therapy assess nares and determine need for appliance change or resting period  3/7/2025 0910 by Ashu Chau LPN  Outcome: Progressing  Flowsheets (Taken 3/7/2025 0845)  Skin Integrity Remains Intact: Monitor for areas of redness and/or skin breakdown     Problem: Musculoskeletal - Adult  Goal: Return mobility to safest level of function  3/7/2025 
  Problem: Chronic Conditions and Co-morbidities  Goal: Patient's chronic conditions and co-morbidity symptoms are monitored and maintained or improved  3/8/2025 0955 by Luis Jane RN  Outcome: Progressing  Flowsheets (Taken 3/8/2025 0756)  Care Plan - Patient's Chronic Conditions and Co-Morbidity Symptoms are Monitored and Maintained or Improved: Monitor and assess patient's chronic conditions and comorbid symptoms for stability, deterioration, or improvement  3/7/2025 2333 by Silas Fuentes, RN  Outcome: Progressing  Flowsheets (Taken 3/7/2025 2015)  Care Plan - Patient's Chronic Conditions and Co-Morbidity Symptoms are Monitored and Maintained or Improved: Monitor and assess patient's chronic conditions and comorbid symptoms for stability, deterioration, or improvement     Problem: Discharge Planning  Goal: Discharge to home or other facility with appropriate resources  3/8/2025 0955 by Luis Jane RN  Outcome: Progressing  Flowsheets (Taken 3/8/2025 0756)  Discharge to home or other facility with appropriate resources: Identify barriers to discharge with patient and caregiver  3/7/2025 2333 by Silas Fuentes, RN  Outcome: Progressing  Flowsheets (Taken 3/7/2025 2015)  Discharge to home or other facility with appropriate resources: Identify barriers to discharge with patient and caregiver     Problem: Safety - Adult  Goal: Free from fall injury  3/8/2025 0955 by Luis Jane RN  Outcome: Progressing  Flowsheets (Taken 3/8/2025 0953)  Free From Fall Injury: Instruct family/caregiver on patient safety  3/7/2025 2333 by Silas Fuentes RN  Outcome: Progressing     Problem: Pain  Goal: Verbalizes/displays adequate comfort level or baseline comfort level  3/8/2025 0955 by Luis Jane RN  Outcome: Progressing  Flowsheets (Taken 3/8/2025 0756)  Verbalizes/displays adequate comfort level or baseline comfort level:   Assess pain using appropriate pain scale   Encourage patient to 
  Problem: Chronic Conditions and Co-morbidities  Goal: Patient's chronic conditions and co-morbidity symptoms are monitored and maintained or improved  3/9/2025 1043 by Luis Jane RN  Outcome: Progressing  Flowsheets (Taken 3/9/2025 0735)  Care Plan - Patient's Chronic Conditions and Co-Morbidity Symptoms are Monitored and Maintained or Improved: Monitor and assess patient's chronic conditions and comorbid symptoms for stability, deterioration, or improvement  3/8/2025 2345 by Silas Fuentes, RN  Outcome: Progressing  Flowsheets (Taken 3/8/2025 1945)  Care Plan - Patient's Chronic Conditions and Co-Morbidity Symptoms are Monitored and Maintained or Improved: Monitor and assess patient's chronic conditions and comorbid symptoms for stability, deterioration, or improvement     Problem: Discharge Planning  Goal: Discharge to home or other facility with appropriate resources  3/9/2025 1043 by Luis Jane RN  Outcome: Progressing  Flowsheets (Taken 3/9/2025 0735)  Discharge to home or other facility with appropriate resources: Identify barriers to discharge with patient and caregiver  3/8/2025 2345 by Silas Fuentes, RN  Outcome: Progressing  Flowsheets (Taken 3/8/2025 1945)  Discharge to home or other facility with appropriate resources: Identify barriers to discharge with patient and caregiver     Problem: Safety - Adult  Goal: Free from fall injury  3/9/2025 1043 by Luis Jane RN  Outcome: Progressing  Flowsheets (Taken 3/9/2025 1039)  Free From Fall Injury: Instruct family/caregiver on patient safety  3/8/2025 2345 by Silas Fuentes, RN  Outcome: Progressing     Problem: Pain  Goal: Verbalizes/displays adequate comfort level or baseline comfort level  3/9/2025 1043 by Luis Jane RN  Outcome: Progressing  Flowsheets (Taken 3/9/2025 0735)  Verbalizes/displays adequate comfort level or baseline comfort level:   Encourage patient to monitor pain and request assistance   Assess 
  Problem: Chronic Conditions and Co-morbidities  Goal: Patient's chronic conditions and co-morbidity symptoms are monitored and maintained or improved  Outcome: Progressing     Problem: Discharge Planning  Goal: Discharge to home or other facility with appropriate resources  Outcome: Progressing     Problem: Safety - Adult  Goal: Free from fall injury  3/2/2025 1516 by Ayana Ramos LPN  Outcome: Progressing     Problem: Pain  Goal: Verbalizes/displays adequate comfort level or baseline comfort level  3/2/2025 1516 by Ayana Ramos LPN  Outcome: Progressing     Problem: Skin/Tissue Integrity  Goal: Skin integrity remains intact  Description: 1.  Monitor for areas of redness and/or skin breakdown  2.  Assess vascular access sites hourly  3.  Every 4-6 hours minimum:  Change oxygen saturation probe site  4.  Every 4-6 hours:  If on nasal continuous positive airway pressure, respiratory therapy assess nares and determine need for appliance change or resting period  3/2/2025 1516 by Ayana Ramos LPN  Outcome: Progressing     Problem: Musculoskeletal - Adult  Goal: Return mobility to safest level of function  Outcome: Progressing     Problem: Musculoskeletal - Adult  Goal: Maintain proper alignment of affected body part  Outcome: Progressing     Problem: Musculoskeletal - Adult  Goal: Return ADL status to a safe level of function  Outcome: Progressing     Problem: Metabolic/Fluid and Electrolytes - Adult  Goal: Electrolytes maintained within normal limits  Outcome: Progressing     
  Problem: Chronic Conditions and Co-morbidities  Goal: Patient's chronic conditions and co-morbidity symptoms are monitored and maintained or improved  Outcome: Progressing  Flowsheets (Taken 2/28/2025 1530 by Jeannie Vogel RN)  Care Plan - Patient's Chronic Conditions and Co-Morbidity Symptoms are Monitored and Maintained or Improved: Monitor and assess patient's chronic conditions and comorbid symptoms for stability, deterioration, or improvement     Problem: Discharge Planning  Goal: Discharge to home or other facility with appropriate resources  3/1/2025 1046 by Tiera Reyes RN  Outcome: Progressing  Flowsheets (Taken 3/1/2025 0350 by Tamiko Pérez LPN)  Discharge to home or other facility with appropriate resources:   Identify barriers to discharge with patient and caregiver   Arrange for needed discharge resources and transportation as appropriate   Identify discharge learning needs (meds, wound care, etc)  3/1/2025 0350 by Tamiko Pérez LPN  Outcome: Progressing  Flowsheets  Taken 3/1/2025 0350 by Tamiko Pérez LPN  Discharge to home or other facility with appropriate resources:   Identify barriers to discharge with patient and caregiver   Arrange for needed discharge resources and transportation as appropriate   Identify discharge learning needs (meds, wound care, etc)  Taken 2/28/2025 1618 by Jeannie Vogel RN  Discharge to home or other facility with appropriate resources: Identify barriers to discharge with patient and caregiver  Taken 2/28/2025 1530 by Jeannie Vogel RN  Discharge to home or other facility with appropriate resources: Identify barriers to discharge with patient and caregiver     Problem: Safety - Adult  Goal: Free from fall injury  3/1/2025 1046 by Tiera Reyes RN  Outcome: Progressing  Flowsheets (Taken 3/1/2025 0350 by Tamiko Pérez LPN)  Free From Fall Injury: Instruct family/caregiver on patient safety  3/1/2025 0350 by Tamiko Pérze LPN  Outcome: 
  Problem: Chronic Conditions and Co-morbidities  Goal: Patient's chronic conditions and co-morbidity symptoms are monitored and maintained or improved  Outcome: Progressing  Flowsheets (Taken 3/7/2025 0306)  Care Plan - Patient's Chronic Conditions and Co-Morbidity Symptoms are Monitored and Maintained or Improved:   Monitor and assess patient's chronic conditions and comorbid symptoms for stability, deterioration, or improvement   Collaborate with multidisciplinary team to address chronic and comorbid conditions and prevent exacerbation or deterioration     Problem: Discharge Planning  Goal: Discharge to home or other facility with appropriate resources  3/7/2025 0306 by Vero Orellana RN  Outcome: Progressing  Flowsheets (Taken 3/7/2025 0306)  Discharge to home or other facility with appropriate resources: Identify barriers to discharge with patient and caregiver     Problem: Safety - Adult  Goal: Free from fall injury  Outcome: Progressing  Flowsheets (Taken 3/7/2025 0306)  Free From Fall Injury: Instruct family/caregiver on patient safety     Problem: Pain  Goal: Verbalizes/displays adequate comfort level or baseline comfort level  Outcome: Progressing  Flowsheets (Taken 3/7/2025 0306)  Verbalizes/displays adequate comfort level or baseline comfort level:   Encourage patient to monitor pain and request assistance   Assess pain using appropriate pain scale   Administer analgesics based on type and severity of pain and evaluate response   Implement non-pharmacological measures as appropriate and evaluate response     Problem: Skin/Tissue Integrity  Goal: Skin integrity remains intact  Description: 1.  Monitor for areas of redness and/or skin breakdown  2.  Assess vascular access sites hourly  3.  Every 4-6 hours minimum:  Change oxygen saturation probe site  4.  Every 4-6 hours:  If on nasal continuous positive airway pressure, respiratory therapy assess nares and determine need for appliance change or 
  Problem: Discharge Planning  Goal: Discharge to home or other facility with appropriate resources  3/10/2025 2300 by Silas Fuentes, RN  Outcome: Progressing  Flowsheets (Taken 3/10/2025 2100)  Discharge to home or other facility with appropriate resources: Identify barriers to discharge with patient and caregiver     Problem: Safety - Adult  Goal: Free from fall injury  3/10/2025 2300 by Silas Fuentes, RN  Outcome: Progressing     Problem: Pain  Goal: Verbalizes/displays adequate comfort level or baseline comfort level  3/10/2025 2300 by Silas Fuentes, RN  Outcome: Progressing  Flowsheets (Taken 3/10/2025 2100)  Verbalizes/displays adequate comfort level or baseline comfort level:   Encourage patient to monitor pain and request assistance   Assess pain using appropriate pain scale     Problem: Skin/Tissue Integrity  Goal: Skin integrity remains intact  Description: 1.  Monitor for areas of redness and/or skin breakdown  3/10/2025 2300 by Silas Fuentes, RN  Outcome: Progressing  Flowsheets  Taken 3/10/2025 2254  Skin Integrity Remains Intact: Monitor for areas of redness and/or skin breakdown  Taken 3/10/2025 2100  Skin Integrity Remains Intact: Monitor for areas of redness and/or skin breakdown     Problem: Musculoskeletal - Adult  Goal: Return mobility to safest level of function  3/10/2025 2300 by Silas Fuentes RN  Outcome: Progressing  Flowsheets (Taken 3/10/2025 2100)  Return Mobility to Safest Level of Function: Assess patient stability and activity tolerance for standing, transferring and ambulating with or without assistive devices     
  Problem: Discharge Planning  Goal: Discharge to home or other facility with appropriate resources  3/11/2025 1316 by Dali Ni LISW-S  Note:   [facility]  Physical Medicine and Rehabilitation  Discharge Planning Progress Note    Date: 3/11/2025  Patient Name: Kim Amaya  MRN: [id number]  : 1950 (75 y.o.)    SW met with patient and her , Wil, on this date to discuss discharge planning. SW provided education on services available at discharge. Patient declines the need for home health or outpatient therapy. Don is in agreement with this. Education was provided on how services can be started if they change their mind at home. SW answered patient and Don's questions and concerns. No outstanding needs. SW will follow and maintain involvement in discharge planning.    Plan  Referrals Made: None per patient's request  Referrals Needed: None  Family Training: Monday, 3/10     Transportation:   Has transportation kept you from medical appointments, meetings, work, or from getting things needed for daily living? (Check all that apply)  No.      Health Literacy:   How often do you need to have someone help you when you read instructions, pamphlets, or other written material from your doctor or pharmacy?  0. - Never    Social Isolation:  How often do you feel lonely or isolated from those around you?  0. Never      Patient Mood Interview (PHQ-2 to 9) (from [employer]©)   Say to Patient: \"Over the last 2 weeks, have you been bothered by any of the following problems?\"   If symptom is present, enter 1 (yes) in column 1 (Symptom Presence)  If yes in column 1, then ask the patient: “About how often have you been bothered by this?”  Read and show the patient a card with the symptom frequency choices.    Indicate response in column 2, Symptom Frequency.   Symptom Presence  No (enter 0 in column 2)   Yes (enter 0-3 in column 2)  9.    No response (leave column 2 blank) Symptom 
  Problem: Discharge Planning  Goal: Discharge to home or other facility with appropriate resources  3/12/2025 0201 by Tamiko Pérez LPN  Outcome: Progressing    Problem: Discharge Planning  Goal: Discharge to home or other facility with appropriate resources  3/12/2025 0201 by Tamiko Pérez LPN  Outcome: Progressing    Problem: Safety - Adult  Goal: Free from fall injury  3/12/2025 0201 by Tamiko Pérez LPN  Outcome: Progressing    Problem: Pain  Goal: Verbalizes/displays adequate comfort level or baseline comfort level  3/12/2025 0201 by Tamiko Pérez LPN  Outcome: Progressing  Flowsheets (Taken 3/12/2025 0201)  Verbalizes/displays adequate comfort level or baseline comfort level:   Assess pain using appropriate pain scale   Administer analgesics based on type and severity of pain and evaluate response   Implement non-pharmacological measures as appropriate and evaluate response   Encourage patient to monitor pain and request assistance     Problem: Skin/Tissue Integrity  Goal: Skin integrity remains intact  Description: 1.  Monitor for areas of redness and/or skin breakdown  2.  Assess vascular access sites hourly  3.  Every 4-6 hours minimum:  Change oxygen saturation probe site  4.  Every 4-6 hours:  If on nasal continuous positive airway pressure, respiratory therapy assess nares and determine need for appliance change or resting period  3/12/2025 0201 by Tamiko Pérez LPN  Outcome: Progressing    Problem: Musculoskeletal - Adult  Goal: Maintain proper alignment of affected body part  3/12/2025 0201 by Tamiko Pérez LPN  Outcome: Progressing    Problem: Nutrition Deficit:  Goal: Optimize nutritional status  3/12/2025 0201 by Tamiko Pérez LPN  Outcome: Progressing  Nutrient intake appropriate for improving, restoring, or maintaining nutritional needs:   Assess nutritional status and recommend course of action   Monitor oral intake, labs, and treatment plans     Maintain proper 
  Problem: Discharge Planning  Goal: Discharge to home or other facility with appropriate resources  3/12/2025 0848 by Dali Ni LISW-S  Note:   [facility]  Physical Medicine and Rehabilitation  Case Management Discharge Note    Date: 3/12/2025  Patient Name: Kim Amaya  MRN: [id number]  : 1950 (75 y.o.)    Patient to be discharged on Wednesday, 3/12 to home. Patient will be under the supervision of her , Wil. Family training was provided on Monday, 3/10. IPR team recommended home health with RN, PT, OT and HHA at discharge. Patient and Don declined the need for services at discharge. Education was provided on how to start these services if they change their mind after discharge. No outstanding needs or concerns regarding discharge plan. SW provided contact information for future questions or concerns.      
  Problem: Discharge Planning  Goal: Discharge to home or other facility with appropriate resources  3/3/2025 1159 by Dali Ni LISW-S  Note:   [facility]  Physical Medicine Case Management Assessment    [x] Inpatient Rehabilitation Unit    Patient Name: Kim Amaya        MRN: [id number]    : 1950  (75 y.o.)  Gender: female   Date of Admission: 2025  3:36 PM    Family/Social/Home Environment:   Prior to admission, patient was living with her , Wil, their son and nephew. Patient reports being independent at home. Patient was completing her ADLs, housekeeping, meal prep, errands, finances and some driving. Don and patient share household tasks. Don reports that patient had a fall between Thanksgiving and [place] of . She another fall since then and has continued to declined. Don and family were providing more assistance at home including with her ADLs. Patient is retired. Don is patient's main support and is able to provide physical assistance. Their daughter, Dyan, lives locally and was assisting with bathing. Don reports that they have good family support. Patient's family practitioner is FABIENNE Hurd. Patient prefers [facility]. Patient is motivated to participate in therapy and return to her prior level of functioning.    Social/Functional History  Lives With: Spouse  Type of Home: House  Home Layout: One level  Home Access: Stairs to enter with rails  Entrance Stairs - Number of Steps: 2 steps with 1 grab bar in through the garage, pt holds onto doorknob on the left and gar bar on the right  Entrance Stairs - Rails: Right  Bathroom Shower/Tub: Walk-in shower, Tub/Shower unit, Shower chair with back  Bathroom Toilet: Handicap height  Bathroom Equipment: Grab bars in shower (vannity next to toilet)  Bathroom Accessibility: Accessible  Home Equipment: Walker - Standard (transport chair, patient reports she will need a rolling walker)  Has the patient 
  Problem: Discharge Planning  Goal: Discharge to home or other facility with appropriate resources  3/5/2025 1158 by Dali Ni LISW-S  Flowsheets (Taken 3/5/2025 0859 by Luis Jane, RN)  Discharge to home or other facility with appropriate resources: Identify barriers to discharge with patient and caregiver  Note:   [facility]  Physical Medicine and Rehabilitation  Post Team Conference Note    Date: 3/5/2025  Patient Name: Kim Amaya  MRN: [id number]  : 1950 (75 y.o.)    IPR Team Conference was held on Wednesday, 3/5. Recommendations of the team were explained to the patient and her , Don by Dr Lugo and JIMBO. Team is recommending that patient continue on acute inpatient rehab for PT and OT, with an expected discharge date of Saturday, 3/15. Patient reported wanting to be discharged on Wednesday, 3/12. Dr Lugo and Wil are in agreement with this. Following discharge, team is recommending home health. Patient and Don would like to pursue home health at discharge. Family training was scheduled for Monday, 3/10 at 9 AM. Care plan reviewed with patient and Don. Patient and Don verbalized understanding or the plan of care and contributed to goal setting. SW to follow and maintain involvement in discharge planning.    Plan  Referrals Needed: Home Health  Family Training: Monday, 3/10 at 9 AM  Driving Recommendations: Physician Clearance      
  Problem: Discharge Planning  Goal: Discharge to home or other facility with appropriate resources  3/6/2025 1511 by Dali Ni LISW-S  Note:   [facility]  Physical Medicine and Rehabilitation  Discharge Planning Progress Note    Date: 3/6/2025  Patient Name: Kim Amaya  MRN: [id number]  : 1950 (75 y.o.)    SW met with patient's spouse, Wil, on this date to discuss discharge planning. SW informed Wil of recommendation for home health services at discharge. Wil was unsure if patient would be agreeable to this. SW provided education on home health services and provided a list of Medicare approved providers. Wil to review with patient. SW will follow up with them at a later time. SW will follow and maintain involvement in discharge planning.    Plan  Referrals Made: None at this time  Referrals Needed: Home Health Services  Family Training: Monday, 3/10      
  Problem: Discharge Planning  Goal: Discharge to home or other facility with appropriate resources  3/8/2025 2345 by Silas Fuentes, RN  Outcome: Progressing  Flowsheets (Taken 3/8/2025 1945)  Discharge to home or other facility with appropriate resources: Identify barriers to discharge with patient and caregiver     Problem: Safety - Adult  Goal: Free from fall injury  3/8/2025 2345 by Silas Fuentes, RN  Outcome: Progressing     Problem: Pain  Goal: Verbalizes/displays adequate comfort level or baseline comfort level  3/8/2025 2345 by Silas Fuentes, RN  Outcome: Progressing  Flowsheets (Taken 3/8/2025 1945)  Verbalizes/displays adequate comfort level or baseline comfort level:   Encourage patient to monitor pain and request assistance   Assess pain using appropriate pain scale     Problem: Skin/Tissue Integrity  Goal: Skin integrity remains intact  Description: 1.  Monitor for areas of redness and/or skin breakdown  3/8/2025 2345 by Silas Fuentes, RN  Outcome: Progressing  Flowsheets  Taken 3/8/2025 2217  Skin Integrity Remains Intact: Monitor for areas of redness and/or skin breakdown  Taken 3/8/2025 1945  Skin Integrity Remains Intact: Monitor for areas of redness and/or skin breakdown     Problem: Musculoskeletal - Adult  Goal: Return mobility to safest level of function  Outcome: Progressing  Flowsheets (Taken 3/8/2025 1945)  Return Mobility to Safest Level of Function: Assess patient stability and activity tolerance for standing, transferring and ambulating with or without assistive devices     
  Problem: Discharge Planning  Goal: Discharge to home or other facility with appropriate resources  3/9/2025 2111 by Silas Fuentes, RN  Outcome: Progressing  Flowsheets (Taken 3/9/2025 1941)  Discharge to home or other facility with appropriate resources: Identify barriers to discharge with patient and caregiver     Problem: Safety - Adult  Goal: Free from fall injury  3/9/2025 2111 by Silas Fuentes, RN  Outcome: Progressing     Problem: Pain  Goal: Verbalizes/displays adequate comfort level or baseline comfort level  3/9/2025 2111 by Silas Fuentes, RN  Outcome: Progressing  Flowsheets (Taken 3/9/2025 1941)  Verbalizes/displays adequate comfort level or baseline comfort level:   Encourage patient to monitor pain and request assistance   Assess pain using appropriate pain scale     Problem: Skin/Tissue Integrity  Goal: Skin integrity remains intact  Description: 1.  Monitor for areas of redness and/or skin breakdown  3/9/2025 2111 by Silas Fuentes, RN  Outcome: Progressing  Flowsheets  Taken 3/9/2025 2106  Skin Integrity Remains Intact: Monitor for areas of redness and/or skin breakdown  Taken 3/9/2025 1941  Skin Integrity Remains Intact: Monitor for areas of redness and/or skin breakdown     Problem: Musculoskeletal - Adult  Goal: Return mobility to safest level of function  Outcome: Progressing  Flowsheets (Taken 3/9/2025 1941)  Return Mobility to Safest Level of Function: Assess patient stability and activity tolerance for standing, transferring and ambulating with or without assistive devices     
  Problem: Discharge Planning  Goal: Discharge to home or other facility with appropriate resources  Outcome: Progressing  Flowsheets  Taken 3/1/2025 0350 by Tamiko Pérez LPN  Discharge to home or other facility with appropriate resources:   Identify barriers to discharge with patient and caregiver   Arrange for needed discharge resources and transportation as appropriate   Identify discharge learning needs (meds, wound care, etc)    Problem: Safety - Adult  Goal: Free from fall injury  Outcome: Progressing  Flowsheets (Taken 3/1/2025 0350)  Free From Fall Injury: Instruct family/caregiver on patient safety   Discharge to home or other facility with appropriate resources: Identify barriers to discharge with patient and caregiver  Ta  Problem: Pain  Goal: Verbalizes/displays adequate comfort level or baseline comfort level  Outcome: Progressing  Verbalizes/displays adequate comfort level or baseline comfort level: Encourage patient to monitor pain and request assistance   Discharge to home or other facility with appropriate resources: Identify barriers to discharge with patient and caregiver     
  Problem: Discharge Planning  Goal: Discharge to home or other facility with appropriate resources  Outcome: Progressing  Flowsheets (Taken 3/5/2025 0402)  Discharge to home or other facility with appropriate resources:   Identify barriers to discharge with patient and caregiver   Arrange for needed discharge resources and transportation as appropriate   Identify discharge learning needs (meds, wound care, etc)     Problem: Safety - Adult  Goal: Free from fall injury  Outcome: Progressing    Problem: Pain  Goal: Verbalizes/displays adequate comfort level or baseline comfort level  Outcome: Progressing    Problem: Skin/Tissue Integrity  Goal: Skin integrity remains intact  Description: 1.  Monitor for areas of redness and/or skin breakdown  2.  Assess vascular access sites hourly  3.  Every 4-6 hours minimum:  Change oxygen saturation probe site  4.  Every 4-6 hours:  If on nasal continuous positive airway pressure, respiratory therapy assess nares and determine need for appliance change or resting period  Outcome: Progressing    Problem: Nutrition Deficit:  Goal: Optimize nutritional status  Outcome: Progressing  Flowsheets (Taken 3/5/2025 0402)  Nutrient intake appropriate for improving, restoring, or maintaining nutritional needs:   Monitor oral intake, labs, and treatment plans   Recommend appropriate diets, oral nutritional supplements, and vitamin/mineral supplements   Assess nutritional status and recommend course of action   Skin Integrity Remains Intact: Monitor for areas of redness and/or skin breakdown   Verbalizes/displays adequate comfort level or baseline comfort level:   Encourage patient to monitor pain and request assistance   Assess pain using appropriate pain scale   Free From Fall Injury: Instruct family/caregiver on patient safety     
  Problem: Discharge Planning  Goal: Discharge to home or other facility with appropriate resources  Outcome: Progressing  Flowsheets (Taken 3/7/2025 2015)  Discharge to home or other facility with appropriate resources: Identify barriers to discharge with patient and caregiver     Problem: Safety - Adult  Goal: Free from fall injury  Outcome: Progressing     Problem: Pain  Goal: Verbalizes/displays adequate comfort level or baseline comfort level  Outcome: Progressing  Flowsheets (Taken 3/7/2025 2015)  Verbalizes/displays adequate comfort level or baseline comfort level:   Encourage patient to monitor pain and request assistance   Assess pain using appropriate pain scale     Problem: Skin/Tissue Integrity  Goal: Skin integrity remains intact  Description: 1.  Monitor for areas of redness and/or skin breakdown  Outcome: Progressing  Flowsheets  Taken 3/7/2025 2254  Skin Integrity Remains Intact: Monitor for areas of redness and/or skin breakdown  Taken 3/7/2025 2015  Skin Integrity Remains Intact: Monitor for areas of redness and/or skin breakdown     Problem: Musculoskeletal - Adult  Goal: Return mobility to safest level of function  Outcome: Progressing  Flowsheets (Taken 3/7/2025 2015)  Return Mobility to Safest Level of Function: Assess patient stability and activity tolerance for standing, transferring and ambulating with or without assistive devices     
  Problem: Pain  Goal: Verbalizes/displays adequate comfort level or baseline comfort level  3/5/2025 2249 by Jonna Brooke, RN  Outcome: Progressing  Flowsheets (Taken 3/5/2025 2100)  Verbalizes/displays adequate comfort level or baseline comfort level: Encourage patient to monitor pain and request assistance     Problem: Skin/Tissue Integrity  Goal: Skin integrity remains intact  Description: 1.  Monitor for areas of redness and/or skin breakdown  2.  Assess vascular access sites hourly  3.  Every 4-6 hours minimum:  Change oxygen saturation probe site  4.  Every 4-6 hours:  If on nasal continuous positive airway pressure, respiratory therapy assess nares and determine need for appliance change or resting period  3/5/2025 2252 by Jonna Brooke, RN  Outcome: Progressing     Problem: Musculoskeletal - Adult  Goal: Return mobility to safest level of function  3/5/2025 2252 by Jonna Brooke, RN  Outcome: Progressing     Problem: Musculoskeletal - Adult  Goal: Maintain proper alignment of affected body part  3/5/2025 2252 by Jonna Brooke, RN  Outcome: Progressing     Problem: Musculoskeletal - Adult  Goal: Return ADL status to a safe level of function  3/5/2025 2252 by Jonna Brooke, RN  Outcome: Progressing     Problem: Metabolic/Fluid and Electrolytes - Adult  Goal: Electrolytes maintained within normal limits  Outcome: Progressing  Flowsheets (Taken 3/5/2025 2045)  Electrolytes maintained within normal limits: Monitor labs and assess patient for signs and symptoms of electrolyte imbalances     
  Problem: Safety - Adult  Goal: Free from fall injury  Outcome: Progressing  Flowsheets (Taken 3/2/2025 4666)  Free From Fall Injury: Instruct family/caregiver on patient safety     Problem: Pain  Goal: Verbalizes/displays adequate comfort level or baseline comfort level  Outcome: Progressing    Problem: Skin/Tissue Integrity  Goal: Skin integrity remains intact  Description: 1.  Monitor for areas of redness and/or skin breakdown  2.  Assess vascular access sites hourly  3.  Every 4-6 hours minimum:  Change oxygen saturation probe site  4.  Every 4-6 hours:  If on nasal continuous positive airway pressure, respiratory therapy assess nares and determine need for appliance change or resting period  Outcome: Progressing  Flowsheets (Taken 3/2/2025 6289)  Skin Integrity Remains Intact: Monitor for areas of redness and/or skin breakdown     Verbalizes/displays adequate comfort level or baseline comfort level: Encourage patient to monitor pain and request assistance     
Individualized Plan of Care  [facility] Inpatient Rehabilitation Unit    Rehabilitation physician: Dr. Lugo    Admit Date: 2/28/2025     Rehabilitation Diagnosis: Pathological compression fracture of lumbar vertebra, initial encounter ([facility]) [M48.56XA]      Rehabilitation impairments: self care, mobility, motor dysfunction, bowel/bladder management, pain management, and safety    Factors facilitating achievement of predicted outcomes: Family support, Motivated, Cooperative, and Pleasant  Barriers to the achievement of predicted outcomes: Pain, Limited family support, Limited safety awareness, Unrealistic expectations, Decreased endurance, Decreased proprioception, Lower extremity weakness, Stairs at home, Skin Care, lumbosacral brace application and activities restriction    Patient Goals: Improve independence with mobility, Improvement of mobility at a wheelchair level, Increase overall strength and endurance, Increase balance, Increase endurance, Increase independence with activities of daily living, Improve cognition, Increase self-awareness, Increase safety awareness, Increase community integration, Increase socialization, Functional communication with caregivers, Integrate appropriate pain management plan, Assure adequate nutritional option for discharge, Continence of bowel and bladder, and Provide appropriate patient and family education      NURSING:  Nursing goals for Kim Amaya while on the rehabilitation unit will include:  Continence of bowel and bladder, Adequate number of bowel movements, Urinate with no urinary retention >300ml in bladder, Maintain O2 SATs at an acceptable level during stay, Effective pain management while on the rehabilitation unit, Establish adequate pain control plan for discharge, Absence of skin breakdown while on the rehabilitation unit, Improved skin integrity via assessments including wound measurements, Avoidance of any hospital acquired infections, No 
Adult  Goal: Return mobility to safest level of function  Outcome: Progressing  Flowsheets (Taken 3/5/2025 2045)  Return Mobility to Safest Level of Function: Assess patient stability and activity tolerance for standing, transferring and ambulating with or without assistive devices     
function  3/10/2025 0958 by Ashu Chau LPN  Outcome: Progressing  Flowsheets (Taken 3/10/2025 0800)  Return Mobility to Safest Level of Function:   Assess patient stability and activity tolerance for standing, transferring and ambulating with or without assistive devices   Assist with transfers and ambulation using safe patient handling equipment as needed     Problem: Musculoskeletal - Adult  Goal: Maintain proper alignment of affected body part  3/10/2025 0958 by Ashu Chau LPN  Outcome: Progressing  Flowsheets (Taken 3/10/2025 0800)  Maintain proper alignment of affected body part: Support and protect limb and body alignment per provider's orders     Problem: Metabolic/Fluid and Electrolytes - Adult  Goal: Glucose maintained within prescribed range  3/10/2025 0958 by Ashu Chau LPN  Outcome: Progressing  Flowsheets (Taken 3/10/2025 0800)  Glucose maintained within prescribed range: Monitor blood glucose as ordered        
function  3/4/2025 1120 by Ashu Chau LPN  Outcome: Progressing  Flowsheets (Taken 3/4/2025 1000)  Return Mobility to Safest Level of Function:   Assess patient stability and activity tolerance for standing, transferring and ambulating with or without assistive devices   Assist with transfers and ambulation using safe patient handling equipment as needed     Problem: Musculoskeletal - Adult  Goal: Maintain proper alignment of affected body part  Outcome: Progressing  Flowsheets (Taken 3/4/2025 1000)  Maintain proper alignment of affected body part: Support and protect limb and body alignment per provider's orders     Problem: Musculoskeletal - Adult  Goal: Return ADL status to a safe level of function  3/4/2025 1120 by Ashu Chau LPN  Outcome: Progressing  Flowsheets (Taken 3/4/2025 1000)  Return ADL Status to a Safe Level of Function: Administer medication as ordered     Problem: Metabolic/Fluid and Electrolytes - Adult  Goal: Electrolytes maintained within normal limits  Outcome: Progressing  Flowsheets (Taken 3/4/2025 1000)  Electrolytes maintained within normal limits: Monitor labs and assess patient for signs and symptoms of electrolyte imbalances     Problem: Metabolic/Fluid and Electrolytes - Adult  Goal: Hemodynamic stability and optimal renal function maintained  Outcome: Progressing  Flowsheets (Taken 3/4/2025 1000)  Hemodynamic stability and optimal renal function maintained: Encourage oral intake as appropriate     Problem: Metabolic/Fluid and Electrolytes - Adult  Goal: Glucose maintained within prescribed range  3/4/2025 1120 by Ashu Chau LPN  Outcome: Progressing  Flowsheets (Taken 3/4/2025 1000)  Glucose maintained within prescribed range: Monitor blood glucose as ordered     Problem: Nutrition Deficit:  Goal: Optimize nutritional status  3/4/2025 1120 by Ashu Chau LPN  Outcome: Progressing     
Luis Jane, RN  Outcome: Progressing  Flowsheets (Taken 3/5/2025 0859)  Skin Integrity Remains Intact: Monitor for areas of redness and/or skin breakdown  3/5/2025 0402 by Tamiko Pérez LPN  Outcome: Progressing  Flowsheets (Taken 3/4/2025 1000 by Ashu Chau LPN)  Skin Integrity Remains Intact: Monitor for areas of redness and/or skin breakdown     Problem: Musculoskeletal - Adult  Goal: Maintain proper alignment of affected body part  3/5/2025 0402 by Tamiko Pérez LPN  Outcome: Progressing  Flowsheets (Taken 3/4/2025 1000 by Ashu Chau LPN)  Maintain proper alignment of affected body part: Support and protect limb and body alignment per provider's orders  Goal: Return ADL status to a safe level of function  Outcome: Progressing  Flowsheets (Taken 3/5/2025 0859)  Return ADL Status to a Safe Level of Function:   Administer medication as ordered   Assess activities of daily living deficits and provide assistive devices as needed     Problem: Metabolic/Fluid and Electrolytes - Adult  Goal: Glucose maintained within prescribed range  Outcome: Progressing  Flowsheets (Taken 3/5/2025 0859)  Glucose maintained within prescribed range:   Monitor blood glucose as ordered   Assess for signs and symptoms of hyperglycemia and hypoglycemia     Problem: Nutrition Deficit:  Goal: Optimize nutritional status  3/5/2025 1252 by Luis Jane, RN  Outcome: Progressing  Flowsheets (Taken 3/5/2025 1252)  Nutrient intake appropriate for improving, restoring, or maintaining nutritional needs:   Assess nutritional status and recommend course of action   Monitor oral intake, labs, and treatment plans  3/5/2025 0402 by Tamiko Pérez LPN  Outcome: Progressing  Flowsheets (Taken 3/5/2025 0402)  Nutrient intake appropriate for improving, restoring, or maintaining nutritional needs:   Monitor oral intake, labs, and treatment plans   Recommend appropriate diets, oral nutritional supplements, and vitamin/mineral

## 2025-03-12 NOTE — PROGRESS NOTES
POST FALL MANAGEMENT    Kim Amaya  MEDICAL RECORD NUMBER:  [id number]  AGE: 75 y.o.   GENDER: female  : 1950  TODAYS DATE:  3/7/2025    Details     Fall Occurred: Yes    Was the Fall Witnessed:  Yes [facility]    Brief Review of Event:Patient was in therapy working on steps and knee buckled and patient was lowered to the floor          Who found the patient: Patient was with [facility]      Where was the patient at the time of the fall: Therapy gym      Patient Comments: My knee buckled        Date Fall Occurred:  2025 .       Time Fall Occurred: 9:50a.m.     Assessment     Post Fall Head to Toe Assessment Completed: Yes    Post Fall Predictive Analytic Score Reviewed: Yes     Post Fall Vitals Completed: Yes    Post Fall Neuro Checks Completed: Yes    Injury Occurred(if yes, describe injury):  no           Did the Patient Experience:(Check Michael all that apply)    [] Patient hit head  [] Loss of consciousness  [] Change in mental status following the fall  [] Patient is on an anticoagulant medication      CT Performed:  no    Follow-up     Persons Notified of Fall:  (Provide names of persons notified)   [x] Physician:   [] LOR:  [x] Nursing Supervisior:  [x] Manager:  [x] Pharmacist:  [x] Family:  [] Other:      Electronically signed by Ashu Chau LPN 3/7/2025 at 10:33 AM   
   Physical Medicine & Rehabilitation Progress Note    Chief Complaint:  Low back pain with lower extremities weakness, difficulty walking    Subjective:    Kim Amaya is a 75 y.o. right-handed obese  female with history of hypertension, hyperlipidemia, hypothyroidism, diabetes mellitus type 2, osteoporosis, coronary artery disease with MI requiring 2 stents placement, depression, osteoarthritis, anemia, questionable COPD and CHF, status post right knee arthroscopic surgery, tonsillectomy, cholecystectomy, urinary bladder suspension surgery, appendectomy, was admitted on 2/28/2025 for intensive inpatient management of impairment & disability secondary to pathological acute L3 inferior endplate compression fracture due to osteoporosis, complicated by Klebsiella pneumoniae urinary tract infection     The patient says she had history of 2 falls at the end of 2024 without causing any injury.  She patient presented to [facility] on 2/17/2025 with the complaints of right lower back pain which started on 2/15/2025 morning when she tried to get out of bed.  She denies having any recent injury.  The patient was evaluated and was discharged with prescription for naproxen and cyclobenzaprine, and recommendation of follow-up with primary physician.     Because of worsening back pain not relieved with naproxen and Flexeril usage, she visited [facility] the ER on 2/19/2025 night.  CT of abdomen and pelvis was performed on 2/20/2025 and revealed acute L3 inferior endplate compression deformity, and liver cirrhosis sequela of chronic portal venous hypertension.  CT of lumbar spine also was carried out on 2/20/2025 and also demonstrated acute L3 inferior endplate compression fracture with approximate 3 mm retropulsion of posterior inferior endplate without significant central canal stenosis, and multilevel disc bulge and osteophyte complex formation and facet hypertrophy 
   Physical Medicine & Rehabilitation Progress Note    Chief Complaint:  Low back pain with lower extremities weakness, difficulty walking    Subjective:    Kim Amaya is a 75 y.o. right-handed obese  female with history of hypertension, hyperlipidemia, hypothyroidism, diabetes mellitus type 2, osteoporosis, coronary artery disease with MI requiring 2 stents placement, depression, osteoarthritis, anemia, questionable COPD and CHF, status post right knee arthroscopic surgery, tonsillectomy, cholecystectomy, urinary bladder suspension surgery, appendectomy, was admitted on 2/28/2025 for intensive inpatient management of impairment & disability secondary to pathological acute L3 inferior endplate compression fracture due to osteoporosis, complicated by Klebsiella pneumoniae urinary tract infection     The patient says she had history of 2 falls at the end of 2024 without causing any injury.  She patient presented to [facility] on 2/17/2025 with the complaints of right lower back pain which started on 2/15/2025 morning when she tried to get out of bed.  She denies having any recent injury.  The patient was evaluated and was discharged with prescription for naproxen and cyclobenzaprine, and recommendation of follow-up with primary physician.     Because of worsening back pain not relieved with naproxen and Flexeril usage, she visited [facility] the ER on 2/19/2025 night.  CT of abdomen and pelvis was performed on 2/20/2025 and revealed acute L3 inferior endplate compression deformity, and liver cirrhosis sequela of chronic portal venous hypertension.  CT of lumbar spine also was carried out on 2/20/2025 and also demonstrated acute L3 inferior endplate compression fracture with approximate 3 mm retropulsion of posterior inferior endplate without significant central canal stenosis, and multilevel disc bulge and osteophyte complex formation and facet hypertrophy 
   Physical Medicine & Rehabilitation Progress Note    Chief Complaint:  Low back pain with lower extremities weakness, difficulty walking    Subjective:    Kim Amaya is a 75 y.o. right-handed obese  female with history of hypertension, hyperlipidemia, hypothyroidism, diabetes mellitus type 2, osteoporosis, coronary artery disease with MI requiring 2 stents placement, depression, osteoarthritis, anemia, questionable COPD and CHF, status post right knee arthroscopic surgery, tonsillectomy, cholecystectomy, urinary bladder suspension surgery, appendectomy, was admitted on 2/28/2025 for intensive inpatient management of impairment & disability secondary to pathological acute L3 inferior endplate compression fracture due to osteoporosis, complicated by Klebsiella pneumoniae urinary tract infection     The patient says she had history of 2 falls at the end of 2024 without causing any injury.  She patient presented to [facility] on 2/17/2025 with the complaints of right lower back pain which started on 2/15/2025 morning when she tried to get out of bed.  She denies having any recent injury.  The patient was evaluated and was discharged with prescription for naproxen and cyclobenzaprine, and recommendation of follow-up with primary physician.     Because of worsening back pain not relieved with naproxen and Flexeril usage, she visited [facility] the ER on 2/19/2025 night.  CT of abdomen and pelvis was performed on 2/20/2025 and revealed acute L3 inferior endplate compression deformity, and liver cirrhosis sequela of chronic portal venous hypertension.  CT of lumbar spine also was carried out on 2/20/2025 and also demonstrated acute L3 inferior endplate compression fracture with approximate 3 mm retropulsion of posterior inferior endplate without significant central canal stenosis, and multilevel disc bulge and osteophyte complex formation and facet hypertrophy 
   Physical Medicine & Rehabilitation Progress Note    Chief Complaint:  Low back pain with lower extremities weakness, difficulty walking    Subjective:    Kim Amaya is a 75 y.o. right-handed obese  female with history of hypertension, hyperlipidemia, hypothyroidism, diabetes mellitus type 2, osteoporosis, coronary artery disease with MI requiring 2 stents placement, depression, osteoarthritis, anemia, questionable COPD and CHF, status post right knee arthroscopic surgery, tonsillectomy, cholecystectomy, urinary bladder suspension surgery, appendectomy, was admitted on 2/28/2025 for intensive inpatient management of impairment & disability secondary to pathological acute L3 inferior endplate compression fracture due to osteoporosis, complicated by Klebsiella pneumoniae urinary tract infection     The patient says she had history of 2 falls at the end of 2024 without causing any injury.  She patient presented to [facility] on 2/17/2025 with the complaints of right lower back pain which started on 2/15/2025 morning when she tried to get out of bed.  She denies having any recent injury.  The patient was evaluated and was discharged with prescription for naproxen and cyclobenzaprine, and recommendation of follow-up with primary physician.     Because of worsening back pain not relieved with naproxen and Flexeril usage, she visited [facility] the ER on 2/19/2025 night.  CT of abdomen and pelvis was performed on 2/20/2025 and revealed acute L3 inferior endplate compression deformity, and liver cirrhosis sequela of chronic portal venous hypertension.  CT of lumbar spine also was carried out on 2/20/2025 and also demonstrated acute L3 inferior endplate compression fracture with approximate 3 mm retropulsion of posterior inferior endplate without significant central canal stenosis, and multilevel disc bulge and osteophyte complex formation and facet hypertrophy 
   Physical Medicine & Rehabilitation Progress Note    Chief Complaint:  Low back pain with lower extremities weakness, difficulty walking    Subjective:    Kim Amaya is a 75 y.o. right-handed obese  female with history of hypertension, hyperlipidemia, hypothyroidism, diabetes mellitus type 2, osteoporosis, coronary artery disease with MI requiring 2 stents placement, depression, osteoarthritis, anemia, questionable COPD and CHF, status post right knee arthroscopic surgery, tonsillectomy, cholecystectomy, urinary bladder suspension surgery, appendectomy, was admitted on 2/28/2025 for intensive inpatient management of impairment & disability secondary to pathological acute L3 inferior endplate compression fracture due to osteoporosis, complicated by Klebsiella pneumoniae urinary tract infection     The patient says she had history of 2 falls at the end of 2024 without causing any injury.  She patient presented to [facility] on 2/17/2025 with the complaints of right lower back pain which started on 2/15/2025 morning when she tried to get out of bed.  She denies having any recent injury.  The patient was evaluated and was discharged with prescription for naproxen and cyclobenzaprine, and recommendation of follow-up with primary physician.     Because of worsening back pain not relieved with naproxen and Flexeril usage, she visited [facility] the ER on 2/19/2025 night.  CT of abdomen and pelvis was performed on 2/20/2025 and revealed acute L3 inferior endplate compression deformity, and liver cirrhosis sequela of chronic portal venous hypertension.  CT of lumbar spine also was carried out on 2/20/2025 and also demonstrated acute L3 inferior endplate compression fracture with approximate 3 mm retropulsion of posterior inferior endplate without significant central canal stenosis, and multilevel disc bulge and osteophyte complex formation and facet hypertrophy 
   Physical Medicine & Rehabilitation Progress Note    Chief Complaint:  Low back pain with lower extremities weakness, difficulty walking    Subjective:    Kim Amaya is a 75 y.o. right-handed obese  female with history of hypertension, hyperlipidemia, hypothyroidism, diabetes mellitus type 2, osteoporosis, coronary artery disease with MI requiring 2 stents placement, depression, osteoarthritis, anemia, questionable COPD and CHF, status post right knee arthroscopic surgery, tonsillectomy, cholecystectomy, urinary bladder suspension surgery, appendectomy, was admitted on 2/28/2025 for intensive inpatient management of impairment & disability secondary to pathological acute L3 inferior endplate compression fracture due to osteoporosis, complicated by Klebsiella pneumoniae urinary tract infection     The patient says she had history of 2 falls at the end of 2024 without causing any injury.  She patient presented to [facility] on 2/17/2025 with the complaints of right lower back pain which started on 2/15/2025 morning when she tried to get out of bed.  She denies having any recent injury.  The patient was evaluated and was discharged with prescription for naproxen and cyclobenzaprine, and recommendation of follow-up with primary physician.     Because of worsening back pain not relieved with naproxen and Flexeril usage, she visited [facility] the ER on 2/19/2025 night.  CT of abdomen and pelvis was performed on 2/20/2025 and revealed acute L3 inferior endplate compression deformity, and liver cirrhosis sequela of chronic portal venous hypertension.  CT of lumbar spine also was carried out on 2/20/2025 and also demonstrated acute L3 inferior endplate compression fracture with approximate 3 mm retropulsion of posterior inferior endplate without significant central canal stenosis, and multilevel disc bulge and osteophyte complex formation and facet hypertrophy 
 [facility]  INPATIENT PHYSICAL THERAPY  DAILY NOTE  [facility] - [id number]-15/[id number]      Discharge Recommendations: Home with Home Health PT  Equipment Recommendations: Yes (will need RW)               Time In: 0930  Time Out: 1100  Timed Code Treatment Minutes: 90 Minutes  Minutes: 90          Date: 3/7/2025  Patient Name: Kim Amaya,  Gender:  female        MRN: [id number]  : 1950  (75 y.o.)     Referring Practitioner: Chris Lugo MD  Diagnosis: Pathological compression fracture of lumbar vertebra, initial encounter ([facility])  Additional Pertinent Hx: Per EMR:Kim Amaya  is a 75 y.o. right-handed obese  female  with history of hypertension, hyperlipidemia, hypothyroidism, diabetes mellitus type 2, osteoporosis, coronary artery disease with MI requiring 2 stents placement, depression, osteoarthritis, anemia, questionable COPD and CHF, status post right knee arthroscopic surgery, tonsillectomy, cholecystectomy, urinary bladder suspension surgery, appendectomy, is admitted to the inpatient rehabilitation unit on 2025 for intensive inpatient rehabilitation treatment of impairment and disability secondary to pathological acute L3 inferior endplate compression fracture due to osteoporosis, complicated by Klebsiella pneumoniae urinary tract infection.The patient says she had history of 2 falls at the end of  without causing any injury.  She patient presented to [facility] [place] on 2025 with the complaints of right lower back pain which started on 2/15/2025 morning when she tried to get out of bed.  She denies having any recent injury.  The patient was evaluated and was discharged with prescription for naproxen and cyclobenzaprine, and recommendation of follow-up with primary physician.     Because of worsening back pain not relieved with naproxen and Flexeril usage, she visited [facility] the ER on 
 [facility]  INPATIENT PHYSICAL THERAPY  DAILY NOTE  [facility] - [id number]-15/[id number]      Discharge Recommendations: Home with Home Health PT and Patient would benefit from continued PT at discharge  Equipment Recommendations: Yes (will need RW)               Time In: 0730  Time Out: 0900  Timed Code Treatment Minutes: 90 Minutes  Minutes: 90          Date: 3/6/2025  Patient Name: Kim Amaya,  Gender:  female        MRN: [id number]  : 1950  (75 y.o.)     Referring Practitioner: Chris Lugo MD  Diagnosis: Pathological compression fracture of lumbar vertebra, initial encounter ([facility])  Additional Pertinent Hx: Per EMR:Kim Amaya  is a 75 y.o. right-handed obese  female  with history of hypertension, hyperlipidemia, hypothyroidism, diabetes mellitus type 2, osteoporosis, coronary artery disease with MI requiring 2 stents placement, depression, osteoarthritis, anemia, questionable COPD and CHF, status post right knee arthroscopic surgery, tonsillectomy, cholecystectomy, urinary bladder suspension surgery, appendectomy, is admitted to the inpatient rehabilitation unit on 2025 for intensive inpatient rehabilitation treatment of impairment and disability secondary to pathological acute L3 inferior endplate compression fracture due to osteoporosis, complicated by Klebsiella pneumoniae urinary tract infection.The patient says she had history of 2 falls at the end of  without causing any injury.  She patient presented to [facility] [place] on 2025 with the complaints of right lower back pain which started on 2/15/2025 morning when she tried to get out of bed.  She denies having any recent injury.  The patient was evaluated and was discharged with prescription for naproxen and cyclobenzaprine, and recommendation of follow-up with primary physician.     Because of worsening back pain not relieved with naproxen and Flexeril 
 [facility]  INPATIENT PHYSICAL THERAPY  DAILY NOTE  [facility] - [id number]-15/[id number]      Discharge Recommendations: Home with Home Health PT and Patient would benefit from continued PT at discharge  Equipment Recommendations: Yes (will need RW)               Time In: 1100  Time Out: 1230  Timed Code Treatment Minutes: 90 Minutes  Minutes: 90          Date: 3/5/2025  Patient Name: Kim Amaya,  Gender:  female        MRN: [id number]  : 1950  (75 y.o.)     Referring Practitioner: Chris Lugo MD  Diagnosis: Pathological compression fracture of lumbar vertebra, initial encounter ([facility])  Additional Pertinent Hx: Per EMR:Kim Amaya  is a 75 y.o. right-handed obese  female  with history of hypertension, hyperlipidemia, hypothyroidism, diabetes mellitus type 2, osteoporosis, coronary artery disease with MI requiring 2 stents placement, depression, osteoarthritis, anemia, questionable COPD and CHF, status post right knee arthroscopic surgery, tonsillectomy, cholecystectomy, urinary bladder suspension surgery, appendectomy, is admitted to the inpatient rehabilitation unit on 2025 for intensive inpatient rehabilitation treatment of impairment and disability secondary to pathological acute L3 inferior endplate compression fracture due to osteoporosis, complicated by Klebsiella pneumoniae urinary tract infection.The patient says she had history of 2 falls at the end of  without causing any injury.  She patient presented to [facility] [place] on 2025 with the complaints of right lower back pain which started on 2/15/2025 morning when she tried to get out of bed.  She denies having any recent injury.  The patient was evaluated and was discharged with prescription for naproxen and cyclobenzaprine, and recommendation of follow-up with primary physician.     Because of worsening back pain not relieved with naproxen and Flexeril 
 [facility]  INPATIENT PHYSICAL THERAPY  DAILY NOTE  [facility] - [id number]-15/[id number]      Discharge Recommendations: Continue to assess pending progress and Patient would benefit from continued PT at discharge  Equipment Recommendations: Yes (will need RW)               Time In: 1030  Time Out: 1215  Timed Code Treatment Minutes: 105 Minutes  Minutes: 105          Date: 3/3/2025  Patient Name: Kim Amaya,  Gender:  female        MRN: [id number]  : 1950  (75 y.o.)     Referring Practitioner: Chris Lugo MD  Diagnosis: Pathological compression fracture of lumbar vertebra, initial encounter ([facility])  Additional Pertinent Hx: Per EMR:Kim Amaya  is a 75 y.o. right-handed obese  female  with history of hypertension, hyperlipidemia, hypothyroidism, diabetes mellitus type 2, osteoporosis, coronary artery disease with MI requiring 2 stents placement, depression, osteoarthritis, anemia, questionable COPD and CHF, status post right knee arthroscopic surgery, tonsillectomy, cholecystectomy, urinary bladder suspension surgery, appendectomy, is admitted to the inpatient rehabilitation unit on 2025 for intensive inpatient rehabilitation treatment of impairment and disability secondary to pathological acute L3 inferior endplate compression fracture due to osteoporosis, complicated by Klebsiella pneumoniae urinary tract infection.The patient says she had history of 2 falls at the end of  without causing any injury.  She patient presented to [facility] [place] on 2025 with the complaints of right lower back pain which started on 2/15/2025 morning when she tried to get out of bed.  She denies having any recent injury.  The patient was evaluated and was discharged with prescription for naproxen and cyclobenzaprine, and recommendation of follow-up with primary physician.     Because of worsening back pain not relieved with naproxen and 
 [facility]  INPATIENT PHYSICAL THERAPY  DAILY NOTE  [facility] - [id number]-15/[id number]      Discharge Recommendations: Home with Home Health PT  Equipment Recommendations: Yes (will need RW)               Time In: 0840  Time Out: 0915  Timed Code Treatment Minutes: 35 Minutes  Minutes: 35          Date: 3/9/2025  Patient Name: Kim Amaya,  Gender:  female        MRN: [id number]  : 1950  (75 y.o.)     Referring Practitioner: Chris Lugo MD  Diagnosis: Pathological compression fracture of lumbar vertebra, initial encounter ([facility])  Additional Pertinent Hx: Per EMR:Kim Amaya  is a 75 y.o. right-handed obese  female  with history of hypertension, hyperlipidemia, hypothyroidism, diabetes mellitus type 2, osteoporosis, coronary artery disease with MI requiring 2 stents placement, depression, osteoarthritis, anemia, questionable COPD and CHF, status post right knee arthroscopic surgery, tonsillectomy, cholecystectomy, urinary bladder suspension surgery, appendectomy, is admitted to the inpatient rehabilitation unit on 2025 for intensive inpatient rehabilitation treatment of impairment and disability secondary to pathological acute L3 inferior endplate compression fracture due to osteoporosis, complicated by Klebsiella pneumoniae urinary tract infection.The patient says she had history of 2 falls at the end of  without causing any injury.  She patient presented to [facility] on 2025 with the complaints of right lower back pain which started on 2/15/2025 morning when she tried to get out of bed.  She denies having any recent injury.  The patient was evaluated and was discharged with prescription for naproxen and cyclobenzaprine, and recommendation of follow-up with primary physician.     Because of worsening back pain not relieved with naproxen and Flexeril usage, she visited [facility] the ER on 
 [facility]  INPATIENT PHYSICAL THERAPY  DAILY NOTE  [facility] - [id number]-15/[id number]      Discharge Recommendations: Home with Assist as Needed and Patient would benefit from continued PT at discharge  Equipment Recommendations: Yes (will need RW)               Time In: 1100  Time Out: 1230  Timed Code Treatment Minutes: 90 Minutes  Minutes: 90          Date: 3/10/2025  Patient Name: Kim Amaya,  Gender:  female        MRN: [id number]  : 1950  (75 y.o.)     Referring Practitioner: Chris Lugo MD  Diagnosis: Pathological compression fracture of lumbar vertebra, initial encounter ([facility])  Additional Pertinent Hx: Per EMR:Kim Amaya  is a 75 y.o. right-handed obese  female  with history of hypertension, hyperlipidemia, hypothyroidism, diabetes mellitus type 2, osteoporosis, coronary artery disease with MI requiring 2 stents placement, depression, osteoarthritis, anemia, questionable COPD and CHF, status post right knee arthroscopic surgery, tonsillectomy, cholecystectomy, urinary bladder suspension surgery, appendectomy, is admitted to the inpatient rehabilitation unit on 2025 for intensive inpatient rehabilitation treatment of impairment and disability secondary to pathological acute L3 inferior endplate compression fracture due to osteoporosis, complicated by Klebsiella pneumoniae urinary tract infection.The patient says she had history of 2 falls at the end of  without causing any injury.  She patient presented to [facility] [place] on 2025 with the complaints of right lower back pain which started on 2/15/2025 morning when she tried to get out of bed.  She denies having any recent injury.  The patient was evaluated and was discharged with prescription for naproxen and cyclobenzaprine, and recommendation of follow-up with primary physician.     Because of worsening back pain not relieved with naproxen and Flexeril 
Admitted to the Inpatient Rehabilitation Unit via wheelchair.  Patient was then oriented to room and unit.  Education provided on the rehabilitation routine: three hours of therapy five days per week.      Explained patients right to have family, representative or physician notified of their admission.  Patient has Declined for physician to be notified.  Patient has Declined for family/representative to be notified.    Admitting medication orders compared with acute stay medications; home medication list reviewed with patient/family.  Medication issues identified No  Medication issue: none  If yes, physician notified  na .    Vaccination Status  Have you ever received a COVID-19 vaccine? Yes  date of last vaccine: 12/5/23, Brand: Pfizer      Transportation:   Has transportation kept you from medical appointments, meetings, work, or from getting things needed for daily living? (Check all that apply)  No.      Health Literacy:   How often do you need to have someone help you when you read instructions, pamphlets, or other written material from your doctor or pharmacy?  0. - Never    Social Isolation:  How often do you feel lonely or isolated from those around you?  0. Never    Patient Mood Interview (PHQ-2 to 9) (from [employer]©)   Say to Patient: \"Over the last 2 weeks, have you been bothered by any of the following problems?\"   If symptom is present, enter yes in column 1 (Symptom Presence)  If yes in column 1, then ask the patient: “About how often have you been bothered by this?” Indicate response in column 2, Symptom Frequency.   Symptom Presence  No    Yes   9.    No response  Symptom Frequency  Never or 1 day  2-6 days (several days)  7-11 days (half or more of the days)  12-14 days (nearly every day)    Symptom Presence Symptom Frequency   Little interest or pleasure in doing things 0. No 0. Never or 1 day   Feeling down, depressed, or hopeless 0. No 0. Never or 1 day   If either A or B above has symptom 
[facility]  Diagnosis List for Inpatient Rehab facility (IRF) - Patient Assessment Instrument (GABBIE)    Patient Name: Kim Amaya        MRN: [id number]    : 1950  (75 y.o.)  Gender: female     Primary impairment requiring rehabilitation: 8.9 Other Orthopedic      Etiologic Diagnosis that led to the condition: Pathological acute L3 inferior endplate compression fracture due to osteoporosis     Comorbid conditions affecting rehabilitation:  Pathological acute L3 inferior endplate compression fracture due to osteoporosis  Klebsiella pneumonia urinary tract infection  Mild L3-4 spinal canal and bilateral foraminal stenosis  L3-4 facet joints sprain  Osteoporosis  Class I obesity  Hypertension  GERD  Diabetes mellitus type 2 with hyperglycemia  Hypothyroidism  Hyperlipidemia  Liver cirrhosis with chronic portal venous hypertension  Elevated liver functional enzyme probably due to scheduled Tylenol usage   Mild anemia  History of coronary artery disease and myocardial infarction requiring coronary artery stenting  History of depression  Questionable history of COPD  Congestive heart failure with reduced ejection fraction      OG KRISHNA MD    
[facility]  Recreational Therapy  Inpatient Rehabilitation Evaluation        Time Spent with Patient: 15 minutes    Date:  3/4/2025       Patient Name: Kim Amaya      MRN: [id number]       YOB: 1950 (75 y.o.)       Gender: female  Diagnosis: Pathological compression fracture of lumbar vertebra  Referring Practitioner: Chris Lugo MD    RESTRICTIONS/PRECAUTIONS:  Restrictions/Precautions: Fall Risk, General Precautions     Hearing: Within functional limits    PAIN: 0    SUBJECTIVE:  pt lives with her , son and nephew-pt states she had 4 children with her daughter passing away, 8 grandchildren and 12 great grandchildren     VISION:  Glasses    HEARING: Within Normal Limit    LEISURE INTERESTS:   Pt use to bowl, she loves to fish and she loves to jermaine and has her materials in her room -she and  use to do some travelling and she would like to get back to that again-every other Sunday she cooks for any of her family that would like to come over-she use to play euchre and may be interested in playing during her stay-she has a dog at home that is part pit and part lab she loves-very pleasant and social-enjoyed listening to our  Nolan play some of her favorite blue grass songs-affect bright     BARRIERS TO LEISURE INTERESTS:    Decreased endurance and Lower extremity weakness           Patient Education  New Education Provided: Importance of Leisure, RT Plan of Care    Plan:  Continue to follow patient through this admission  Include patient in appropriate groups  See patient individually    Electronically signed by: Maria Eugenia Isbell CTRS  Date: 3/4/2025         
[facility]  Occupational Therapy  Daily Note    Discharge Recommendations: Home with assist as needed, Home with assistance of aide, and Home with Home Health OT  Equipment Recommendations:   Pt has a shower chair with back, monitor for grab bars in her walk in shower.  Pt has ADA height toilet with garb bar and sink nearby.  Recommend reacher and LH brush. Pt plans to have toiler raiser added to her toilet.      Time In: 1000  Time Out: 1030  Timed Code Treatment Minutes: 30 Minutes  Minutes: 30          Date: 3/9/2025  Patient Name: Kim Amaya,   Gender: female      Room: [address] [place] [address]  MRN: [id number]  : 1950  (75 y.o.)  Referring Practitioner: Chris Lugo MD  Diagnosis: Pathological compression fracture of lumbar vertebra, initial encounter (HCC)  Additional Pertinent Hx: Kim Amaya  is a 75 y.o. admitted to the inpatient rehabilitation unit on 2025 for intensive inpatient rehabilitation treatment of impairment and disability secondary to pathological acute L3 inferior endplate compression fracture due to osteoporosis, complicated by Klebsiella pneumoniae urinary tract infection. The patient says she had history of 2 falls at the end of  without causing any injury.  She patient presented to [facility] [place] on 2025 with the complaints of right lower back pain which started on 2/15/2025 morning when she tried to get out of bed.  The patient was evaluated and was discharged with prescription for naproxen and cyclobenzaprine, and recommendation of follow-up with primary physician. Because of worsening back pain not relieved with naproxen and Flexeril usage, she visited [facility] the ER on 2025 night. CT of lumbar spine also was carried out on 2025 and also demonstrated acute L3 inferior endplate compression fracture with approximate 3 mm retropulsion of posterior inferior 
[facility]  Occupational Therapy  Daily Note    Discharge Recommendations: Home with assistance of aide and Home with Home Health OT  Equipment Recommendations:   Pt has a shower chair with back, monitor for grab bars in her walk in shower.  Pt has ADA height toilet with garb bar and sink nearby.  Recommend reacher and LH brush. Pt plans to have toiler raiser added to her toilet.      Time In: 0805  Time Out: 0830  Timed Code Treatment Minutes: 25 Minutes  Minutes: 25          Date: 3/5/2025  Patient Name: Kim Amaya,   Gender: female      Room: [facility]15/015-  MRN: [id number]  : 1950  (75 y.o.)  Referring Practitioner: Chris Lugo MD  Diagnosis: Pathological compression fracture of lumbar vertebra, initial encounter ([facility])  Additional Pertinent Hx: Kim Amaya  is a 75 y.o. admitted to the inpatient rehabilitation unit on 2025 for intensive inpatient rehabilitation treatment of impairment and disability secondary to pathological acute L3 inferior endplate compression fracture due to osteoporosis, complicated by Klebsiella pneumoniae urinary tract infection. The patient says she had history of 2 falls at the end of  without causing any injury.  She patient presented to [facility] [place] on 2025 with the complaints of right lower back pain which started on 2/15/2025 morning when she tried to get out of bed.  The patient was evaluated and was discharged with prescription for naproxen and cyclobenzaprine, and recommendation of follow-up with primary physician. Because of worsening back pain not relieved with naproxen and Flexeril usage, she visited [facility] the ER on 2025 night. CT of lumbar spine also was carried out on 2025 and also demonstrated acute L3 inferior endplate compression fracture with approximate 3 mm retropulsion of posterior inferior endplate without significant 
[facility]  PHYSICAL THERAPY MISSED TREATMENT NOTE  [facility]    Date: 3/8/2025  Patient Name: Kim Amaya        MRN: [id number]   : 1950  (75 y.o.)  Gender: female   Referring Practitioner: Chris Lugo MD            REASON FOR MISSED TREATMENT:  Patient refusing PT this date due to having diarrhea and not feeling well. Patient agreeable to attempting therapy session tomorrow.    Madeline Manrique PT, DPT        
Comprehensive Nutrition Assessment    Type and Reason for Visit:  Initial, Consult (Nutritional assessment; unintentional weight loss)    Nutrition Recommendations/Plan:   Recommend CHO controlled diet - pt. Reported won't follow.  Declines ONS.  Encouraged po, good nutrition at best efforts for healing.  Consider MVI.     Malnutrition Assessment:  Malnutrition Status:  At risk for malnutrition (03/03/25 1151)    Context:  Acute Illness     Findings of the 6 clinical characteristics of malnutrition:  Energy Intake:  Mild decrease in energy intake  Weight Loss:  No weight loss (difficult to evaluate with edema)     Body Fat Loss:  No body fat loss     Muscle Mass Loss:  No muscle mass loss    Fluid Accumulation:  Unable to assess     Strength:  Not Performed    Nutrition Assessment:     Pt. nutritionally compromised AEB inadequate oral intake pta.  At risk for further nutrition compromise r/t admit with compression fracture of lumbar vertebra and underlying medical condition (hx DM, COPD, GERD, HLD, CAD, CHF).       Nutrition Related Findings:    Pt. Report/Treatments/Miscellaneous:   3/3- pt. Seen - reports eating well; denies any trouble tolerating diet; states recent decreased appetite d/t pain from broken ribs (around [place]); denies any trouble tolerating diet; declines ONS - states \"I won't drink them\"; pt. On regular diet per provider - noting She states she will not follow a DM diet and so I will not use a carb restriction diet. She is just now resuming the metformin  and it  will take a few days for it to take affect. She may need the use of the home glimiperide. She is having the cinnamon from home brought in to use  GI Status: BM 3/2  Pertinent Labs: 3/1: Glucose 193, BUN 10, Cr 0.5; Cholesterol 107, HDL 41, LDL 51, Triglycerides 76; 3/1: HgbA1c 5.9%  Pertinent Meds: Colace, Senokot, Glycolax, Imodium, Glucophage, Actos      Wound Type: None       Current Nutrition Intake & Therapies:    Average 
Comprehensive Nutrition Assessment    Type and Reason for Visit:  Reassess    Nutrition Recommendations/Plan:   Continue current diet.    Encouraged po, good nutrition at best efforts for healing.  Reviewed protein sources and encouraged w/ meals.  Recommend MVI.  Rx per provider to assist with blood sugar control.  Note - pt. Previously declined DB diet and won't follow.     Malnutrition Assessment:  Malnutrition Status:  At risk for malnutrition (03/03/25 1151)    Context:  Acute Illness     Findings of the 6 clinical characteristics of malnutrition:  Energy Intake:  Mild decrease in energy intake  Weight Loss:  No weight loss (difficult to evaluate with edema)     Body Fat Loss:  No body fat loss     Muscle Mass Loss:  No muscle mass loss    Fluid Accumulation:  Unable to assess     Strength:  Not Performed    Nutrition Assessment:     Pt. nutritionally compromised AEB inadequate oral intake pta.  At risk for further nutrition compromise r/t admit with compression fracture of lumbar vertebra and underlying medical condition (hx DM, COPD, GERD, HLD, CAD, CHF).       Nutrition Related Findings:    Pt. Report/Treatments/Miscellaneous:   3/7-pt. Seen - reports appetite is \"ok\"; denies any trouble chewing/swallowing or any  nausea, abdominal pain  3/3- pt. Seen - reports eating well; denies any trouble tolerating diet; states recent decreased appetite d/t pain from broken ribs (around [place]); denies any trouble tolerating diet; declines ONS - states \"I won't drink them\"; pt. On regular diet per provider - noting She states she will not follow a DM diet and so I will not use a carb restriction diet. She is just now resuming the metformin  and it  will take a few days for it to take affect. She may need the use of the home glimiperide. She is having the cinnamon from home brought in to use   GI Status: BM 3/7  Pertinent Labs: 3/7: Glucose 133; 3/6: 152, 155, 136 mg/dl  Pertinent Meds: PPI, Colace, Imodium, 
[facility]  INPATIENT OCCUPATIONAL THERAPY  [facility]  EVALUATION      Discharge Recommendations: Continue to assess pending progress  Equipment Recommendations:        Time In: 0702  Time Out: 0832  Timed Code Treatment Minutes: 75 Minutes  Minutes: 90          Date: 3/1/2025  Patient Name: Kim Amaya,   Gender: female      MRN: [id number]  : 1950  (75 y.o.)  Referring Practitioner: Chris Lugo MD  Diagnosis: Pathological compression fracture of lumbar vertebra, initial encounter (HCC)  Additional Pertinent Hx: Kim Amaya  is a 75 y.o. admitted to the inpatient rehabilitation unit on 2025 for intensive inpatient rehabilitation treatment of impairment and disability secondary to pathological acute L3 inferior endplate compression fracture due to osteoporosis, complicated by Klebsiella pneumoniae urinary tract infection. The patient says she had history of 2 falls at the end of  without causing any injury.  She patient presented to [facility] on 2025 with the complaints of right lower back pain which started on 2/15/2025 morning when she tried to get out of bed.  The patient was evaluated and was discharged with prescription for naproxen and cyclobenzaprine, and recommendation of follow-up with primary physician. Because of worsening back pain not relieved with naproxen and Flexeril usage, she visited [facility] the ER on 2025 night. CT of lumbar spine also was carried out on 2025 and also demonstrated acute L3 inferior endplate compression fracture with approximate 3 mm retropulsion of posterior inferior endplate without significant central canal stenosis, and multilevel disc bulge and osteophyte complex formation and facet hypertrophy causing up to mild left neural foraminal stenosis at L3-4 and L5-S1.The patient was brought back to [facility] ER again on 2025 
Discharge pain assessment:    Complete within 3 days of discharge.      Pain Effect on Sleep ()   Ask patient: \"Over the past 5 days, how much of the time has pain made it hard for you to sleep at night?\" 1.  Rarely or not at all     If no pain is reported, end interview.  If pain is reported, continue with the additional questions.   Pain Interference with Therapy Activities   Ask patient: \"Over the past 5 days, how often have you limited your participation in rehabilitation therapy sessions due to pain?\" 1.  Rarely or not at all   Pain Interference with Day to Day Activities   Ask patient: \"Over the past 5 days, how often have you limited your day to day activities (excluding rehabilitation therapy sessions) because of pain?\" 1.  Rarely or not at all       
[facility]  Occupational Therapy  Daily Note    Discharge Recommendations: Home with assistance of aide and Home with Home Health OT  Equipment Recommendations:   Pt has a shower chair with back, monitor for grab bars in her walk in shower.  Pt has ADA height toilet with garb bar and sink nearby.  Recommend reacher and LH brush. Pt plans to have toiler raiser added to her toilet.      Time In: 1132  Time Out: 1232  Timed Code Treatment Minutes: 60 Minutes  Minutes: 60          Date: 3/6/2025  Patient Name: Kim Amaya,   Gender: female      Room: [facility]15/015-  MRN: [id number]  : 1950  (75 y.o.)  Referring Practitioner: Chris Lugo MD  Diagnosis: Pathological compression fracture of lumbar vertebra, initial encounter ([facility])  Additional Pertinent Hx: Kim Amaya  is a 75 y.o. admitted to the inpatient rehabilitation unit on 2025 for intensive inpatient rehabilitation treatment of impairment and disability secondary to pathological acute L3 inferior endplate compression fracture due to osteoporosis, complicated by Klebsiella pneumoniae urinary tract infection. The patient says she had history of 2 falls at the end of  without causing any injury.  She patient presented to [facility] [place] on 2025 with the complaints of right lower back pain which started on 2/15/2025 morning when she tried to get out of bed.  The patient was evaluated and was discharged with prescription for naproxen and cyclobenzaprine, and recommendation of follow-up with primary physician. Because of worsening back pain not relieved with naproxen and Flexeril usage, she visited [facility] the ER on 2025 night. CT of lumbar spine also was carried out on 2025 and also demonstrated acute L3 inferior endplate compression fracture with approximate 3 mm retropulsion of posterior inferior endplate without significant 
[facility]  INPATIENT PHYSICAL THERAPY  EVALUATION  [facility] - 8K-15/015-A    Discharge Recommendations: Continue to assess pending progress, Therapy recommended at discharge  Equipment Recommendations: Yes (will need RW)           Time In: 0930  Time Out: 1100  Timed Code Treatment Minutes: 75 Minutes  Minutes: 90        Date: 3/1/2025  Patient Name: Kim Amaya,  Gender:  female        MRN: [id number]  : 1950  (75 y.o.)      Referring Practitioner: Chris Lugo MD  Diagnosis: Pathological compression fracture of lumbar vertebra, initial encounter ([facility])  Additional Pertinent Hx: Per EMR:Kim Amaya  is a 75 y.o. right-handed obese  female  with history of hypertension, hyperlipidemia, hypothyroidism, diabetes mellitus type 2, osteoporosis, coronary artery disease with MI requiring 2 stents placement, depression, osteoarthritis, anemia, questionable COPD and CHF, status post right knee arthroscopic surgery, tonsillectomy, cholecystectomy, urinary bladder suspension surgery, appendectomy, is admitted to the inpatient rehabilitation unit on 2025 for intensive inpatient rehabilitation treatment of impairment and disability secondary to pathological acute L3 inferior endplate compression fracture due to osteoporosis, complicated by Klebsiella pneumoniae urinary tract infection.The patient says she had history of 2 falls at the end of  without causing any injury.  She patient presented to [facility] [place] on 2025 with the complaints of right lower back pain which started on 2/15/2025 morning when she tried to get out of bed.  She denies having any recent injury.  The patient was evaluated and was discharged with prescription for naproxen and cyclobenzaprine, and recommendation of follow-up with primary physician.     Because of worsening back pain not relieved with naproxen and Flexeril usage, she visited [facility] 
[facility]  Occupational Therapy  Daily Note    Discharge Recommendations: Home with Home Health OT  (pt declining HH and plan to discharge with HEPs)   Equipment Recommendations:   Pt has a shower chair with back, monitor for grab bars in her walk in shower.  Pt has ADA height toilet with garb bar and sink nearby.  Recommend reacher and LH brush. Pt plans to have toiler raiser added to her toilet.       Time In: 0900  Time Out: 1030  Timed Code Treatment Minutes: 90 Minutes  Minutes: 90          Date: 3/10/2025  Patient Name: Kim Amaya,   Gender: female      Room: [address] [place] [address]  MRN: [id number]  : 1950  (75 y.o.)  Referring Practitioner: Chris Lugo MD  Diagnosis: Pathological compression fracture of lumbar vertebra, initial encounter ([facility])  Additional Pertinent Hx: Kim Amaya  is a 75 y.o. admitted to the inpatient rehabilitation unit on 2025 for intensive inpatient rehabilitation treatment of impairment and disability secondary to pathological acute L3 inferior endplate compression fracture due to osteoporosis, complicated by Klebsiella pneumoniae urinary tract infection. The patient says she had history of 2 falls at the end of  without causing any injury.  She patient presented to [facility] [place] on 2025 with the complaints of right lower back pain which started on 2/15/2025 morning when she tried to get out of bed.  The patient was evaluated and was discharged with prescription for naproxen and cyclobenzaprine, and recommendation of follow-up with primary physician. Because of worsening back pain not relieved with naproxen and Flexeril usage, she visited [facility] the ER on 2025 night. CT of lumbar spine also was carried out on 2025 and also demonstrated acute L3 inferior endplate compression fracture with approximate 3 mm retropulsion of posterior inferior endplate 
[facility]  OCCUPATIONAL THERAPY MISSED TREATMENT NOTE  [facility]  8K-15/015-A      Date: 3/6/2025  Patient Name: Kim Amaya        CSN: [id number]   : 1950  (75 y.o.)  Gender: female   Referring Practitioner: Chris Lugo MD  Diagnosis: Pathological compression fracture of lumbar vertebra         REASON FOR MISSED TREATMENT: Patient Refused.  Pt sitting in bedside chair upon arrival with eyes closed. Pt refuses therapy this date stating she has a headache and is feeling nauseous. This writer educated pt on IP rehab standards and benefits of completing therapy. Pt continues to refuse and states \"I don't care; I'm not doing it.\" During second attempt this writer entered room with familiar staff member however pt became emotional and continued saying no and that she does not care.  was present on second attempt however did not speak. Dr. Lugo and RN notified of pt's behavior. Will try again this pm for OT session.                
[facility]  Physical Medicine and Rehabilitation  Post Team Conference Note    Date: 3/12/2025  Patient Name: Kim Amaya  MRN: [id number]  : 1950 (75 y.o.)    IPR Team Conference was held on Wednesday, 3/12. Recommendations of the team were explained to the patient and her , Don by Dr Lugo and SW. Team is recommending that patient continue on acute inpatient rehab for PT and OT, with an expected discharge date of Wednesday, 3/12. Following discharge, team is recommending home health. Patient and Don refuse services at discharge. Care plan reviewed with patient and Don. Patient and Don verbalized understanding or the plan of care and contributed to goal setting. SW to follow and maintain involvement in discharge planning.    Plan  Referrals Needed: None  Family Training: Monday, 3/10  Driving Recommendations: Physician Clearance   
[facility]  Occupational Therapy  Progress Note    Discharge Recommendations: Home with Home Health OT, however patient is declining HH services, so being sent home with HEPs.   Equipment Recommendations:   Pt has a shower chair with back, monitor for grab bars in her walk in shower.  Pt has ADA height toilet with garb bar and sink nearby.  Recommend reacher and LH brush. Pt plans to have toiler raiser added to her toilet.       Time In: 1027  Time Out: 1157  Timed Code Treatment Minutes: 90 Minutes  Minutes: 90          Date: 3/11/2025  Patient Name: Kim Amaya,   Gender: female      Room: [address] [place] [address]  MRN: [id number]  : 1950  (75 y.o.)  Referring Practitioner: Chris Lugo MD  Diagnosis: Pathological compression fracture of lumbar vertebra, initial encounter (HCC)  Additional Pertinent Hx: Kim Amaya  is a 75 y.o. admitted to the inpatient rehabilitation unit on 2025 for intensive inpatient rehabilitation treatment of impairment and disability secondary to pathological acute L3 inferior endplate compression fracture due to osteoporosis, complicated by Klebsiella pneumoniae urinary tract infection. The patient says she had history of 2 falls at the end of  without causing any injury.  She patient presented to [facility] on 2025 with the complaints of right lower back pain which started on 2/15/2025 morning when she tried to get out of bed.  The patient was evaluated and was discharged with prescription for naproxen and cyclobenzaprine, and recommendation of follow-up with primary physician. Because of worsening back pain not relieved with naproxen and Flexeril usage, she visited [facility] the ER on 2025 night. CT of lumbar spine also was carried out on 2025 and also demonstrated acute L3 inferior endplate compression fracture with approximate 3 mm retropulsion of 
Occupational Therapy  [facility]  Occupational Therapy  Daily Note    Discharge Recommendations: Home with Home Health OT  Equipment Recommendations:   Pt has a shower chair with back, monitor for grab bars in her walk in shower.  Pt has ADA height toilet with garb bar and sink nearby.  Recommend reacher and LH brush. Pt plans to have toiler raiser added to her toilet.      Time In: 0700  Time Out: 0805  Timed Code Treatment Minutes: 65 Minutes  Minutes: 65          Date: 3/5/2025  Patient Name: Kim Amaya,   Gender: female      Room: [facility]15/015-A  MRN: [id number]  : 1950  (75 y.o.)  Referring Practitioner: Chris Lugo MD  Diagnosis: Pathological compression fracture of lumbar vertebra, initial encounter (Formerly [facility])  Additional Pertinent Hx: Kim Amaya  is a 75 y.o. admitted to the inpatient rehabilitation unit on 2025 for intensive inpatient rehabilitation treatment of impairment and disability secondary to pathological acute L3 inferior endplate compression fracture due to osteoporosis, complicated by Klebsiella pneumoniae urinary tract infection. The patient says she had history of 2 falls at the end of  without causing any injury.  She patient presented to [facility] on 2025 with the complaints of right lower back pain which started on 2/15/2025 morning when she tried to get out of bed.  The patient was evaluated and was discharged with prescription for naproxen and cyclobenzaprine, and recommendation of follow-up with primary physician. Because of worsening back pain not relieved with naproxen and Flexeril usage, she visited [facility] the ER on 2025 night. CT of lumbar spine also was carried out on 2025 and also demonstrated acute L3 inferior endplate compression fracture with approximate 3 mm retropulsion of posterior inferior endplate without significant central canal 
Patient discharged in stable condition as per order of attending physician to Home per staff at Time: 1040 and Via Wheelchair to car.    AVS provided by RN at time of discharge, which includes all necessary medical information pertaining to the patients current course of illness, treatment, medications, post-discharge goals of care, and treatment preferences.     Patient/ family verbalize understanding of discharge plan and are in agreement with goal/plan/treatment preferences.     Belongings including  all personal belongings  sent with patient.      Home medications sent home with patient N/A    Availability of \"My Chart\" offered to patient as a tool for updated health record.  Steps for activation discussed with patient as mentioned on AVS.     
Patient educated on how to use incentive spirometer. Patient verbalized understanding and demonstrated proper use. Emphasized importance and usage of device, with coughing and deep breathing every 2 hours while awake.   
Patient had headache today, improved after Fioicet, able to participate in therapy. Denies concerns,  in room  
Patient: Kim Amaya  Unit/Bed: 8K-15/015-A  YOB: 1950  MRN: [id number] Acct: [id number]   Admitting Diagnosis: Pathological compression fracture of lumbar vertebra, initial encounter ([facility]) [M48.56XA]  Admit Date:  2/28/2025  Hospital Day: 7    Assessment:     Principal Problem:    Pathological compression fracture of lumbar vertebra, initial encounter ([facility])  Active Problems:    Acute cystitis with hematuria    Hyperlipidemia    Essential hypertension    Class 1 obesity due to excess calories with serious comorbidity and body mass index (BMI) of 34.0 to 34.9 in adult    Hypothyroidism    Depression    Senile osteoporosis    Closed compression fracture of L3 lumbar vertebra, initial encounter ([facility])    Anxiety and depression    Hypothyroidism (acquired)    Type 2 diabetes mellitus with hyperglycemia, without long-term current use of insulin ([facility])  Resolved Problems:    * No resolved hospital problems. *      Plan:     We discussed how the correction scale is not being used and she could do CS fasting only.        Subjective:     Patient has no complaint of CP or SOB..   Medication side effects: none    Scheduled Meds:   levothyroxine  50 mcg Oral Daily    glipiZIDE  5 mg Oral Daily before evening meal    glipiZIDE  10 mg Oral QAM AC    aspirin  81 mg Oral Nightly    atenolol  25 mg Oral Daily    atorvastatin  40 mg Oral Daily    enoxaparin  40 mg SubCUTAneous Daily    escitalopram  20 mg Oral Daily    insulin lispro  0-16 Units SubCUTAneous 4x Daily AC & HS    lisinopril  20 mg Oral BID    melatonin  6 mg Oral Nightly    pantoprazole  40 mg Oral BID    metFORMIN  1,000 mg Oral BID WC    calcium elemental  500 mg Oral BID WC     Continuous Infusions:   sodium chloride      dextrose       PRN Meds:butalbital-acetaminophen-caffeine, sodium chloride, acetaminophen **OR** acetaminophen, cyclobenzaprine, dextrose, dextrose bolus **OR** dextrose bolus, glucagon (rDNA), glucose, sodium chloride flush, 
Patient: Kim Amaya  Unit/Bed: 8K-15/015-A  YOB: 1950  MRN: [id number] Acct: [id number]   Admitting Diagnosis: Pathological compression fracture of lumbar vertebra, initial encounter ([facility]) [M48.56XA]  Admit Date:  2/28/2025  Hospital Day: 7    Assessment:     Principal Problem:    Pathological compression fracture of lumbar vertebra, initial encounter ([facility])  Active Problems:    Acute cystitis with hematuria    Hyperlipidemia    Essential hypertension    Class 1 obesity due to excess calories with serious comorbidity and body mass index (BMI) of 34.0 to 34.9 in adult    Hypothyroidism    Depression    Senile osteoporosis    Closed compression fracture of L3 lumbar vertebra, initial encounter ([facility])    Anxiety and depression    Hypothyroidism (acquired)    Type 2 diabetes mellitus with hyperglycemia, without long-term current use of insulin ([facility])  Resolved Problems:    * No resolved hospital problems. *      Plan:     Continue to follow        Subjective:     Patient has no complaint of CP or SOB..   Medication side effects: none    Scheduled Meds:   levothyroxine  50 mcg Oral Daily    glipiZIDE  5 mg Oral Daily before evening meal    glipiZIDE  10 mg Oral QAM AC    aspirin  81 mg Oral Nightly    atenolol  25 mg Oral Daily    atorvastatin  40 mg Oral Daily    enoxaparin  40 mg SubCUTAneous Daily    escitalopram  20 mg Oral Daily    lisinopril  20 mg Oral BID    melatonin  6 mg Oral Nightly    pantoprazole  40 mg Oral BID    metFORMIN  1,000 mg Oral BID WC    calcium elemental  500 mg Oral BID WC     Continuous Infusions:   sodium chloride      dextrose       PRN Meds:butalbital-acetaminophen-caffeine, sodium chloride, acetaminophen **OR** acetaminophen, cyclobenzaprine, dextrose, dextrose bolus **OR** dextrose bolus, glucagon (rDNA), glucose, sodium chloride flush, ondansetron, oxyCODONE **OR** oxyCODONE, bisacodyl, magnesium hydroxide, traZODone, polyethylene glycol, docusate sodium, loperamide, 
Patient: Kim Amaya  Unit/Bed: 8K-15/015-A  YOB: 1950  MRN: [id number] Acct: [id number]   Admitting Diagnosis: Pathological compression fracture of lumbar vertebra, initial encounter ([facility]) [M48.56XA]  Admit Date:  2/28/2025  Hospital Day: 3    Assessment:     Principal Problem:    Pathological compression fracture of lumbar vertebra, initial encounter ([facility])  Active Problems:    Acute cystitis with hematuria    Hyperlipidemia    Essential hypertension    Class 1 obesity due to excess calories with serious comorbidity and body mass index (BMI) of 34.0 to 34.9 in adult    Hypothyroidism    Depression    Senile osteoporosis    Closed compression fracture of L3 lumbar vertebra, initial encounter ([facility])    Anxiety and depression    Hypothyroidism (acquired)    Type 2 diabetes mellitus with hyperglycemia, without long-term current use of insulin ([facility])  Resolved Problems:    * No resolved hospital problems. *      Plan:     We discussed stopping the actos and using the amaryl as dosed at home.   Use glipizide in place of the glimiperide since in the hospital.   Repeat the LFT as they were slightly up on the last check        Subjective:     Patient has no complaint of CP or SOB..   Medication side effects: none    Scheduled Meds:   levothyroxine  50 mcg Oral Daily    [START ON 3/4/2025] glipiZIDE  5 mg Oral Daily before evening meal    [START ON 3/4/2025] glipiZIDE  10 mg Oral QAM AC    aspirin  81 mg Oral Nightly    atenolol  25 mg Oral Daily    atorvastatin  40 mg Oral Daily    enoxaparin  40 mg SubCUTAneous Daily    escitalopram  20 mg Oral Daily    insulin lispro  0-16 Units SubCUTAneous 4x Daily AC & HS    lisinopril  20 mg Oral BID    melatonin  6 mg Oral Nightly    pantoprazole  40 mg Oral BID    metFORMIN  1,000 mg Oral BID WC    calcium elemental  500 mg Oral BID WC     Continuous Infusions:   sodium chloride      dextrose       PRN Meds:sodium chloride, acetaminophen **OR** acetaminophen, 
Patient: Kim Amaya  Unit/Bed: 8K-15/015-A  YOB: 1950  MRN: [id number] Acct: [id number]   Admitting Diagnosis: Pathological compression fracture of lumbar vertebra, initial encounter ([facility]) [M48.56XA]  Admit Date:  2/28/2025  Hospital Day: 4    Assessment:     Principal Problem:    Pathological compression fracture of lumbar vertebra, initial encounter ([facility])  Active Problems:    Acute cystitis with hematuria    Hyperlipidemia    Essential hypertension    Class 1 obesity due to excess calories with serious comorbidity and body mass index (BMI) of 34.0 to 34.9 in adult    Hypothyroidism    Depression    Senile osteoporosis    Closed compression fracture of L3 lumbar vertebra, initial encounter ([facility])    Anxiety and depression    Hypothyroidism (acquired)    Type 2 diabetes mellitus with hyperglycemia, without long-term current use of insulin ([facility])  Resolved Problems:    * No resolved hospital problems. *      Plan:     Follow the CS with the additional DM meds began today.  We discussed the LFTs being improved.        Subjective:     Patient has no complaint of CP or SOB..   Medication side effects: none    Scheduled Meds:   levothyroxine  50 mcg Oral Daily    glipiZIDE  5 mg Oral Daily before evening meal    glipiZIDE  10 mg Oral QAM AC    aspirin  81 mg Oral Nightly    atenolol  25 mg Oral Daily    atorvastatin  40 mg Oral Daily    enoxaparin  40 mg SubCUTAneous Daily    escitalopram  20 mg Oral Daily    insulin lispro  0-16 Units SubCUTAneous 4x Daily AC & HS    lisinopril  20 mg Oral BID    melatonin  6 mg Oral Nightly    pantoprazole  40 mg Oral BID    metFORMIN  1,000 mg Oral BID WC    calcium elemental  500 mg Oral BID WC     Continuous Infusions:   sodium chloride      dextrose       PRN Meds:sodium chloride, acetaminophen **OR** acetaminophen, cyclobenzaprine, dextrose, dextrose bolus **OR** dextrose bolus, glucagon (rDNA), glucose, sodium chloride flush, ondansetron, oxyCODONE **OR** 
Patient: Kim Amaya  Unit/Bed: 8K-15/015-A  YOB: 1950  MRN: [id number] Acct: [id number]   Admitting Diagnosis: Pathological compression fracture of lumbar vertebra, initial encounter ([facility]) [M48.56XA]  Admit Date:  2/28/2025  Hospital Day: 11    Assessment:     Principal Problem:    Pathological compression fracture of lumbar vertebra, initial encounter ([facility])  Active Problems:    Acute cystitis with hematuria    Hyperlipidemia    Essential hypertension    Class 1 obesity due to excess calories with serious comorbidity and body mass index (BMI) of 34.0 to 34.9 in adult    Hypothyroidism    Depression    Senile osteoporosis    Closed compression fracture of L3 lumbar vertebra, initial encounter ([facility])    Anxiety and depression    Hypothyroidism (acquired)    Type 2 diabetes mellitus with hyperglycemia, without long-term current use of insulin ([facility])  Resolved Problems:    * No resolved hospital problems. *      Plan:     The CS are acceptable        Subjective:     Patient has no complaint of CP or SOB..   Medication side effects: none    Scheduled Meds:   levothyroxine  50 mcg Oral Daily    glipiZIDE  5 mg Oral Daily before evening meal    glipiZIDE  10 mg Oral QAM AC    aspirin  81 mg Oral Nightly    atenolol  25 mg Oral Daily    atorvastatin  40 mg Oral Daily    enoxaparin  40 mg SubCUTAneous Daily    escitalopram  20 mg Oral Daily    lisinopril  20 mg Oral BID    melatonin  6 mg Oral Nightly    pantoprazole  40 mg Oral BID    metFORMIN  1,000 mg Oral BID WC    calcium elemental  500 mg Oral BID WC     Continuous Infusions:   sodium chloride      dextrose       PRN Meds:butalbital-acetaminophen-caffeine, sodium chloride, acetaminophen **OR** acetaminophen, cyclobenzaprine, dextrose, dextrose bolus **OR** dextrose bolus, glucagon (rDNA), glucose, sodium chloride flush, ondansetron, oxyCODONE **OR** oxyCODONE, bisacodyl, magnesium hydroxide, traZODone, polyethylene glycol, docusate sodium, 
[facility]  Recreational Therapy  Discharge Note  Inpatient Rehabilitation Unit         Date:  3/12/2025       Patient Name: Kim Amaya      MRN: [id number]       YOB: 1950 (75 y.o.)       Gender: female  Diagnosis: Pathological compression fracture of lumbar vertebra  Referring Practitioner: Chris Lugo MD    Patient discharged from Recreational Therapy at this time.  See recreational therapy notes for details.    Electronically signed by: CAREY Blount  Date: 3/12/2025        
Pt has had a good day and was able to work with therapy today.  She was up in her chair most of the day.  She did request some pain medication prior to therapy as it helps her.  It appears to have worked well.  Her  was in to visit this afternoon.  She has had no loose bowel movements or nausea today.   
Pt has worked well with therapy.  She has had some pain and muscle spasms PRN medications given with good effect.  She did request to go to bed after therapy and has remained there and napped.  Her  has been in through the afternoon visiting and watching TV together.   
Pt was unable to work with therapy today as she was not feeling well.  She was up most of the night having bowel movements.  This morning she was still have bowel movements and she stated that it was getting looser.  She also complained of being nauseous. Gave her PRN medication for both loose stool and nausea.  She also request pain meds prior to her attempting to do therapy as it helps her.  She has been resting in bed through out the day.  At one point she had trouble standing up from the toilet and needed two people assist.  The toilet appeared to be to low for her so it was elevated and that worked really well for her.  She was able to stand up with stand by assist. She appears to be feeling better this afternoon.    
RECREATIONAL THERAPY  [facility]      Date:  3/5/2025            Patient Name: Kim Amaya           MRN: [id number]  Acct: [id number]          YOB: 1950 (75 y.o.)       Gender: female   Diagnosis: Pathological compression fracture of lumbar vertebra  Physician: Referring Practitioner: Crhis Lugo MD    REASON FOR MISSED TREATMENT:  Pt sound asleep this afternoon and did not play euchre with peers     Maria Eugenia Isbell, JUNIES    3/5/2025               
[facility]  Occupational Therapy  Discharge Note    Discharge Recommendations: Home with Home Health OT, however patient is declining HH services, so being sent home with HEPs.   Equipment Recommendations:   Pt has a shower chair with back, monitor for grab bars in her walk in shower.  Pt has ADA height toilet with garb bar and sink nearby.  Recommend reacher and LH brush. Pt plans to have toiler raiser added to her toilet.       Time In: 09  Time Out: 1023  Timed Code Treatment Minutes: 24 Minutes  Minutes: 24          Date: 3/12/2025  Patient Name: Kim Amaya,   Gender: female      Room: [address] [place] [address]  MRN: [id number]  : 1950  (75 y.o.)  Referring Practitioner: Chris Lugo MD  Diagnosis: Pathological compression fracture of lumbar vertebra, initial encounter (HCC)  Additional Pertinent Hx: Kim Amaya  is a 75 y.o. admitted to the inpatient rehabilitation unit on 2025 for intensive inpatient rehabilitation treatment of impairment and disability secondary to pathological acute L3 inferior endplate compression fracture due to osteoporosis, complicated by Klebsiella pneumoniae urinary tract infection. The patient says she had history of 2 falls at the end of  without causing any injury.  She patient presented to [facility] on 2025 with the complaints of right lower back pain which started on 2/15/2025 morning when she tried to get out of bed.  The patient was evaluated and was discharged with prescription for naproxen and cyclobenzaprine, and recommendation of follow-up with primary physician. Because of worsening back pain not relieved with naproxen and Flexeril usage, she visited [facility] the ER on 2025 night. CT of lumbar spine also was carried out on 2025 and also demonstrated acute L3 inferior endplate compression fracture with approximate 3 mm retropulsion of 
[facility]  Occupational Therapy  Daily Note    Discharge Recommendations: Home with assistance of aide and Home with Home Health OT  Equipment Recommendations:   Pt has a shower chair with back, monitor for grab bars in her walk in shower.  Pt has ADA height toilet with garb bar and sink nearby.  Recommend reacher and LH brush. Pt plans to have toiler raiser added to her toilet.      Time In: 0700  Time Out: 0830  Timed Code Treatment Minutes: 90 Minutes  Minutes: 90          Date: 3/7/2025  Patient Name: Kim Amaya,   Gender: female      Room: [facility]15/015-  MRN: [id number]  : 1950  (75 y.o.)  Referring Practitioner: Chris Lugo MD  Diagnosis: Pathological compression fracture of lumbar vertebra, initial encounter ([facility])  Additional Pertinent Hx: Kim Amaya  is a 75 y.o. admitted to the inpatient rehabilitation unit on 2025 for intensive inpatient rehabilitation treatment of impairment and disability secondary to pathological acute L3 inferior endplate compression fracture due to osteoporosis, complicated by Klebsiella pneumoniae urinary tract infection. The patient says she had history of 2 falls at the end of  without causing any injury.  She patient presented to [facility] on 2025 with the complaints of right lower back pain which started on 2/15/2025 morning when she tried to get out of bed.  The patient was evaluated and was discharged with prescription for naproxen and cyclobenzaprine, and recommendation of follow-up with primary physician. Because of worsening back pain not relieved with naproxen and Flexeril usage, she visited [facility] the ER on 2025 night. CT of lumbar spine also was carried out on 2025 and also demonstrated acute L3 inferior endplate compression fracture with approximate 3 mm retropulsion of posterior inferior endplate without significant 
This Pre Admission Screen is a refiled document from the acute stay. The Pre Admission was completed and signed on 2025 at 1:24 by Dr. Chris Lugo.    Chris Lugo MD  Physician  Physical Medicine and Rehabilitation     Progress Notes     Signed     Date of Service: 2025  1:24 PM   Related encounter: ED to Hosp-Admission (Discharged) from 2025 in [facility] 6A Pedi/Med Surg     Signed       Expand All Collapse All    [facility]  Acute Inpatient Rehab Preadmission Assessment     Patient Name: Kim Amaya        Ethnicity:Not of , /a, or [place] citizen origin  Race:White  MRN:   [id number]    : 1950  (75 y.o.)  Gender: female      Admitted from:[facility]  Initial Assessment     Date of admission to the hospital: 2025  3:52 PM  Date patient eligible for admission:2025     Primary Diagnosis: L3 compression fracture      Did patient have surgery?  no     Physicians: Dr Lugo, Dr Real, Dr Barrientos     Risk for clinical complications/co-morbidities:   Past Medical History        Past Medical History:   Diagnosis Date    Anemia      CAD (coronary artery disease)      CHF (congestive heart failure) (HCC)      COPD (chronic obstructive pulmonary disease) ([facility])      Depression     GERD (gastroesophageal reflux disease)      Headache(784.0)      Heart attack (HCC)      Hyperlipidemia      Hypertension      Hypothyroidism 2015    Liver disease      MI (myocardial infarction) ([facility]) , 10/01     x 2     Obesity      Osteoarthritis       B/L knees--Dr. Jordan.    PONV (postoperative nausea and vomiting)      Type 2 diabetes mellitus without complication ([facility])      Type II or unspecified type diabetes mellitus without mention of complication, not stated as uncontrolled             Financial Information  Primary insurance: Medicare     Secondary Insurance:   Medical [place]     Has the patient had two or more falls in the past year or any 
[facility]  INPATIENT REHABILITATION  TEAM CONFERENCE NOTE    Conference Date: 3/11/2025  Admit Date:  2025  3:36 PM  Patient Name: Kim Amaya    MRN: [id number]    : 1950  (75 y.o.)  Rehabilitation Admitting Diagnosis:  Pathological compression fracture of lumbar vertebra, initial encounter ([facility]) [M48.56XA]  Referring Practitioner: Chris Lugo MD      CASE MANAGEMENT  Current issues/needs regarding patient and family discharge status: Patient continues to be motivated and progressing with therapy. Patient plans to be discharged on Wednesday, 3/12 with no services. SW will follow and maintain involvement in discharge planning.    PHYSICAL THERAPY  Patient overall is making progress with skilled PT treatment and has met 5/6 STG's and 0/6 LTG's. Patient has progressed to SBA for bed mobility, CGA for sit<>stand transfers and CGA for car transfers. Patient has progressed to SBA for ambulation with use of RW up to 160 feet with decreased toe clearance and gait speed. Patient is able to ascend/descend 6 steps with B handrails and CGA.  Patient continues to demonstrate decreased dynamic standing balance resulting in difficulty with functional mobility. Patient overall can continue to benefit from skilled PT treatment in order to assist with BLE strengthening, gait training, transfer training, core strengthening and dynamic balance for increased functional mobility.  Equipment Needed: Yes (will need RW)    SPEECH THERAPY       OCCUPATIONAL THERAPY  Pt, Isadora, continues to make good progress during her 2nd week on IPR. She has progressed to completing functional sit<>stand transfers from elevated surfaces with SBA, but requires min assist from typical height or lower height surfaces.  She demonstrates improved overall strength and balance which has improved ADL independence. Pt is now able to complete bathing tasks with set up, SBA for LB dressing, sinkside grooming tasks, set up for 
[facility]  OCCUPATIONAL THERAPY MISSED TREATMENT NOTE  [facility]  8K-15/015-A      Date: 3/8/2025  Patient Name: Kim Amaya        CSN: [id number]   : 1950  (75 y.o.)  Gender: female   Referring Practitioner: Chris Lugo MD  Diagnosis: Pathological compression fracture of lumbar vertebra         REASON FOR MISSED TREATMENT:   Pt. Refusing to participate in OT session this date due to having diarrhea and not feeling well.  Pt. States will attempt next date in hopes to feeling better.              
[facility]  Occupational Therapy  Progress Note    Discharge Recommendations: Home with assistance of aide and Home with Home Health OT  Equipment Recommendations:   Pt has a shower chair with back, monitor for grab bars in her walk in shower.  Pt has ADA height toilet with garb bar and sink nearby.  Recommend reacher and LH brush. Pt plans to have toiler raiser added to her toilet.       Time In: 1100  Time Out: 1230  Timed Code Treatment Minutes: 90 Minutes  Minutes: 90          Date: 3/4/2025  Patient Name: Kim Amaya,   Gender: female      Room: [facility]15/015-A  MRN: [id number]  : 1950  (75 y.o.)  Referring Practitioner: Chris Lugo MD  Diagnosis: Pathological compression fracture of lumbar vertebra, initial encounter ([facility])  Additional Pertinent Hx: Kim Amaya  is a 75 y.o. admitted to the inpatient rehabilitation unit on 2025 for intensive inpatient rehabilitation treatment of impairment and disability secondary to pathological acute L3 inferior endplate compression fracture due to osteoporosis, complicated by Klebsiella pneumoniae urinary tract infection. The patient says she had history of 2 falls at the end of  without causing any injury.  She patient presented to [facility] on 2025 with the complaints of right lower back pain which started on 2/15/2025 morning when she tried to get out of bed.  The patient was evaluated and was discharged with prescription for naproxen and cyclobenzaprine, and recommendation of follow-up with primary physician. Because of worsening back pain not relieved with naproxen and Flexeril usage, she visited [facility] the ER on 2025 night. CT of lumbar spine also was carried out on 2025 and also demonstrated acute L3 inferior endplate compression fracture with approximate 3 mm retropulsion of posterior inferior endplate without significant 
[facility]  Occupational Therapy  Daily Note    Discharge Recommendations: Home with assistance of aide and Home with Home Health OT  Equipment Recommendations:   Pt has a shower chair with back, monitor for grab bars in her walk in shower.  Pt has ADA height toilet with garb bar and sink nearby.  Recommend reacher and LH brush.       Time In: 0730  Time Out: 0900  Timed Code Treatment Minutes: 90 Minutes  Minutes: 90          Date: 3/3/2025  Patient Name: Kim Amaya,   Gender: female      Room: [facility]15/015-A  MRN: [id number]  : 1950  (75 y.o.)  Referring Practitioner: Chris Lugo MD  Diagnosis: Pathological compression fracture of lumbar vertebra, initial encounter ([facility])  Additional Pertinent Hx: Kim Amaya  is a 75 y.o. admitted to the inpatient rehabilitation unit on 2025 for intensive inpatient rehabilitation treatment of impairment and disability secondary to pathological acute L3 inferior endplate compression fracture due to osteoporosis, complicated by Klebsiella pneumoniae urinary tract infection. The patient says she had history of 2 falls at the end of  without causing any injury.  She patient presented to [facility] on 2025 with the complaints of right lower back pain which started on 2/15/2025 morning when she tried to get out of bed.  The patient was evaluated and was discharged with prescription for naproxen and cyclobenzaprine, and recommendation of follow-up with primary physician. Because of worsening back pain not relieved with naproxen and Flexeril usage, she visited [facility] the ER on 2025 night. CT of lumbar spine also was carried out on 2025 and also demonstrated acute L3 inferior endplate compression fracture with approximate 3 mm retropulsion of posterior inferior endplate without significant central canal stenosis, and multilevel disc bulge and 
patient had two or more falls in the past year or any fall with injury in the past year?: Yes    Restrictions/Precautions:  Restrictions/Precautions: Fall Risk, General Precautions  Required Braces or Orthoses  Spinal: Lumbar Corset  Position Activity Restriction  Spinal Precautions: No Bending, No Lifting, No Twisting  Spinal Precautions Comments: 10# lifting restriction     SUBJECTIVE: Patient seated on edge of bed reporting that she was wanting to get back to bed. PT provided education on PT session and patient only agreeable to seated exercises in wheelchair and declined ambulation. Patient continued to report declining home health PT and PT provided education on importance of HEP and mobility at home.    PAIN: 0/10    Vitals: Vitals not assessed per clinical judgement, see nursing flowsheet    OBJECTIVE:  Bed Mobility:  Not Tested    Transfers:  Sit to Stand: Stand By Assistance  Stand to Sit:Stand By Assistance  To/From Bed and Chair: Stand By Assistance    Ambulation:  Not Tested    Stairs:  Not Tested    Balance:  Not Tested    Exercise:  Patient was guided in 1 set(s) 10 reps of exercises to both lower extremities: Glut sets, Seated marches, Seated hamstring curls, Seated heel/toe raises, Long arc quads, Seated isometric hip adduction, Seated abduction/adduction. Exercises were completed for increased independence with functional mobility.    Functional Outcome Measures:  Not completed  Modified Luz Scale:  Not Applicable    ASSESSMENT:  Assessment: Patient overall has made progress with skilled PT treatment and has met 6/6 STG's and 0/6 LTG's. Patient has progressed to SBA for bed mobility, CGA to SBA for sit<>stand trnasfers and CGA for ambulation with use of RW. Patient is able to ascend/descend steps with B handrails and CGA. Patient continues to demonstrate decreased strength in BLE's limiting her functional mobility. Patient recommended to discharge home with home health PT but declined and given 
urinalysis done on 2/26/2025 and revealed presence of nitrite, moderate leukocyte esterase, trace ketone, and many bacteria.  Reflex urine culture done on 2/26/2026 revealed presence of Klebsiella pneumoniae.  Therefore IV Rocephin was started on 2/26/2025 to be ended on 3/1/2025.    The patient states she was feeling well this morning when she woke up but she developed severe headache after her PT treatment this morning.  Therefore she refused to participate further rehab treatment afterward.  She says the headache is pressure-like squeezing pain at the entire head.  She denies having any neck pain, nasal congestion, rhinorrhea or visual disturbance.  Moving her neck does not affect her headache.  She says her walking aggravate her headache.  She took Tylenol without improvement in her headache.  She says her lower back still has some pain but tolerable.  She says she still has intermittent pain radiating down to her left lower extremity with a walking and standing but it has improved with reduced intensity.  She took oxycodone 5 mg once yesterday and once this morning.  He denies having any numbness while sitting on reclining chair.  He says his weakness is improving but still had difficulty lifting up his legs at hip.  He tolerated yesterday's rehab treatment provided.    The patient's is projected to be ready for discharge on 3/15/2025 but she is willing to stay only up to 3/12/2025.      Rehabilitation:  PT: Reviewed.    Bed Mobility:  Supine to Sit: Stand By Assistance, with head of bed flat, with rail     -Did have pt trial stepping onto 6\" step using RW and then sitting down onto EOB set at 26\" height to simulate home setup. Did require verbal cues for sequencing, but tolerated well.      Transfers:  Sit to Stand: Contact Guard Assistance, cues for hand placement  Stand to Sit:Contact Guard Assistance, cues for hand placement     Ambulation:  Contact Guard Assistance  Distance: 200 feet and 15 feet x 
Increased reliance on assistive device, and Cues for proximity to assistive device     Stairs:  Stairs: 4\" steps X 6 using Bilateral Handrails and Contact Guard Assistance, with verbal cues. Descended steps with retro technique for safety and pt comfort level.   Platform: 6\" platform X 2 using Rolling Walker and 1 UE support and RW on 2nd trial Contact Guard Assistance, with verbal cues , with increased time for completion. Descended step with retro technique per pt request and comfort level.     Balance:  Static Standing Balance: Stand By Assistance  Dynamic Standing Balance: Contact Guard Assistance  Pt. Able to  an object from floor using long handled reacher and Rolling walker for support with Stand By Assistance     Exercise:  Patient was guided in 1 set(s) 15 reps of exercises to both lower extremities: Seated marches, Seated hamstring curls, Seated heel/toe raises, Long arc quads, and Seated isometric hip adduction.  Exercises were completed for increased independence with functional mobility.    Functional Outcome Measures:   None.   Modified Lane:  Current Functional Status:  Not Applicable    ASSESSMENT:  Assessment: Patient progressing toward established goals.    PT Assessment: Patient overall is making progress with skilled PT treatment and has met 5/6 STG's and 0/6 LTG's. Patient has progressed to SBA for bed mobility, CGA for sit<>stand transfers and CGA for car transfers. Patient has progressed to SBA for ambulation with use of RW up to 160 feet with decreased toe clearance and gait speed. Patient is able to ascend/descend 6 steps with B handrails and CGA.  Patient continues to demonstrate decreased dynamic standing balance resulting in difficulty with functional mobility. Patient overall can continue to benefit from skilled PT treatment in order to assist with BLE strengthening, gait training, transfer training, core strengthening and dynamic balance for increased functional mobility. 
Transfer: Minimal Assistance.  From standard toilet with review on hand placement     TRANSFERS:  Sit to Stand:  stand by Assistance. From elevated bed,   VC for hand placement  Stand to Sit: Contact Guard Assistance. To bed,  w/c, VC for hand placement and controlling descent.    FUNCTIONAL MOBILITY:  Assistive Device: Rolling Walker  Assist Level:  Contact Guard Assistance to emerging stand by assistance .   Distance: to and from the bathroom     IADL:  Patient completed IADL laundry task that facilitated retrieving laundry from graded planes and heights to simulate putting her laundry away at home.  Task facilitated comprehension of spinal precautions during IADL task. Pt utilized RW with SBA to transport items. OT discussed of use of walker bag (she has one at home) to transport smaller items (socks, pad etc). Pt was then able to stand and place clothing in closet with 1 UE release and no LOB. Pt required close SBA throughout task and demo'ed and endurance of 5 minutes. Skilled education completed on safety techniques and fall prevention strategies with patient demo'ing carry over within session.     ASSESSMENT:  Activity Tolerance:  Patient tolerance of  treatment: Good treatment tolerance       Plan: Times Per Week: 5x for 90 min  Current Treatment Recommendations: Balance training, Functional mobility training, Endurance training, Safety education & training, Pain management, Neuromuscular re-education, Patient/Caregiver education & training, Equipment evaluation, education, & procurement, Positioning, Self-Care / ADL, Coordination training, Home management training    Education:  Learners: Patient  Plan of Care, Energy Conservation, Reviewed Prior Education, and Importance of Increasing Activity fall prevention    Goals  Short Term Goals  Time Frame for Short Term Goals: 1 week  Short Term Goal 1: Pt will complete functional transfers from various surfaces utilizing with SBA utilizing WW for UE 
initiated  Is the patient appropriate for a stay in the functional apartment? no    Discharge Plan   Estimated Discharge Date:  3/15/2025    Destination: home health and discharge home with supervision  Services at Discharge: Home Health  Physical Therapy, Occupational Therapy, Nursing, and HHA 3x week  Is patient appropriate for an outpatient driving evaluation? No. But need PCP clearance  Equipment at Discharge: Other: Pt has a shower chair with back, monitor for grab bars in her walk in shower.  Pt has ADA height toilet with garb bar and sink nearby.  Recommend reacher and LH brush. Pt plans to have toiler raiser added to her toilet.  Factors facilitating achievement of predicted outcomes: Family support, Motivated, Cooperative, and Pleasant  Barriers to the achievement of predicted outcomes: Pain, Limited safety awareness, Unrealistic expectations, Decreased endurance, Decreased proprioception, Lower extremity weakness, Stairs at home, Skin Care, and brace appllication  Follow up with physiatrist? No   If yes, what timeframe? N/A    Team Members Present at Conference:  :KEVIN Lebron  Occupational Therapist:Ashley Hernandez OTR/L [id number]  Physical Therapist:Madeline Manrique, PT [id number]  Speech Therapist:N/A  Nurse:Janett Moreland RN  Psychologist: Chinedu Sapp Psy.D     I approve the established interdisciplinary plan of care as documented within the medical record of iKm Amaya. I was present and led the interdisciplinary care conference.    OG KRISHNA MD  Electronically signed by OG KRISHNA MD on 3/4/2025 at 7:46 PM         
chairs safely. GOAL NOT MET  Short Term Goal 4: Ambulate 25 feet with RW at CGA in order to walk to/from bathroom. GOAL MET, SEE LTG  Short Term Goal 5: Ascend/Descend 2 steps with bilateral handrails at Min A in order to enter/exit home safely. GOAL NOT MET  Short Term Goal 6: Simulated SUV transfer at Min A in order to meet transportation needs. GOAL MET, SEE LTG        Long Term Goals  Time Frame for Long Term Goals : 2 weeks from evaluation  Long Term Goal 1: Supine to/from sit at Mod I in order to get in/out of bed via log rolling technique. GOAL NOT MET  Long Term Goal 2: Sit to/from stand at consistent Mod I to get up to walk. GOAL NOT MET  Long Term Goal 3: Stand pivot with RW at Mod I in order to get in/out of chairs safely. GOAL NOT MET  Long Term Goal 4: Ambulate 150 feet with RW at Mod I in order to walk to/from bathroom. GOAL NOT MET  Long Term Goal 5: Ascend/Descend 2 steps with bilateral handrails at Mod I in order to enter/exit home safely. GOAL NOT MET  Long term goal 6: Simulated SUV transfer at Mod I in order to meet transportation needs. GOAL NOT MET      Following session, patient left in safe position with all fall risk precautions in place.       
technology

## 2025-03-19 ENCOUNTER — OFFICE VISIT (OUTPATIENT)
Dept: FAMILY MEDICINE CLINIC | Age: 75
End: 2025-03-19

## 2025-03-19 VITALS — DIASTOLIC BLOOD PRESSURE: 76 MMHG | HEART RATE: 76 BPM | SYSTOLIC BLOOD PRESSURE: 132 MMHG

## 2025-03-19 DIAGNOSIS — Z09 HOSPITAL DISCHARGE FOLLOW-UP: Primary | ICD-10-CM

## 2025-03-19 DIAGNOSIS — I50.22 CHRONIC SYSTOLIC (CONGESTIVE) HEART FAILURE: ICD-10-CM

## 2025-03-19 DIAGNOSIS — K76.0 NAFLD (NONALCOHOLIC FATTY LIVER DISEASE): ICD-10-CM

## 2025-03-19 DIAGNOSIS — F32.A DEPRESSION, UNSPECIFIED DEPRESSION TYPE: ICD-10-CM

## 2025-03-19 DIAGNOSIS — M25.562 CHRONIC PAIN OF LEFT KNEE: ICD-10-CM

## 2025-03-19 DIAGNOSIS — S32.030A COMPRESSION FRACTURE OF L3 VERTEBRA, INITIAL ENCOUNTER (HCC): ICD-10-CM

## 2025-03-19 DIAGNOSIS — E03.9 HYPOTHYROIDISM, UNSPECIFIED TYPE: ICD-10-CM

## 2025-03-19 DIAGNOSIS — G89.29 CHRONIC PAIN OF LEFT KNEE: ICD-10-CM

## 2025-03-19 DIAGNOSIS — S32.030A CLOSED WEDGE COMPRESSION FRACTURE OF L3 VERTEBRA, INITIAL ENCOUNTER (HCC): ICD-10-CM

## 2025-03-19 DIAGNOSIS — E11.21 DIABETIC NEPHROPATHY ASSOCIATED WITH TYPE 2 DIABETES MELLITUS: ICD-10-CM

## 2025-03-19 DIAGNOSIS — E78.5 HYPERLIPIDEMIA, UNSPECIFIED HYPERLIPIDEMIA TYPE: ICD-10-CM

## 2025-03-19 DIAGNOSIS — E11.8 TYPE 2 DIABETES MELLITUS WITH COMPLICATION, WITHOUT LONG-TERM CURRENT USE OF INSULIN (HCC): ICD-10-CM

## 2025-03-19 DIAGNOSIS — K21.9 GASTROESOPHAGEAL REFLUX DISEASE, UNSPECIFIED WHETHER ESOPHAGITIS PRESENT: ICD-10-CM

## 2025-03-19 DIAGNOSIS — I10 ESSENTIAL HYPERTENSION: ICD-10-CM

## 2025-03-19 NOTE — PROGRESS NOTES
glucose monitor kit and supplies Glucose monitor and supplies, brand per pt preference. Test sugar daily. Dx: E11.9 1 kit 0    FISH OIL Take by mouth daily Arthur Red          Medications patient taking as of now reconciled against medications ordered at time of hospital discharge: Yes    A comprehensive review of systems was negative except for what was noted in the HPI.    Objective:    /76   Pulse 76   General Appearance: alert and oriented to person, place and time, well-developed and well-nourished, in no acute distress  Skin: warm and dry, no rash or erythema  Head: normocephalic and atraumatic  Eyes: conjunctivae normal  ENT: hearing grossly normal bilaterally  Neck: no thyromegaly   Pulmonary/Chest: clear to auscultation bilaterally- no wheezes, rales or rhonchi, normal air movement, no respiratory distress  Cardiovascular: normal rate, normal S1 and S2, no gallops, intact distal pulses, and no carotid bruits  Abdomen: soft, non-tender  Extremities: no cyanosis and no clubbing  Musculoskeletal: no swollen joints  Neurologic: no cranial nerve deficit and speech normal      An electronic signature was used to authenticate this note.  --JULIAN YA, APRN - CNP

## 2025-03-24 ENCOUNTER — TELEPHONE (OUTPATIENT)
Dept: ONCOLOGY | Age: 75
End: 2025-03-24

## 2025-03-24 NOTE — TELEPHONE ENCOUNTER
Her  called and said she had a very rough night last night recently had back surgery. Will reschedule after surgeon appt.

## 2025-04-07 ENCOUNTER — HOSPITAL ENCOUNTER (OUTPATIENT)
Dept: INFUSION THERAPY | Age: 75
Discharge: HOME | End: 2025-04-07
Payer: MEDICARE

## 2025-04-07 ENCOUNTER — OFFICE VISIT (OUTPATIENT)
Dept: ONCOLOGY | Age: 75
End: 2025-04-07
Payer: MEDICARE

## 2025-04-07 VITALS
DIASTOLIC BLOOD PRESSURE: 58 MMHG | RESPIRATION RATE: 16 BRPM | BODY MASS INDEX: 32.12 KG/M2 | WEIGHT: 163.6 LBS | HEIGHT: 60 IN | SYSTOLIC BLOOD PRESSURE: 119 MMHG | OXYGEN SATURATION: 96 % | TEMPERATURE: 98.1 F | HEART RATE: 56 BPM

## 2025-04-07 VITALS
OXYGEN SATURATION: 96 % | DIASTOLIC BLOOD PRESSURE: 58 MMHG | TEMPERATURE: 98.1 F | HEART RATE: 56 BPM | SYSTOLIC BLOOD PRESSURE: 119 MMHG | RESPIRATION RATE: 16 BRPM

## 2025-04-07 DIAGNOSIS — D69.6 THROMBOCYTOPENIA: ICD-10-CM

## 2025-04-07 DIAGNOSIS — D50.9 MICROCYTIC ANEMIA: Primary | ICD-10-CM

## 2025-04-07 DIAGNOSIS — D50.0 IRON DEFICIENCY ANEMIA DUE TO CHRONIC BLOOD LOSS: ICD-10-CM

## 2025-04-07 DIAGNOSIS — D50.9 MICROCYTIC ANEMIA: ICD-10-CM

## 2025-04-07 LAB
BASOPHILS ABSOLUTE: 0 THOU/MM3 (ref 0–0.1)
BASOPHILS NFR BLD AUTO: 1 % (ref 0–3)
EOSINOPHIL NFR BLD AUTO: 5 % (ref 0–4)
EOSINOPHILS ABSOLUTE: 0.2 THOU/MM3 (ref 0–0.4)
ERYTHROCYTE [DISTWIDTH] IN BLOOD BY AUTOMATED COUNT: 15.5 % (ref 11.5–14.5)
FERRITIN SERPL IA-MCNC: 209 NG/ML (ref 13–150)
FOLATE SERPL-MCNC: 4.9 NG/ML (ref 4.6–34.8)
HCT VFR BLD AUTO: 41.3 % (ref 37–47)
HGB BLD-MCNC: 13 GM/DL (ref 12–16)
IMMATURE GRANULOCYTES %: 0 %
IMMATURE GRANULOCYTES ABSOLUTE: 0.01 THOU/MM3 (ref 0–0.07)
IRON SATN MFR SERPL: 29 % (ref 20–50)
IRON SERPL-MCNC: 76 UG/DL (ref 37–145)
LYMPHOCYTES ABSOLUTE: 1.5 THOU/MM3 (ref 1–4.8)
LYMPHOCYTES NFR BLD AUTO: 37 % (ref 15–47)
MCH RBC QN AUTO: 29.4 PG (ref 26–33)
MCHC RBC AUTO-ENTMCNC: 31.5 GM/DL (ref 32.2–35.5)
MCV RBC AUTO: 93 FL (ref 81–99)
MONOCYTES ABSOLUTE: 0.6 THOU/MM3 (ref 0.4–1.3)
MONOCYTES NFR BLD AUTO: 15 % (ref 0–12)
NEUTROPHILS ABSOLUTE: 1.6 THOU/MM3 (ref 1.8–7.7)
NEUTROPHILS NFR BLD AUTO: 42 % (ref 43–75)
PLATELET # BLD AUTO: 85 THOU/MM3 (ref 130–400)
PMV BLD AUTO: 9.6 FL (ref 9.4–12.4)
RBC # BLD AUTO: 4.42 MILL/MM3 (ref 4.2–5.4)
TIBC SERPL-MCNC: 263 UG/DL (ref 171–450)
VIT B12 SERPL-MCNC: 595 PG/ML (ref 232–1245)
WBC # BLD AUTO: 3.9 THOU/MM3 (ref 4.8–10.8)

## 2025-04-07 PROCEDURE — 3078F DIAST BP <80 MM HG: CPT | Performed by: PHYSICIAN ASSISTANT

## 2025-04-07 PROCEDURE — 83550 IRON BINDING TEST: CPT

## 2025-04-07 PROCEDURE — 36415 COLL VENOUS BLD VENIPUNCTURE: CPT

## 2025-04-07 PROCEDURE — 83540 ASSAY OF IRON: CPT

## 2025-04-07 PROCEDURE — 1160F RVW MEDS BY RX/DR IN RCRD: CPT | Performed by: PHYSICIAN ASSISTANT

## 2025-04-07 PROCEDURE — 82746 ASSAY OF FOLIC ACID SERUM: CPT

## 2025-04-07 PROCEDURE — 1126F AMNT PAIN NOTED NONE PRSNT: CPT | Performed by: PHYSICIAN ASSISTANT

## 2025-04-07 PROCEDURE — 1111F DSCHRG MED/CURRENT MED MERGE: CPT | Performed by: PHYSICIAN ASSISTANT

## 2025-04-07 PROCEDURE — 99211 OFF/OP EST MAY X REQ PHY/QHP: CPT

## 2025-04-07 PROCEDURE — 99213 OFFICE O/P EST LOW 20 MIN: CPT | Performed by: PHYSICIAN ASSISTANT

## 2025-04-07 PROCEDURE — 82728 ASSAY OF FERRITIN: CPT

## 2025-04-07 PROCEDURE — 1159F MED LIST DOCD IN RCRD: CPT | Performed by: PHYSICIAN ASSISTANT

## 2025-04-07 PROCEDURE — G8417 CALC BMI ABV UP PARAM F/U: HCPCS | Performed by: PHYSICIAN ASSISTANT

## 2025-04-07 PROCEDURE — 1124F ACP DISCUSS-NO DSCNMKR DOCD: CPT | Performed by: PHYSICIAN ASSISTANT

## 2025-04-07 PROCEDURE — 3017F COLORECTAL CA SCREEN DOC REV: CPT | Performed by: PHYSICIAN ASSISTANT

## 2025-04-07 PROCEDURE — 3074F SYST BP LT 130 MM HG: CPT | Performed by: PHYSICIAN ASSISTANT

## 2025-04-07 PROCEDURE — G8427 DOCREV CUR MEDS BY ELIG CLIN: HCPCS | Performed by: PHYSICIAN ASSISTANT

## 2025-04-07 PROCEDURE — 85025 COMPLETE CBC W/AUTO DIFF WBC: CPT

## 2025-04-07 PROCEDURE — G8399 PT W/DXA RESULTS DOCUMENT: HCPCS | Performed by: PHYSICIAN ASSISTANT

## 2025-04-07 PROCEDURE — 1036F TOBACCO NON-USER: CPT | Performed by: PHYSICIAN ASSISTANT

## 2025-04-07 PROCEDURE — 1090F PRES/ABSN URINE INCON ASSESS: CPT | Performed by: PHYSICIAN ASSISTANT

## 2025-04-07 PROCEDURE — 82607 VITAMIN B-12: CPT

## 2025-04-07 NOTE — PROGRESS NOTES
for signs/symptoms of worsening anemia or blood loss              -Return to clinic in 12 weeks               -Labs on RTC     2. Iron Deficiency  Likely secondary to chronic blood loss. Treat as in #1.     3. Thrombocytopenia   Chronic since at least 2015 per EMR. Stable since 9/2023. Platelet count 85,000 on 4/7/25. Likely secondary to history of cirrhosis. Will continue to monitor.       RTC in 12 weeks.       All patient questions answered. Pt voiced understanding. Patient agreed with treatment plan. Follow up as directed. Patient instructed to call for questions or concerns.      Electronically signed by   Nereida Bauer PA-C

## 2025-05-03 DIAGNOSIS — E11.65 TYPE 2 DIABETES MELLITUS WITH HYPERGLYCEMIA, WITHOUT LONG-TERM CURRENT USE OF INSULIN (HCC): ICD-10-CM

## 2025-05-05 RX ORDER — PIOGLITAZONE 30 MG/1
30 TABLET ORAL DAILY
Qty: 90 TABLET | Refills: 3 | Status: SHIPPED | OUTPATIENT
Start: 2025-05-05

## 2025-05-05 NOTE — TELEPHONE ENCOUNTER
This medication refill is regarding a electronic request. Refill requested by patient.    Requested Prescriptions     Pending Prescriptions Disp Refills    pioglitazone (ACTOS) 30 MG tablet [Pharmacy Med Name: PIOGLITAZONE TABS 30MG]  0       Date of last visit: 3/19/2025   Date of next visit: 7/8/2025  Date of last refill: 6-25-24 #90/3    Rx verified, ordered and set to EP.

## 2025-07-03 ENCOUNTER — HOSPITAL ENCOUNTER (OUTPATIENT)
Age: 75
Discharge: HOME | End: 2025-07-03
Payer: MEDICARE

## 2025-07-03 DIAGNOSIS — E03.9 HYPOTHYROIDISM, UNSPECIFIED TYPE: ICD-10-CM

## 2025-07-03 DIAGNOSIS — E11.8 TYPE 2 DIABETES MELLITUS WITH COMPLICATION, WITHOUT LONG-TERM CURRENT USE OF INSULIN (HCC): ICD-10-CM

## 2025-07-03 DIAGNOSIS — I10 ESSENTIAL HYPERTENSION: ICD-10-CM

## 2025-07-03 LAB
ALBUMIN SERPL BCG-MCNC: 3.5 G/DL (ref 3.4–4.9)
ALP SERPL-CCNC: 100 U/L (ref 38–126)
ALT SERPL W/O P-5'-P-CCNC: 38 U/L (ref 10–35)
ANION GAP SERPL CALC-SCNC: 11 MEQ/L (ref 8–16)
AST SERPL-CCNC: 45 U/L (ref 10–35)
BILIRUB SERPL-MCNC: 0.7 MG/DL (ref 0.3–1.2)
BUN SERPL-MCNC: 18 MG/DL (ref 8–23)
CALCIUM SERPL-MCNC: 9.3 MG/DL (ref 8.8–10.2)
CHLORIDE SERPL-SCNC: 104 MEQ/L (ref 98–111)
CO2 SERPL-SCNC: 24 MEQ/L (ref 22–29)
CREAT SERPL-MCNC: 0.7 MG/DL (ref 0.5–0.9)
DEPRECATED MEAN GLUCOSE BLD GHB EST-ACNC: 138 MG/DL (ref 70–126)
GFR SERPL CREATININE-BSD FRML MDRD: 90 ML/MIN/1.73M2
GLUCOSE SERPL-MCNC: 137 MG/DL (ref 74–109)
HBA1C MFR BLD HPLC: 6.6 % (ref 4–6)
POTASSIUM SERPL-SCNC: 4.2 MEQ/L (ref 3.5–5.2)
PROT SERPL-MCNC: 6.7 G/DL (ref 6.4–8.3)
SODIUM SERPL-SCNC: 139 MEQ/L (ref 135–145)
T4 FREE SERPL-MCNC: 1.1 NG/DL (ref 0.92–1.68)
TSH SERPL DL<=0.05 MIU/L-ACNC: 2.16 UIU/ML (ref 0.27–4.2)

## 2025-07-03 PROCEDURE — 36415 COLL VENOUS BLD VENIPUNCTURE: CPT

## 2025-07-03 PROCEDURE — 84439 ASSAY OF FREE THYROXINE: CPT

## 2025-07-03 PROCEDURE — 83036 HEMOGLOBIN GLYCOSYLATED A1C: CPT

## 2025-07-03 PROCEDURE — 84443 ASSAY THYROID STIM HORMONE: CPT

## 2025-07-03 PROCEDURE — 80053 COMPREHEN METABOLIC PANEL: CPT

## 2025-07-06 ENCOUNTER — RESULTS FOLLOW-UP (OUTPATIENT)
Dept: FAMILY MEDICINE CLINIC | Age: 75
End: 2025-07-06

## 2025-07-08 ENCOUNTER — OFFICE VISIT (OUTPATIENT)
Dept: FAMILY MEDICINE CLINIC | Age: 75
End: 2025-07-08
Payer: MEDICARE

## 2025-07-08 VITALS
RESPIRATION RATE: 16 BRPM | HEART RATE: 88 BPM | WEIGHT: 167.4 LBS | DIASTOLIC BLOOD PRESSURE: 82 MMHG | SYSTOLIC BLOOD PRESSURE: 128 MMHG | HEIGHT: 60 IN | BODY MASS INDEX: 32.86 KG/M2 | TEMPERATURE: 97.6 F

## 2025-07-08 DIAGNOSIS — K21.9 GASTROESOPHAGEAL REFLUX DISEASE, UNSPECIFIED WHETHER ESOPHAGITIS PRESENT: ICD-10-CM

## 2025-07-08 DIAGNOSIS — E11.21 DIABETIC NEPHROPATHY ASSOCIATED WITH TYPE 2 DIABETES MELLITUS (HCC): ICD-10-CM

## 2025-07-08 DIAGNOSIS — E03.9 HYPOTHYROIDISM, UNSPECIFIED TYPE: ICD-10-CM

## 2025-07-08 DIAGNOSIS — Z00.00 MEDICARE ANNUAL WELLNESS VISIT, SUBSEQUENT: Primary | ICD-10-CM

## 2025-07-08 DIAGNOSIS — E11.8 TYPE 2 DIABETES MELLITUS WITH COMPLICATION, WITHOUT LONG-TERM CURRENT USE OF INSULIN (HCC): ICD-10-CM

## 2025-07-08 DIAGNOSIS — I50.22 CHRONIC SYSTOLIC (CONGESTIVE) HEART FAILURE (HCC): ICD-10-CM

## 2025-07-08 DIAGNOSIS — E78.5 HYPERLIPIDEMIA, UNSPECIFIED HYPERLIPIDEMIA TYPE: ICD-10-CM

## 2025-07-08 DIAGNOSIS — I10 ESSENTIAL HYPERTENSION: ICD-10-CM

## 2025-07-08 DIAGNOSIS — F32.A DEPRESSION, UNSPECIFIED DEPRESSION TYPE: ICD-10-CM

## 2025-07-08 DIAGNOSIS — K76.0 NAFLD (NONALCOHOLIC FATTY LIVER DISEASE): ICD-10-CM

## 2025-07-08 PROCEDURE — G8417 CALC BMI ABV UP PARAM F/U: HCPCS | Performed by: NURSE PRACTITIONER

## 2025-07-08 PROCEDURE — G8399 PT W/DXA RESULTS DOCUMENT: HCPCS | Performed by: NURSE PRACTITIONER

## 2025-07-08 PROCEDURE — 1124F ACP DISCUSS-NO DSCNMKR DOCD: CPT | Performed by: NURSE PRACTITIONER

## 2025-07-08 PROCEDURE — G0439 PPPS, SUBSEQ VISIT: HCPCS | Performed by: NURSE PRACTITIONER

## 2025-07-08 PROCEDURE — 1160F RVW MEDS BY RX/DR IN RCRD: CPT | Performed by: NURSE PRACTITIONER

## 2025-07-08 PROCEDURE — 1036F TOBACCO NON-USER: CPT | Performed by: NURSE PRACTITIONER

## 2025-07-08 PROCEDURE — 1090F PRES/ABSN URINE INCON ASSESS: CPT | Performed by: NURSE PRACTITIONER

## 2025-07-08 PROCEDURE — 2022F DILAT RTA XM EVC RTNOPTHY: CPT | Performed by: NURSE PRACTITIONER

## 2025-07-08 PROCEDURE — 99214 OFFICE O/P EST MOD 30 MIN: CPT | Performed by: NURSE PRACTITIONER

## 2025-07-08 PROCEDURE — G8427 DOCREV CUR MEDS BY ELIG CLIN: HCPCS | Performed by: NURSE PRACTITIONER

## 2025-07-08 PROCEDURE — 1159F MED LIST DOCD IN RCRD: CPT | Performed by: NURSE PRACTITIONER

## 2025-07-08 PROCEDURE — 3074F SYST BP LT 130 MM HG: CPT | Performed by: NURSE PRACTITIONER

## 2025-07-08 PROCEDURE — 3044F HG A1C LEVEL LT 7.0%: CPT | Performed by: NURSE PRACTITIONER

## 2025-07-08 PROCEDURE — 3079F DIAST BP 80-89 MM HG: CPT | Performed by: NURSE PRACTITIONER

## 2025-07-08 PROCEDURE — 3017F COLORECTAL CA SCREEN DOC REV: CPT | Performed by: NURSE PRACTITIONER

## 2025-07-08 ASSESSMENT — PATIENT HEALTH QUESTIONNAIRE - PHQ9
SUM OF ALL RESPONSES TO PHQ QUESTIONS 1-9: 0
SUM OF ALL RESPONSES TO PHQ QUESTIONS 1-9: 0
3. TROUBLE FALLING OR STAYING ASLEEP: NOT AT ALL
1. LITTLE INTEREST OR PLEASURE IN DOING THINGS: NOT AT ALL
10. IF YOU CHECKED OFF ANY PROBLEMS, HOW DIFFICULT HAVE THESE PROBLEMS MADE IT FOR YOU TO DO YOUR WORK, TAKE CARE OF THINGS AT HOME, OR GET ALONG WITH OTHER PEOPLE: NOT DIFFICULT AT ALL
7. TROUBLE CONCENTRATING ON THINGS, SUCH AS READING THE NEWSPAPER OR WATCHING TELEVISION: NOT AT ALL
5. POOR APPETITE OR OVEREATING: NOT AT ALL
6. FEELING BAD ABOUT YOURSELF - OR THAT YOU ARE A FAILURE OR HAVE LET YOURSELF OR YOUR FAMILY DOWN: NOT AT ALL
4. FEELING TIRED OR HAVING LITTLE ENERGY: NOT AT ALL
8. MOVING OR SPEAKING SO SLOWLY THAT OTHER PEOPLE COULD HAVE NOTICED. OR THE OPPOSITE, BEING SO FIGETY OR RESTLESS THAT YOU HAVE BEEN MOVING AROUND A LOT MORE THAN USUAL: NOT AT ALL
SUM OF ALL RESPONSES TO PHQ QUESTIONS 1-9: 0
SUM OF ALL RESPONSES TO PHQ QUESTIONS 1-9: 0
2. FEELING DOWN, DEPRESSED OR HOPELESS: NOT AT ALL
9. THOUGHTS THAT YOU WOULD BE BETTER OFF DEAD, OR OF HURTING YOURSELF: NOT AT ALL

## 2025-07-08 NOTE — PATIENT INSTRUCTIONS
[employer], [facility], disclaims any warranty or liability for your use of this information.    Personalized Preventive Plan for Kim Amaya - 7/8/2025  Medicare offers a range of preventive health benefits. Some of the tests and screenings are paid in full while other may be subject to a deductible, co-insurance, and/or copay.  Some of these benefits include a comprehensive review of your medical history including lifestyle, illnesses that may run in your family, and various assessments and screenings as appropriate.  After reviewing your medical record and screening and assessments performed today your provider may have ordered immunizations, labs, imaging, and/or referrals for you.  A list of these orders (if applicable) as well as your Preventive Care list are included within your After Visit Summary for your review.

## 2025-07-08 NOTE — PROGRESS NOTES
Health Maintenance Due   Topic Date Due    Hepatitis A vaccine (1 of 2 - Risk 2-dose series) Never done    Hepatitis B vaccine (1 of 3 - Risk 3-dose series) Never done    Respiratory Syncytial Virus (RSV) Pregnant or age 60 yrs+ (1 - Risk 60-74 years 1-dose series) Never done    Shingles vaccine (2 of 3) 06/21/2012    Breast cancer screen  05/14/2020    Diabetic foot exam  10/12/2022    COVID-19 Vaccine (7 - 2024-25 season) 09/01/2024    DTaP/Tdap/Td vaccine (2 - Td or Tdap) 09/19/2024    Annual Wellness Visit (Medicare)  06/26/2025

## 2025-07-08 NOTE — PROGRESS NOTES
[facility]  [place] [id number] [facility]  [address]  [facility] [address]  Dept: [phone]  Dept Fax: [phone]  Loc: [phone]      Medicare Annual Wellness Visit    Kim Amaya is here for Medicare AWV and Discuss Labs    Assessment & Plan   Medicare annual wellness visit, subsequent  Hyperlipidemia, unspecified hyperlipidemia type  -     Lipid Panel; Future  Chronic systolic (congestive) heart failure (HCC)  Diabetic nephropathy associated with type 2 diabetes mellitus (HCC)  Depression, unspecified depression type  Type 2 diabetes mellitus with complication, without long-term current use of insulin (HCC)  -     Albumin/Creatinine Ratio, Urine; Future  -     Hemoglobin A1C; Future  -     Comprehensive Metabolic Panel; Future  Essential hypertension  NAFLD (nonalcoholic fatty liver disease)  Gastroesophageal reflux disease, unspecified whether esophagitis present  Hypothyroidism, unspecified type    - Work on diet and exercise  - Repeat labs in 6 months.  - Follow up with cardiology and hematology as planned.   - Call office with any questions or concerns, or if symptoms are getting worse or changing       Return in about 6 months (around 1/8/2026) for Routine follow up, Medication check.     Subjective   The following acute and/or chronic problems were also addressed today:    Pt presents to the office today for 6 month follow up on chronic conditions. Still recovering from hip and back pain from earlier this year, and that is going well.       - Pt follows up with Dr Contreras for cardiology and [facility].    Treatment Adherence:   Medication compliance:  compliant all of the time  Diet compliance:  compliant most of the time  Weight trend: stable     Diabetes Mellitus Type 2: Current symptoms/problems include none. Started taking supplements of cinnamon.      Home blood sugar records: trend: stable  Any episodes of hypoglycemia? no  Eye exam

## 2025-07-14 ENCOUNTER — HOSPITAL ENCOUNTER (OUTPATIENT)
Dept: INFUSION THERAPY | Age: 75
Discharge: HOME | End: 2025-07-14
Payer: MEDICARE

## 2025-07-14 ENCOUNTER — OFFICE VISIT (OUTPATIENT)
Dept: ONCOLOGY | Age: 75
End: 2025-07-14
Payer: MEDICARE

## 2025-07-14 VITALS
DIASTOLIC BLOOD PRESSURE: 67 MMHG | SYSTOLIC BLOOD PRESSURE: 154 MMHG | RESPIRATION RATE: 20 BRPM | TEMPERATURE: 98 F | OXYGEN SATURATION: 98 % | HEART RATE: 53 BPM

## 2025-07-14 VITALS
OXYGEN SATURATION: 98 % | DIASTOLIC BLOOD PRESSURE: 67 MMHG | HEART RATE: 53 BPM | SYSTOLIC BLOOD PRESSURE: 154 MMHG | BODY MASS INDEX: 33.1 KG/M2 | RESPIRATION RATE: 20 BRPM | TEMPERATURE: 98 F | HEIGHT: 60 IN | WEIGHT: 168.6 LBS

## 2025-07-14 DIAGNOSIS — D61.818 PANCYTOPENIA (HCC): ICD-10-CM

## 2025-07-14 DIAGNOSIS — D50.0 IRON DEFICIENCY ANEMIA DUE TO CHRONIC BLOOD LOSS: ICD-10-CM

## 2025-07-14 DIAGNOSIS — D50.9 MICROCYTIC ANEMIA: ICD-10-CM

## 2025-07-14 DIAGNOSIS — D61.818 PANCYTOPENIA (HCC): Primary | ICD-10-CM

## 2025-07-14 DIAGNOSIS — D69.6 THROMBOCYTOPENIA: ICD-10-CM

## 2025-07-14 LAB
BASOPHILS ABSOLUTE: 0 THOU/MM3 (ref 0–0.1)
BASOPHILS NFR BLD AUTO: 1 % (ref 0–3)
EOSINOPHIL NFR BLD AUTO: 9 % (ref 0–4)
EOSINOPHILS ABSOLUTE: 0.3 THOU/MM3 (ref 0–0.4)
ERYTHROCYTE [DISTWIDTH] IN BLOOD BY AUTOMATED COUNT: 15.6 % (ref 11.5–14.5)
FERRITIN SERPL IA-MCNC: 59 NG/ML (ref 13–150)
FOLATE SERPL-MCNC: 6.8 NG/ML (ref 4.6–34.8)
HCT VFR BLD AUTO: 35.8 % (ref 37–47)
HGB BLD-MCNC: 11.3 GM/DL (ref 12–16)
IMMATURE GRANULOCYTES %: 0 %
IMMATURE GRANULOCYTES ABSOLUTE: 0 THOU/MM3 (ref 0–0.07)
IRON SATN MFR SERPL: 23 % (ref 20–50)
IRON SERPL-MCNC: 79 UG/DL (ref 37–145)
LDH SERPL L TO P-CCNC: 207 U/L (ref 135–214)
LYMPHOCYTES ABSOLUTE: 1.3 THOU/MM3 (ref 1–4.8)
LYMPHOCYTES NFR BLD AUTO: 41 % (ref 15–47)
MCH RBC QN AUTO: 30.6 PG (ref 26–33)
MCHC RBC AUTO-ENTMCNC: 31.6 GM/DL (ref 32.2–35.5)
MCV RBC AUTO: 97 FL (ref 81–99)
MONOCYTES ABSOLUTE: 0.4 THOU/MM3 (ref 0.4–1.3)
MONOCYTES NFR BLD AUTO: 11 % (ref 0–12)
NEUTROPHILS ABSOLUTE: 1.2 THOU/MM3 (ref 1.8–7.7)
NEUTROPHILS NFR BLD AUTO: 39 % (ref 43–75)
PLATELET # BLD AUTO: 60 THOU/MM3 (ref 130–400)
PMV BLD AUTO: 8.4 FL (ref 9.4–12.4)
RBC # BLD AUTO: 3.69 MILL/MM3 (ref 4.2–5.4)
TIBC SERPL-MCNC: 337 UG/DL (ref 171–450)
VIT B12 SERPL-MCNC: 544 PG/ML (ref 232–1245)
WBC # BLD AUTO: 3.1 THOU/MM3 (ref 4.8–10.8)

## 2025-07-14 PROCEDURE — G8399 PT W/DXA RESULTS DOCUMENT: HCPCS | Performed by: PHYSICIAN ASSISTANT

## 2025-07-14 PROCEDURE — 1090F PRES/ABSN URINE INCON ASSESS: CPT | Performed by: PHYSICIAN ASSISTANT

## 2025-07-14 PROCEDURE — 1036F TOBACCO NON-USER: CPT | Performed by: PHYSICIAN ASSISTANT

## 2025-07-14 PROCEDURE — 36415 COLL VENOUS BLD VENIPUNCTURE: CPT

## 2025-07-14 PROCEDURE — 1126F AMNT PAIN NOTED NONE PRSNT: CPT | Performed by: PHYSICIAN ASSISTANT

## 2025-07-14 PROCEDURE — 3017F COLORECTAL CA SCREEN DOC REV: CPT | Performed by: PHYSICIAN ASSISTANT

## 2025-07-14 PROCEDURE — 82746 ASSAY OF FOLIC ACID SERUM: CPT

## 2025-07-14 PROCEDURE — G8428 CUR MEDS NOT DOCUMENT: HCPCS | Performed by: PHYSICIAN ASSISTANT

## 2025-07-14 PROCEDURE — 99211 OFF/OP EST MAY X REQ PHY/QHP: CPT

## 2025-07-14 PROCEDURE — 82607 VITAMIN B-12: CPT

## 2025-07-14 PROCEDURE — 3078F DIAST BP <80 MM HG: CPT | Performed by: PHYSICIAN ASSISTANT

## 2025-07-14 PROCEDURE — 83540 ASSAY OF IRON: CPT

## 2025-07-14 PROCEDURE — 1124F ACP DISCUSS-NO DSCNMKR DOCD: CPT | Performed by: PHYSICIAN ASSISTANT

## 2025-07-14 PROCEDURE — 3077F SYST BP >= 140 MM HG: CPT | Performed by: PHYSICIAN ASSISTANT

## 2025-07-14 PROCEDURE — 99214 OFFICE O/P EST MOD 30 MIN: CPT | Performed by: PHYSICIAN ASSISTANT

## 2025-07-14 PROCEDURE — 82525 ASSAY OF COPPER: CPT

## 2025-07-14 PROCEDURE — 82728 ASSAY OF FERRITIN: CPT

## 2025-07-14 PROCEDURE — 83550 IRON BINDING TEST: CPT

## 2025-07-14 PROCEDURE — G8417 CALC BMI ABV UP PARAM F/U: HCPCS | Performed by: PHYSICIAN ASSISTANT

## 2025-07-14 PROCEDURE — 83615 LACTATE (LD) (LDH) ENZYME: CPT

## 2025-07-14 PROCEDURE — 84630 ASSAY OF ZINC: CPT

## 2025-07-14 PROCEDURE — 85025 COMPLETE CBC W/AUTO DIFF WBC: CPT

## 2025-07-14 NOTE — PROGRESS NOTES
anemia or blood loss              -Return to clinic in 4 weeks               -Labs on RTC     2. Iron Deficiency  Likely secondary to chronic blood loss. Treat as in #1.     3. Thrombocytopenia   Chronic since at least 2015 per EMR. Stable since 9/2023. Likely secondary to history of cirrhosis. Will continue to monitor. Platelets 60,000 on 7/14/25, baseline 80,000 to 100,000.  Labs added.    4. Leukopenia  With neutropenia. WBC count 3.1, ANC 1209.  No prior neutropenia per review of EMR.  ANC 1600 in April 2025 following a UTI.  Denies recent colds or infections.  Denies development of B type symptoms.  Denies medication changes or vaccinations.  Will obtain additional labs with folate, vitamin B12, copper, zinc, path smear review, LDH.  Will trend white blood cell count and return to clinic in 4 weeks.  Discussed if neutropenia persistent will obtain further labs.       RTC in 4 weeks.       All patient questions answered. Pt voiced understanding. Patient agreed with treatment plan. Follow up as directed. Patient instructed to call for questions or concerns.      Electronically signed by   Nereida Bauer PA-C

## 2025-07-14 NOTE — PATIENT INSTRUCTIONS
Will obtain additional labs today, will call with results  RTC in 4 weeks  Labs on RTC  Please call for questions or concerns.

## 2025-07-15 LAB
REVIEWED BY: NORMAL
SMEAR REVIEW: NORMAL

## 2025-07-17 LAB
COPPER SERPL-MCNC: 112.7 UG/DL (ref 80–155)
ZINC SERPL-MCNC: 57.9 UG/DL (ref 60–120)

## 2025-08-12 ENCOUNTER — HOSPITAL ENCOUNTER (OUTPATIENT)
Dept: INFUSION THERAPY | Age: 75
Discharge: HOME | End: 2025-08-12
Payer: MEDICARE

## 2025-08-12 ENCOUNTER — OFFICE VISIT (OUTPATIENT)
Dept: ONCOLOGY | Age: 75
End: 2025-08-12
Payer: MEDICARE

## 2025-08-12 VITALS
TEMPERATURE: 98.6 F | WEIGHT: 170.2 LBS | RESPIRATION RATE: 16 BRPM | HEART RATE: 60 BPM | BODY MASS INDEX: 33.41 KG/M2 | DIASTOLIC BLOOD PRESSURE: 68 MMHG | HEIGHT: 60 IN | OXYGEN SATURATION: 98 % | SYSTOLIC BLOOD PRESSURE: 153 MMHG

## 2025-08-12 VITALS
OXYGEN SATURATION: 98 % | RESPIRATION RATE: 16 BRPM | HEART RATE: 60 BPM | TEMPERATURE: 98.6 F | DIASTOLIC BLOOD PRESSURE: 68 MMHG | SYSTOLIC BLOOD PRESSURE: 153 MMHG

## 2025-08-12 DIAGNOSIS — D69.6 THROMBOCYTOPENIA: ICD-10-CM

## 2025-08-12 DIAGNOSIS — D50.0 IRON DEFICIENCY ANEMIA DUE TO CHRONIC BLOOD LOSS: ICD-10-CM

## 2025-08-12 DIAGNOSIS — D61.818 PANCYTOPENIA (HCC): Primary | ICD-10-CM

## 2025-08-12 DIAGNOSIS — D61.818 PANCYTOPENIA (HCC): ICD-10-CM

## 2025-08-12 DIAGNOSIS — D50.9 MICROCYTIC ANEMIA: ICD-10-CM

## 2025-08-12 LAB
BASOPHILS ABSOLUTE: 0 THOU/MM3 (ref 0–0.1)
BASOPHILS NFR BLD AUTO: 0 % (ref 0–3)
EOSINOPHIL NFR BLD AUTO: 4 % (ref 0–4)
EOSINOPHILS ABSOLUTE: 0.1 THOU/MM3 (ref 0–0.4)
ERYTHROCYTE [DISTWIDTH] IN BLOOD BY AUTOMATED COUNT: 14.8 % (ref 11.5–14.5)
HCT VFR BLD AUTO: 34.7 % (ref 37–47)
HGB BLD-MCNC: 11 GM/DL (ref 12–16)
IMMATURE GRANULOCYTES %: 0 %
IMMATURE GRANULOCYTES ABSOLUTE: 0.01 THOU/MM3 (ref 0–0.07)
LYMPHOCYTES ABSOLUTE: 1.5 THOU/MM3 (ref 1–4.8)
LYMPHOCYTES NFR BLD AUTO: 39 % (ref 15–47)
MCH RBC QN AUTO: 30.6 PG (ref 26–33)
MCHC RBC AUTO-ENTMCNC: 31.7 GM/DL (ref 32.2–35.5)
MCV RBC AUTO: 96 FL (ref 81–99)
MONOCYTES ABSOLUTE: 0.5 THOU/MM3 (ref 0.4–1.3)
MONOCYTES NFR BLD AUTO: 13 % (ref 0–12)
NEUTROPHILS ABSOLUTE: 1.6 THOU/MM3 (ref 1.8–7.7)
NEUTROPHILS NFR BLD AUTO: 43 % (ref 43–75)
PLATELET # BLD AUTO: 73 THOU/MM3 (ref 130–400)
PMV BLD AUTO: 10 FL (ref 9.4–12.4)
RBC # BLD AUTO: 3.6 MILL/MM3 (ref 4.2–5.4)
WBC # BLD AUTO: 3.7 THOU/MM3 (ref 4.8–10.8)

## 2025-08-12 PROCEDURE — 3078F DIAST BP <80 MM HG: CPT | Performed by: PHYSICIAN ASSISTANT

## 2025-08-12 PROCEDURE — 3017F COLORECTAL CA SCREEN DOC REV: CPT | Performed by: PHYSICIAN ASSISTANT

## 2025-08-12 PROCEDURE — 1126F AMNT PAIN NOTED NONE PRSNT: CPT | Performed by: PHYSICIAN ASSISTANT

## 2025-08-12 PROCEDURE — 85025 COMPLETE CBC W/AUTO DIFF WBC: CPT

## 2025-08-12 PROCEDURE — G8427 DOCREV CUR MEDS BY ELIG CLIN: HCPCS | Performed by: PHYSICIAN ASSISTANT

## 2025-08-12 PROCEDURE — 1160F RVW MEDS BY RX/DR IN RCRD: CPT | Performed by: PHYSICIAN ASSISTANT

## 2025-08-12 PROCEDURE — 99211 OFF/OP EST MAY X REQ PHY/QHP: CPT

## 2025-08-12 PROCEDURE — G8399 PT W/DXA RESULTS DOCUMENT: HCPCS | Performed by: PHYSICIAN ASSISTANT

## 2025-08-12 PROCEDURE — 1159F MED LIST DOCD IN RCRD: CPT | Performed by: PHYSICIAN ASSISTANT

## 2025-08-12 PROCEDURE — 1124F ACP DISCUSS-NO DSCNMKR DOCD: CPT | Performed by: PHYSICIAN ASSISTANT

## 2025-08-12 PROCEDURE — 99214 OFFICE O/P EST MOD 30 MIN: CPT | Performed by: PHYSICIAN ASSISTANT

## 2025-08-12 PROCEDURE — 1036F TOBACCO NON-USER: CPT | Performed by: PHYSICIAN ASSISTANT

## 2025-08-12 PROCEDURE — 1090F PRES/ABSN URINE INCON ASSESS: CPT | Performed by: PHYSICIAN ASSISTANT

## 2025-08-12 PROCEDURE — 36415 COLL VENOUS BLD VENIPUNCTURE: CPT

## 2025-08-12 PROCEDURE — 3077F SYST BP >= 140 MM HG: CPT | Performed by: PHYSICIAN ASSISTANT

## 2025-08-12 PROCEDURE — G8417 CALC BMI ABV UP PARAM F/U: HCPCS | Performed by: PHYSICIAN ASSISTANT

## (undated) DEVICE — TUBING IV STOPCOCK 48 CM 3 W

## (undated) DEVICE — SET LNR RED GRN W/ BASE CLEANASCOPE

## (undated) DEVICE — FORCEPS BX L L240CM DIA2.4MM RAD JAW 4 HOT FOR POLYP DISP

## (undated) DEVICE — IV START KIT: Brand: MEDLINE INDUSTRIES, INC.

## (undated) DEVICE — SET ADMIN 25ML L117IN PMP MOD CK VLV RLER CLMP 2 SMRTSITE

## (undated) DEVICE — LIGATOR ENDOSCP DIA8.6-11.5MM MULT DISP SPDBND LIGATOR SUP

## (undated) DEVICE — FORCEP RAD JAW W/NEEDLE 160CM

## (undated) DEVICE — CONMED SCOPE SAVER BITE BLOCK, 20X27 MM: Brand: SCOPE SAVER

## (undated) DEVICE — CATHETER ETER IV 22GA L1IN POLYUR STR RADPQ INTROCAN SFTY

## (undated) DEVICE — SNARE POLYP SM W13MMXL240CM SHTH DIA2.4MM OVL FLX DISP

## (undated) DEVICE — TRAP POLYP ETRAP

## (undated) DEVICE — ENDO KIT: Brand: MEDLINE INDUSTRIES, INC.

## (undated) DEVICE — SOLUTION IV 1000ML 0.45% SOD CHL PH 5 INJ USP VIAFLX PLAS

## (undated) DEVICE — SUREFIT, DUAL DISPERSIVE ELECTRODE, CONTACT QUALITY MONITOR: Brand: SUREFIT

## (undated) DEVICE — FORCEPS BX L240CM JAW DIA3.2MM L CAP W/ NDL MIC MESH TOOTH

## (undated) DEVICE — CANISTER, RIGID, 2000CC: Brand: MEDLINE INDUSTRIES, INC.